# Patient Record
Sex: FEMALE | Race: WHITE | NOT HISPANIC OR LATINO | Employment: OTHER | URBAN - NONMETROPOLITAN AREA
[De-identification: names, ages, dates, MRNs, and addresses within clinical notes are randomized per-mention and may not be internally consistent; named-entity substitution may affect disease eponyms.]

---

## 2017-01-01 ENCOUNTER — OFFICE VISIT (OUTPATIENT)
Dept: CARDIAC SURGERY | Facility: CLINIC | Age: 67
End: 2017-01-01

## 2017-01-01 ENCOUNTER — LAB (OUTPATIENT)
Dept: ONCOLOGY | Facility: HOSPITAL | Age: 67
End: 2017-01-01

## 2017-01-01 ENCOUNTER — HOSPITAL ENCOUNTER (INPATIENT)
Facility: HOSPITAL | Age: 67
LOS: 4 days | Discharge: HOME | End: 2017-11-25
Attending: EMERGENCY MEDICINE | Admitting: INTERNAL MEDICINE

## 2017-01-01 ENCOUNTER — HOSPITAL ENCOUNTER (EMERGENCY)
Facility: HOSPITAL | Age: 67
Discharge: HOME | End: 2017-08-24
Attending: EMERGENCY MEDICINE | Admitting: EMERGENCY MEDICINE

## 2017-01-01 ENCOUNTER — TELEPHONE (OUTPATIENT)
Dept: ONCOLOGY | Facility: CLINIC | Age: 67
End: 2017-01-01

## 2017-01-01 ENCOUNTER — HOSPITAL ENCOUNTER (OUTPATIENT)
Dept: CT IMAGING | Facility: HOSPITAL | Age: 67
Discharge: HOME | End: 2017-09-08
Attending: INTERNAL MEDICINE | Admitting: INTERNAL MEDICINE

## 2017-01-01 ENCOUNTER — APPOINTMENT (OUTPATIENT)
Dept: CARDIOLOGY | Facility: HOSPITAL | Age: 67
End: 2017-01-01

## 2017-01-01 ENCOUNTER — APPOINTMENT (OUTPATIENT)
Dept: GENERAL RADIOLOGY | Facility: HOSPITAL | Age: 67
End: 2017-01-01

## 2017-01-01 ENCOUNTER — APPOINTMENT (OUTPATIENT)
Dept: ONCOLOGY | Facility: CLINIC | Age: 67
End: 2017-01-01

## 2017-01-01 ENCOUNTER — OFFICE VISIT (OUTPATIENT)
Dept: FAMILY MEDICINE CLINIC | Facility: CLINIC | Age: 67
End: 2017-01-01

## 2017-01-01 ENCOUNTER — OFFICE VISIT (OUTPATIENT)
Dept: ONCOLOGY | Facility: CLINIC | Age: 67
End: 2017-01-01

## 2017-01-01 ENCOUNTER — APPOINTMENT (OUTPATIENT)
Dept: CT IMAGING | Facility: HOSPITAL | Age: 67
End: 2017-01-01

## 2017-01-01 ENCOUNTER — APPOINTMENT (OUTPATIENT)
Dept: RADIATION ONCOLOGY | Facility: HOSPITAL | Age: 67
End: 2017-01-01

## 2017-01-01 ENCOUNTER — APPOINTMENT (OUTPATIENT)
Dept: NUCLEAR MEDICINE | Facility: HOSPITAL | Age: 67
End: 2017-01-01

## 2017-01-01 ENCOUNTER — LAB (OUTPATIENT)
Dept: LAB | Facility: OTHER | Age: 67
End: 2017-01-01

## 2017-01-01 ENCOUNTER — APPOINTMENT (OUTPATIENT)
Dept: CT IMAGING | Facility: HOSPITAL | Age: 67
End: 2017-01-01
Attending: INTERNAL MEDICINE

## 2017-01-01 ENCOUNTER — INFUSION (OUTPATIENT)
Dept: ONCOLOGY | Facility: HOSPITAL | Age: 67
End: 2017-01-01

## 2017-01-01 ENCOUNTER — APPOINTMENT (OUTPATIENT)
Dept: ULTRASOUND IMAGING | Facility: HOSPITAL | Age: 67
End: 2017-01-01

## 2017-01-01 ENCOUNTER — TELEPHONE (OUTPATIENT)
Dept: ONCOLOGY | Facility: HOSPITAL | Age: 67
End: 2017-01-01

## 2017-01-01 ENCOUNTER — DOCUMENTATION (OUTPATIENT)
Dept: ONCOLOGY | Facility: HOSPITAL | Age: 67
End: 2017-01-01

## 2017-01-01 ENCOUNTER — APPOINTMENT (OUTPATIENT)
Dept: ONCOLOGY | Facility: HOSPITAL | Age: 67
End: 2017-01-01

## 2017-01-01 ENCOUNTER — HOSPITAL ENCOUNTER (INPATIENT)
Facility: HOSPITAL | Age: 67
LOS: 2 days | Discharge: HOME | End: 2017-08-05
Attending: EMERGENCY MEDICINE | Admitting: FAMILY MEDICINE

## 2017-01-01 VITALS
BODY MASS INDEX: 35.13 KG/M2 | SYSTOLIC BLOOD PRESSURE: 138 MMHG | RESPIRATION RATE: 18 BRPM | RESPIRATION RATE: 18 BRPM | HEIGHT: 62 IN | SYSTOLIC BLOOD PRESSURE: 144 MMHG | TEMPERATURE: 96.9 F | BODY MASS INDEX: 35.12 KG/M2 | DIASTOLIC BLOOD PRESSURE: 75 MMHG | HEART RATE: 65 BPM | WEIGHT: 190.92 LBS | TEMPERATURE: 98.6 F | HEART RATE: 86 BPM | DIASTOLIC BLOOD PRESSURE: 76 MMHG | WEIGHT: 192 LBS

## 2017-01-01 VITALS
WEIGHT: 190.8 LBS | SYSTOLIC BLOOD PRESSURE: 130 MMHG | HEIGHT: 62 IN | DIASTOLIC BLOOD PRESSURE: 74 MMHG | HEART RATE: 64 BPM | BODY MASS INDEX: 35.11 KG/M2 | OXYGEN SATURATION: 96 %

## 2017-01-01 VITALS
DIASTOLIC BLOOD PRESSURE: 74 MMHG | OXYGEN SATURATION: 97 % | WEIGHT: 193.8 LBS | TEMPERATURE: 97.6 F | RESPIRATION RATE: 18 BRPM | SYSTOLIC BLOOD PRESSURE: 155 MMHG | HEART RATE: 68 BPM | BODY MASS INDEX: 35.66 KG/M2 | HEIGHT: 62 IN

## 2017-01-01 VITALS
WEIGHT: 187.13 LBS | OXYGEN SATURATION: 98 % | SYSTOLIC BLOOD PRESSURE: 158 MMHG | DIASTOLIC BLOOD PRESSURE: 82 MMHG | HEIGHT: 62 IN | BODY MASS INDEX: 34.44 KG/M2 | TEMPERATURE: 97.4 F | HEART RATE: 98 BPM

## 2017-01-01 VITALS
DIASTOLIC BLOOD PRESSURE: 73 MMHG | TEMPERATURE: 97.5 F | WEIGHT: 187 LBS | HEIGHT: 62 IN | RESPIRATION RATE: 18 BRPM | BODY MASS INDEX: 34.41 KG/M2 | SYSTOLIC BLOOD PRESSURE: 123 MMHG | HEART RATE: 90 BPM | OXYGEN SATURATION: 90 %

## 2017-01-01 VITALS
HEART RATE: 67 BPM | RESPIRATION RATE: 16 BRPM | BODY MASS INDEX: 34.64 KG/M2 | TEMPERATURE: 97 F | WEIGHT: 189.4 LBS | SYSTOLIC BLOOD PRESSURE: 192 MMHG | DIASTOLIC BLOOD PRESSURE: 79 MMHG

## 2017-01-01 VITALS
HEIGHT: 62 IN | SYSTOLIC BLOOD PRESSURE: 120 MMHG | OXYGEN SATURATION: 99 % | BODY MASS INDEX: 35.15 KG/M2 | DIASTOLIC BLOOD PRESSURE: 76 MMHG | WEIGHT: 191 LBS | TEMPERATURE: 97.2 F | HEART RATE: 66 BPM

## 2017-01-01 VITALS
SYSTOLIC BLOOD PRESSURE: 162 MMHG | BODY MASS INDEX: 35.31 KG/M2 | DIASTOLIC BLOOD PRESSURE: 98 MMHG | WEIGHT: 191.9 LBS | HEIGHT: 62 IN | RESPIRATION RATE: 18 BRPM | HEART RATE: 72 BPM | TEMPERATURE: 98.3 F

## 2017-01-01 VITALS
SYSTOLIC BLOOD PRESSURE: 149 MMHG | TEMPERATURE: 97.7 F | DIASTOLIC BLOOD PRESSURE: 89 MMHG | HEART RATE: 79 BPM | BODY MASS INDEX: 35.11 KG/M2 | HEIGHT: 62 IN | WEIGHT: 190.8 LBS

## 2017-01-01 VITALS
OXYGEN SATURATION: 93 % | SYSTOLIC BLOOD PRESSURE: 146 MMHG | BODY MASS INDEX: 36.44 KG/M2 | TEMPERATURE: 97.6 F | HEIGHT: 62 IN | HEART RATE: 76 BPM | WEIGHT: 198 LBS | DIASTOLIC BLOOD PRESSURE: 72 MMHG | RESPIRATION RATE: 18 BRPM

## 2017-01-01 VITALS
BODY MASS INDEX: 35.16 KG/M2 | RESPIRATION RATE: 18 BRPM | HEART RATE: 70 BPM | DIASTOLIC BLOOD PRESSURE: 90 MMHG | SYSTOLIC BLOOD PRESSURE: 165 MMHG | WEIGHT: 192.24 LBS | TEMPERATURE: 98.9 F

## 2017-01-01 DIAGNOSIS — I10 ESSENTIAL HYPERTENSION: Chronic | ICD-10-CM

## 2017-01-01 DIAGNOSIS — C79.51 METASTASIS TO BONE (HCC): ICD-10-CM

## 2017-01-01 DIAGNOSIS — C50.412 MALIGNANT NEOPLASM OF UPPER-OUTER QUADRANT OF LEFT FEMALE BREAST (HCC): Primary | ICD-10-CM

## 2017-01-01 DIAGNOSIS — T45.1X5A PANCYTOPENIA DUE TO ANTINEOPLASTIC CHEMOTHERAPY (HCC): ICD-10-CM

## 2017-01-01 DIAGNOSIS — D61.810 PANCYTOPENIA DUE TO ANTINEOPLASTIC CHEMOTHERAPY (HCC): ICD-10-CM

## 2017-01-01 DIAGNOSIS — Z17.0 MALIGNANT NEOPLASM OF UPPER-OUTER QUADRANT OF LEFT BREAST IN FEMALE, ESTROGEN RECEPTOR POSITIVE (HCC): ICD-10-CM

## 2017-01-01 DIAGNOSIS — D61.818 PANCYTOPENIA (HCC): ICD-10-CM

## 2017-01-01 DIAGNOSIS — C50.412 MALIGNANT NEOPLASM OF UPPER-OUTER QUADRANT OF LEFT FEMALE BREAST, UNSPECIFIED ESTROGEN RECEPTOR STATUS (HCC): ICD-10-CM

## 2017-01-01 DIAGNOSIS — M79.605 BILATERAL LOWER EXTREMITY PAIN: ICD-10-CM

## 2017-01-01 DIAGNOSIS — C50.412 MALIGNANT NEOPLASM OF UPPER-OUTER QUADRANT OF LEFT FEMALE BREAST (HCC): ICD-10-CM

## 2017-01-01 DIAGNOSIS — A41.9 SEPSIS, DUE TO UNSPECIFIED ORGANISM: Primary | ICD-10-CM

## 2017-01-01 DIAGNOSIS — C50.412 MALIGNANT NEOPLASM OF UPPER-OUTER QUADRANT OF LEFT BREAST IN FEMALE, ESTROGEN RECEPTOR POSITIVE (HCC): ICD-10-CM

## 2017-01-01 DIAGNOSIS — Z17.0 MALIGNANT NEOPLASM OF UPPER-OUTER QUADRANT OF LEFT BREAST IN FEMALE, ESTROGEN RECEPTOR POSITIVE (HCC): Primary | ICD-10-CM

## 2017-01-01 DIAGNOSIS — F41.9 ANXIETY: Chronic | ICD-10-CM

## 2017-01-01 DIAGNOSIS — C79.51 METASTASIS TO BONE (HCC): Primary | ICD-10-CM

## 2017-01-01 DIAGNOSIS — C50.919 MALIGNANT NEOPLASM OF FEMALE BREAST, UNSPECIFIED ESTROGEN RECEPTOR STATUS, UNSPECIFIED LATERALITY, UNSPECIFIED SITE OF BREAST (HCC): ICD-10-CM

## 2017-01-01 DIAGNOSIS — E87.6 HYPOKALEMIA: ICD-10-CM

## 2017-01-01 DIAGNOSIS — I48.0 PAROXYSMAL ATRIAL FIBRILLATION (HCC): ICD-10-CM

## 2017-01-01 DIAGNOSIS — K21.00 REFLUX ESOPHAGITIS: Chronic | ICD-10-CM

## 2017-01-01 DIAGNOSIS — Z85.3 HISTORY OF BREAST CANCER: Chronic | ICD-10-CM

## 2017-01-01 DIAGNOSIS — M79.604 BILATERAL LOWER EXTREMITY PAIN: ICD-10-CM

## 2017-01-01 DIAGNOSIS — A41.9 SEPSIS SECONDARY TO UTI (HCC): ICD-10-CM

## 2017-01-01 DIAGNOSIS — E87.1 HYPONATREMIA: ICD-10-CM

## 2017-01-01 DIAGNOSIS — G89.29 CHRONIC MIDLINE THORACIC BACK PAIN: Primary | ICD-10-CM

## 2017-01-01 DIAGNOSIS — E78.5 HYPERLIPIDEMIA, UNSPECIFIED: Primary | Chronic | ICD-10-CM

## 2017-01-01 DIAGNOSIS — C50.412 MALIGNANT NEOPLASM OF UPPER-OUTER QUADRANT OF LEFT BREAST IN FEMALE, ESTROGEN RECEPTOR POSITIVE (HCC): Primary | ICD-10-CM

## 2017-01-01 DIAGNOSIS — I44.0 AV BLOCK, 1ST DEGREE: ICD-10-CM

## 2017-01-01 DIAGNOSIS — Z79.01 CURRENT USE OF LONG TERM ANTICOAGULATION: Primary | ICD-10-CM

## 2017-01-01 DIAGNOSIS — R11.0 NAUSEA: Primary | ICD-10-CM

## 2017-01-01 DIAGNOSIS — Z74.09 IMPAIRED MOBILITY: ICD-10-CM

## 2017-01-01 DIAGNOSIS — M54.6 CHRONIC MIDLINE THORACIC BACK PAIN: Primary | ICD-10-CM

## 2017-01-01 DIAGNOSIS — C79.51 BONE METASTASIS: ICD-10-CM

## 2017-01-01 DIAGNOSIS — E78.01 FAMILIAL HYPERCHOLESTEROLEMIA: Chronic | ICD-10-CM

## 2017-01-01 DIAGNOSIS — N39.0 SEPSIS SECONDARY TO UTI (HCC): ICD-10-CM

## 2017-01-01 DIAGNOSIS — R06.09 DYSPNEA ON EXERTION: Primary | ICD-10-CM

## 2017-01-01 LAB
ALBUMIN SERPL-MCNC: 2.9 G/DL (ref 3.4–4.8)
ALBUMIN SERPL-MCNC: 3.1 G/DL (ref 3.4–4.8)
ALBUMIN SERPL-MCNC: 3.2 G/DL (ref 3.4–4.8)
ALBUMIN SERPL-MCNC: 3.2 G/DL (ref 3.4–4.8)
ALBUMIN SERPL-MCNC: 3.7 G/DL (ref 3.2–5.5)
ALBUMIN SERPL-MCNC: 3.7 G/DL (ref 3.4–4.8)
ALBUMIN SERPL-MCNC: 3.8 G/DL (ref 3.4–4.8)
ALBUMIN SERPL-MCNC: 3.9 G/DL (ref 3.4–4.8)
ALBUMIN SERPL-MCNC: 4 G/DL (ref 3.4–4.8)
ALBUMIN SERPL-MCNC: 4.1 G/DL (ref 3.4–4.8)
ALBUMIN SERPL-MCNC: 4.3 G/DL (ref 3.4–4.8)
ALBUMIN/GLOB SERPL: 0.9 G/DL (ref 1.1–1.8)
ALBUMIN/GLOB SERPL: 0.9 G/DL (ref 1–3)
ALBUMIN/GLOB SERPL: 1 G/DL (ref 1.1–1.8)
ALBUMIN/GLOB SERPL: 1 G/DL (ref 1.1–1.8)
ALBUMIN/GLOB SERPL: 1.1 G/DL (ref 1.1–1.8)
ALBUMIN/GLOB SERPL: 1.2 G/DL (ref 1.1–1.8)
ALBUMIN/GLOB SERPL: 1.3 G/DL (ref 1.1–1.8)
ALP SERPL-CCNC: 34 U/L (ref 38–126)
ALP SERPL-CCNC: 38 U/L (ref 38–126)
ALP SERPL-CCNC: 39 U/L (ref 38–126)
ALP SERPL-CCNC: 40 U/L (ref 38–126)
ALP SERPL-CCNC: 47 U/L (ref 15–121)
ALP SERPL-CCNC: 47 U/L (ref 38–126)
ALP SERPL-CCNC: 47 U/L (ref 38–126)
ALP SERPL-CCNC: 49 U/L (ref 38–126)
ALP SERPL-CCNC: 50 U/L (ref 38–126)
ALP SERPL-CCNC: 50 U/L (ref 38–126)
ALP SERPL-CCNC: 52 U/L (ref 38–126)
ALP SERPL-CCNC: 57 U/L (ref 38–126)
ALP SERPL-CCNC: 57 U/L (ref 38–126)
ALP SERPL-CCNC: 58 U/L (ref 38–126)
ALP SERPL-CCNC: 63 U/L (ref 38–126)
ALT SERPL W P-5'-P-CCNC: 14 U/L (ref 9–52)
ALT SERPL W P-5'-P-CCNC: 19 U/L (ref 10–60)
ALT SERPL W P-5'-P-CCNC: 21 U/L (ref 9–52)
ALT SERPL W P-5'-P-CCNC: 21 U/L (ref 9–52)
ALT SERPL W P-5'-P-CCNC: 22 U/L (ref 9–52)
ALT SERPL W P-5'-P-CCNC: 22 U/L (ref 9–52)
ALT SERPL W P-5'-P-CCNC: 23 U/L (ref 9–52)
ALT SERPL W P-5'-P-CCNC: 24 U/L (ref 9–52)
ALT SERPL W P-5'-P-CCNC: 25 U/L (ref 9–52)
ALT SERPL W P-5'-P-CCNC: 25 U/L (ref 9–52)
ALT SERPL W P-5'-P-CCNC: 27 U/L (ref 9–52)
ALT SERPL W P-5'-P-CCNC: 28 U/L (ref 9–52)
ALT SERPL W P-5'-P-CCNC: 29 U/L (ref 9–52)
ALT SERPL W P-5'-P-CCNC: 31 U/L (ref 9–52)
ALT SERPL W P-5'-P-CCNC: 36 U/L (ref 9–52)
ANION GAP SERPL CALCULATED.3IONS-SCNC: 10 MMOL/L (ref 5–15)
ANION GAP SERPL CALCULATED.3IONS-SCNC: 10 MMOL/L (ref 5–15)
ANION GAP SERPL CALCULATED.3IONS-SCNC: 11 MMOL/L (ref 5–15)
ANION GAP SERPL CALCULATED.3IONS-SCNC: 11 MMOL/L (ref 5–15)
ANION GAP SERPL CALCULATED.3IONS-SCNC: 12 MMOL/L (ref 5–15)
ANION GAP SERPL CALCULATED.3IONS-SCNC: 13 MMOL/L (ref 5–15)
ANION GAP SERPL CALCULATED.3IONS-SCNC: 13 MMOL/L (ref 5–15)
ANION GAP SERPL CALCULATED.3IONS-SCNC: 14 MMOL/L (ref 5–15)
ANION GAP SERPL CALCULATED.3IONS-SCNC: 5 MMOL/L (ref 5–15)
ANION GAP SERPL CALCULATED.3IONS-SCNC: 6 MMOL/L (ref 5–15)
ANION GAP SERPL CALCULATED.3IONS-SCNC: 7 MMOL/L (ref 5–15)
ANION GAP SERPL CALCULATED.3IONS-SCNC: 7 MMOL/L (ref 5–15)
ANION GAP SERPL CALCULATED.3IONS-SCNC: 8 MMOL/L (ref 5–15)
ANION GAP SERPL CALCULATED.3IONS-SCNC: 8 MMOL/L (ref 5–15)
ANION GAP SERPL CALCULATED.3IONS-SCNC: 9 MMOL/L (ref 5–15)
ANISOCYTOSIS BLD QL: ABNORMAL
ANISOCYTOSIS BLD QL: NORMAL
APTT PPP: 26.5 SECONDS (ref 20–40.3)
APTT PPP: 29.7 SECONDS (ref 20–40.3)
AST SERPL-CCNC: 26 U/L (ref 14–36)
AST SERPL-CCNC: 29 U/L (ref 14–36)
AST SERPL-CCNC: 29 U/L (ref 14–36)
AST SERPL-CCNC: 30 U/L (ref 14–36)
AST SERPL-CCNC: 32 U/L (ref 14–36)
AST SERPL-CCNC: 37 U/L (ref 10–60)
AST SERPL-CCNC: 42 U/L (ref 14–36)
AST SERPL-CCNC: 43 U/L (ref 14–36)
AST SERPL-CCNC: 44 U/L (ref 14–36)
AST SERPL-CCNC: 46 U/L (ref 14–36)
AST SERPL-CCNC: 48 U/L (ref 14–36)
AST SERPL-CCNC: 49 U/L (ref 14–36)
AST SERPL-CCNC: 49 U/L (ref 14–36)
AST SERPL-CCNC: 58 U/L (ref 14–36)
AST SERPL-CCNC: 82 U/L (ref 14–36)
BACTERIA BLD CULT: ABNORMAL
BACTERIA SPEC AEROBE CULT: ABNORMAL
BACTERIA SPEC AEROBE CULT: NORMAL
BACTERIA UR QL AUTO: ABNORMAL /HPF
BASO STIPL COARSE BLD QL SMEAR: ABNORMAL
BASO STIPL COARSE BLD QL SMEAR: NORMAL
BASOPHILS # BLD AUTO: 0.03 10*3/MM3 (ref 0–0.2)
BASOPHILS # BLD AUTO: 0.03 10*3/MM3 (ref 0–0.2)
BASOPHILS # BLD AUTO: 0.05 10*3/MM3 (ref 0–0.2)
BASOPHILS # BLD AUTO: 0.06 10*3/MM3 (ref 0–0.2)
BASOPHILS # BLD AUTO: 0.07 10*3/MM3 (ref 0–0.2)
BASOPHILS # BLD AUTO: 0.07 10*3/MM3 (ref 0–0.2)
BASOPHILS # BLD AUTO: 0.08 10*3/MM3 (ref 0–0.2)
BASOPHILS # BLD AUTO: 0.1 10*3/MM3 (ref 0–0.2)
BASOPHILS # BLD AUTO: 0.12 10*3/MM3 (ref 0–0.2)
BASOPHILS # BLD AUTO: 0.13 10*3/MM3 (ref 0–0.2)
BASOPHILS # BLD AUTO: 0.14 10*3/MM3 (ref 0–0.2)
BASOPHILS # BLD AUTO: 0.14 10*3/MM3 (ref 0–0.2)
BASOPHILS # BLD MANUAL: 0.02 10*3/MM3 (ref 0–0.2)
BASOPHILS # BLD MANUAL: 0.02 10*3/MM3 (ref 0–0.2)
BASOPHILS # BLD MANUAL: 0.03 10*3/MM3 (ref 0–0.2)
BASOPHILS # BLD MANUAL: 0.11 10*3/MM3 (ref 0–0.2)
BASOPHILS NFR BLD AUTO: 1 % (ref 0–2)
BASOPHILS NFR BLD AUTO: 1.6 % (ref 0–2)
BASOPHILS NFR BLD AUTO: 2 % (ref 0–2)
BASOPHILS NFR BLD AUTO: 2.1 % (ref 0–2)
BASOPHILS NFR BLD AUTO: 2.2 % (ref 0–2)
BASOPHILS NFR BLD AUTO: 2.4 % (ref 0–2)
BASOPHILS NFR BLD AUTO: 2.8 % (ref 0–2)
BASOPHILS NFR BLD AUTO: 2.8 % (ref 0–2)
BASOPHILS NFR BLD AUTO: 2.9 % (ref 0–2)
BASOPHILS NFR BLD AUTO: 3.2 % (ref 0–2)
BASOPHILS NFR BLD AUTO: 3.2 % (ref 0–2)
BASOPHILS NFR BLD AUTO: 3.3 % (ref 0–2)
BASOPHILS NFR BLD AUTO: 3.3 % (ref 0–2)
BASOPHILS NFR BLD AUTO: 4 % (ref 0–2)
BH CV ECHO MEAS - ACS: 2.2 CM
BH CV ECHO MEAS - AO MAX PG (FULL): 4.5 MMHG
BH CV ECHO MEAS - AO MAX PG: 8.1 MMHG
BH CV ECHO MEAS - AO MEAN PG (FULL): 2.2 MMHG
BH CV ECHO MEAS - AO MEAN PG: 4.2 MMHG
BH CV ECHO MEAS - AO ROOT AREA: 12.8 CM^2
BH CV ECHO MEAS - AO ROOT DIAM: 4 CM
BH CV ECHO MEAS - AO V2 MAX: 142 CM/SEC
BH CV ECHO MEAS - AO V2 MEAN: 94.2 CM/SEC
BH CV ECHO MEAS - AO V2 VTI: 28.4 CM
BH CV ECHO MEAS - AVA(I,A): 3.9 CM^2
BH CV ECHO MEAS - AVA(I,D): 3.9 CM^2
BH CV ECHO MEAS - AVA(V,A): 3.1 CM^2
BH CV ECHO MEAS - AVA(V,D): 3.1 CM^2
BH CV ECHO MEAS - EDV(CUBED): 137.8 ML
BH CV ECHO MEAS - EDV(TEICH): 127.5 ML
BH CV ECHO MEAS - EF(CUBED): 83 %
BH CV ECHO MEAS - EF(TEICH): 75.6 %
BH CV ECHO MEAS - ESV(CUBED): 23.4 ML
BH CV ECHO MEAS - ESV(TEICH): 31.1 ML
BH CV ECHO MEAS - FS: 44.6 %
BH CV ECHO MEAS - IVS/LVPW: 1.1
BH CV ECHO MEAS - IVSD: 1.3 CM
BH CV ECHO MEAS - LA DIMENSION: 4.2 CM
BH CV ECHO MEAS - LA/AO: 1
BH CV ECHO MEAS - LV MASS(C)D: 267.9 GRAMS
BH CV ECHO MEAS - LV MAX PG: 3.5 MMHG
BH CV ECHO MEAS - LV MEAN PG: 2.1 MMHG
BH CV ECHO MEAS - LV V1 MAX: 94.2 CM/SEC
BH CV ECHO MEAS - LV V1 MEAN: 66.1 CM/SEC
BH CV ECHO MEAS - LV V1 VTI: 23.2 CM
BH CV ECHO MEAS - LVIDD: 5.2 CM
BH CV ECHO MEAS - LVIDS: 2.9 CM
BH CV ECHO MEAS - LVOT AREA: 4.7 CM^2
BH CV ECHO MEAS - LVOT DIAM: 2.5 CM
BH CV ECHO MEAS - LVPWD: 1.2 CM
BH CV ECHO MEAS - MR MAX PG: 109.7 MMHG
BH CV ECHO MEAS - MR MAX VEL: 523.4 CM/SEC
BH CV ECHO MEAS - MV A MAX VEL: 76.2 CM/SEC
BH CV ECHO MEAS - MV E MAX VEL: 63 CM/SEC
BH CV ECHO MEAS - MV E/A: 0.83
BH CV ECHO MEAS - MV MAX PG: 2.7 MMHG
BH CV ECHO MEAS - MV MEAN PG: 1.4 MMHG
BH CV ECHO MEAS - MV P1/2T MAX VEL: 76.4 CM/SEC
BH CV ECHO MEAS - MV V2 MAX: 82.7 CM/SEC
BH CV ECHO MEAS - MV V2 MEAN: 57 CM/SEC
BH CV ECHO MEAS - MV V2 VTI: 28.5 CM
BH CV ECHO MEAS - MVA P1/2T LCG: 2.9 CM^2
BH CV ECHO MEAS - MVA(VTI): 3.9 CM^2
BH CV ECHO MEAS - PA MAX PG: 3.6 MMHG
BH CV ECHO MEAS - PA V2 MAX: 94.3 CM/SEC
BH CV ECHO MEAS - PI END-D VEL: 44.9 CM/SEC
BH CV ECHO MEAS - RAP SYSTOLE: 5 MMHG
BH CV ECHO MEAS - RVSP: 33.2 MMHG
BH CV ECHO MEAS - SV(AO): 364.5 ML
BH CV ECHO MEAS - SV(CUBED): 114.4 ML
BH CV ECHO MEAS - SV(LVOT): 109.8 ML
BH CV ECHO MEAS - SV(TEICH): 96.4 ML
BH CV ECHO MEAS - TR MAX VEL: 265.6 CM/SEC
BH CV STRESS BP STAGE 1: NORMAL
BH CV STRESS COMMENTS STAGE 1: NORMAL
BH CV STRESS DOSE REGADENOSON STAGE 1: 0.4
BH CV STRESS DURATION MIN STAGE 1: 0
BH CV STRESS DURATION SEC STAGE 1: 10
BH CV STRESS HR STAGE 1: 61
BH CV STRESS PROTOCOL 1: NORMAL
BH CV STRESS RECOVERY BP: NORMAL MMHG
BH CV STRESS RECOVERY HR: 86 BPM
BH CV STRESS STAGE 1: 1
BILIRUB SERPL-MCNC: 0.3 MG/DL (ref 0.2–1.3)
BILIRUB SERPL-MCNC: 0.4 MG/DL (ref 0.2–1.3)
BILIRUB SERPL-MCNC: 0.5 MG/DL (ref 0.2–1)
BILIRUB SERPL-MCNC: 0.5 MG/DL (ref 0.2–1.3)
BILIRUB SERPL-MCNC: 0.6 MG/DL (ref 0.2–1.3)
BILIRUB SERPL-MCNC: 0.7 MG/DL (ref 0.2–1.3)
BILIRUB UR QL STRIP: NEGATIVE
BUN BLD-MCNC: 10 MG/DL (ref 7–21)
BUN BLD-MCNC: 10 MG/DL (ref 7–21)
BUN BLD-MCNC: 11 MG/DL (ref 7–21)
BUN BLD-MCNC: 11 MG/DL (ref 7–21)
BUN BLD-MCNC: 12 MG/DL (ref 7–21)
BUN BLD-MCNC: 12 MG/DL (ref 7–21)
BUN BLD-MCNC: 14 MG/DL (ref 7–21)
BUN BLD-MCNC: 15 MG/DL (ref 7–21)
BUN BLD-MCNC: 17 MG/DL (ref 7–21)
BUN BLD-MCNC: 17 MG/DL (ref 7–21)
BUN BLD-MCNC: 18 MG/DL (ref 7–21)
BUN BLD-MCNC: 18 MG/DL (ref 7–21)
BUN BLD-MCNC: 19 MG/DL (ref 8–25)
BUN BLD-MCNC: 23 MG/DL (ref 7–21)
BUN BLD-MCNC: 24 MG/DL (ref 7–21)
BUN BLD-MCNC: 25 MG/DL (ref 7–21)
BUN BLD-MCNC: 25 MG/DL (ref 7–21)
BUN BLD-MCNC: 9 MG/DL (ref 7–21)
BUN/CREAT SERPL: 10 (ref 7–25)
BUN/CREAT SERPL: 10.5 (ref 7–25)
BUN/CREAT SERPL: 11.5 (ref 7–25)
BUN/CREAT SERPL: 11.6 (ref 7–25)
BUN/CREAT SERPL: 12.9 (ref 7–25)
BUN/CREAT SERPL: 12.9 (ref 7–25)
BUN/CREAT SERPL: 13.1 (ref 7–25)
BUN/CREAT SERPL: 14 (ref 7–25)
BUN/CREAT SERPL: 14.9 (ref 7–25)
BUN/CREAT SERPL: 16.1 (ref 7–25)
BUN/CREAT SERPL: 16.5 (ref 7–25)
BUN/CREAT SERPL: 17 (ref 7–25)
BUN/CREAT SERPL: 17.9 (ref 7–25)
BUN/CREAT SERPL: 18.3 (ref 7–25)
BUN/CREAT SERPL: 19 (ref 7–25)
BUN/CREAT SERPL: 20.8 (ref 7–25)
BUN/CREAT SERPL: 21.3 (ref 7–25)
BUN/CREAT SERPL: 21.7 (ref 7–25)
BUN/CREAT SERPL: 22 (ref 7–25)
BUN/CREAT SERPL: 22.4 (ref 7–25)
BUN/CREAT SERPL: 23.4 (ref 7–25)
CALCIUM SPEC-SCNC: 10 MG/DL (ref 8.4–10.8)
CALCIUM SPEC-SCNC: 10.4 MG/DL (ref 8.4–10.2)
CALCIUM SPEC-SCNC: 7.2 MG/DL (ref 8.4–10.2)
CALCIUM SPEC-SCNC: 7.6 MG/DL (ref 8.4–10.2)
CALCIUM SPEC-SCNC: 7.9 MG/DL (ref 8.4–10.2)
CALCIUM SPEC-SCNC: 7.9 MG/DL (ref 8.4–10.2)
CALCIUM SPEC-SCNC: 8.7 MG/DL (ref 8.4–10.2)
CALCIUM SPEC-SCNC: 8.8 MG/DL (ref 8.4–10.2)
CALCIUM SPEC-SCNC: 8.9 MG/DL (ref 8.4–10.2)
CALCIUM SPEC-SCNC: 9 MG/DL (ref 8.4–10.2)
CALCIUM SPEC-SCNC: 9.2 MG/DL (ref 8.4–10.2)
CALCIUM SPEC-SCNC: 9.3 MG/DL (ref 8.4–10.2)
CALCIUM SPEC-SCNC: 9.4 MG/DL (ref 8.4–10.2)
CALCIUM SPEC-SCNC: 9.6 MG/DL (ref 8.4–10.2)
CANCER AG15-3 SERPL-ACNC: 179.4 U/ML (ref 0–25)
CANCER AG15-3 SERPL-ACNC: 187.1 U/ML (ref 0–25)
CANCER AG27-29 SERPL-ACNC: 209.4 U/ML (ref 0–38.6)
CANCER AG27-29 SERPL-ACNC: 224 U/ML (ref 0–38.6)
CANCER AG27-29 SERPL-ACNC: 243.7 U/ML (ref 0–38.6)
CANCER AG27-29 SERPL-ACNC: 277.3 U/ML (ref 0–38.6)
CANCER AG27-29 SERPL-ACNC: 320.6 U/ML (ref 0–38.6)
CANCER AG27-29 SERPL-ACNC: 327.9 U/ML (ref 0–38.6)
CHLORIDE SERPL-SCNC: 100 MMOL/L (ref 95–110)
CHLORIDE SERPL-SCNC: 101 MMOL/L (ref 95–110)
CHLORIDE SERPL-SCNC: 102 MMOL/L (ref 100–112)
CHLORIDE SERPL-SCNC: 102 MMOL/L (ref 95–110)
CHLORIDE SERPL-SCNC: 103 MMOL/L (ref 95–110)
CHLORIDE SERPL-SCNC: 103 MMOL/L (ref 95–110)
CHLORIDE SERPL-SCNC: 104 MMOL/L (ref 95–110)
CHLORIDE SERPL-SCNC: 105 MMOL/L (ref 95–110)
CHLORIDE SERPL-SCNC: 105 MMOL/L (ref 95–110)
CHLORIDE SERPL-SCNC: 106 MMOL/L (ref 95–110)
CHLORIDE SERPL-SCNC: 107 MMOL/L (ref 95–110)
CHLORIDE SERPL-SCNC: 107 MMOL/L (ref 95–110)
CHLORIDE SERPL-SCNC: 108 MMOL/L (ref 95–110)
CHLORIDE SERPL-SCNC: 109 MMOL/L (ref 95–110)
CHLORIDE SERPL-SCNC: 109 MMOL/L (ref 95–110)
CHLORIDE SERPL-SCNC: 94 MMOL/L (ref 95–110)
CHLORIDE SERPL-SCNC: 99 MMOL/L (ref 95–110)
CHOLEST SERPL-MCNC: 124 MG/DL (ref 150–200)
CK MB SERPL-CCNC: 0.76 NG/ML (ref 0–5)
CK SERPL-CCNC: 66 U/L (ref 30–135)
CLARITY UR: ABNORMAL
CLARITY UR: ABNORMAL
CLARITY UR: CLEAR
CO2 SERPL-SCNC: 20 MMOL/L (ref 22–31)
CO2 SERPL-SCNC: 21 MMOL/L (ref 22–31)
CO2 SERPL-SCNC: 22 MMOL/L (ref 22–31)
CO2 SERPL-SCNC: 23 MMOL/L (ref 22–31)
CO2 SERPL-SCNC: 24 MMOL/L (ref 22–31)
CO2 SERPL-SCNC: 25 MMOL/L (ref 22–31)
CO2 SERPL-SCNC: 26 MMOL/L (ref 22–31)
CO2 SERPL-SCNC: 26 MMOL/L (ref 22–31)
CO2 SERPL-SCNC: 27 MMOL/L (ref 20–32)
CO2 SERPL-SCNC: 27 MMOL/L (ref 22–31)
COLOR UR: YELLOW
CREAT BLD-MCNC: 0.77 MG/DL (ref 0.5–1)
CREAT BLD-MCNC: 0.78 MG/DL (ref 0.5–1)
CREAT BLD-MCNC: 0.82 MG/DL (ref 0.5–1)
CREAT BLD-MCNC: 0.84 MG/DL (ref 0.5–1)
CREAT BLD-MCNC: 0.86 MG/DL (ref 0.5–1)
CREAT BLD-MCNC: 0.87 MG/DL (ref 0.5–1)
CREAT BLD-MCNC: 0.88 MG/DL (ref 0.5–1)
CREAT BLD-MCNC: 0.9 MG/DL (ref 0.5–1)
CREAT BLD-MCNC: 0.93 MG/DL (ref 0.5–1)
CREAT BLD-MCNC: 0.94 MG/DL (ref 0.5–1)
CREAT BLD-MCNC: 0.95 MG/DL (ref 0.5–1)
CREAT BLD-MCNC: 0.96 MG/DL (ref 0.5–1)
CREAT BLD-MCNC: 1 MG/DL (ref 0.4–1.3)
CREAT BLD-MCNC: 1 MG/DL (ref 0.5–1)
CREAT BLD-MCNC: 1.03 MG/DL (ref 0.5–1)
CREAT BLD-MCNC: 1.07 MG/DL (ref 0.5–1)
CREAT BLD-MCNC: 1.08 MG/DL (ref 0.5–1)
CREAT BLD-MCNC: 1.15 MG/DL (ref 0.5–1)
CREAT BLD-MCNC: 1.2 MG/DL (ref 0.5–1)
D-DIMER, QUANTITATIVE (MAD,POW, STR): 1002 NG/ML (FEU) (ref 0–470)
D-DIMER, QUANTITATIVE (MAD,POW, STR): 3249 NG/ML (FEU) (ref 0–470)
D-LACTATE SERPL-SCNC: 1.6 MMOL/L (ref 0.5–2)
DACRYOCYTES BLD QL SMEAR: ABNORMAL
DEPRECATED RDW RBC AUTO: 67.7 FL (ref 36.4–46.3)
DEPRECATED RDW RBC AUTO: 68 FL (ref 36.4–46.3)
DEPRECATED RDW RBC AUTO: 68.1 FL (ref 36.4–46.3)
DEPRECATED RDW RBC AUTO: 69.4 FL (ref 36.4–46.3)
DEPRECATED RDW RBC AUTO: 70.1 FL (ref 36.4–46.3)
DEPRECATED RDW RBC AUTO: 70.6 FL (ref 36.4–46.3)
DEPRECATED RDW RBC AUTO: 70.6 FL (ref 36.4–46.3)
DEPRECATED RDW RBC AUTO: 71.6 FL (ref 36.4–46.3)
DEPRECATED RDW RBC AUTO: 72.2 FL (ref 36.4–46.3)
DEPRECATED RDW RBC AUTO: 72.7 FL (ref 36.4–46.3)
DEPRECATED RDW RBC AUTO: 73.3 FL (ref 36.4–46.3)
DEPRECATED RDW RBC AUTO: 73.3 FL (ref 36.4–46.3)
DEPRECATED RDW RBC AUTO: 73.5 FL (ref 36.4–46.3)
DEPRECATED RDW RBC AUTO: 73.7 FL (ref 36.4–46.3)
DEPRECATED RDW RBC AUTO: 74.7 FL (ref 36.4–46.3)
DEPRECATED RDW RBC AUTO: 75.3 FL (ref 36.4–46.3)
DEPRECATED RDW RBC AUTO: 75.8 FL (ref 36.4–46.3)
DEPRECATED RDW RBC AUTO: 77.2 FL (ref 36.4–46.3)
DEPRECATED RDW RBC AUTO: 78.6 FL (ref 36.4–46.3)
DEPRECATED RDW RBC AUTO: 79.5 FL (ref 36.4–46.3)
DEPRECATED RDW RBC AUTO: 82.7 FL (ref 36.4–46.3)
DEPRECATED RDW RBC AUTO: 82.8 FL (ref 36.4–46.3)
DIGOXIN SERPL-MCNC: 0.7 NG/ML (ref 0.8–2)
DIGOXIN SERPL-MCNC: 0.8 NG/ML (ref 0.8–2)
DIGOXIN SERPL-MCNC: 1.2 NG/ML (ref 0.8–2)
EOSINOPHIL # BLD AUTO: 0.01 10*3/MM3 (ref 0–0.7)
EOSINOPHIL # BLD AUTO: 0.01 10*3/MM3 (ref 0–0.7)
EOSINOPHIL # BLD AUTO: 0.03 10*3/MM3 (ref 0–0.7)
EOSINOPHIL # BLD AUTO: 0.04 10*3/MM3 (ref 0–0.7)
EOSINOPHIL # BLD AUTO: 0.05 10*3/MM3 (ref 0–0.7)
EOSINOPHIL # BLD AUTO: 0.05 10*3/MM3 (ref 0–0.7)
EOSINOPHIL # BLD AUTO: 0.06 10*3/MM3 (ref 0–0.7)
EOSINOPHIL # BLD AUTO: 0.07 10*3/MM3 (ref 0–0.7)
EOSINOPHIL # BLD MANUAL: 0.03 10*3/MM3 (ref 0–0.7)
EOSINOPHIL # BLD MANUAL: 0.03 10*3/MM3 (ref 0–0.7)
EOSINOPHIL # BLD MANUAL: 0.05 10*3/MM3 (ref 0–0.7)
EOSINOPHIL # BLD MANUAL: 0.09 10*3/MM3 (ref 0–0.7)
EOSINOPHIL # BLD MANUAL: 0.12 10*3/MM3 (ref 0–0.7)
EOSINOPHIL NFR BLD AUTO: 0.3 % (ref 0–7)
EOSINOPHIL NFR BLD AUTO: 0.3 % (ref 0–7)
EOSINOPHIL NFR BLD AUTO: 1 % (ref 0–7)
EOSINOPHIL NFR BLD AUTO: 1.1 % (ref 0–7)
EOSINOPHIL NFR BLD AUTO: 1.3 % (ref 0–7)
EOSINOPHIL NFR BLD AUTO: 1.4 % (ref 0–7)
EOSINOPHIL NFR BLD AUTO: 1.6 % (ref 0–7)
EOSINOPHIL NFR BLD AUTO: 1.7 % (ref 0–7)
EOSINOPHIL NFR BLD AUTO: 1.7 % (ref 0–7)
EOSINOPHIL NFR BLD AUTO: 2 % (ref 0–7)
EOSINOPHIL NFR BLD AUTO: 2.2 % (ref 0–7)
EOSINOPHIL NFR BLD AUTO: 2.5 % (ref 0–7)
EOSINOPHIL NFR BLD MANUAL: 1 % (ref 0–7)
EOSINOPHIL NFR BLD MANUAL: 1 % (ref 0–7)
EOSINOPHIL NFR BLD MANUAL: 2 % (ref 0–7)
EOSINOPHIL NFR BLD MANUAL: 2 % (ref 0–7)
EOSINOPHIL NFR BLD MANUAL: 4 % (ref 0–7)
ERYTHROCYTE [DISTWIDTH] IN BLOOD BY AUTOMATED COUNT: 18.5 % (ref 11.5–14.5)
ERYTHROCYTE [DISTWIDTH] IN BLOOD BY AUTOMATED COUNT: 18.6 % (ref 11.5–14.5)
ERYTHROCYTE [DISTWIDTH] IN BLOOD BY AUTOMATED COUNT: 18.7 % (ref 11.5–14.5)
ERYTHROCYTE [DISTWIDTH] IN BLOOD BY AUTOMATED COUNT: 18.8 % (ref 11.5–14.5)
ERYTHROCYTE [DISTWIDTH] IN BLOOD BY AUTOMATED COUNT: 18.9 % (ref 11.5–14.5)
ERYTHROCYTE [DISTWIDTH] IN BLOOD BY AUTOMATED COUNT: 19.2 % (ref 11.5–14.5)
ERYTHROCYTE [DISTWIDTH] IN BLOOD BY AUTOMATED COUNT: 19.7 % (ref 11.5–14.5)
ERYTHROCYTE [DISTWIDTH] IN BLOOD BY AUTOMATED COUNT: 19.8 % (ref 11.5–14.5)
ERYTHROCYTE [DISTWIDTH] IN BLOOD BY AUTOMATED COUNT: 19.8 % (ref 11.5–14.5)
ERYTHROCYTE [DISTWIDTH] IN BLOOD BY AUTOMATED COUNT: 20 % (ref 11.5–14.5)
ERYTHROCYTE [DISTWIDTH] IN BLOOD BY AUTOMATED COUNT: 20 % (ref 11.5–14.5)
ERYTHROCYTE [DISTWIDTH] IN BLOOD BY AUTOMATED COUNT: 20.1 % (ref 11.5–14.5)
ERYTHROCYTE [DISTWIDTH] IN BLOOD BY AUTOMATED COUNT: 20.5 % (ref 11.5–14.5)
ERYTHROCYTE [DISTWIDTH] IN BLOOD BY AUTOMATED COUNT: 20.7 % (ref 11.5–14.5)
ERYTHROCYTE [DISTWIDTH] IN BLOOD BY AUTOMATED COUNT: 20.9 % (ref 11.5–14.5)
ERYTHROCYTE [DISTWIDTH] IN BLOOD BY AUTOMATED COUNT: 21 % (ref 11.5–14.5)
ERYTHROCYTE [DISTWIDTH] IN BLOOD BY AUTOMATED COUNT: 21 % (ref 11.5–14.5)
ERYTHROCYTE [DISTWIDTH] IN BLOOD BY AUTOMATED COUNT: 21.4 % (ref 11.5–14.5)
ERYTHROCYTE [DISTWIDTH] IN BLOOD BY AUTOMATED COUNT: 21.9 % (ref 11.5–14.5)
ERYTHROCYTE [DISTWIDTH] IN BLOOD BY AUTOMATED COUNT: 22 % (ref 11.5–14.5)
ERYTHROCYTE [DISTWIDTH] IN BLOOD BY AUTOMATED COUNT: 22.2 % (ref 11.5–14.5)
ERYTHROCYTE [DISTWIDTH] IN BLOOD BY AUTOMATED COUNT: 22.4 % (ref 11.5–14.5)
FLUAV AG NPH QL: NEGATIVE
FLUBV AG NPH QL IA: NEGATIVE
GFR SERPL CREATININE-BSD FRML MDRD: 45 ML/MIN/1.73 (ref 60–104)
GFR SERPL CREATININE-BSD FRML MDRD: 47 ML/MIN/1.73
GFR SERPL CREATININE-BSD FRML MDRD: 50 ML/MIN/1.73 (ref 45–104)
GFR SERPL CREATININE-BSD FRML MDRD: 51 ML/MIN/1.73 (ref 45–104)
GFR SERPL CREATININE-BSD FRML MDRD: 53 ML/MIN/1.73 (ref 60–104)
GFR SERPL CREATININE-BSD FRML MDRD: 55 ML/MIN/1.73 (ref 45–104)
GFR SERPL CREATININE-BSD FRML MDRD: 55 ML/MIN/1.73 (ref 60–104)
GFR SERPL CREATININE-BSD FRML MDRD: 58 ML/MIN/1.73 (ref 45–104)
GFR SERPL CREATININE-BSD FRML MDRD: 58 ML/MIN/1.73 (ref 45–104)
GFR SERPL CREATININE-BSD FRML MDRD: 59 ML/MIN/1.73 (ref 45–104)
GFR SERPL CREATININE-BSD FRML MDRD: 60 ML/MIN/1.73 (ref 45–104)
GFR SERPL CREATININE-BSD FRML MDRD: 60 ML/MIN/1.73 (ref 60–104)
GFR SERPL CREATININE-BSD FRML MDRD: 60 ML/MIN/1.73 (ref 60–104)
GFR SERPL CREATININE-BSD FRML MDRD: 62 ML/MIN/1.73 (ref 45–104)
GFR SERPL CREATININE-BSD FRML MDRD: 64 ML/MIN/1.73 (ref 45–104)
GFR SERPL CREATININE-BSD FRML MDRD: 65 ML/MIN/1.73 (ref 45–104)
GFR SERPL CREATININE-BSD FRML MDRD: 65 ML/MIN/1.73 (ref 45–104)
GFR SERPL CREATININE-BSD FRML MDRD: 67 ML/MIN/1.73 (ref 45–104)
GFR SERPL CREATININE-BSD FRML MDRD: 69 ML/MIN/1.73 (ref 60–104)
GFR SERPL CREATININE-BSD FRML MDRD: 73 ML/MIN/1.73 (ref 60–104)
GFR SERPL CREATININE-BSD FRML MDRD: 75 ML/MIN/1.73 (ref 60–104)
GLOBULIN UR ELPH-MCNC: 2.9 GM/DL (ref 2.3–3.5)
GLOBULIN UR ELPH-MCNC: 3.2 GM/DL (ref 2.3–3.5)
GLOBULIN UR ELPH-MCNC: 3.3 GM/DL (ref 2.3–3.5)
GLOBULIN UR ELPH-MCNC: 3.3 GM/DL (ref 2.3–3.5)
GLOBULIN UR ELPH-MCNC: 3.4 GM/DL (ref 2.3–3.5)
GLOBULIN UR ELPH-MCNC: 3.5 GM/DL (ref 2.3–3.5)
GLOBULIN UR ELPH-MCNC: 3.5 GM/DL (ref 2.3–3.5)
GLOBULIN UR ELPH-MCNC: 3.6 GM/DL (ref 2.3–3.5)
GLOBULIN UR ELPH-MCNC: 3.7 GM/DL (ref 2.3–3.5)
GLOBULIN UR ELPH-MCNC: 4.1 GM/DL (ref 2.5–4.6)
GLUCOSE BLD-MCNC: 100 MG/DL (ref 60–100)
GLUCOSE BLD-MCNC: 101 MG/DL (ref 60–100)
GLUCOSE BLD-MCNC: 102 MG/DL (ref 70–100)
GLUCOSE BLD-MCNC: 103 MG/DL (ref 60–100)
GLUCOSE BLD-MCNC: 105 MG/DL (ref 60–100)
GLUCOSE BLD-MCNC: 105 MG/DL (ref 60–100)
GLUCOSE BLD-MCNC: 107 MG/DL (ref 60–100)
GLUCOSE BLD-MCNC: 107 MG/DL (ref 60–100)
GLUCOSE BLD-MCNC: 110 MG/DL (ref 60–100)
GLUCOSE BLD-MCNC: 111 MG/DL (ref 60–100)
GLUCOSE BLD-MCNC: 120 MG/DL (ref 60–100)
GLUCOSE BLD-MCNC: 83 MG/DL (ref 60–100)
GLUCOSE BLD-MCNC: 89 MG/DL (ref 60–100)
GLUCOSE BLD-MCNC: 90 MG/DL (ref 60–100)
GLUCOSE BLD-MCNC: 90 MG/DL (ref 60–100)
GLUCOSE BLD-MCNC: 94 MG/DL (ref 60–100)
GLUCOSE BLD-MCNC: 95 MG/DL (ref 60–100)
GLUCOSE BLD-MCNC: 95 MG/DL (ref 60–100)
GLUCOSE BLD-MCNC: 96 MG/DL (ref 60–100)
GLUCOSE BLD-MCNC: 98 MG/DL (ref 60–100)
GLUCOSE BLD-MCNC: 99 MG/DL (ref 60–100)
GLUCOSE UR STRIP-MCNC: NEGATIVE MG/DL
GRAM STN SPEC: ABNORMAL
HCT VFR BLD AUTO: 23.8 % (ref 35–45)
HCT VFR BLD AUTO: 24 % (ref 35–45)
HCT VFR BLD AUTO: 25 % (ref 35–45)
HCT VFR BLD AUTO: 25.5 % (ref 35–45)
HCT VFR BLD AUTO: 26.7 % (ref 35–45)
HCT VFR BLD AUTO: 27.1 % (ref 35–45)
HCT VFR BLD AUTO: 28 % (ref 35–45)
HCT VFR BLD AUTO: 28 % (ref 35–45)
HCT VFR BLD AUTO: 28.3 % (ref 35–45)
HCT VFR BLD AUTO: 28.7 % (ref 35–45)
HCT VFR BLD AUTO: 28.8 % (ref 35–45)
HCT VFR BLD AUTO: 29.1 % (ref 35–45)
HCT VFR BLD AUTO: 29.4 % (ref 35–45)
HCT VFR BLD AUTO: 29.6 % (ref 35–45)
HCT VFR BLD AUTO: 29.9 % (ref 35–45)
HCT VFR BLD AUTO: 30 % (ref 35–45)
HCT VFR BLD AUTO: 30.1 % (ref 35–45)
HCT VFR BLD AUTO: 30.8 % (ref 35–45)
HCT VFR BLD AUTO: 31.3 % (ref 35–45)
HCT VFR BLD AUTO: 31.7 % (ref 35–45)
HCT VFR BLD AUTO: 31.7 % (ref 35–45)
HCT VFR BLD AUTO: 32.5 % (ref 35–45)
HCT VFR BLD AUTO: 36.6 % (ref 35–45)
HDLC SERPL-MCNC: 30 MG/DL (ref 35–100)
HGB BLD-MCNC: 10 G/DL (ref 12–15.5)
HGB BLD-MCNC: 10.1 G/DL (ref 12–15.5)
HGB BLD-MCNC: 10.2 G/DL (ref 12–15.5)
HGB BLD-MCNC: 10.3 G/DL (ref 12–15.5)
HGB BLD-MCNC: 10.4 G/DL (ref 12–15.5)
HGB BLD-MCNC: 10.4 G/DL (ref 12–15.5)
HGB BLD-MCNC: 12.3 G/DL (ref 12–15.5)
HGB BLD-MCNC: 7.8 G/DL (ref 12–15.5)
HGB BLD-MCNC: 8 G/DL (ref 12–15.5)
HGB BLD-MCNC: 8 G/DL (ref 12–15.5)
HGB BLD-MCNC: 8.5 G/DL (ref 12–15.5)
HGB BLD-MCNC: 8.6 G/DL (ref 12–15.5)
HGB BLD-MCNC: 8.8 G/DL (ref 12–15.5)
HGB BLD-MCNC: 9.1 G/DL (ref 12–15.5)
HGB BLD-MCNC: 9.2 G/DL (ref 12–15.5)
HGB BLD-MCNC: 9.2 G/DL (ref 12–15.5)
HGB BLD-MCNC: 9.3 G/DL (ref 12–15.5)
HGB BLD-MCNC: 9.3 G/DL (ref 12–15.5)
HGB BLD-MCNC: 9.5 G/DL (ref 12–15.5)
HGB BLD-MCNC: 9.6 G/DL (ref 12–15.5)
HGB BLD-MCNC: 9.7 G/DL (ref 12–15.5)
HGB BLD-MCNC: 9.8 G/DL (ref 12–15.5)
HGB BLD-MCNC: 9.8 G/DL (ref 12–15.5)
HGB UR QL STRIP.AUTO: ABNORMAL
HOLD SPECIMEN: NORMAL
HYALINE CASTS UR QL AUTO: ABNORMAL /LPF
HYPOCHROMIA BLD QL: ABNORMAL
HYPOCHROMIA BLD QL: NORMAL
IMM GRANULOCYTES # BLD: 0 10*3/MM3 (ref 0–0.02)
IMM GRANULOCYTES # BLD: 0.01 10*3/MM3 (ref 0–0.02)
IMM GRANULOCYTES NFR BLD: 0 % (ref 0–0.5)
IMM GRANULOCYTES NFR BLD: 0.2 % (ref 0–0.5)
IMM GRANULOCYTES NFR BLD: 0.3 % (ref 0–0.5)
INR PPP: 1.09 (ref 0.8–1.2)
INR PPP: 1.45 (ref 0.8–1.2)
ISOLATED FROM: ABNORMAL
KETONES UR QL STRIP: NEGATIVE
LDLC SERPL CALC-MCNC: 63 MG/DL
LDLC/HDLC SERPL: 2.09 {RATIO}
LEUKOCYTE ESTERASE UR QL STRIP.AUTO: ABNORMAL
LV EF NUC BP: 68 %
LYMPHOCYTES # BLD AUTO: 0.5 10*3/MM3 (ref 0.6–4.2)
LYMPHOCYTES # BLD AUTO: 0.92 10*3/MM3 (ref 0.6–4.2)
LYMPHOCYTES # BLD AUTO: 0.92 10*3/MM3 (ref 0.6–4.2)
LYMPHOCYTES # BLD AUTO: 0.96 10*3/MM3 (ref 0.6–4.2)
LYMPHOCYTES # BLD AUTO: 1.34 10*3/MM3 (ref 0.6–4.2)
LYMPHOCYTES # BLD AUTO: 1.48 10*3/MM3 (ref 0.6–4.2)
LYMPHOCYTES # BLD AUTO: 1.49 10*3/MM3 (ref 0.6–4.2)
LYMPHOCYTES # BLD AUTO: 1.64 10*3/MM3 (ref 0.6–4.2)
LYMPHOCYTES # BLD AUTO: 1.76 10*3/MM3 (ref 0.6–4.2)
LYMPHOCYTES # BLD AUTO: 1.76 10*3/MM3 (ref 0.6–4.2)
LYMPHOCYTES # BLD AUTO: 1.77 10*3/MM3 (ref 0.6–4.2)
LYMPHOCYTES # BLD AUTO: 1.83 10*3/MM3 (ref 0.6–4.2)
LYMPHOCYTES # BLD AUTO: 1.85 10*3/MM3 (ref 0.6–4.2)
LYMPHOCYTES # BLD AUTO: 1.92 10*3/MM3 (ref 0.6–4.2)
LYMPHOCYTES # BLD AUTO: 2.04 10*3/MM3 (ref 0.6–4.2)
LYMPHOCYTES # BLD AUTO: 2.1 10*3/MM3 (ref 0.6–4.2)
LYMPHOCYTES # BLD MANUAL: 1.28 10*3/MM3 (ref 0.6–4.2)
LYMPHOCYTES # BLD MANUAL: 1.55 10*3/MM3 (ref 0.6–4.2)
LYMPHOCYTES # BLD MANUAL: 1.55 10*3/MM3 (ref 0.6–4.2)
LYMPHOCYTES # BLD MANUAL: 1.73 10*3/MM3 (ref 0.6–4.2)
LYMPHOCYTES # BLD MANUAL: 1.77 10*3/MM3 (ref 0.6–4.2)
LYMPHOCYTES # BLD MANUAL: 1.81 10*3/MM3 (ref 0.6–4.2)
LYMPHOCYTES # BLD MANUAL: 1.87 10*3/MM3 (ref 0.6–4.2)
LYMPHOCYTES # BLD MANUAL: 2.07 10*3/MM3 (ref 0.6–4.2)
LYMPHOCYTES NFR BLD AUTO: 16 % (ref 10–50)
LYMPHOCYTES NFR BLD AUTO: 30.6 % (ref 10–50)
LYMPHOCYTES NFR BLD AUTO: 38 % (ref 10–50)
LYMPHOCYTES NFR BLD AUTO: 38 % (ref 10–50)
LYMPHOCYTES NFR BLD AUTO: 39 % (ref 10–50)
LYMPHOCYTES NFR BLD AUTO: 42.4 % (ref 10–50)
LYMPHOCYTES NFR BLD AUTO: 43.8 % (ref 10–50)
LYMPHOCYTES NFR BLD AUTO: 47 % (ref 10–50)
LYMPHOCYTES NFR BLD AUTO: 47.2 % (ref 10–50)
LYMPHOCYTES NFR BLD AUTO: 49.1 % (ref 10–50)
LYMPHOCYTES NFR BLD AUTO: 49.4 % (ref 10–50)
LYMPHOCYTES NFR BLD AUTO: 51.6 % (ref 10–50)
LYMPHOCYTES NFR BLD AUTO: 60.1 % (ref 10–50)
LYMPHOCYTES NFR BLD AUTO: 62.7 % (ref 10–50)
LYMPHOCYTES NFR BLD AUTO: 63.4 % (ref 10–50)
LYMPHOCYTES NFR BLD AUTO: 64.7 % (ref 10–50)
LYMPHOCYTES NFR BLD MANUAL: 36 % (ref 10–50)
LYMPHOCYTES NFR BLD MANUAL: 57 % (ref 10–50)
LYMPHOCYTES NFR BLD MANUAL: 58 % (ref 10–50)
LYMPHOCYTES NFR BLD MANUAL: 6 % (ref 0–12)
LYMPHOCYTES NFR BLD MANUAL: 6 % (ref 0–12)
LYMPHOCYTES NFR BLD MANUAL: 60 % (ref 10–50)
LYMPHOCYTES NFR BLD MANUAL: 62 % (ref 10–50)
LYMPHOCYTES NFR BLD MANUAL: 64 % (ref 10–50)
LYMPHOCYTES NFR BLD MANUAL: 65 % (ref 10–50)
LYMPHOCYTES NFR BLD MANUAL: 67 % (ref 10–50)
LYMPHOCYTES NFR BLD MANUAL: 7 % (ref 0–12)
LYMPHOCYTES NFR BLD MANUAL: 8 % (ref 0–12)
LYMPHOCYTES NFR BLD MANUAL: 8 % (ref 0–12)
LYMPHOCYTES NFR BLD MANUAL: 9 % (ref 0–12)
MACROCYTES BLD QL SMEAR: ABNORMAL
MACROCYTES BLD QL SMEAR: NORMAL
MACROCYTES BLD QL SMEAR: NORMAL
MAGNESIUM SERPL-MCNC: 1.9 MG/DL (ref 1.6–2.3)
MAGNESIUM SERPL-MCNC: 1.9 MG/DL (ref 1.6–2.3)
MAGNESIUM SERPL-MCNC: 2 MG/DL (ref 1.6–2.3)
MAGNESIUM SERPL-MCNC: 2 MG/DL (ref 1.6–2.3)
MAXIMAL PREDICTED HEART RATE: 152 BPM
MCH RBC QN AUTO: 30.2 PG (ref 26.5–34)
MCH RBC QN AUTO: 31.4 PG (ref 26.5–34)
MCH RBC QN AUTO: 31.8 PG (ref 26.5–34)
MCH RBC QN AUTO: 31.9 PG (ref 26.5–34)
MCH RBC QN AUTO: 32 PG (ref 26.5–34)
MCH RBC QN AUTO: 32.2 PG (ref 26.5–34)
MCH RBC QN AUTO: 32.3 PG (ref 26.5–34)
MCH RBC QN AUTO: 32.4 PG (ref 26.5–34)
MCH RBC QN AUTO: 32.6 PG (ref 26.5–34)
MCH RBC QN AUTO: 32.6 PG (ref 26.5–34)
MCH RBC QN AUTO: 32.7 PG (ref 26.5–34)
MCH RBC QN AUTO: 32.9 PG (ref 26.5–34)
MCH RBC QN AUTO: 33 PG (ref 26.5–34)
MCH RBC QN AUTO: 33.1 PG (ref 26.5–34)
MCH RBC QN AUTO: 33.2 PG (ref 26.5–34)
MCH RBC QN AUTO: 33.3 PG (ref 26.5–34)
MCH RBC QN AUTO: 33.3 PG (ref 26.5–34)
MCH RBC QN AUTO: 33.8 PG (ref 26.5–34)
MCH RBC QN AUTO: 33.9 PG (ref 26.5–34)
MCH RBC QN AUTO: 34.2 PG (ref 26.5–34)
MCHC RBC AUTO-ENTMCNC: 30.7 G/DL (ref 31.4–36)
MCHC RBC AUTO-ENTMCNC: 31.7 G/DL (ref 31.4–36)
MCHC RBC AUTO-ENTMCNC: 31.8 G/DL (ref 31.4–36)
MCHC RBC AUTO-ENTMCNC: 32 G/DL (ref 31.4–36)
MCHC RBC AUTO-ENTMCNC: 32.4 G/DL (ref 31.4–36)
MCHC RBC AUTO-ENTMCNC: 32.5 G/DL (ref 31.4–36)
MCHC RBC AUTO-ENTMCNC: 32.7 G/DL (ref 31.4–36)
MCHC RBC AUTO-ENTMCNC: 32.8 G/DL (ref 31.4–36)
MCHC RBC AUTO-ENTMCNC: 32.9 G/DL (ref 31.4–36)
MCHC RBC AUTO-ENTMCNC: 33 G/DL (ref 31.4–36)
MCHC RBC AUTO-ENTMCNC: 33.2 G/DL (ref 31.4–36)
MCHC RBC AUTO-ENTMCNC: 33.3 G/DL (ref 31.4–36)
MCHC RBC AUTO-ENTMCNC: 33.6 G/DL (ref 31.4–36)
MCHC RBC AUTO-ENTMCNC: 33.9 G/DL (ref 31.4–36)
MCV RBC AUTO: 100.8 FL (ref 80–98)
MCV RBC AUTO: 101 FL (ref 80–98)
MCV RBC AUTO: 101 FL (ref 80–98)
MCV RBC AUTO: 101.4 FL (ref 80–98)
MCV RBC AUTO: 101.7 FL (ref 80–98)
MCV RBC AUTO: 101.8 FL (ref 80–98)
MCV RBC AUTO: 102.9 FL (ref 80–98)
MCV RBC AUTO: 105.3 FL (ref 80–98)
MCV RBC AUTO: 92.2 FL (ref 80–98)
MCV RBC AUTO: 95.8 FL (ref 80–98)
MCV RBC AUTO: 97.2 FL (ref 80–98)
MCV RBC AUTO: 97.7 FL (ref 80–98)
MCV RBC AUTO: 98.3 FL (ref 80–98)
MCV RBC AUTO: 98.4 FL (ref 80–98)
MCV RBC AUTO: 98.9 FL (ref 80–98)
MCV RBC AUTO: 98.9 FL (ref 80–98)
MCV RBC AUTO: 99.2 FL (ref 80–98)
MCV RBC AUTO: 99.6 FL (ref 80–98)
MCV RBC AUTO: 99.6 FL (ref 80–98)
MCV RBC AUTO: 99.7 FL (ref 80–98)
MONOCYTES # BLD AUTO: 0.06 10*3/MM3 (ref 0–0.9)
MONOCYTES # BLD AUTO: 0.12 10*3/MM3 (ref 0–0.9)
MONOCYTES # BLD AUTO: 0.13 10*3/MM3 (ref 0–0.9)
MONOCYTES # BLD AUTO: 0.14 10*3/MM3 (ref 0–0.9)
MONOCYTES # BLD AUTO: 0.14 10*3/MM3 (ref 0–0.9)
MONOCYTES # BLD AUTO: 0.15 10*3/MM3 (ref 0–0.9)
MONOCYTES # BLD AUTO: 0.15 10*3/MM3 (ref 0–0.9)
MONOCYTES # BLD AUTO: 0.16 10*3/MM3 (ref 0–0.9)
MONOCYTES # BLD AUTO: 0.17 10*3/MM3 (ref 0–0.9)
MONOCYTES # BLD AUTO: 0.17 10*3/MM3 (ref 0–0.9)
MONOCYTES # BLD AUTO: 0.18 10*3/MM3 (ref 0–0.9)
MONOCYTES # BLD AUTO: 0.19 10*3/MM3 (ref 0–0.9)
MONOCYTES # BLD AUTO: 0.2 10*3/MM3 (ref 0–0.9)
MONOCYTES # BLD AUTO: 0.2 10*3/MM3 (ref 0–0.9)
MONOCYTES # BLD AUTO: 0.21 10*3/MM3 (ref 0–0.9)
MONOCYTES # BLD AUTO: 0.27 10*3/MM3 (ref 0–0.9)
MONOCYTES # BLD AUTO: 0.27 10*3/MM3 (ref 0–0.9)
MONOCYTES # BLD AUTO: 0.32 10*3/MM3 (ref 0–0.9)
MONOCYTES # BLD AUTO: 0.33 10*3/MM3 (ref 0–0.9)
MONOCYTES # BLD AUTO: 0.4 10*3/MM3 (ref 0–0.9)
MONOCYTES # BLD AUTO: 0.4 10*3/MM3 (ref 0–0.9)
MONOCYTES # BLD AUTO: 0.41 10*3/MM3 (ref 0–0.9)
MONOCYTES # BLD AUTO: 0.41 10*3/MM3 (ref 0–0.9)
MONOCYTES # BLD AUTO: 0.52 10*3/MM3 (ref 0–0.9)
MONOCYTES NFR BLD AUTO: 12.1 % (ref 0–12)
MONOCYTES NFR BLD AUTO: 2.7 % (ref 0–12)
MONOCYTES NFR BLD AUTO: 3.4 % (ref 0–12)
MONOCYTES NFR BLD AUTO: 4.9 % (ref 0–12)
MONOCYTES NFR BLD AUTO: 5 % (ref 0–12)
MONOCYTES NFR BLD AUTO: 5 % (ref 0–12)
MONOCYTES NFR BLD AUTO: 5.1 % (ref 0–12)
MONOCYTES NFR BLD AUTO: 5.9 % (ref 0–12)
MONOCYTES NFR BLD AUTO: 6.4 % (ref 0–12)
MONOCYTES NFR BLD AUTO: 7.4 % (ref 0–12)
MONOCYTES NFR BLD AUTO: 8.2 % (ref 0–12)
MONOCYTES NFR BLD AUTO: 8.8 % (ref 0–12)
MONOCYTES NFR BLD AUTO: 9.3 % (ref 0–12)
MONOCYTES NFR BLD AUTO: 9.4 % (ref 0–12)
MONOCYTES NFR BLD AUTO: 9.4 % (ref 0–12)
MONOCYTES NFR BLD AUTO: 9.8 % (ref 0–12)
NEUTROPHILS # BLD AUTO: 0.6 10*3/MM3 (ref 2–8.6)
NEUTROPHILS # BLD AUTO: 0.62 10*3/MM3 (ref 2–8.6)
NEUTROPHILS # BLD AUTO: 0.67 10*3/MM3 (ref 2–8.6)
NEUTROPHILS # BLD AUTO: 0.72 10*3/MM3 (ref 2–8.6)
NEUTROPHILS # BLD AUTO: 0.73 10*3/MM3 (ref 2–8.6)
NEUTROPHILS # BLD AUTO: 0.79 10*3/MM3 (ref 2–8.6)
NEUTROPHILS # BLD AUTO: 0.83 10*3/MM3 (ref 2–8.6)
NEUTROPHILS # BLD AUTO: 0.87 10*3/MM3 (ref 2–8.6)
NEUTROPHILS # BLD AUTO: 0.97 10*3/MM3 (ref 2–8.6)
NEUTROPHILS # BLD AUTO: 0.99 10*3/MM3 (ref 2–8.6)
NEUTROPHILS # BLD AUTO: 1.03 10*3/MM3 (ref 2–8.6)
NEUTROPHILS # BLD AUTO: 1.23 10*3/MM3 (ref 2–8.6)
NEUTROPHILS # BLD AUTO: 1.44 10*3/MM3 (ref 2–8.6)
NEUTROPHILS # BLD AUTO: 1.5 10*3/MM3 (ref 2–8.6)
NEUTROPHILS # BLD AUTO: 1.56 10*3/MM3 (ref 2–8.6)
NEUTROPHILS # BLD AUTO: 1.69 10*3/MM3 (ref 2–8.6)
NEUTROPHILS # BLD AUTO: 1.9 10*3/MM3 (ref 2–8.6)
NEUTROPHILS # BLD AUTO: 1.9 10*3/MM3 (ref 2–8.6)
NEUTROPHILS # BLD AUTO: 1.93 10*3/MM3 (ref 2–8.6)
NEUTROPHILS # BLD AUTO: 1.97 10*3/MM3 (ref 2–8.6)
NEUTROPHILS # BLD AUTO: 2.42 10*3/MM3 (ref 2–8.6)
NEUTROPHILS # BLD AUTO: 3.16 10*3/MM3 (ref 2–8.6)
NEUTROPHILS NFR BLD AUTO: 25.6 % (ref 37–80)
NEUTROPHILS NFR BLD AUTO: 26.2 % (ref 37–80)
NEUTROPHILS NFR BLD AUTO: 28.5 % (ref 37–80)
NEUTROPHILS NFR BLD AUTO: 32.8 % (ref 37–80)
NEUTROPHILS NFR BLD AUTO: 33.7 % (ref 37–80)
NEUTROPHILS NFR BLD AUTO: 35.9 % (ref 37–80)
NEUTROPHILS NFR BLD AUTO: 39 % (ref 37–80)
NEUTROPHILS NFR BLD AUTO: 40.3 % (ref 37–80)
NEUTROPHILS NFR BLD AUTO: 43.8 % (ref 37–80)
NEUTROPHILS NFR BLD AUTO: 43.9 % (ref 37–80)
NEUTROPHILS NFR BLD AUTO: 45.2 % (ref 37–80)
NEUTROPHILS NFR BLD AUTO: 46.9 % (ref 37–80)
NEUTROPHILS NFR BLD AUTO: 49.2 % (ref 37–80)
NEUTROPHILS NFR BLD AUTO: 50.8 % (ref 37–80)
NEUTROPHILS NFR BLD AUTO: 63.1 % (ref 37–80)
NEUTROPHILS NFR BLD AUTO: 77.3 % (ref 37–80)
NEUTROPHILS NFR BLD MANUAL: 23 % (ref 37–80)
NEUTROPHILS NFR BLD MANUAL: 25 % (ref 37–80)
NEUTROPHILS NFR BLD MANUAL: 25 % (ref 37–80)
NEUTROPHILS NFR BLD MANUAL: 28 % (ref 37–80)
NEUTROPHILS NFR BLD MANUAL: 29 % (ref 37–80)
NEUTROPHILS NFR BLD MANUAL: 29 % (ref 37–80)
NEUTROPHILS NFR BLD MANUAL: 32 % (ref 37–80)
NEUTROPHILS NFR BLD MANUAL: 55 % (ref 37–80)
NEUTS BAND NFR BLD MANUAL: 1 % (ref 0–5)
NEUTS BAND NFR BLD MANUAL: 2 % (ref 0–5)
NITRITE UR QL STRIP: NEGATIVE
NITRITE UR QL STRIP: POSITIVE
NITRITE UR QL STRIP: POSITIVE
NRBC BLD MANUAL-RTO: 0 /100 WBC (ref 0–0)
NT-PROBNP SERPL-MCNC: 477 PG/ML (ref 0–900)
NT-PROBNP SERPL-MCNC: 961 PG/ML (ref 0–900)
PERCENT MAX PREDICTED HR: 57.89 %
PH UR STRIP.AUTO: 6.5 [PH] (ref 5–9)
PH UR STRIP.AUTO: 6.5 [PH] (ref 5–9)
PH UR STRIP.AUTO: 7 [PH] (ref 5–9)
PLAT MORPH BLD: NORMAL
PLAT MORPH BLD: NORMAL
PLATELET # BLD AUTO: 101 10*3/MM3 (ref 150–450)
PLATELET # BLD AUTO: 107 10*3/MM3 (ref 150–450)
PLATELET # BLD AUTO: 112 10*3/MM3 (ref 150–450)
PLATELET # BLD AUTO: 116 10*3/MM3 (ref 150–450)
PLATELET # BLD AUTO: 118 10*3/MM3 (ref 150–450)
PLATELET # BLD AUTO: 122 10*3/MM3 (ref 150–450)
PLATELET # BLD AUTO: 130 10*3/MM3 (ref 150–450)
PLATELET # BLD AUTO: 132 10*3/MM3 (ref 150–450)
PLATELET # BLD AUTO: 150 10*3/MM3 (ref 150–450)
PLATELET # BLD AUTO: 160 10*3/MM3 (ref 150–450)
PLATELET # BLD AUTO: 178 10*3/MM3 (ref 150–450)
PLATELET # BLD AUTO: 199 10*3/MM3 (ref 150–450)
PLATELET # BLD AUTO: 213 10*3/MM3 (ref 150–450)
PLATELET # BLD AUTO: 219 10*3/MM3 (ref 150–450)
PLATELET # BLD AUTO: 235 10*3/MM3 (ref 150–450)
PLATELET # BLD AUTO: 284 10*3/MM3 (ref 150–450)
PLATELET # BLD AUTO: 68 10*3/MM3 (ref 150–450)
PLATELET # BLD AUTO: 81 10*3/MM3 (ref 150–450)
PLATELET # BLD AUTO: 84 10*3/MM3 (ref 150–450)
PLATELET # BLD AUTO: 86 10*3/MM3 (ref 150–450)
PLATELET # BLD AUTO: 86 10*3/MM3 (ref 150–450)
PLATELET # BLD AUTO: 98 10*3/MM3 (ref 150–450)
PMV BLD AUTO: 10 FL (ref 8–12)
PMV BLD AUTO: 10.2 FL (ref 8–12)
PMV BLD AUTO: 10.3 FL (ref 8–12)
PMV BLD AUTO: 10.3 FL (ref 8–12)
PMV BLD AUTO: 10.6 FL (ref 8–12)
PMV BLD AUTO: 9.1 FL (ref 8–12)
PMV BLD AUTO: 9.2 FL (ref 8–12)
PMV BLD AUTO: 9.3 FL (ref 8–12)
PMV BLD AUTO: 9.3 FL (ref 8–12)
PMV BLD AUTO: 9.4 FL (ref 8–12)
PMV BLD AUTO: 9.5 FL (ref 8–12)
PMV BLD AUTO: 9.6 FL (ref 8–12)
PMV BLD AUTO: 9.6 FL (ref 8–12)
PMV BLD AUTO: 9.8 FL (ref 8–12)
PMV BLD AUTO: 9.9 FL (ref 8–12)
PMV BLD AUTO: 9.9 FL (ref 8–12)
POLYCHROMASIA BLD QL SMEAR: ABNORMAL
POLYCHROMASIA BLD QL SMEAR: ABNORMAL
POLYCHROMASIA BLD QL SMEAR: NORMAL
POTASSIUM BLD-SCNC: 2.9 MMOL/L (ref 3.5–5.1)
POTASSIUM BLD-SCNC: 3.2 MMOL/L (ref 3.5–5.1)
POTASSIUM BLD-SCNC: 3.2 MMOL/L (ref 3.5–5.1)
POTASSIUM BLD-SCNC: 3.4 MMOL/L (ref 3.5–5.1)
POTASSIUM BLD-SCNC: 3.5 MMOL/L (ref 3.5–5.1)
POTASSIUM BLD-SCNC: 3.6 MMOL/L (ref 3.5–5.1)
POTASSIUM BLD-SCNC: 3.6 MMOL/L (ref 3.5–5.1)
POTASSIUM BLD-SCNC: 3.7 MMOL/L (ref 3.5–5.1)
POTASSIUM BLD-SCNC: 3.8 MMOL/L (ref 3.5–5.1)
POTASSIUM BLD-SCNC: 3.8 MMOL/L (ref 3.5–5.1)
POTASSIUM BLD-SCNC: 3.9 MMOL/L (ref 3.5–5.1)
POTASSIUM BLD-SCNC: 4 MMOL/L (ref 3.5–5.1)
POTASSIUM BLD-SCNC: 4.1 MMOL/L (ref 3.5–5.1)
POTASSIUM BLD-SCNC: 4.2 MMOL/L (ref 3.5–5.1)
POTASSIUM BLD-SCNC: 4.3 MMOL/L (ref 3.4–5.4)
PROT SERPL-MCNC: 6 G/DL (ref 6.3–8.6)
PROT SERPL-MCNC: 6.1 G/DL (ref 6.3–8.6)
PROT SERPL-MCNC: 6.4 G/DL (ref 6.3–8.6)
PROT SERPL-MCNC: 6.5 G/DL (ref 6.3–8.6)
PROT SERPL-MCNC: 7 G/DL (ref 6.3–8.6)
PROT SERPL-MCNC: 7.1 G/DL (ref 6.3–8.6)
PROT SERPL-MCNC: 7.2 G/DL (ref 6.3–8.6)
PROT SERPL-MCNC: 7.3 G/DL (ref 6.3–8.6)
PROT SERPL-MCNC: 7.5 G/DL (ref 6.3–8.6)
PROT SERPL-MCNC: 7.5 G/DL (ref 6.3–8.6)
PROT SERPL-MCNC: 7.6 G/DL (ref 6.3–8.6)
PROT SERPL-MCNC: 7.6 G/DL (ref 6.3–8.6)
PROT SERPL-MCNC: 7.7 G/DL (ref 6.3–8.6)
PROT SERPL-MCNC: 7.7 G/DL (ref 6.3–8.6)
PROT SERPL-MCNC: 7.8 G/DL (ref 6.7–8.2)
PROT UR QL STRIP: NEGATIVE
PROTHROMBIN TIME: 14 SECONDS (ref 11.1–15.3)
PROTHROMBIN TIME: 17.6 SECONDS (ref 11.1–15.3)
RBC # BLD AUTO: 2.36 10*6/MM3 (ref 3.77–5.16)
RBC # BLD AUTO: 2.41 10*6/MM3 (ref 3.77–5.16)
RBC # BLD AUTO: 2.43 10*6/MM3 (ref 3.77–5.16)
RBC # BLD AUTO: 2.57 10*6/MM3 (ref 3.77–5.16)
RBC # BLD AUTO: 2.7 10*6/MM3 (ref 3.77–5.16)
RBC # BLD AUTO: 2.74 10*6/MM3 (ref 3.77–5.16)
RBC # BLD AUTO: 2.76 10*6/MM3 (ref 3.77–5.16)
RBC # BLD AUTO: 2.79 10*6/MM3 (ref 3.77–5.16)
RBC # BLD AUTO: 2.81 10*6/MM3 (ref 3.77–5.16)
RBC # BLD AUTO: 2.81 10*6/MM3 (ref 3.77–5.16)
RBC # BLD AUTO: 2.83 10*6/MM3 (ref 3.77–5.16)
RBC # BLD AUTO: 2.9 10*6/MM3 (ref 3.77–5.16)
RBC # BLD AUTO: 2.92 10*6/MM3 (ref 3.77–5.16)
RBC # BLD AUTO: 2.94 10*6/MM3 (ref 3.77–5.16)
RBC # BLD AUTO: 2.97 10*6/MM3 (ref 3.77–5.16)
RBC # BLD AUTO: 2.98 10*6/MM3 (ref 3.77–5.16)
RBC # BLD AUTO: 2.98 10*6/MM3 (ref 3.77–5.16)
RBC # BLD AUTO: 3.14 10*6/MM3 (ref 3.77–5.16)
RBC # BLD AUTO: 3.17 10*6/MM3 (ref 3.77–5.16)
RBC # BLD AUTO: 3.22 10*6/MM3 (ref 3.77–5.16)
RBC # BLD AUTO: 3.44 10*6/MM3 (ref 3.77–5.16)
RBC # BLD AUTO: 3.82 10*6/MM3 (ref 3.77–5.16)
RBC # UR: ABNORMAL /HPF
REF LAB TEST METHOD: ABNORMAL
S PNEUM AG SPEC QL LA: NEGATIVE
SCAN SLIDE: NORMAL
SMALL PLATELETS BLD QL SMEAR: ABNORMAL
SMALL PLATELETS BLD QL SMEAR: ADEQUATE
SMALL PLATELETS BLD QL SMEAR: ADEQUATE
SMALL PLATELETS BLD QL SMEAR: NORMAL
SMALL PLATELETS BLD QL SMEAR: NORMAL
SODIUM BLD-SCNC: 133 MMOL/L (ref 137–145)
SODIUM BLD-SCNC: 134 MMOL/L (ref 137–145)
SODIUM BLD-SCNC: 136 MMOL/L (ref 137–145)
SODIUM BLD-SCNC: 137 MMOL/L (ref 137–145)
SODIUM BLD-SCNC: 138 MMOL/L (ref 137–145)
SODIUM BLD-SCNC: 139 MMOL/L (ref 137–145)
SODIUM BLD-SCNC: 140 MMOL/L (ref 137–145)
SODIUM BLD-SCNC: 141 MMOL/L (ref 134–146)
SODIUM BLD-SCNC: 142 MMOL/L (ref 137–145)
SODIUM BLD-SCNC: 142 MMOL/L (ref 137–145)
SP GR UR STRIP: 1.01 (ref 1–1.03)
SP GR UR STRIP: 1.01 (ref 1–1.03)
SP GR UR STRIP: 1.02 (ref 1–1.03)
SQUAMOUS #/AREA URNS HPF: ABNORMAL /HPF
STRESS BASELINE BP: NORMAL MMHG
STRESS BASELINE HR: 54 BPM
STRESS PERCENT HR: 68 %
STRESS POST ESTIMATED WORKLOAD: 1 METS
STRESS POST PEAK BP: NORMAL MMHG
STRESS POST PEAK HR: 88 BPM
STRESS TARGET HR: 129 BPM
T4 SERPL-MCNC: 8.05 MCG/DL (ref 5.5–11)
TRIGL SERPL-MCNC: 157 MG/DL (ref 35–160)
TROPONIN I SERPL-MCNC: 0.03 NG/ML
TROPONIN I SERPL-MCNC: <0.012 NG/ML
TROPONIN I SERPL-MCNC: <0.012 NG/ML
TSH SERPL DL<=0.05 MIU/L-ACNC: 1.76 MIU/ML (ref 0.46–4.68)
TSH SERPL DL<=0.05 MIU/L-ACNC: 2.93 MIU/ML (ref 0.46–4.68)
UROBILINOGEN UR QL STRIP: ABNORMAL
VARIANT LYMPHS NFR BLD MANUAL: 1 % (ref 0–5)
VARIANT LYMPHS NFR BLD MANUAL: 2 % (ref 0–5)
VLDLC SERPL-MCNC: 31.4 MG/DL
WBC MORPH BLD: NORMAL
WBC NRBC COR # BLD: 2.13 10*3/MM3 (ref 3.2–9.8)
WBC NRBC COR # BLD: 2.19 10*3/MM3 (ref 3.2–9.8)
WBC NRBC COR # BLD: 2.23 10*3/MM3 (ref 3.2–9.8)
WBC NRBC COR # BLD: 2.38 10*3/MM3 (ref 3.2–9.8)
WBC NRBC COR # BLD: 2.42 10*3/MM3 (ref 3.2–9.8)
WBC NRBC COR # BLD: 2.67 10*3/MM3 (ref 3.2–9.8)
WBC NRBC COR # BLD: 2.79 10*3/MM3 (ref 3.2–9.8)
WBC NRBC COR # BLD: 2.83 10*3/MM3 (ref 3.2–9.8)
WBC NRBC COR # BLD: 2.86 10*3/MM3 (ref 3.2–9.8)
WBC NRBC COR # BLD: 2.87 10*3/MM3 (ref 3.2–9.8)
WBC NRBC COR # BLD: 3.01 10*3/MM3 (ref 3.2–9.8)
WBC NRBC COR # BLD: 3.03 10*3/MM3 (ref 3.2–9.8)
WBC NRBC COR # BLD: 3.06 10*3/MM3 (ref 3.2–9.8)
WBC NRBC COR # BLD: 3.13 10*3/MM3 (ref 3.2–9.8)
WBC NRBC COR # BLD: 3.56 10*3/MM3 (ref 3.2–9.8)
WBC NRBC COR # BLD: 3.73 10*3/MM3 (ref 3.2–9.8)
WBC NRBC COR # BLD: 3.92 10*3/MM3 (ref 3.2–9.8)
WBC NRBC COR # BLD: 4.2 10*3/MM3 (ref 3.2–9.8)
WBC NRBC COR # BLD: 4.28 10*3/MM3 (ref 3.2–9.8)
WBC NRBC COR # BLD: 4.32 10*3/MM3 (ref 3.2–9.8)
WBC NRBC COR # BLD: 4.34 10*3/MM3 (ref 3.2–9.8)
WBC NRBC COR # BLD: 5.75 10*3/MM3 (ref 3.2–9.8)
WBC UR QL AUTO: ABNORMAL /HPF
WHOLE BLOOD HOLD SPECIMEN: NORMAL

## 2017-01-01 PROCEDURE — 82553 CREATINE MB FRACTION: CPT | Performed by: EMERGENCY MEDICINE

## 2017-01-01 PROCEDURE — 99214 OFFICE O/P EST MOD 30 MIN: CPT | Performed by: INTERNAL MEDICINE

## 2017-01-01 PROCEDURE — 86300 IMMUNOASSAY TUMOR CA 15-3: CPT | Performed by: INTERNAL MEDICINE

## 2017-01-01 PROCEDURE — 85014 HEMATOCRIT: CPT | Performed by: FAMILY MEDICINE

## 2017-01-01 PROCEDURE — 80053 COMPREHEN METABOLIC PANEL: CPT

## 2017-01-01 PROCEDURE — 80053 COMPREHEN METABOLIC PANEL: CPT | Performed by: INTERNAL MEDICINE

## 2017-01-01 PROCEDURE — 85025 COMPLETE CBC W/AUTO DIFF WBC: CPT

## 2017-01-01 PROCEDURE — 25010000002 MORPHINE PER 10 MG: Performed by: EMERGENCY MEDICINE

## 2017-01-01 PROCEDURE — 96372 THER/PROPH/DIAG INJ SC/IM: CPT | Performed by: INTERNAL MEDICINE

## 2017-01-01 PROCEDURE — 86300 IMMUNOASSAY TUMOR CA 15-3: CPT

## 2017-01-01 PROCEDURE — 84484 ASSAY OF TROPONIN QUANT: CPT | Performed by: EMERGENCY MEDICINE

## 2017-01-01 PROCEDURE — 85025 COMPLETE CBC W/AUTO DIFF WBC: CPT | Performed by: INTERNAL MEDICINE

## 2017-01-01 PROCEDURE — G0378 HOSPITAL OBSERVATION PER HR: HCPCS

## 2017-01-01 PROCEDURE — 25010000002 DENOSUMAB 120 MG/1.7ML SOLUTION: Performed by: INTERNAL MEDICINE

## 2017-01-01 PROCEDURE — 87086 URINE CULTURE/COLONY COUNT: CPT | Performed by: INTERNAL MEDICINE

## 2017-01-01 PROCEDURE — 87040 BLOOD CULTURE FOR BACTERIA: CPT | Performed by: EMERGENCY MEDICINE

## 2017-01-01 PROCEDURE — 85730 THROMBOPLASTIN TIME PARTIAL: CPT | Performed by: EMERGENCY MEDICINE

## 2017-01-01 PROCEDURE — 83880 ASSAY OF NATRIURETIC PEPTIDE: CPT | Performed by: PHYSICIAN ASSISTANT

## 2017-01-01 PROCEDURE — 84484 ASSAY OF TROPONIN QUANT: CPT | Performed by: PHYSICIAN ASSISTANT

## 2017-01-01 PROCEDURE — 81001 URINALYSIS AUTO W/SCOPE: CPT | Performed by: EMERGENCY MEDICINE

## 2017-01-01 PROCEDURE — 0 IOPAMIDOL PER 1 ML: Performed by: EMERGENCY MEDICINE

## 2017-01-01 PROCEDURE — 80053 COMPREHEN METABOLIC PANEL: CPT | Performed by: EMERGENCY MEDICINE

## 2017-01-01 PROCEDURE — 83735 ASSAY OF MAGNESIUM: CPT | Performed by: INTERNAL MEDICINE

## 2017-01-01 PROCEDURE — 85025 COMPLETE CBC W/AUTO DIFF WBC: CPT | Performed by: FAMILY MEDICINE

## 2017-01-01 PROCEDURE — 71020 HC CHEST PA AND LATERAL: CPT

## 2017-01-01 PROCEDURE — 87077 CULTURE AEROBIC IDENTIFY: CPT | Performed by: EMERGENCY MEDICINE

## 2017-01-01 PROCEDURE — 85379 FIBRIN DEGRADATION QUANT: CPT | Performed by: EMERGENCY MEDICINE

## 2017-01-01 PROCEDURE — 81001 URINALYSIS AUTO W/SCOPE: CPT | Performed by: INTERNAL MEDICINE

## 2017-01-01 PROCEDURE — 94760 N-INVAS EAR/PLS OXIMETRY 1: CPT

## 2017-01-01 PROCEDURE — 36415 COLL VENOUS BLD VENIPUNCTURE: CPT | Performed by: INTERNAL MEDICINE

## 2017-01-01 PROCEDURE — 99203 OFFICE O/P NEW LOW 30 MIN: CPT | Performed by: NURSE PRACTITIONER

## 2017-01-01 PROCEDURE — 85025 COMPLETE CBC W/AUTO DIFF WBC: CPT | Performed by: PHYSICIAN ASSISTANT

## 2017-01-01 PROCEDURE — 93005 ELECTROCARDIOGRAM TRACING: CPT

## 2017-01-01 PROCEDURE — 25010000002 CEFTRIAXONE PER 250 MG: Performed by: NURSE PRACTITIONER

## 2017-01-01 PROCEDURE — 84443 ASSAY THYROID STIM HORMONE: CPT | Performed by: NURSE PRACTITIONER

## 2017-01-01 PROCEDURE — 85018 HEMOGLOBIN: CPT | Performed by: FAMILY MEDICINE

## 2017-01-01 PROCEDURE — 84436 ASSAY OF TOTAL THYROXINE: CPT | Performed by: INTERNAL MEDICINE

## 2017-01-01 PROCEDURE — 85007 BL SMEAR W/DIFF WBC COUNT: CPT | Performed by: EMERGENCY MEDICINE

## 2017-01-01 PROCEDURE — 83605 ASSAY OF LACTIC ACID: CPT | Performed by: EMERGENCY MEDICINE

## 2017-01-01 PROCEDURE — 85007 BL SMEAR W/DIFF WBC COUNT: CPT

## 2017-01-01 PROCEDURE — 85610 PROTHROMBIN TIME: CPT | Performed by: PHYSICIAN ASSISTANT

## 2017-01-01 PROCEDURE — 80162 ASSAY OF DIGOXIN TOTAL: CPT | Performed by: EMERGENCY MEDICINE

## 2017-01-01 PROCEDURE — G0463 HOSPITAL OUTPT CLINIC VISIT: HCPCS | Performed by: INTERNAL MEDICINE

## 2017-01-01 PROCEDURE — 0 TECHNETIUM SESTAMIBI: Performed by: INTERNAL MEDICINE

## 2017-01-01 PROCEDURE — 80048 BASIC METABOLIC PNL TOTAL CA: CPT | Performed by: NURSE PRACTITIONER

## 2017-01-01 PROCEDURE — 85610 PROTHROMBIN TIME: CPT | Performed by: EMERGENCY MEDICINE

## 2017-01-01 PROCEDURE — 85007 BL SMEAR W/DIFF WBC COUNT: CPT | Performed by: FAMILY MEDICINE

## 2017-01-01 PROCEDURE — 99284 EMERGENCY DEPT VISIT MOD MDM: CPT

## 2017-01-01 PROCEDURE — 71275 CT ANGIOGRAPHY CHEST: CPT

## 2017-01-01 PROCEDURE — 93010 ELECTROCARDIOGRAM REPORT: CPT | Performed by: INTERNAL MEDICINE

## 2017-01-01 PROCEDURE — 96376 TX/PRO/DX INJ SAME DRUG ADON: CPT

## 2017-01-01 PROCEDURE — 25010000002 ONDANSETRON PER 1 MG: Performed by: EMERGENCY MEDICINE

## 2017-01-01 PROCEDURE — 96374 THER/PROPH/DIAG INJ IV PUSH: CPT

## 2017-01-01 PROCEDURE — 87186 SC STD MICRODIL/AGAR DIL: CPT | Performed by: EMERGENCY MEDICINE

## 2017-01-01 PROCEDURE — 93005 ELECTROCARDIOGRAM TRACING: CPT | Performed by: EMERGENCY MEDICINE

## 2017-01-01 PROCEDURE — 85025 COMPLETE CBC W/AUTO DIFF WBC: CPT | Performed by: EMERGENCY MEDICINE

## 2017-01-01 PROCEDURE — 80061 LIPID PANEL: CPT | Performed by: INTERNAL MEDICINE

## 2017-01-01 PROCEDURE — 74177 CT ABD & PELVIS W/CONTRAST: CPT

## 2017-01-01 PROCEDURE — 80053 COMPREHEN METABOLIC PANEL: CPT | Performed by: PHYSICIAN ASSISTANT

## 2017-01-01 PROCEDURE — 78452 HT MUSCLE IMAGE SPECT MULT: CPT

## 2017-01-01 PROCEDURE — 94799 UNLISTED PULMONARY SVC/PX: CPT

## 2017-01-01 PROCEDURE — 87150 DNA/RNA AMPLIFIED PROBE: CPT | Performed by: EMERGENCY MEDICINE

## 2017-01-01 PROCEDURE — 85007 BL SMEAR W/DIFF WBC COUNT: CPT | Performed by: INTERNAL MEDICINE

## 2017-01-01 PROCEDURE — 25010000002 ONDANSETRON PER 1 MG: Performed by: INTERNAL MEDICINE

## 2017-01-01 PROCEDURE — 87040 BLOOD CULTURE FOR BACTERIA: CPT | Performed by: NURSE PRACTITIONER

## 2017-01-01 PROCEDURE — 36415 COLL VENOUS BLD VENIPUNCTURE: CPT

## 2017-01-01 PROCEDURE — 82550 ASSAY OF CK (CPK): CPT | Performed by: EMERGENCY MEDICINE

## 2017-01-01 PROCEDURE — 25010000002 ENOXAPARIN PER 10 MG: Performed by: NURSE PRACTITIONER

## 2017-01-01 PROCEDURE — 83880 ASSAY OF NATRIURETIC PEPTIDE: CPT | Performed by: EMERGENCY MEDICINE

## 2017-01-01 PROCEDURE — 71010 HC CHEST PA OR AP: CPT

## 2017-01-01 PROCEDURE — 25010000002 VANCOMYCIN PER 500 MG: Performed by: EMERGENCY MEDICINE

## 2017-01-01 PROCEDURE — 93970 EXTREMITY STUDY: CPT

## 2017-01-01 PROCEDURE — 87899 AGENT NOS ASSAY W/OPTIC: CPT | Performed by: INTERNAL MEDICINE

## 2017-01-01 PROCEDURE — 93017 CV STRESS TEST TRACING ONLY: CPT

## 2017-01-01 PROCEDURE — 71260 CT THORAX DX C+: CPT

## 2017-01-01 PROCEDURE — 80162 ASSAY OF DIGOXIN TOTAL: CPT

## 2017-01-01 PROCEDURE — 84443 ASSAY THYROID STIM HORMONE: CPT | Performed by: INTERNAL MEDICINE

## 2017-01-01 PROCEDURE — 84132 ASSAY OF SERUM POTASSIUM: CPT | Performed by: INTERNAL MEDICINE

## 2017-01-01 PROCEDURE — 99285 EMERGENCY DEPT VISIT HI MDM: CPT

## 2017-01-01 PROCEDURE — 96372 THER/PROPH/DIAG INJ SC/IM: CPT

## 2017-01-01 PROCEDURE — 25010000002 REGADENOSON 0.4 MG/5ML SOLUTION: Performed by: INTERNAL MEDICINE

## 2017-01-01 PROCEDURE — 0 IOPAMIDOL 61 % SOLUTION: Performed by: INTERNAL MEDICINE

## 2017-01-01 PROCEDURE — 25010000002 ENOXAPARIN PER 10 MG: Performed by: EMERGENCY MEDICINE

## 2017-01-01 PROCEDURE — A9500 TC99M SESTAMIBI: HCPCS | Performed by: INTERNAL MEDICINE

## 2017-01-01 PROCEDURE — 85730 THROMBOPLASTIN TIME PARTIAL: CPT | Performed by: PHYSICIAN ASSISTANT

## 2017-01-01 PROCEDURE — 85379 FIBRIN DEGRADATION QUANT: CPT | Performed by: PHYSICIAN ASSISTANT

## 2017-01-01 PROCEDURE — 93306 TTE W/DOPPLER COMPLETE: CPT

## 2017-01-01 PROCEDURE — 87804 INFLUENZA ASSAY W/OPTIC: CPT | Performed by: EMERGENCY MEDICINE

## 2017-01-01 PROCEDURE — 96375 TX/PRO/DX INJ NEW DRUG ADDON: CPT

## 2017-01-01 PROCEDURE — 87086 URINE CULTURE/COLONY COUNT: CPT | Performed by: EMERGENCY MEDICINE

## 2017-01-01 RX ORDER — SODIUM CHLORIDE 0.9 % (FLUSH) 0.9 %
10 SYRINGE (ML) INJECTION AS NEEDED
Status: DISCONTINUED | OUTPATIENT
Start: 2017-01-01 | End: 2017-01-01 | Stop reason: HOSPADM

## 2017-01-01 RX ORDER — POTASSIUM CHLORIDE 750 MG/1
30 CAPSULE, EXTENDED RELEASE ORAL ONCE
Status: COMPLETED | OUTPATIENT
Start: 2017-01-01 | End: 2017-01-01

## 2017-01-01 RX ORDER — OXYCODONE AND ACETAMINOPHEN 10; 325 MG/1; MG/1
1 TABLET ORAL EVERY 6 HOURS PRN
Status: DISCONTINUED | OUTPATIENT
Start: 2017-01-01 | End: 2017-01-01 | Stop reason: HOSPADM

## 2017-01-01 RX ORDER — LOSARTAN POTASSIUM 100 MG/1
100 TABLET ORAL
Qty: 30 TABLET | Refills: 0 | Status: SHIPPED | OUTPATIENT
Start: 2017-01-01

## 2017-01-01 RX ORDER — ONDANSETRON 4 MG/1
4 TABLET, ORALLY DISINTEGRATING ORAL EVERY 6 HOURS PRN
Status: DISCONTINUED | OUTPATIENT
Start: 2017-01-01 | End: 2017-01-01 | Stop reason: HOSPADM

## 2017-01-01 RX ORDER — LETROZOLE 2.5 MG/1
TABLET, FILM COATED ORAL
Qty: 30 TABLET | Refills: 3 | Status: SHIPPED | OUTPATIENT
Start: 2017-01-01 | End: 2018-01-01

## 2017-01-01 RX ORDER — ONDANSETRON 2 MG/ML
4 INJECTION INTRAMUSCULAR; INTRAVENOUS ONCE
Status: COMPLETED | OUTPATIENT
Start: 2017-01-01 | End: 2017-01-01

## 2017-01-01 RX ORDER — LOSARTAN POTASSIUM 50 MG/1
100 TABLET ORAL
Status: DISCONTINUED | OUTPATIENT
Start: 2017-01-01 | End: 2017-01-01 | Stop reason: HOSPADM

## 2017-01-01 RX ORDER — ISOSORBIDE MONONITRATE 30 MG/1
30 TABLET, EXTENDED RELEASE ORAL
Qty: 30 TABLET | Refills: 0 | Status: SHIPPED | OUTPATIENT
Start: 2017-01-01

## 2017-01-01 RX ORDER — DIGOXIN 125 MCG
125 TABLET ORAL
Status: DISCONTINUED | OUTPATIENT
Start: 2017-01-01 | End: 2017-01-01 | Stop reason: HOSPADM

## 2017-01-01 RX ORDER — DILTIAZEM HYDROCHLORIDE 180 MG/1
180 CAPSULE, EXTENDED RELEASE ORAL DAILY
Qty: 30 CAPSULE | Refills: 3 | Status: SHIPPED | OUTPATIENT
Start: 2017-01-01

## 2017-01-01 RX ORDER — ONDANSETRON 4 MG/1
4 TABLET, FILM COATED ORAL 2 TIMES DAILY PRN
Qty: 40 TABLET | Refills: 5 | Status: SHIPPED | OUTPATIENT
Start: 2017-01-01 | End: 2018-01-01 | Stop reason: SDUPTHER

## 2017-01-01 RX ORDER — LETROZOLE 2.5 MG/1
2.5 TABLET, FILM COATED ORAL DAILY
Status: DISCONTINUED | OUTPATIENT
Start: 2017-01-01 | End: 2017-01-01 | Stop reason: HOSPADM

## 2017-01-01 RX ORDER — DILTIAZEM HYDROCHLORIDE 120 MG/1
CAPSULE, EXTENDED RELEASE ORAL
Refills: 12 | COMMUNITY
Start: 2017-01-01 | End: 2017-01-01 | Stop reason: DRUGHIGH

## 2017-01-01 RX ORDER — OXYCODONE AND ACETAMINOPHEN 10; 325 MG/1; MG/1
1 TABLET ORAL EVERY 6 HOURS PRN
Qty: 16 TABLET | Refills: 0 | Status: SHIPPED | OUTPATIENT
Start: 2017-01-01 | End: 2017-01-01

## 2017-01-01 RX ORDER — BISACODYL 10 MG
10 SUPPOSITORY, RECTAL RECTAL DAILY PRN
Status: DISCONTINUED | OUTPATIENT
Start: 2017-01-01 | End: 2017-01-01 | Stop reason: HOSPADM

## 2017-01-01 RX ORDER — ONDANSETRON 4 MG/1
4 TABLET, FILM COATED ORAL EVERY 6 HOURS PRN
Status: DISCONTINUED | OUTPATIENT
Start: 2017-01-01 | End: 2017-01-01 | Stop reason: SDUPTHER

## 2017-01-01 RX ORDER — CEFUROXIME AXETIL 250 MG/1
250 TABLET ORAL 2 TIMES DAILY
Qty: 24 TABLET | Refills: 0 | Status: SHIPPED | OUTPATIENT
Start: 2017-01-01 | End: 2018-01-01

## 2017-01-01 RX ORDER — OXYCODONE HYDROCHLORIDE AND ACETAMINOPHEN 5; 325 MG/1; MG/1
1 TABLET ORAL EVERY 8 HOURS PRN
Status: DISCONTINUED | OUTPATIENT
Start: 2017-01-01 | End: 2017-01-01 | Stop reason: HOSPADM

## 2017-01-01 RX ORDER — DOCUSATE SODIUM 100 MG/1
200 CAPSULE, LIQUID FILLED ORAL 2 TIMES DAILY
Status: DISCONTINUED | OUTPATIENT
Start: 2017-01-01 | End: 2017-01-01 | Stop reason: HOSPADM

## 2017-01-01 RX ORDER — POLYETHYLENE GLYCOL 3350 17 G/17G
17 POWDER, FOR SOLUTION ORAL DAILY
Status: DISCONTINUED | OUTPATIENT
Start: 2017-01-01 | End: 2017-01-01 | Stop reason: HOSPADM

## 2017-01-01 RX ORDER — VENLAFAXINE 37.5 MG/1
37.5 TABLET ORAL DAILY
Status: DISCONTINUED | OUTPATIENT
Start: 2017-01-01 | End: 2017-01-01 | Stop reason: HOSPADM

## 2017-01-01 RX ORDER — ONDANSETRON 2 MG/ML
4 INJECTION INTRAMUSCULAR; INTRAVENOUS EVERY 6 HOURS PRN
Status: DISCONTINUED | OUTPATIENT
Start: 2017-01-01 | End: 2017-01-01 | Stop reason: HOSPADM

## 2017-01-01 RX ORDER — VENLAFAXINE 37.5 MG/1
37.5 TABLET ORAL DAILY
Qty: 90 TABLET | Refills: 1 | Status: SHIPPED | OUTPATIENT
Start: 2017-01-01 | End: 2017-01-01 | Stop reason: SDUPTHER

## 2017-01-01 RX ORDER — HYDRALAZINE HYDROCHLORIDE 20 MG/ML
10 INJECTION INTRAMUSCULAR; INTRAVENOUS EVERY 6 HOURS PRN
Status: DISCONTINUED | OUTPATIENT
Start: 2017-01-01 | End: 2017-01-01 | Stop reason: HOSPADM

## 2017-01-01 RX ORDER — ACETAMINOPHEN 325 MG/1
650 TABLET ORAL EVERY 4 HOURS PRN
Status: DISCONTINUED | OUTPATIENT
Start: 2017-01-01 | End: 2017-01-01 | Stop reason: HOSPADM

## 2017-01-01 RX ORDER — ATORVASTATIN CALCIUM 10 MG/1
10 TABLET, FILM COATED ORAL NIGHTLY
Status: DISCONTINUED | OUTPATIENT
Start: 2017-01-01 | End: 2017-01-01 | Stop reason: HOSPADM

## 2017-01-01 RX ORDER — PANTOPRAZOLE SODIUM 40 MG/1
40 TABLET, DELAYED RELEASE ORAL DAILY
Qty: 30 TABLET | Refills: 5 | Status: SHIPPED | OUTPATIENT
Start: 2017-01-01

## 2017-01-01 RX ORDER — OMEPRAZOLE 40 MG/1
40 CAPSULE, DELAYED RELEASE ORAL DAILY
Qty: 90 CAPSULE | Refills: 1 | Status: SHIPPED | OUTPATIENT
Start: 2017-01-01 | End: 2017-01-01 | Stop reason: DRUGHIGH

## 2017-01-01 RX ORDER — ACETAMINOPHEN 500 MG
500 TABLET ORAL EVERY 6 HOURS PRN
COMMUNITY

## 2017-01-01 RX ORDER — ASPIRIN 81 MG/1
81 TABLET ORAL DAILY
Qty: 30 TABLET | Refills: 0 | Status: SHIPPED | OUTPATIENT
Start: 2017-01-01

## 2017-01-01 RX ORDER — PANTOPRAZOLE SODIUM 40 MG/1
40 TABLET, DELAYED RELEASE ORAL EVERY MORNING
Status: DISCONTINUED | OUTPATIENT
Start: 2017-01-01 | End: 2017-01-01 | Stop reason: HOSPADM

## 2017-01-01 RX ORDER — DOCOSANOL 100 MG/G
CREAM TOPICAL
Status: DISCONTINUED | OUTPATIENT
Start: 2017-01-01 | End: 2017-01-01 | Stop reason: HOSPADM

## 2017-01-01 RX ORDER — SODIUM CHLORIDE 0.9 % (FLUSH) 0.9 %
1-10 SYRINGE (ML) INJECTION AS NEEDED
Status: DISCONTINUED | OUTPATIENT
Start: 2017-01-01 | End: 2017-01-01 | Stop reason: HOSPADM

## 2017-01-01 RX ORDER — MORPHINE SULFATE 4 MG/ML
4 INJECTION, SOLUTION INTRAMUSCULAR; INTRAVENOUS ONCE
Status: COMPLETED | OUTPATIENT
Start: 2017-01-01 | End: 2017-01-01

## 2017-01-01 RX ORDER — OXYCODONE AND ACETAMINOPHEN 10; 325 MG/1; MG/1
1 TABLET ORAL EVERY 6 HOURS PRN
Qty: 120 TABLET | Refills: 0 | Status: SHIPPED | OUTPATIENT
Start: 2017-01-01 | End: 2018-01-01 | Stop reason: SDUPTHER

## 2017-01-01 RX ORDER — DOCOSANOL 100 MG/G
CREAM TOPICAL
Start: 2017-01-01 | End: 2018-01-01

## 2017-01-01 RX ORDER — ONDANSETRON 4 MG/1
4 TABLET, FILM COATED ORAL 4 TIMES DAILY PRN
Status: DISCONTINUED | OUTPATIENT
Start: 2017-01-01 | End: 2017-01-01 | Stop reason: HOSPADM

## 2017-01-01 RX ORDER — ISOSORBIDE MONONITRATE 30 MG/1
30 TABLET, EXTENDED RELEASE ORAL
Status: DISCONTINUED | OUTPATIENT
Start: 2017-01-01 | End: 2017-01-01 | Stop reason: HOSPADM

## 2017-01-01 RX ORDER — FAMOTIDINE 20 MG/1
20 TABLET, FILM COATED ORAL 2 TIMES DAILY
Status: DISCONTINUED | OUTPATIENT
Start: 2017-01-01 | End: 2017-01-01 | Stop reason: HOSPADM

## 2017-01-01 RX ORDER — SODIUM CHLORIDE 9 MG/ML
125 INJECTION, SOLUTION INTRAVENOUS CONTINUOUS
Status: DISCONTINUED | OUTPATIENT
Start: 2017-01-01 | End: 2017-01-01

## 2017-01-01 RX ORDER — SODIUM CHLORIDE 9 MG/ML
75 INJECTION, SOLUTION INTRAVENOUS CONTINUOUS
Status: DISCONTINUED | OUTPATIENT
Start: 2017-01-01 | End: 2017-01-01 | Stop reason: HOSPADM

## 2017-01-01 RX ORDER — DILTIAZEM HYDROCHLORIDE 120 MG/1
120 CAPSULE, COATED, EXTENDED RELEASE ORAL
Status: DISCONTINUED | OUTPATIENT
Start: 2017-01-01 | End: 2017-01-01 | Stop reason: HOSPADM

## 2017-01-01 RX ORDER — DILTIAZEM HYDROCHLORIDE 180 MG/1
180 CAPSULE, COATED, EXTENDED RELEASE ORAL
Status: DISCONTINUED | OUTPATIENT
Start: 2017-01-01 | End: 2017-01-01 | Stop reason: HOSPADM

## 2017-01-01 RX ORDER — SUCRALFATE 1 G/1
TABLET ORAL
Qty: 90 TABLET | Refills: 5 | Status: SHIPPED | OUTPATIENT
Start: 2017-01-01 | End: 2018-01-01

## 2017-01-01 RX ORDER — VENLAFAXINE 37.5 MG/1
37.5 TABLET ORAL 2 TIMES DAILY WITH MEALS
Status: DISCONTINUED | OUTPATIENT
Start: 2017-01-01 | End: 2017-01-01 | Stop reason: HOSPADM

## 2017-01-01 RX ORDER — ASPIRIN 81 MG/1
81 TABLET ORAL DAILY
Status: DISCONTINUED | OUTPATIENT
Start: 2017-01-01 | End: 2017-01-01 | Stop reason: HOSPADM

## 2017-01-01 RX ORDER — DILTIAZEM HYDROCHLORIDE 180 MG/1
CAPSULE, COATED, EXTENDED RELEASE ORAL
Qty: 30 CAPSULE | Refills: 3 | OUTPATIENT
Start: 2017-01-01

## 2017-01-01 RX ORDER — POTASSIUM CHLORIDE 750 MG/1
40 CAPSULE, EXTENDED RELEASE ORAL AS NEEDED
Status: DISCONTINUED | OUTPATIENT
Start: 2017-01-01 | End: 2017-01-01 | Stop reason: HOSPADM

## 2017-01-01 RX ORDER — ACETAMINOPHEN 500 MG
500 TABLET ORAL EVERY 6 HOURS PRN
Status: DISCONTINUED | OUTPATIENT
Start: 2017-01-01 | End: 2017-01-01 | Stop reason: SDUPTHER

## 2017-01-01 RX ORDER — ATORVASTATIN CALCIUM 10 MG/1
10 TABLET, FILM COATED ORAL DAILY
Qty: 30 TABLET | Refills: 5 | Status: SHIPPED | OUTPATIENT
Start: 2017-01-01 | End: 2018-01-01 | Stop reason: SDUPTHER

## 2017-01-01 RX ORDER — POTASSIUM CHLORIDE 1.5 G/1.77G
40 POWDER, FOR SOLUTION ORAL AS NEEDED
Status: DISCONTINUED | OUTPATIENT
Start: 2017-01-01 | End: 2017-01-01 | Stop reason: HOSPADM

## 2017-01-01 RX ORDER — ASPIRIN 325 MG
325 TABLET, DELAYED RELEASE (ENTERIC COATED) ORAL DAILY
Status: DISCONTINUED | OUTPATIENT
Start: 2017-01-01 | End: 2017-01-01

## 2017-01-01 RX ORDER — POLYETHYLENE GLYCOL 3350 17 G/17G
17 POWDER, FOR SOLUTION ORAL DAILY
Qty: 527 G | Refills: 1 | Status: SHIPPED | OUTPATIENT
Start: 2017-01-01

## 2017-01-01 RX ORDER — SODIUM CHLORIDE 9 MG/ML
100 INJECTION, SOLUTION INTRAVENOUS CONTINUOUS
Status: DISCONTINUED | OUTPATIENT
Start: 2017-01-01 | End: 2017-01-01 | Stop reason: HOSPADM

## 2017-01-01 RX ORDER — SUCRALFATE 1 G/1
1 TABLET ORAL
Status: DISCONTINUED | OUTPATIENT
Start: 2017-01-01 | End: 2017-01-01 | Stop reason: HOSPADM

## 2017-01-01 RX ORDER — PALBOCICLIB 125 MG/1
CAPSULE ORAL
Qty: 21 CAPSULE | Refills: 2 | Status: SHIPPED | OUTPATIENT
Start: 2017-01-01 | End: 2017-01-01 | Stop reason: SDUPTHER

## 2017-01-01 RX ORDER — 0.9 % SODIUM CHLORIDE 0.9 %
10 VIAL (ML) INJECTION AS NEEDED
Status: DISCONTINUED | OUTPATIENT
Start: 2017-01-01 | End: 2017-01-01 | Stop reason: HOSPADM

## 2017-01-01 RX ORDER — ERGOCALCIFEROL 1.25 MG/1
50000 CAPSULE ORAL
Qty: 2 CAPSULE | Refills: 5 | Status: SHIPPED | OUTPATIENT
Start: 2017-01-01 | End: 2017-01-01 | Stop reason: DRUGHIGH

## 2017-01-01 RX ORDER — ACETAMINOPHEN 325 MG/1
650 TABLET ORAL ONCE
Status: COMPLETED | OUTPATIENT
Start: 2017-01-01 | End: 2017-01-01

## 2017-01-01 RX ORDER — LOSARTAN POTASSIUM AND HYDROCHLOROTHIAZIDE 25; 100 MG/1; MG/1
1 TABLET ORAL DAILY
Qty: 30 TABLET | Refills: 5 | Status: SHIPPED | OUTPATIENT
Start: 2017-01-01 | End: 2017-01-01 | Stop reason: HOSPADM

## 2017-01-01 RX ORDER — OXYCODONE HYDROCHLORIDE AND ACETAMINOPHEN 5; 325 MG/1; MG/1
1 TABLET ORAL EVERY 8 HOURS PRN
Qty: 90 TABLET | Refills: 0 | Status: SHIPPED | OUTPATIENT
Start: 2017-01-01 | End: 2018-01-01

## 2017-01-01 RX ORDER — VENLAFAXINE 37.5 MG/1
TABLET ORAL
Qty: 90 TABLET | Refills: 1 | Status: SHIPPED | OUTPATIENT
Start: 2017-01-01 | End: 2018-01-01

## 2017-01-01 RX ADMIN — ASPIRIN 325 MG: 325 TABLET, DELAYED RELEASE ORAL at 08:34

## 2017-01-01 RX ADMIN — VANCOMYCIN HYDROCHLORIDE 1750 MG: 1 INJECTION, POWDER, LYOPHILIZED, FOR SOLUTION INTRAVENOUS at 13:58

## 2017-01-01 RX ADMIN — OXYCODONE HYDROCHLORIDE AND ACETAMINOPHEN 1 TABLET: 5; 325 TABLET ORAL at 12:11

## 2017-01-01 RX ADMIN — RIVAROXABAN 20 MG: 10 TABLET, FILM COATED ORAL at 17:36

## 2017-01-01 RX ADMIN — POTASSIUM CHLORIDE 40 MEQ: 750 CAPSULE, EXTENDED RELEASE ORAL at 00:43

## 2017-01-01 RX ADMIN — PANTOPRAZOLE SODIUM 40 MG: 40 TABLET, DELAYED RELEASE ORAL at 06:26

## 2017-01-01 RX ADMIN — AZTREONAM 2 G: 2 INJECTION, POWDER, LYOPHILIZED, FOR SOLUTION INTRAMUSCULAR; INTRAVENOUS at 22:50

## 2017-01-01 RX ADMIN — LETROZOLE 2.5 MG: 2.5 TABLET, FILM COATED ORAL at 09:27

## 2017-01-01 RX ADMIN — DOCUSATE SODIUM 200 MG: 100 CAPSULE, LIQUID FILLED ORAL at 08:33

## 2017-01-01 RX ADMIN — OXYCODONE HYDROCHLORIDE AND ACETAMINOPHEN 1 TABLET: 5; 325 TABLET ORAL at 21:09

## 2017-01-01 RX ADMIN — DIGOXIN 125 MCG: 125 TABLET ORAL at 10:43

## 2017-01-01 RX ADMIN — SODIUM CHLORIDE 100 ML/HR: 9 INJECTION, SOLUTION INTRAVENOUS at 06:37

## 2017-01-01 RX ADMIN — LETROZOLE 2.5 MG: 2.5 TABLET, FILM COATED ORAL at 09:13

## 2017-01-01 RX ADMIN — LETROZOLE 2.5 MG: 2.5 TABLET, FILM COATED ORAL at 08:34

## 2017-01-01 RX ADMIN — ONDANSETRON 4 MG: 4 TABLET, FILM COATED ORAL at 11:41

## 2017-01-01 RX ADMIN — ASPIRIN 325 MG: 325 TABLET, DELAYED RELEASE ORAL at 12:00

## 2017-01-01 RX ADMIN — SODIUM CHLORIDE 100 ML/HR: 9 INJECTION, SOLUTION INTRAVENOUS at 05:47

## 2017-01-01 RX ADMIN — LOSARTAN POTASSIUM 100 MG: 50 TABLET, FILM COATED ORAL at 08:33

## 2017-01-01 RX ADMIN — ISOSORBIDE MONONITRATE 30 MG: 30 TABLET, EXTENDED RELEASE ORAL at 08:16

## 2017-01-01 RX ADMIN — SUCRALFATE 1 G: 1 TABLET ORAL at 08:34

## 2017-01-01 RX ADMIN — ISOSORBIDE MONONITRATE 30 MG: 30 TABLET, EXTENDED RELEASE ORAL at 09:27

## 2017-01-01 RX ADMIN — FAMOTIDINE 20 MG: 20 TABLET ORAL at 17:32

## 2017-01-01 RX ADMIN — LETROZOLE 2.5 MG: 2.5 TABLET, FILM COATED ORAL at 11:40

## 2017-01-01 RX ADMIN — SUCRALFATE 1 G: 1 TABLET ORAL at 17:32

## 2017-01-01 RX ADMIN — IOPAMIDOL 61 ML: 755 INJECTION, SOLUTION INTRAVENOUS at 19:01

## 2017-01-01 RX ADMIN — LOSARTAN POTASSIUM 100 MG: 50 TABLET, FILM COATED ORAL at 08:16

## 2017-01-01 RX ADMIN — POTASSIUM CHLORIDE 40 MEQ: 750 CAPSULE, EXTENDED RELEASE ORAL at 20:55

## 2017-01-01 RX ADMIN — DOCUSATE SODIUM 200 MG: 100 CAPSULE, LIQUID FILLED ORAL at 09:13

## 2017-01-01 RX ADMIN — DOCUSATE SODIUM 200 MG: 100 CAPSULE, LIQUID FILLED ORAL at 18:11

## 2017-01-01 RX ADMIN — DENOSUMAB 120 MG: 120 INJECTION SUBCUTANEOUS at 14:02

## 2017-01-01 RX ADMIN — OXYCODONE HYDROCHLORIDE AND ACETAMINOPHEN 1 TABLET: 5; 325 TABLET ORAL at 21:54

## 2017-01-01 RX ADMIN — ATORVASTATIN CALCIUM 10 MG: 10 TABLET, FILM COATED ORAL at 21:23

## 2017-01-01 RX ADMIN — ATORVASTATIN CALCIUM 10 MG: 10 TABLET, FILM COATED ORAL at 21:07

## 2017-01-01 RX ADMIN — CEFTRIAXONE SODIUM 1 G: 1 INJECTION, POWDER, FOR SOLUTION INTRAMUSCULAR; INTRAVENOUS at 11:24

## 2017-01-01 RX ADMIN — ATORVASTATIN CALCIUM 10 MG: 10 TABLET, FILM COATED ORAL at 20:06

## 2017-01-01 RX ADMIN — SUCRALFATE 1 G: 1 TABLET ORAL at 09:22

## 2017-01-01 RX ADMIN — RIVAROXABAN 20 MG: 10 TABLET, FILM COATED ORAL at 18:15

## 2017-01-01 RX ADMIN — FAMOTIDINE 20 MG: 20 TABLET ORAL at 08:33

## 2017-01-01 RX ADMIN — SUCRALFATE 1 G: 1 TABLET ORAL at 11:42

## 2017-01-01 RX ADMIN — SUCRALFATE 1 G: 1 TABLET ORAL at 11:43

## 2017-01-01 RX ADMIN — TECHNETIUM TC-99M SESTAMIBI 1 DOSE: 1 INJECTION INTRAVENOUS at 09:13

## 2017-01-01 RX ADMIN — LETROZOLE 2.5 MG: 2.5 TABLET, FILM COATED ORAL at 10:42

## 2017-01-01 RX ADMIN — CEFTRIAXONE SODIUM 1 G: 1 INJECTION, POWDER, FOR SOLUTION INTRAMUSCULAR; INTRAVENOUS at 09:27

## 2017-01-01 RX ADMIN — LOSARTAN POTASSIUM 100 MG: 50 TABLET, FILM COATED ORAL at 08:52

## 2017-01-01 RX ADMIN — LETROZOLE 2.5 MG: 2.5 TABLET, FILM COATED ORAL at 09:21

## 2017-01-01 RX ADMIN — ATORVASTATIN CALCIUM 10 MG: 10 TABLET, FILM COATED ORAL at 21:21

## 2017-01-01 RX ADMIN — RIVAROXABAN 20 MG: 10 TABLET, FILM COATED ORAL at 17:20

## 2017-01-01 RX ADMIN — LOSARTAN POTASSIUM 100 MG: 50 TABLET, FILM COATED ORAL at 18:58

## 2017-01-01 RX ADMIN — ATORVASTATIN CALCIUM 10 MG: 10 TABLET, FILM COATED ORAL at 20:50

## 2017-01-01 RX ADMIN — RIVAROXABAN 20 MG: 10 TABLET, FILM COATED ORAL at 18:58

## 2017-01-01 RX ADMIN — DILTIAZEM HYDROCHLORIDE 120 MG: 120 CAPSULE, COATED, EXTENDED RELEASE ORAL at 08:16

## 2017-01-01 RX ADMIN — LETROZOLE 2.5 MG: 2.5 TABLET, FILM COATED ORAL at 08:49

## 2017-01-01 RX ADMIN — DILTIAZEM HYDROCHLORIDE 180 MG: 180 CAPSULE, COATED, EXTENDED RELEASE ORAL at 09:28

## 2017-01-01 RX ADMIN — PANTOPRAZOLE SODIUM 40 MG: 40 TABLET, DELAYED RELEASE ORAL at 05:39

## 2017-01-01 RX ADMIN — SODIUM CHLORIDE 125 ML/HR: 9 INJECTION, SOLUTION INTRAVENOUS at 12:07

## 2017-01-01 RX ADMIN — VENLAFAXINE HYDROCHLORIDE 37.5 MG: 37.5 TABLET ORAL at 08:16

## 2017-01-01 RX ADMIN — ATORVASTATIN CALCIUM 10 MG: 10 TABLET, FILM COATED ORAL at 20:55

## 2017-01-01 RX ADMIN — ASPIRIN 81 MG: 81 TABLET, COATED ORAL at 09:13

## 2017-01-01 RX ADMIN — PANTOPRAZOLE SODIUM 40 MG: 40 TABLET, DELAYED RELEASE ORAL at 06:13

## 2017-01-01 RX ADMIN — PANTOPRAZOLE SODIUM 40 MG: 40 TABLET, DELAYED RELEASE ORAL at 06:06

## 2017-01-01 RX ADMIN — MORPHINE SULFATE 4 MG: 4 INJECTION, SOLUTION INTRAMUSCULAR; INTRAVENOUS at 13:59

## 2017-01-01 RX ADMIN — REGADENOSON 0.4 MG: 0.08 INJECTION, SOLUTION INTRAVENOUS at 10:46

## 2017-01-01 RX ADMIN — DIGOXIN 125 MCG: 125 TABLET ORAL at 13:26

## 2017-01-01 RX ADMIN — DIGOXIN 125 MCG: 125 TABLET ORAL at 12:00

## 2017-01-01 RX ADMIN — FAMOTIDINE 20 MG: 20 TABLET ORAL at 09:27

## 2017-01-01 RX ADMIN — ASPIRIN 325 MG: 325 TABLET, DELAYED RELEASE ORAL at 08:52

## 2017-01-01 RX ADMIN — SUCRALFATE 1 G: 1 TABLET ORAL at 10:44

## 2017-01-01 RX ADMIN — FAMOTIDINE 20 MG: 20 TABLET ORAL at 18:11

## 2017-01-01 RX ADMIN — ISOSORBIDE MONONITRATE 30 MG: 30 TABLET, EXTENDED RELEASE ORAL at 18:58

## 2017-01-01 RX ADMIN — SODIUM CHLORIDE 100 ML/HR: 9 INJECTION, SOLUTION INTRAVENOUS at 17:48

## 2017-01-01 RX ADMIN — DIGOXIN 125 MCG: 125 TABLET ORAL at 11:24

## 2017-01-01 RX ADMIN — SODIUM CHLORIDE 10 ML: 9 INJECTION, SOLUTION INTRAMUSCULAR; INTRAVENOUS; SUBCUTANEOUS at 10:47

## 2017-01-01 RX ADMIN — TECHNETIUM TC-99M SESTAMIBI 1 DOSE: 1 INJECTION INTRAVENOUS at 10:47

## 2017-01-01 RX ADMIN — LOSARTAN POTASSIUM 100 MG: 50 TABLET, FILM COATED ORAL at 08:01

## 2017-01-01 RX ADMIN — MORPHINE SULFATE 4 MG: 4 INJECTION, SOLUTION INTRAMUSCULAR; INTRAVENOUS at 16:14

## 2017-01-01 RX ADMIN — ISOSORBIDE MONONITRATE 30 MG: 30 TABLET, EXTENDED RELEASE ORAL at 09:22

## 2017-01-01 RX ADMIN — DILTIAZEM HYDROCHLORIDE 180 MG: 180 CAPSULE, COATED, EXTENDED RELEASE ORAL at 18:58

## 2017-01-01 RX ADMIN — CEFTRIAXONE SODIUM 1 G: 1 INJECTION, POWDER, FOR SOLUTION INTRAMUSCULAR; INTRAVENOUS at 09:49

## 2017-01-01 RX ADMIN — DILTIAZEM HYDROCHLORIDE 180 MG: 180 CAPSULE, COATED, EXTENDED RELEASE ORAL at 08:33

## 2017-01-01 RX ADMIN — DIGOXIN 125 MCG: 125 TABLET ORAL at 11:43

## 2017-01-01 RX ADMIN — SODIUM CHLORIDE 100 ML/HR: 9 INJECTION, SOLUTION INTRAVENOUS at 05:13

## 2017-01-01 RX ADMIN — WATER 2 G: 1 INJECTION INTRAMUSCULAR; INTRAVENOUS; SUBCUTANEOUS at 14:27

## 2017-01-01 RX ADMIN — VENLAFAXINE HYDROCHLORIDE 37.5 MG: 37.5 TABLET ORAL at 08:34

## 2017-01-01 RX ADMIN — DILTIAZEM HYDROCHLORIDE 120 MG: 120 CAPSULE, COATED, EXTENDED RELEASE ORAL at 11:59

## 2017-01-01 RX ADMIN — POTASSIUM CHLORIDE 40 MEQ: 1.5 POWDER, FOR SOLUTION ORAL at 17:32

## 2017-01-01 RX ADMIN — DILTIAZEM HYDROCHLORIDE 120 MG: 120 CAPSULE, COATED, EXTENDED RELEASE ORAL at 08:34

## 2017-01-01 RX ADMIN — POTASSIUM CHLORIDE 30 MEQ: 750 CAPSULE, EXTENDED RELEASE ORAL at 17:18

## 2017-01-01 RX ADMIN — LOSARTAN POTASSIUM 100 MG: 50 TABLET, FILM COATED ORAL at 08:34

## 2017-01-01 RX ADMIN — ATORVASTATIN CALCIUM 10 MG: 10 TABLET, FILM COATED ORAL at 21:10

## 2017-01-01 RX ADMIN — PANTOPRAZOLE SODIUM 40 MG: 40 TABLET, DELAYED RELEASE ORAL at 06:52

## 2017-01-01 RX ADMIN — LOSARTAN POTASSIUM 100 MG: 50 TABLET, FILM COATED ORAL at 09:22

## 2017-01-01 RX ADMIN — RIVAROXABAN 20 MG: 10 TABLET, FILM COATED ORAL at 17:39

## 2017-01-01 RX ADMIN — PANTOPRAZOLE SODIUM 40 MG: 40 TABLET, DELAYED RELEASE ORAL at 06:01

## 2017-01-01 RX ADMIN — FAMOTIDINE 20 MG: 20 TABLET ORAL at 17:20

## 2017-01-01 RX ADMIN — ENOXAPARIN SODIUM 90 MG: 100 INJECTION SUBCUTANEOUS at 06:27

## 2017-01-01 RX ADMIN — FAMOTIDINE 20 MG: 20 TABLET ORAL at 09:22

## 2017-01-01 RX ADMIN — SUCRALFATE 1 G: 1 TABLET ORAL at 17:20

## 2017-01-01 RX ADMIN — VENLAFAXINE HYDROCHLORIDE 37.5 MG: 37.5 TABLET ORAL at 12:00

## 2017-01-01 RX ADMIN — ACETAMINOPHEN 650 MG: 325 TABLET ORAL at 11:24

## 2017-01-01 RX ADMIN — PANTOPRAZOLE SODIUM 40 MG: 40 TABLET, DELAYED RELEASE ORAL at 05:13

## 2017-01-01 RX ADMIN — PANTOPRAZOLE SODIUM 40 MG: 40 TABLET, DELAYED RELEASE ORAL at 06:37

## 2017-01-01 RX ADMIN — LOSARTAN POTASSIUM 100 MG: 50 TABLET, FILM COATED ORAL at 09:12

## 2017-01-01 RX ADMIN — ENOXAPARIN SODIUM 90 MG: 100 INJECTION SUBCUTANEOUS at 18:37

## 2017-01-01 RX ADMIN — SUCRALFATE 1 G: 1 TABLET ORAL at 21:07

## 2017-01-01 RX ADMIN — ONDANSETRON 4 MG: 2 INJECTION INTRAMUSCULAR; INTRAVENOUS at 22:29

## 2017-01-01 RX ADMIN — POTASSIUM CHLORIDE 40 MEQ: 750 CAPSULE, EXTENDED RELEASE ORAL at 05:10

## 2017-01-01 RX ADMIN — ATORVASTATIN CALCIUM 10 MG: 10 TABLET, FILM COATED ORAL at 21:51

## 2017-01-01 RX ADMIN — VENLAFAXINE HYDROCHLORIDE 37.5 MG: 37.5 TABLET ORAL at 17:20

## 2017-01-01 RX ADMIN — ISOSORBIDE MONONITRATE 30 MG: 30 TABLET, EXTENDED RELEASE ORAL at 09:12

## 2017-01-01 RX ADMIN — VENLAFAXINE HYDROCHLORIDE 37.5 MG: 37.5 TABLET ORAL at 09:12

## 2017-01-01 RX ADMIN — SUCRALFATE 1 G: 1 TABLET ORAL at 06:37

## 2017-01-01 RX ADMIN — VENLAFAXINE HYDROCHLORIDE 37.5 MG: 37.5 TABLET ORAL at 17:32

## 2017-01-01 RX ADMIN — RIVAROXABAN 20 MG: 10 TABLET, FILM COATED ORAL at 18:11

## 2017-01-01 RX ADMIN — DENOSUMAB 120 MG: 120 INJECTION SUBCUTANEOUS at 11:01

## 2017-01-01 RX ADMIN — SODIUM CHLORIDE 500 ML: 9 INJECTION, SOLUTION INTRAVENOUS at 17:19

## 2017-01-01 RX ADMIN — ASPIRIN 81 MG: 81 TABLET, COATED ORAL at 09:22

## 2017-01-01 RX ADMIN — DIGOXIN 125 MCG: 125 TABLET ORAL at 11:25

## 2017-01-01 RX ADMIN — DILTIAZEM HYDROCHLORIDE 120 MG: 120 CAPSULE, COATED, EXTENDED RELEASE ORAL at 08:52

## 2017-01-01 RX ADMIN — DOCUSATE SODIUM 200 MG: 100 CAPSULE, LIQUID FILLED ORAL at 09:27

## 2017-01-01 RX ADMIN — SODIUM CHLORIDE 100 ML/HR: 9 INJECTION, SOLUTION INTRAVENOUS at 18:48

## 2017-01-01 RX ADMIN — DILTIAZEM HYDROCHLORIDE 180 MG: 180 CAPSULE, COATED, EXTENDED RELEASE ORAL at 09:21

## 2017-01-01 RX ADMIN — DOCUSATE SODIUM 200 MG: 100 CAPSULE, LIQUID FILLED ORAL at 09:21

## 2017-01-01 RX ADMIN — FAMOTIDINE 20 MG: 20 TABLET ORAL at 09:12

## 2017-01-01 RX ADMIN — DOCUSATE SODIUM 200 MG: 100 CAPSULE, LIQUID FILLED ORAL at 17:20

## 2017-01-01 RX ADMIN — DENOSUMAB 120 MG: 120 INJECTION SUBCUTANEOUS at 11:38

## 2017-01-01 RX ADMIN — SODIUM CHLORIDE 100 ML/HR: 9 INJECTION, SOLUTION INTRAVENOUS at 07:22

## 2017-01-01 RX ADMIN — SUCRALFATE 1 G: 1 TABLET ORAL at 21:10

## 2017-01-01 RX ADMIN — RIVAROXABAN 20 MG: 10 TABLET, FILM COATED ORAL at 17:32

## 2017-01-01 RX ADMIN — CEFTRIAXONE SODIUM 1 G: 1 INJECTION, POWDER, FOR SOLUTION INTRAMUSCULAR; INTRAVENOUS at 09:15

## 2017-01-01 RX ADMIN — SUCRALFATE 1 G: 1 TABLET ORAL at 09:27

## 2017-01-01 RX ADMIN — FAMOTIDINE 20 MG: 20 TABLET ORAL at 18:58

## 2017-01-01 RX ADMIN — ACETAMINOPHEN 650 MG: 325 TABLET ORAL at 16:45

## 2017-01-01 RX ADMIN — POTASSIUM CHLORIDE 40 MEQ: 1.5 POWDER, FOR SOLUTION ORAL at 20:49

## 2017-01-01 RX ADMIN — PANTOPRAZOLE SODIUM 40 MG: 40 TABLET, DELAYED RELEASE ORAL at 06:28

## 2017-01-01 RX ADMIN — SODIUM CHLORIDE 100 ML/HR: 9 INJECTION, SOLUTION INTRAVENOUS at 17:13

## 2017-01-01 RX ADMIN — VENLAFAXINE HYDROCHLORIDE 37.5 MG: 37.5 TABLET ORAL at 08:53

## 2017-01-01 RX ADMIN — VENLAFAXINE HYDROCHLORIDE 37.5 MG: 37.5 TABLET ORAL at 09:28

## 2017-01-01 RX ADMIN — ISOSORBIDE MONONITRATE 30 MG: 30 TABLET, EXTENDED RELEASE ORAL at 08:01

## 2017-01-01 RX ADMIN — VENLAFAXINE HYDROCHLORIDE 37.5 MG: 37.5 TABLET ORAL at 18:11

## 2017-01-01 RX ADMIN — SUCRALFATE 1 G: 1 TABLET ORAL at 11:24

## 2017-01-01 RX ADMIN — ISOSORBIDE MONONITRATE 30 MG: 30 TABLET, EXTENDED RELEASE ORAL at 08:52

## 2017-01-01 RX ADMIN — AZTREONAM 2 G: 2 INJECTION, POWDER, LYOPHILIZED, FOR SOLUTION INTRAMUSCULAR; INTRAVENOUS at 05:49

## 2017-01-01 RX ADMIN — SODIUM CHLORIDE 100 ML/HR: 9 INJECTION, SOLUTION INTRAVENOUS at 20:06

## 2017-01-01 RX ADMIN — ASPIRIN 325 MG: 325 TABLET, DELAYED RELEASE ORAL at 20:55

## 2017-01-01 RX ADMIN — VENLAFAXINE HYDROCHLORIDE 37.5 MG: 37.5 TABLET ORAL at 18:58

## 2017-01-01 RX ADMIN — IOPAMIDOL 65 ML: 755 INJECTION, SOLUTION INTRAVENOUS at 14:20

## 2017-01-01 RX ADMIN — SUCRALFATE 1 G: 1 TABLET ORAL at 20:06

## 2017-01-01 RX ADMIN — SUCRALFATE 1 G: 1 TABLET ORAL at 20:50

## 2017-01-01 RX ADMIN — DENOSUMAB 120 MG: 120 INJECTION SUBCUTANEOUS at 14:10

## 2017-01-01 RX ADMIN — DIGOXIN 125 MCG: 125 TABLET ORAL at 11:40

## 2017-01-01 RX ADMIN — DILTIAZEM HYDROCHLORIDE 180 MG: 180 CAPSULE, COATED, EXTENDED RELEASE ORAL at 09:12

## 2017-01-01 RX ADMIN — ASPIRIN 81 MG: 81 TABLET, COATED ORAL at 18:57

## 2017-01-01 RX ADMIN — IOPAMIDOL 90 ML: 612 INJECTION, SOLUTION INTRAVENOUS at 11:15

## 2017-01-01 RX ADMIN — VENLAFAXINE HYDROCHLORIDE 37.5 MG: 37.5 TABLET ORAL at 08:01

## 2017-01-01 RX ADMIN — ATORVASTATIN CALCIUM 10 MG: 10 TABLET, FILM COATED ORAL at 21:09

## 2017-01-01 RX ADMIN — ONDANSETRON 4 MG: 2 INJECTION INTRAMUSCULAR; INTRAVENOUS at 13:59

## 2017-01-01 RX ADMIN — SUCRALFATE 1 G: 1 TABLET ORAL at 18:58

## 2017-01-01 RX ADMIN — DENOSUMAB 120 MG: 120 INJECTION SUBCUTANEOUS at 14:45

## 2017-01-01 RX ADMIN — DENOSUMAB 120 MG: 120 INJECTION SUBCUTANEOUS at 14:12

## 2017-01-01 RX ADMIN — LETROZOLE 2.5 MG: 2.5 TABLET, FILM COATED ORAL at 11:24

## 2017-01-01 RX ADMIN — RIVAROXABAN 20 MG: 10 TABLET, FILM COATED ORAL at 17:51

## 2017-01-01 RX ADMIN — LETROZOLE 2.5 MG: 2.5 TABLET, FILM COATED ORAL at 12:00

## 2017-01-01 RX ADMIN — VENLAFAXINE HYDROCHLORIDE 37.5 MG: 37.5 TABLET ORAL at 09:22

## 2017-01-01 RX ADMIN — DILTIAZEM HYDROCHLORIDE 120 MG: 120 CAPSULE, COATED, EXTENDED RELEASE ORAL at 08:01

## 2017-01-01 RX ADMIN — SUCRALFATE 1 G: 1 TABLET ORAL at 18:11

## 2017-01-01 RX ADMIN — ISOSORBIDE MONONITRATE 30 MG: 30 TABLET, EXTENDED RELEASE ORAL at 08:33

## 2017-01-01 RX ADMIN — LOSARTAN POTASSIUM 100 MG: 50 TABLET, FILM COATED ORAL at 12:00

## 2017-01-01 RX ADMIN — OXYCODONE HYDROCHLORIDE AND ACETAMINOPHEN 1 TABLET: 5; 325 TABLET ORAL at 21:26

## 2017-01-01 RX ADMIN — LOSARTAN POTASSIUM 100 MG: 50 TABLET, FILM COATED ORAL at 09:28

## 2017-01-01 RX ADMIN — LETROZOLE 2.5 MG: 2.5 TABLET, FILM COATED ORAL at 18:58

## 2017-01-01 RX ADMIN — DIGOXIN 125 MCG: 125 TABLET ORAL at 11:41

## 2017-01-01 RX ADMIN — ASPIRIN 81 MG: 81 TABLET, COATED ORAL at 08:01

## 2017-01-05 ENCOUNTER — HOSPITAL ENCOUNTER (OUTPATIENT)
Dept: OTHER | Facility: HOSPITAL | Age: 67
Setting detail: RADIATION/ONCOLOGY SERIES
Discharge: HOME | End: 2017-01-20
Attending: INTERNAL MEDICINE | Admitting: INTERNAL MEDICINE

## 2017-01-05 LAB
ANION GAP SERPL CALCULATED.3IONS-SCNC: 10 MMOL/L (ref 5–15)
BUN SERPL-MCNC: 14 MG/DL (ref 7–21)
CALCIUM SERPL-MCNC: 9.1 MG/DL (ref 8.4–10.2)
CHLORIDE SERPL-SCNC: 97 MMOL/L (ref 95–110)
CO2 SERPL-SCNC: 30 MMOL/L (ref 22–31)
CREAT SERPL-MCNC: 0.8 MG/DL (ref 0.5–1)
GLUCOSE SERPL-MCNC: 89 MG/DL (ref 60–100)
POTASSIUM SERPL-SCNC: 3.3 MMOL/L (ref 3.5–5.1)
SODIUM SERPL-SCNC: 137 MMOL/L (ref 137–145)

## 2017-01-06 LAB — CANCER AG27-29 SERPL-ACNC: 356.3 U/ML (ref 0–38.6)

## 2017-01-12 PROBLEM — C50.912 MALIGNANT NEOPLASM OF LEFT BREAST: Status: ACTIVE | Noted: 2017-01-12

## 2017-01-13 DIAGNOSIS — C50.412 MALIGNANT NEOPLASM OF UPPER-OUTER QUADRANT OF LEFT FEMALE BREAST (HCC): Primary | ICD-10-CM

## 2017-01-19 ENCOUNTER — OFFICE VISIT (OUTPATIENT)
Dept: FAMILY MEDICINE CLINIC | Facility: CLINIC | Age: 67
End: 2017-01-19

## 2017-01-19 VITALS
DIASTOLIC BLOOD PRESSURE: 68 MMHG | WEIGHT: 187.6 LBS | BODY MASS INDEX: 34.52 KG/M2 | SYSTOLIC BLOOD PRESSURE: 148 MMHG | HEART RATE: 88 BPM | HEIGHT: 62 IN | OXYGEN SATURATION: 96 %

## 2017-01-19 DIAGNOSIS — Z00.00 PREVENTATIVE HEALTH CARE: ICD-10-CM

## 2017-01-19 DIAGNOSIS — I48.91 ATRIAL FIBRILLATION, UNSPECIFIED TYPE (HCC): Chronic | ICD-10-CM

## 2017-01-19 DIAGNOSIS — E11.8 TYPE 2 DIABETES MELLITUS WITH COMPLICATION, UNSPECIFIED LONG TERM INSULIN USE STATUS: Chronic | ICD-10-CM

## 2017-01-19 DIAGNOSIS — Z00.00 ROUTINE GENERAL MEDICAL EXAMINATION AT HEALTH CARE FACILITY: ICD-10-CM

## 2017-01-19 DIAGNOSIS — J32.9 SINUSITIS, UNSPECIFIED CHRONICITY, UNSPECIFIED LOCATION: Primary | ICD-10-CM

## 2017-01-19 DIAGNOSIS — Z12.31 VISIT FOR SCREENING MAMMOGRAM: ICD-10-CM

## 2017-01-19 DIAGNOSIS — E78.5 HYPERLIPIDEMIA, UNSPECIFIED: Chronic | ICD-10-CM

## 2017-01-19 DIAGNOSIS — K21.00 REFLUX ESOPHAGITIS: Chronic | ICD-10-CM

## 2017-01-19 DIAGNOSIS — I10 ESSENTIAL HYPERTENSION: Chronic | ICD-10-CM

## 2017-01-19 PROCEDURE — 99214 OFFICE O/P EST MOD 30 MIN: CPT | Performed by: INTERNAL MEDICINE

## 2017-01-19 RX ORDER — POTASSIUM CHLORIDE 20 MEQ/1
20 TABLET, EXTENDED RELEASE ORAL DAILY
Qty: 30 TABLET | Refills: 5 | Status: SHIPPED | OUTPATIENT
Start: 2017-01-19 | End: 2017-01-01 | Stop reason: HOSPADM

## 2017-01-19 RX ORDER — LEVOFLOXACIN 500 MG/1
500 TABLET, FILM COATED ORAL DAILY
Qty: 7 TABLET | Refills: 0 | Status: SHIPPED | OUTPATIENT
Start: 2017-01-19 | End: 2017-01-26

## 2017-01-19 NOTE — PROGRESS NOTES
Subjective   Johanne Mayer is a 66 y.o. female.     Chief Complaint   Patient presents with   • Med Refill     needs K+ filled and c/o acid reflux        History of Present Illness     Pt f/u on DM, hyperlipidemia, HTN, reflux, and Afib.     Pt says she keeps indigestion a lot.  She says she has acid reflux, but this is staying with her.      Pt says she hasn't been able to get her potassium medications refilled.  She says she hasn't been seen in a while and hasn't had blood work in almost 9 months.  She says the pharmacy never got the refill on the potassium and that is why she hasn't had it.  She hasn't had it in over a month.    Pt says she has been SOB, but she has been having pressure in her head and has been coughing a lot.  She thinks she has some congestion.      The following portions of the patient's history were reviewed and updated as appropriate: allergies, current medications, past family history, past medical history, past social history, past surgical history and problem list.    Review of Systems   Constitutional: Negative for activity change, appetite change, fatigue and unexpected weight change.   HENT: Positive for congestion and sinus pressure. Negative for ear pain, facial swelling and hearing loss.    Respiratory: Positive for cough and shortness of breath. Negative for chest tightness and wheezing.    Cardiovascular: Positive for leg swelling. Negative for chest pain and palpitations.   Gastrointestinal: Negative for abdominal pain.   Genitourinary: Negative for difficulty urinating, dysuria, flank pain, frequency and urgency.   Musculoskeletal: Negative for arthralgias and back pain.   Skin: Negative for color change and rash.   Allergic/Immunologic: Negative for environmental allergies and food allergies.   Neurological: Negative for dizziness, syncope, weakness, numbness and headaches.   Hematological: Negative for adenopathy.   Psychiatric/Behavioral: Negative for confusion,  decreased concentration, dysphoric mood, sleep disturbance and suicidal ideas. The patient is not nervous/anxious.    All other systems reviewed and are negative.      Objective   Physical Exam   Constitutional: She is oriented to person, place, and time. Vital signs are normal. She appears well-developed and well-nourished.   HENT:   Head: Normocephalic.   Right Ear: External ear normal.   Left Ear: External ear normal.   Nose: Right sinus exhibits frontal sinus tenderness. Left sinus exhibits frontal sinus tenderness.   Mouth/Throat: Oropharynx is clear and moist.   Eyes: Conjunctivae and EOM are normal. Pupils are equal, round, and reactive to light.   Neck: Normal range of motion. Neck supple. No JVD present. No thyromegaly present.   Cardiovascular: Normal rate, regular rhythm, normal heart sounds and intact distal pulses.    No murmur heard.  Pulmonary/Chest: Effort normal and breath sounds normal. No respiratory distress. She has no wheezes. She has no rales. She exhibits no tenderness.   Abdominal: Soft. Bowel sounds are normal. She exhibits no distension and no mass. There is no tenderness. There is no rebound and no guarding. No hernia.   Musculoskeletal: Normal range of motion. She exhibits edema. She exhibits no tenderness or deformity.   Lymphadenopathy:     She has no cervical adenopathy.   Neurological: She is alert and oriented to person, place, and time. No cranial nerve deficit. Coordination normal.   Skin: Skin is warm and dry. No rash noted. No pallor.   Psychiatric: She has a normal mood and affect. Her behavior is normal. Judgment and thought content normal. Her mood appears not anxious. She does not exhibit a depressed mood. She expresses no homicidal and no suicidal ideation. She expresses no suicidal plans and no homicidal plans.   Nursing note and vitals reviewed.      Assessment/Plan   Johanne was seen today for med refill.    Diagnoses and all orders for this visit:    Sinusitis,  unspecified chronicity, unspecified location  Comments:  new  Orders:  -     CBC & AUTO Differential; Future    Hyperlipidemia, unspecified  -     TSH; Future  -     T4, Free; Future  -     Lipid Panel; Future    Essential hypertension  -     CBC & AUTO Differential; Future  -     Comprehensive Metabolic Panel; Future  -     TSH; Future  -     T4, Free; Future  -     Lipid Panel; Future  -     Protein, Urine, Random; Future  -     Hemoglobin A1c; Future    Reflux esophagitis    Atrial fibrillation, unspecified type    Type 2 diabetes mellitus with complication, unspecified long term insulin use status  -     Comprehensive Metabolic Panel; Future  -     Protein, Urine, Random; Future  -     Hemoglobin A1c; Future    Preventative health care  -     Mammo Screening Bilateral With CAD; Future  -     Ambulatory Referral to General Surgery    Routine general medical examination at health care facility  -     Mammo Screening Bilateral With CAD; Future    Visit for screening mammogram  -     Mammo Screening Bilateral With CAD; Future    Other orders  -     potassium chloride (K-DUR,KLOR-CON) 20 MEQ CR tablet; Take 1 tablet by mouth Daily.  -     levoFLOXacin (LEVAQUIN) 500 MG tablet; Take 1 tablet by mouth Daily for 7 days.        DM labs due: CBC CMP Lipids Thyroids A1C Urine Protein    Refill medications    Recommended the patient to get the pneumonia vaccinations, if they want to receive them today, it will be administered.       Pt needs to schedule a Wellness Exam within the next 6 months.     Schedule Colonoscopy   Schedule Mammo     It is recommended to this patient that they get a Diabetic eye exam and a Diabetic foot exam every year.      Levaquin 500mg a day X7 days for sinusitis.     Schedule Dr. Ware for EDG and colonoscopy.     Scribed for Dr. Wood by Mel[facility] Hernandez [facility] 01/19/17

## 2017-01-19 NOTE — MR AVS SNAPSHOT
Johanne Mayer   1/19/2017 1:15 PM   Office Visit    Dept Phone:  [phone]   Encounter #:  [id number]    Provider:  Jenaro Wood MD   Department:  [facility]                Your Full Care Plan              Today's Medication Changes          These changes are accurate as of: 1/19/17  2:20 PM.  If you have any questions, ask your nurse or doctor.               New Medication(s)Ordered:     levoFLOXacin 500 MG tablet   Commonly known as:  LEVAQUIN   Take 1 tablet by mouth Daily for 7 days.   Started by:  Jenaro Wood MD            Where to Get Your Medications      These medications were sent to [facility] - [place], [place] - [address] [place] [address] [phone] [facility] [phone] [address] [address]     Phone:  [phone]     levoFLOXacin 500 MG tablet    potassium chloride 20 MEQ CR tablet                  Your Updated Medication List          This list is accurate as of: 1/19/17  2:20 PM.  Always use your most recent med list.                atorvastatin 10 MG tablet   Commonly known as:  LIPITOR       CALCIUM 600 + D PO       clopidogrel 75 MG tablet   Commonly known as:  PLAVIX       digoxin 125 MCG tablet   Commonly known as:  LANOXIN       diltiaZEM  MG 24 hr capsule   Commonly known as:  CARDIZEM CD       EXTAVIA SC       fulvestrant 250 MG/5ML chemo syringe   Commonly known as:  FASLODEX       furosemide 40 MG tablet   Commonly known as:  LASIX       levoFLOXacin 500 MG tablet   Commonly known as:  LEVAQUIN   Take 1 tablet by mouth Daily for 7 days.       losartan-hydrochlorothiazide 100-12.5 MG per tablet   Commonly known as:  HYZAAR       Magnesium 250 MG tablet       MULTIVITAMINS PLUS ZINC PO       omeprazole 40 MG capsule   Commonly known as:  priLOSEC   Take 1 capsule by mouth Daily.       potassium chloride 20 MEQ CR tablet   Commonly known as:  K-DUR,KLOR-CON   Take 1 tablet by mouth  Daily.       sucralfate 1 G tablet   Commonly known as:  CARAFATE   Take 1 tablet by mouth 3 (Three) Times a Day.       venlafaxine XR 37.5 MG 24 hr capsule   Commonly known as:  EFFEXOR-XR       Vitamin D-3 1000 UNITS capsule               You Were Diagnosed With        Codes Comments    Sinusitis, unspecified chronicity, unspecified location    -  Primary ICD-10-CM: J32.9  ICD-9-CM: 473.9 new    Hyperlipidemia, unspecified     ICD-10-CM: E78.5  ICD-9-CM: 272.4     Essential hypertension     ICD-10-CM: I10  ICD-9-CM: 401.9     Reflux esophagitis     ICD-10-CM: K21.0  ICD-9-CM: 530.11     Atrial fibrillation, unspecified type     ICD-10-CM: I48.91  ICD-9-CM: 427.31     Type 2 diabetes mellitus with complication, unspecified long term insulin use status     ICD-10-CM: E11.8  ICD-9-CM: 250.90       Instructions     None    Patient Instructions History      Upcoming Appointments     Visit Type Date Time Department    OFFICE VISIT 1/19/2017  1:15 PM [facility]    INJECTION 15 2/6/2017  2:00 PM [facility] OP INFUSION    LAB 2/6/2017  1:15 PM  [facility] INFUSION    FOLLOW UP - ONCOLOGY 4/17/2017  2:00 PM [facility] ONC [facility]      [facility] Signup     Our records indicate that you have declined [facility] signup. If you would like to sign up for [employer] (formerly known as [employer])t, please email [email or web address] [email or web address] or call [phone] to obtain an activation code.             Other Info from Your Visit           Your Appointments     Feb 06, 2017  1:15 PM CST   LAB with NURSE BRITTANY BARRIOS   [facility] OP INFUSION ([address] [address] [phone]            Feb 06, 2017  2:00 PM CST   INJECTION with  [facility] OP INFU PROCEDURE CHAIR   [facility] OP INFUSION ([address] [address]   [phone]            Apr 17, 2017  2:00 PM CDT   FOLLOW UP with Amarjit Stoner MD   [facility] RADIATION ONCOLOGY (--)    [address] [place] [address]  "Dr [place] [address] [phone]              Allergies     Eggs Or Egg-derived Products  Diarrhea, Nausea And Vomiting    Influenza Vaccines      Lortab [Hydrocodone-acetaminophen]  Hallucinations    Penicillins      \"PASS OUT\"      Reason for Visit     Med Refill needs K+ filled and c/o acid reflux      Vital Signs     Blood Pressure Pulse Height Weight Oxygen Saturation Breastfeeding?    148/68 88 62\" (157.5 cm) 187 lb 9.6 oz (85.1 kg) 96% No    Body Mass Index Smoking Status                34.31 kg/m2 Never Smoker          Problems and Diagnoses Noted     Sinus infection    -  Primary    High cholesterol or triglycerides        High blood pressure        Inflammation of the esophagus        Atrial fibrillation (irregular heartbeat)        Type 2 diabetes mellitus with manifestations            "

## 2017-01-25 DIAGNOSIS — Z12.11 SCREENING FOR COLON CANCER: Primary | ICD-10-CM

## 2017-01-25 RX ORDER — SODIUM CHLORIDE 0.9 % (FLUSH) 0.9 %
1-10 SYRINGE (ML) INJECTION AS NEEDED
Status: CANCELLED | OUTPATIENT
Start: 2017-01-25

## 2017-01-25 RX ORDER — SODIUM CHLORIDE 9 MG/ML
30 INJECTION, SOLUTION INTRAVENOUS CONTINUOUS PRN
Status: CANCELLED | OUTPATIENT
Start: 2017-01-25

## 2017-01-25 RX ORDER — LIDOCAINE HYDROCHLORIDE 10 MG/ML
0.1 INJECTION, SOLUTION EPIDURAL; INFILTRATION; INTRACAUDAL; PERINEURAL ONCE AS NEEDED
Status: CANCELLED | OUTPATIENT
Start: 2017-01-25

## 2017-01-26 RX ORDER — ATORVASTATIN CALCIUM 10 MG/1
10 TABLET, FILM COATED ORAL DAILY
Qty: 30 TABLET | Refills: 5 | Status: SHIPPED | OUTPATIENT
Start: 2017-01-26 | End: 2017-01-01 | Stop reason: SDUPTHER

## 2017-01-31 ENCOUNTER — LAB (OUTPATIENT)
Dept: LAB | Facility: OTHER | Age: 67
End: 2017-01-31

## 2017-01-31 DIAGNOSIS — E78.5 HYPERLIPIDEMIA, UNSPECIFIED: Chronic | ICD-10-CM

## 2017-01-31 DIAGNOSIS — E11.8 TYPE 2 DIABETES MELLITUS WITH COMPLICATION, UNSPECIFIED LONG TERM INSULIN USE STATUS: Chronic | ICD-10-CM

## 2017-01-31 DIAGNOSIS — J32.9 SINUSITIS, UNSPECIFIED CHRONICITY, UNSPECIFIED LOCATION: ICD-10-CM

## 2017-01-31 DIAGNOSIS — I10 ESSENTIAL HYPERTENSION: ICD-10-CM

## 2017-01-31 LAB
ALBUMIN SERPL-MCNC: 3.6 G/DL (ref 3.2–5.5)
ALBUMIN/GLOB SERPL: 0.9 G/DL (ref 1–3)
ALP SERPL-CCNC: 45 U/L (ref 15–121)
ALT SERPL W P-5'-P-CCNC: 12 U/L (ref 10–60)
ANION GAP SERPL CALCULATED.3IONS-SCNC: 7 MMOL/L (ref 5–15)
AST SERPL-CCNC: 24 U/L (ref 10–60)
BASOPHILS # BLD AUTO: 0.03 10*3/MM3 (ref 0–0.2)
BASOPHILS NFR BLD AUTO: 0.9 % (ref 0–2)
BILIRUB SERPL-MCNC: 0.5 MG/DL (ref 0.2–1)
BUN BLD-MCNC: 14 MG/DL (ref 8–25)
BUN/CREAT SERPL: 17.5 (ref 7–25)
CALCIUM SPEC-SCNC: 9.2 MG/DL (ref 8.4–10.8)
CHLORIDE SERPL-SCNC: 106 MMOL/L (ref 100–112)
CHOLEST SERPL-MCNC: 139 MG/DL (ref 150–200)
CO2 SERPL-SCNC: 27 MMOL/L (ref 20–32)
CREAT BLD-MCNC: 0.8 MG/DL (ref 0.4–1.3)
DEPRECATED RDW RBC AUTO: 55.9 FL (ref 36.4–46.3)
EOSINOPHIL # BLD AUTO: 0.15 10*3/MM3 (ref 0–0.7)
EOSINOPHIL NFR BLD AUTO: 4.6 % (ref 0–7)
ERYTHROCYTE [DISTWIDTH] IN BLOOD BY AUTOMATED COUNT: 17.6 % (ref 11.5–14.5)
GFR SERPL CREATININE-BSD FRML MDRD: 71 ML/MIN/1.73 (ref 45–104)
GLOBULIN UR ELPH-MCNC: 3.9 GM/DL (ref 2.5–4.6)
GLUCOSE BLD-MCNC: 91 MG/DL (ref 70–100)
HCT VFR BLD AUTO: 36.1 % (ref 35–45)
HDLC SERPL-MCNC: 36 MG/DL (ref 35–100)
HGB BLD-MCNC: 11.1 G/DL (ref 12–15.5)
LDLC SERPL CALC-MCNC: 61 MG/DL
LDLC/HDLC SERPL: 1.71 {RATIO}
LYMPHOCYTES # BLD AUTO: 1.6 10*3/MM3 (ref 0.6–4.2)
LYMPHOCYTES NFR BLD AUTO: 49.5 % (ref 10–50)
MCH RBC QN AUTO: 27.9 PG (ref 26.5–34)
MCHC RBC AUTO-ENTMCNC: 30.7 G/DL (ref 31.4–36)
MCV RBC AUTO: 90.7 FL (ref 80–98)
MONOCYTES # BLD AUTO: 0.24 10*3/MM3 (ref 0–0.9)
MONOCYTES NFR BLD AUTO: 7.4 % (ref 0–12)
NEUTROPHILS # BLD AUTO: 1.21 10*3/MM3 (ref 2–8.6)
NEUTROPHILS NFR BLD AUTO: 37.6 % (ref 37–80)
PLATELET # BLD AUTO: 204 10*3/MM3 (ref 150–450)
PMV BLD AUTO: 9.4 FL (ref 8–12)
POTASSIUM BLD-SCNC: 4.3 MMOL/L (ref 3.4–5.4)
PROT SERPL-MCNC: 7.5 G/DL (ref 6.7–8.2)
RBC # BLD AUTO: 3.98 10*6/MM3 (ref 3.77–5.16)
SODIUM BLD-SCNC: 140 MMOL/L (ref 134–146)
TRIGL SERPL-MCNC: 208 MG/DL (ref 35–160)
VLDLC SERPL-MCNC: 41.6 MG/DL
WBC NRBC COR # BLD: 3.23 10*3/MM3 (ref 3.2–9.8)

## 2017-01-31 PROCEDURE — 84156 ASSAY OF PROTEIN URINE: CPT | Performed by: INTERNAL MEDICINE

## 2017-01-31 PROCEDURE — 80061 LIPID PANEL: CPT | Performed by: INTERNAL MEDICINE

## 2017-01-31 PROCEDURE — 84443 ASSAY THYROID STIM HORMONE: CPT | Performed by: INTERNAL MEDICINE

## 2017-01-31 PROCEDURE — 80053 COMPREHEN METABOLIC PANEL: CPT | Performed by: INTERNAL MEDICINE

## 2017-01-31 PROCEDURE — 84439 ASSAY OF FREE THYROXINE: CPT | Performed by: INTERNAL MEDICINE

## 2017-01-31 PROCEDURE — 85025 COMPLETE CBC W/AUTO DIFF WBC: CPT | Performed by: INTERNAL MEDICINE

## 2017-01-31 PROCEDURE — 83036 HEMOGLOBIN GLYCOSYLATED A1C: CPT | Performed by: INTERNAL MEDICINE

## 2017-01-31 PROCEDURE — 36415 COLL VENOUS BLD VENIPUNCTURE: CPT | Performed by: INTERNAL MEDICINE

## 2017-02-02 LAB
HBA1C MFR BLD: 5.47 % (ref 4–5.6)
PROT UR-MCNC: 23.9 MG/DL
T4 FREE SERPL-MCNC: 0.83 NG/DL (ref 0.78–2.19)
TSH SERPL DL<=0.05 MIU/L-ACNC: 2.72 MIU/ML (ref 0.46–4.68)

## 2017-02-02 RX ORDER — VENLAFAXINE 37.5 MG/1
37.5 TABLET ORAL DAILY
COMMUNITY
End: 2017-01-01 | Stop reason: SDUPTHER

## 2017-02-02 RX ORDER — DILTIAZEM HYDROCHLORIDE 120 MG/1
120 TABLET, FILM COATED ORAL DAILY
COMMUNITY
End: 2017-01-01

## 2017-02-02 RX ORDER — TAMOXIFEN CITRATE 20 MG/1
20 TABLET ORAL DAILY
COMMUNITY
End: 2017-05-10

## 2017-02-02 RX ORDER — FUROSEMIDE 40 MG/1
40 TABLET ORAL 2 TIMES DAILY
COMMUNITY
End: 2017-05-02 | Stop reason: SDUPTHER

## 2017-02-02 RX ORDER — LOSARTAN POTASSIUM AND HYDROCHLOROTHIAZIDE 12.5; 1 MG/1; MG/1
1 TABLET ORAL DAILY
COMMUNITY
End: 2017-01-01

## 2017-02-02 RX ORDER — DIGOXIN 125 MCG
125 TABLET ORAL
COMMUNITY
End: 2018-01-01 | Stop reason: HOSPADM

## 2017-02-03 ENCOUNTER — TELEPHONE (OUTPATIENT)
Dept: FAMILY MEDICINE CLINIC | Facility: CLINIC | Age: 67
End: 2017-02-03

## 2017-02-03 DIAGNOSIS — D64.9 LOW HEMOGLOBIN: Primary | ICD-10-CM

## 2017-02-03 NOTE — TELEPHONE ENCOUNTER
----- Message from Jenaro Wood MD sent at 2/3/2017 11:16 AM CST -----  Anemia vinson. colonscopy if due.

## 2017-02-03 NOTE — TELEPHONE ENCOUNTER
Called patient concerning lab orders and request for her to get colonoscopy.  She is scheduled for colonoscopy on Tuesday Feb. 7th 2017.  Explained new lab orders and she stated she will come in next week and do labs.

## 2017-02-06 ENCOUNTER — OFFICE VISIT (OUTPATIENT)
Dept: ONCOLOGY | Facility: CLINIC | Age: 67
End: 2017-02-06

## 2017-02-06 ENCOUNTER — APPOINTMENT (OUTPATIENT)
Dept: ONCOLOGY | Facility: HOSPITAL | Age: 67
End: 2017-02-06

## 2017-02-06 ENCOUNTER — INFUSION (OUTPATIENT)
Dept: ONCOLOGY | Facility: HOSPITAL | Age: 67
End: 2017-02-06

## 2017-02-06 VITALS
TEMPERATURE: 97.3 F | WEIGHT: 189.7 LBS | HEART RATE: 65 BPM | DIASTOLIC BLOOD PRESSURE: 81 MMHG | HEIGHT: 62 IN | SYSTOLIC BLOOD PRESSURE: 153 MMHG | BODY MASS INDEX: 34.91 KG/M2

## 2017-02-06 DIAGNOSIS — C50.412 MALIGNANT NEOPLASM OF UPPER-OUTER QUADRANT OF LEFT FEMALE BREAST (HCC): Primary | ICD-10-CM

## 2017-02-06 LAB
ALBUMIN SERPL-MCNC: 4 G/DL (ref 3.4–4.8)
ALBUMIN/GLOB SERPL: 1.1 G/DL (ref 1.1–1.8)
ALP SERPL-CCNC: 62 U/L (ref 38–126)
ALT SERPL W P-5'-P-CCNC: 18 U/L (ref 9–52)
ANION GAP SERPL CALCULATED.3IONS-SCNC: 10 MMOL/L (ref 5–15)
ANISOCYTOSIS BLD QL: NORMAL
AST SERPL-CCNC: 34 U/L (ref 14–36)
BASOPHILS # BLD MANUAL: 0.06 10*3/MM3 (ref 0–0.2)
BASOPHILS NFR BLD AUTO: 1 % (ref 0–2)
BILIRUB SERPL-MCNC: 0.5 MG/DL (ref 0.2–1.3)
BUN BLD-MCNC: 19 MG/DL (ref 7–21)
BUN/CREAT SERPL: 22.1 (ref 7–25)
CALCIUM SPEC-SCNC: 8.9 MG/DL (ref 8.4–10.2)
CHLORIDE SERPL-SCNC: 102 MMOL/L (ref 95–110)
CO2 SERPL-SCNC: 28 MMOL/L (ref 22–31)
CREAT BLD-MCNC: 0.86 MG/DL (ref 0.5–1)
DEPRECATED RDW RBC AUTO: 55.8 FL (ref 36.4–46.3)
EOSINOPHIL # BLD MANUAL: 0.11 10*3/MM3 (ref 0–0.7)
EOSINOPHIL NFR BLD MANUAL: 2 % (ref 0–7)
ERYTHROCYTE [DISTWIDTH] IN BLOOD BY AUTOMATED COUNT: 17.2 % (ref 11.5–14.5)
GFR SERPL CREATININE-BSD FRML MDRD: 66 ML/MIN/1.73 (ref 45–104)
GLOBULIN UR ELPH-MCNC: 3.5 GM/DL (ref 2.3–3.5)
GLUCOSE BLD-MCNC: 92 MG/DL (ref 60–100)
HCT VFR BLD AUTO: 36 % (ref 35–45)
HGB BLD-MCNC: 11.8 G/DL (ref 12–15.5)
HYPOCHROMIA BLD QL: NORMAL
LYMPHOCYTES # BLD MANUAL: 2.58 10*3/MM3 (ref 0.6–4.2)
LYMPHOCYTES NFR BLD MANUAL: 46 % (ref 10–50)
LYMPHOCYTES NFR BLD MANUAL: 7 % (ref 0–12)
MCH RBC QN AUTO: 28.9 PG (ref 26.5–34)
MCHC RBC AUTO-ENTMCNC: 32.8 G/DL (ref 31.4–36)
MCV RBC AUTO: 88 FL (ref 80–98)
MONOCYTES # BLD AUTO: 0.39 10*3/MM3 (ref 0–0.9)
NEUTROPHILS # BLD AUTO: 2.46 10*3/MM3 (ref 2–8.6)
NEUTROPHILS NFR BLD MANUAL: 44 % (ref 37–80)
PLATELET # BLD AUTO: 159 10*3/MM3 (ref 150–450)
PMV BLD AUTO: 9.1 FL (ref 8–12)
POTASSIUM BLD-SCNC: 4.3 MMOL/L (ref 3.5–5.1)
PROT SERPL-MCNC: 7.5 G/DL (ref 6.3–8.6)
RBC # BLD AUTO: 4.09 10*6/MM3 (ref 3.77–5.16)
SMALL PLATELETS BLD QL SMEAR: NORMAL
SODIUM BLD-SCNC: 140 MMOL/L (ref 137–145)
WBC MORPH BLD: NORMAL
WBC NRBC COR # BLD: 5.6 10*3/MM3 (ref 3.2–9.8)

## 2017-02-06 PROCEDURE — 96372 THER/PROPH/DIAG INJ SC/IM: CPT | Performed by: INTERNAL MEDICINE

## 2017-02-06 PROCEDURE — 36415 COLL VENOUS BLD VENIPUNCTURE: CPT

## 2017-02-06 PROCEDURE — 36415 COLL VENOUS BLD VENIPUNCTURE: CPT | Performed by: INTERNAL MEDICINE

## 2017-02-06 PROCEDURE — 80053 COMPREHEN METABOLIC PANEL: CPT | Performed by: INTERNAL MEDICINE

## 2017-02-06 PROCEDURE — 85025 COMPLETE CBC W/AUTO DIFF WBC: CPT | Performed by: INTERNAL MEDICINE

## 2017-02-06 PROCEDURE — 85007 BL SMEAR W/DIFF WBC COUNT: CPT | Performed by: INTERNAL MEDICINE

## 2017-02-06 PROCEDURE — 25010000002 DENOSUMAB 120 MG/1.7ML SOLUTION: Performed by: INTERNAL MEDICINE

## 2017-02-06 PROCEDURE — 86300 IMMUNOASSAY TUMOR CA 15-3: CPT | Performed by: INTERNAL MEDICINE

## 2017-02-06 PROCEDURE — 99214 OFFICE O/P EST MOD 30 MIN: CPT | Performed by: INTERNAL MEDICINE

## 2017-02-06 RX ORDER — DEXTROSE AND SODIUM CHLORIDE 5; .45 G/100ML; G/100ML
20 INJECTION, SOLUTION INTRAVENOUS CONTINUOUS
Status: CANCELLED | OUTPATIENT
Start: 2017-02-07

## 2017-02-06 RX ORDER — OXYCODONE HYDROCHLORIDE AND ACETAMINOPHEN 5; 325 MG/1; MG/1
1 TABLET ORAL EVERY 8 HOURS PRN
Qty: 90 TABLET | Refills: 0 | Status: SHIPPED | OUTPATIENT
Start: 2017-02-06 | End: 2017-01-01 | Stop reason: SDUPTHER

## 2017-02-06 RX ADMIN — DENOSUMAB 120 MG: 120 INJECTION SUBCUTANEOUS at 15:14

## 2017-02-07 ENCOUNTER — ANESTHESIA (OUTPATIENT)
Dept: GASTROENTEROLOGY | Facility: HOSPITAL | Age: 67
End: 2017-02-07

## 2017-02-07 ENCOUNTER — HOSPITAL ENCOUNTER (OUTPATIENT)
Facility: HOSPITAL | Age: 67
Setting detail: HOSPITAL OUTPATIENT SURGERY
Discharge: HOME | End: 2017-02-07
Attending: SURGERY | Admitting: SURGERY

## 2017-02-07 ENCOUNTER — ANESTHESIA EVENT (OUTPATIENT)
Dept: GASTROENTEROLOGY | Facility: HOSPITAL | Age: 67
End: 2017-02-07

## 2017-02-07 ENCOUNTER — TELEPHONE (OUTPATIENT)
Dept: FAMILY MEDICINE CLINIC | Facility: CLINIC | Age: 67
End: 2017-02-07

## 2017-02-07 VITALS
HEIGHT: 62 IN | OXYGEN SATURATION: 95 % | SYSTOLIC BLOOD PRESSURE: 118 MMHG | DIASTOLIC BLOOD PRESSURE: 53 MMHG | BODY MASS INDEX: 33.92 KG/M2 | WEIGHT: 184.3 LBS | RESPIRATION RATE: 20 BRPM | HEART RATE: 67 BPM | TEMPERATURE: 97.4 F

## 2017-02-07 DIAGNOSIS — D64.9 ANEMIA, UNSPECIFIED TYPE: Primary | ICD-10-CM

## 2017-02-07 DIAGNOSIS — Z12.11 SCREENING FOR COLON CANCER: ICD-10-CM

## 2017-02-07 LAB — CANCER AG27-29 SERPL-ACNC: 270 U/ML (ref 0–38.6)

## 2017-02-07 PROCEDURE — 25010000002 FENTANYL CITRATE (PF) 100 MCG/2ML SOLUTION: Performed by: NURSE ANESTHETIST, CERTIFIED REGISTERED

## 2017-02-07 PROCEDURE — 25010000002 PROPOFOL 10 MG/ML EMULSION: Performed by: NURSE ANESTHETIST, CERTIFIED REGISTERED

## 2017-02-07 PROCEDURE — 88305 TISSUE EXAM BY PATHOLOGIST: CPT | Performed by: PATHOLOGY

## 2017-02-07 PROCEDURE — 45380 COLONOSCOPY AND BIOPSY: CPT | Performed by: SURGERY

## 2017-02-07 PROCEDURE — 25010000002 MIDAZOLAM PER 1 MG: Performed by: NURSE ANESTHETIST, CERTIFIED REGISTERED

## 2017-02-07 PROCEDURE — 88305 TISSUE EXAM BY PATHOLOGIST: CPT | Performed by: SURGERY

## 2017-02-07 RX ORDER — PROPOFOL 10 MG/ML
VIAL (ML) INTRAVENOUS AS NEEDED
Status: DISCONTINUED | OUTPATIENT
Start: 2017-02-07 | End: 2017-02-07 | Stop reason: SURG

## 2017-02-07 RX ORDER — SODIUM CHLORIDE, SODIUM GLUCONATE, SODIUM ACETATE, POTASSIUM CHLORIDE, AND MAGNESIUM CHLORIDE 526; 502; 368; 37; 30 MG/100ML; MG/100ML; MG/100ML; MG/100ML; MG/100ML
1000 INJECTION, SOLUTION INTRAVENOUS CONTINUOUS PRN
Status: DISCONTINUED | OUTPATIENT
Start: 2017-02-07 | End: 2017-02-07 | Stop reason: HOSPADM

## 2017-02-07 RX ORDER — FENTANYL CITRATE 50 UG/ML
INJECTION, SOLUTION INTRAMUSCULAR; INTRAVENOUS AS NEEDED
Status: DISCONTINUED | OUTPATIENT
Start: 2017-02-07 | End: 2017-02-07 | Stop reason: SURG

## 2017-02-07 RX ORDER — MIDAZOLAM HYDROCHLORIDE 1 MG/ML
INJECTION INTRAMUSCULAR; INTRAVENOUS AS NEEDED
Status: DISCONTINUED | OUTPATIENT
Start: 2017-02-07 | End: 2017-02-07 | Stop reason: SURG

## 2017-02-07 RX ADMIN — PROPOFOL 30 MG: 10 INJECTION, EMULSION INTRAVENOUS at 13:15

## 2017-02-07 RX ADMIN — PROPOFOL 50 MG: 10 INJECTION, EMULSION INTRAVENOUS at 13:10

## 2017-02-07 RX ADMIN — SODIUM CHLORIDE, SODIUM GLUCONATE, SODIUM ACETATE, POTASSIUM CHLORIDE, AND MAGNESIUM CHLORIDE 1000 ML: 526; 502; 368; 37; 30 INJECTION, SOLUTION INTRAVENOUS at 12:45

## 2017-02-07 RX ADMIN — FENTANYL CITRATE 50 MCG: 50 INJECTION, SOLUTION INTRAMUSCULAR; INTRAVENOUS at 13:00

## 2017-02-07 RX ADMIN — MIDAZOLAM 1 MG: 1 INJECTION INTRAMUSCULAR; INTRAVENOUS at 13:15

## 2017-02-07 RX ADMIN — PROPOFOL 70 MG: 10 INJECTION, EMULSION INTRAVENOUS at 13:00

## 2017-02-07 RX ADMIN — FENTANYL CITRATE 50 MCG: 50 INJECTION, SOLUTION INTRAMUSCULAR; INTRAVENOUS at 13:15

## 2017-02-07 RX ADMIN — MIDAZOLAM 1 MG: 1 INJECTION INTRAMUSCULAR; INTRAVENOUS at 13:00

## 2017-02-07 NOTE — PROGRESS NOTES
DATE OF VISIT: 2/6/2017    REASON FOR VISIT:  Metastatic lobular carcinoma of left breast with a bone metastasis.    HISTORY OF PRESENT ILLNESS:    66-year-old female with a past medical history significant for left breast cancer with diffuse skeletal metastasis diagnosed in 2014, currently on tamoxifen with Xgeva is here for follow-up visit today.  Patient complains of discomfort in bilateral hip.  Denies any fever or chills.  Denies any blood in the stool or urine.  Denies any mouth ulceration or jaw pain.    PAST MEDICAL HISTORY:    Past Medical History   Diagnosis Date   • Atrial fibrillation    • Carcinoma, female breast, infiltrating lobular      LEFT side with axillary lymph node metastases      • Essential hypertension    • GERD (gastroesophageal reflux disease)    • Heartburn    • History of echocardiogram 10/13/2011   • Localized enlarged lymph nodes    • Lump of breast, left    • Lymphadenopathy       LEFT axilla-this is confirmed by my personal review of the ultrasound      • Osteoarthritis        SOCIAL HISTORY:    Social History   Substance Use Topics   • Smoking status: Never Smoker   • Smokeless tobacco: None   • Alcohol use No       Surgical History :  Past Surgical History   Procedure Laterality Date   • Appendectomy     • Breast surgery  11/21/2014     Ultrasound directed mammotome biopsy of the left breast with clip placement, lesion at the 5:30 position 5 cm. from the nipple.Left axillary lymph node biopsy, open.   • Colonoscopy  12/12/2011     Mild diverticulosis in sigmoid colon. Stool collected for study. Hemorrhoids found.   • Cholecystectomy     • Upper gastrointestinal endoscopy  06/10/2014     Normal hypopharynx, esophagus, symmetrical & patent pylorus. Hiatus hernia in GE junction. Polyps in antrum & fundus. Multiple biopsies taken. Prominant CBD in medial duodenal wall. The ampulla has a normal appearance.   • Injection of medication  08/12/2013     Kenalog (19)      • Tonsillectomy    "  • Tubal abdominal ligation         ALLERGIES:    Allergies   Allergen Reactions   • Eggs Or Egg-Derived Products Diarrhea and Nausea And Vomiting   • Influenza Vaccines    • Lortab [Hydrocodone-Acetaminophen] Hallucinations   • Penicillins      \"PASS OUT\"       REVIEW OF SYSTEMS:      CONSTITUTIONAL: Positive for fatigue.  Positive for hot flashes.  No fever, chills, or night sweats.     HEENT:  No epistaxis, mouth sores, or difficulty swallowing.    RESPIRATORY:  No new shortness of breath or cough at present.    CARDIOVASCULAR:  No chest pain or palpitations.    GASTROINTESTINAL:  Positive for intermittent constipation.  No abdominal pain, nausea, vomiting, or blood in the stool.    GENITOURINARY:  No dysuria or hematuria.    MUSCULOSKELETAL:  Complains of bilateral hip pain and stiffness.  Complains of bilateral shoulder stiffness.    NEUROLOGICAL:  No tingling or numbness. No new headache or dizziness.     LYMPHATICS:  Denies any abnormal swollen and anywhere in the body.    SKIN:  Denies any new skin rash.    PHYSICAL EXAMINATION:      VITAL SIGNS:    Visit Vitals   • /81   • Pulse 65   • Temp 97.3 °F (36.3 °C)   • Ht 62\" (157.5 cm)   • Wt 189 lb 11.2 oz (86 kg)   • BMI 34.7 kg/m2       GENERAL:  Not in any distress.     EYES:  Mild pallor. No icterus.    NECK:  No adenopathy.    RESPIRATORY:  Fair air entry bilaterally. No rhonchi or wheezing.    CARDIOVASCULAR:  S1, S2. Regular rate and rhythm.  Systolic murmur present.    ABDOMEN:  Soft, nontender. Bowel sounds present.    EXTREMITIES:  No edema.    NEUROLOGICAL:  Alert, awake, oriented x3. No deficits.    DIAGNOSTIC DATA:    GLUCOSE   Date Value Ref Range Status   02/06/2017 92 60 - 100 mg/dL Final   01/05/2017 89 60 - 100 mg/dl Final     SODIUM   Date Value Ref Range Status   02/06/2017 140 137 - 145 mmol/L Final   01/05/2017 137 137 - 145 mmol/L Final     POTASSIUM   Date Value Ref Range Status   02/06/2017 4.3 3.5 - 5.1 mmol/L Final   01/05/2017 " 3.3 (L) 3.5 - 5.1 mmol/L Final     CO2   Date Value Ref Range Status   02/06/2017 28.0 22.0 - 31.0 mmol/L Final   01/05/2017 30 22 - 31 mmol/L Final     CHLORIDE   Date Value Ref Range Status   02/06/2017 102 95 - 110 mmol/L Final   01/05/2017 97 95 - 110 mmol/L Final     ANION GAP   Date Value Ref Range Status   02/06/2017 10.0 5.0 - 15.0 mmol/L Final   01/05/2017 10.0 5.0 - 15.0 mmol/L Final     CREATININE   Date Value Ref Range Status   02/06/2017 0.86 0.50 - 1.00 mg/dL Final   01/05/2017 0.8 0.5 - 1.0 mg/dl Final     BUN   Date Value Ref Range Status   02/06/2017 19 7 - 21 mg/dL Final   01/05/2017 14 7 - 21 mg/dl Final     BUN/CREATININE RATIO   Date Value Ref Range Status   02/06/2017 22.1 7.0 - 25.0 Final     CALCIUM   Date Value Ref Range Status   02/06/2017 8.9 8.4 - 10.2 mg/dL Final   01/05/2017 9.1 8.4 - 10.2 mg/dl Final     EGFR NON  AMER   Date Value Ref Range Status   02/06/2017 66 45 - 104 mL/min/1.73 Final     ALKALINE PHOSPHATASE   Date Value Ref Range Status   02/06/2017 62 38 - 126 U/L Final   10/28/2016 66 38 - 126 U/L Final     TOTAL PROTEIN   Date Value Ref Range Status   02/06/2017 7.5 6.3 - 8.6 g/dL Final   10/28/2016 7.7 6.3 - 8.6 gm/dl Final     ALT (SGPT)   Date Value Ref Range Status   02/06/2017 18 9 - 52 U/L Final   10/28/2016 26 9 - 52 U/L Final     AST (SGOT)   Date Value Ref Range Status   02/06/2017 34 14 - 36 U/L Final   10/28/2016 35 14 - 36 U/L Final     TOTAL BILIRUBIN   Date Value Ref Range Status   02/06/2017 0.5 0.2 - 1.3 mg/dL Final   10/28/2016 0.4 0.2 - 1.3 mg/dl Final     ALBUMIN   Date Value Ref Range Status   02/06/2017 4.00 3.40 - 4.80 g/dL Final   10/28/2016 4.0 3.4 - 4.8 gm/dl Final     GLOBULIN   Date Value Ref Range Status   02/06/2017 3.5 2.3 - 3.5 gm/dL Final     A/G RATIO   Date Value Ref Range Status   02/06/2017 1.1 1.1 - 1.8 g/dL Final     Lab Results   Component Value Date    WBC 5.60 02/06/2017    HGB 11.8 (L) 02/06/2017    HCT 36.0 02/06/2017    MCV  "88.0 02/06/2017     02/06/2017     Lab Results   Component Value Date    NEUTROABS 1.21 (L) 01/31/2017     Lab Results   Component Value Date    LABCA2 356.3 (H) 01/05/2017       RADIOLOGY DATA :  CT chest abdomen and pelvis done on October 28, 2016 showed:  IMPRESSION-   1.Worsening of osteoblastic lesions involving the thoracic and lumbar  spine suspicious for progression of metastatic disease. Stable  osteoblastic metastatic disease involving the clavicle sternum and  pelvic region.  2.Mild centrilobular emphysematous changes.  3.Bilateral kidneys stable small simple renal cortical cyst.  4.Colonic diverticulosis.      ASSESSMENT AND PLAN:      1.  Metastatic lobular carcinoma of left breast with a diffuse blastic lesion involving the spine and pelvis, ER/NM positive, HER-2/shelly negative diagnosed in December 2014.  Initially patient was started on Arimidex with Xgeva in November 2015 patient had a restaging scan done which showed worsening of the metastases and patient was changed to Aromasin along with Xgeva.  In February 2016 in view of worsening metastasis patient was started on Faslodex with Xgeva.  Staging done in October 2016 showed worsening so patient was put on tamoxifen along with Xgeva.  At this point we'll continue with the tamoxifen will follow up CA 27-29 from today.  Shows worsening then patient may need restaging CAT scan and to be seen in one month from now.  If CA 27-29 is stable or better than last month and then will continue with tamoxifen and do the restaging CAT scan in 3 months from now.\"  Recommendations were discussed with the patient and her daughter in the room.    2.  Hot flashes: Patient is enough Effexor with her for now    3.  Bone metastases: Continue with Xgeva for now.  Patient was again explained about possible side effects including jaw pain and osteonecrosis of jaw.    4.  Prescriptions: Patient is requesting a refill on Percocet 5/325 today.  We will give her total 90 " tablets.    5.  Health maintenance: Patient does not smoke.  She is not a candidate for further screening colonoscopy in view of metastatic breast cancer.  She remains full code.       Joey Ramos MD  2/6/2017  7:19 PM

## 2017-02-07 NOTE — ANESTHESIA POSTPROCEDURE EVALUATION
Patient: Johanne Mayer    Procedure Summary     Date Anesthesia Start Anesthesia Stop Room / Location    02/07/17 1258 1332 [facility] ENDOSCOPY 2 / [facility] ENDOSCOPY       Procedure Diagnosis Surgeon Provider    COLONOSCOPY (N/A ) Screening for colon cancer  (Screening for colon cancer [Z12.11]) MD Malika Chan CRNA          Anesthesia Type: MAC  Last vitals  BP (!) 185/105 (02/07/17 1236)    Temp      Pulse 61 (02/07/17 1236)   Resp 18 (02/07/17 1236)    SpO2 96 % (02/07/17 1236)      Post Anesthesia Care and Evaluation    Patient location during evaluation: bedside  Patient participation: complete - patient participated  Level of consciousness: awake and alert  Pain management: adequate  Airway patency: patent  Anesthetic complications: No anesthetic complications    Cardiovascular status: acceptable  Respiratory status: acceptable  Hydration status: acceptable

## 2017-02-07 NOTE — H&P
Chief Complaint: Need for Screening Colonoscopy    Johanne Mayer is a 66 y.o. female referred today for evaluation for colonoscopy.  She notes no change in bowel habits, no blood in the stool.     She does have a history of lobular cancer of the breast for which she is still undergoing therapy.    Prior Colonoscopy:yes  Prior Polyps:yes  Family History of Colon Cancer:no  On anticoagulation/antiplatelets:yes    Past Surgical History   Procedure Laterality Date   • Appendectomy     • Breast surgery  11/21/2014     Ultrasound directed mammotome biopsy of the left breast with clip placement, lesion at the 5:30 position 5 cm. from the nipple.Left axillary lymph node biopsy, open.   • Colonoscopy  12/12/2011     Mild diverticulosis in sigmoid colon. Stool collected for study. Hemorrhoids found.   • Cholecystectomy     • Upper gastrointestinal endoscopy  06/10/2014     Normal hypopharynx, esophagus, symmetrical & patent pylorus. Hiatus hernia in GE junction. Polyps in antrum & fundus. Multiple biopsies taken. Prominant CBD in medial duodenal wall. The ampulla has a normal appearance.   • Injection of medication  08/12/2013     Kenalog (19)      • Tonsillectomy     • Tubal abdominal ligation       Past Medical History   Diagnosis Date   • Atrial fibrillation    • Carcinoma, female breast, infiltrating lobular      LEFT side with axillary lymph node metastases      • Essential hypertension    • GERD (gastroesophageal reflux disease)    • Heartburn    • History of echocardiogram 10/13/2011   • Localized enlarged lymph nodes    • Lump of breast, left    • Lymphadenopathy       LEFT axilla-this is confirmed by my personal review of the ultrasound      • Osteoarthritis      Social History     Social History   • Marital status:      Spouse name: N/A   • Number of children: N/A   • Years of education: N/A     Occupational History   • Not on file.     Social History Main Topics   • Smoking status: Never Smoker   •  Smokeless tobacco: Not on file   • Alcohol use No   • Drug use: No   • Sexual activity: Defer     Other Topics Concern   • Not on file     Social History Narrative     No current facility-administered medications on file prior to encounter.      Current Outpatient Prescriptions on File Prior to Encounter   Medication Sig Dispense Refill   • Calcium Carb-Cholecalciferol (CALCIUM 600 + D PO) Take 1 capsule by mouth 2 (Two) Times a Day.     • Cholecalciferol (VITAMIN D-3) 1000 UNITS capsule Take 1,000 Units by mouth Daily.     • Interferon Beta-1b (EXTAVIA SC) Inject  under the skin Every 30 (Thirty) Days.     • Magnesium 250 MG tablet Take 1 tablet by mouth Daily.     • Multiple Vitamins-Minerals (MULTIVITAMINS PLUS ZINC PO) Take 50 mg by mouth Daily.     • omeprazole (priLOSEC) 40 MG capsule Take 1 capsule by mouth Daily. 30 capsule 5   • potassium chloride (K-DUR,KLOR-CON) 20 MEQ CR tablet Take 1 tablet by mouth Daily. 30 tablet 5   • sucralfate (CARAFATE) 1 G tablet Take 1 tablet by mouth 3 (Three) Times a Day. 90 tablet 5   • atorvastatin (LIPITOR) 10 MG tablet Take 1 tablet by mouth Daily. 30 tablet 5   • clopidogrel (PLAVIX) 75 MG tablet Take 75 mg by mouth Daily.     • [DISCONTINUED] fulvestrant (FASLODEX) 250 MG/5ML chemo syringe Inject 500 mg into the shoulder, thigh, or buttocks Every 14 (Fourteen) Days.           Review of Systems    Vitals:    02/07/17 1236   BP: (!) 185/105   Pulse: 61   Resp: 18   SpO2: 96%       Physical Exam:       General Appearance:    Alert, cooperative, in no acute distress   Head:    Normocephalic, without obvious abnormality, atraumatic   Eyes:            Lids and lashes normal, conjunctivae and sclerae normal, no   icterus, no pallor, corneas clear, PERRLA   Ears:    Ears appear intact with no abnormalities noted   Throat:   No oral lesions, no thrush, oral mucosa moist   Neck:   No adenopathy, supple, trachea midline, no thyromegaly, no   carotid bruit, no JVD   Back:     No  kyphosis present, no scoliosis present, no skin lesions,      erythema or scars, no tenderness to percussion or                   palpation, range of motion normal   Lungs:     Clear to auscultation,respirations regular, even and                  unlabored    Heart:    Regular rhythm and normal rate, normal S1 and S2, no            murmur, no gallop, no rub, no click   Chest Wall:    No abnormalities observed   Abdomen:     Normal bowel sounds, no masses, no organomegaly, soft        non-tender, non-distended, no guarding, no rebound                Tenderness, soft   Extremities:   Moves all extremities well, no edema, no cyanosis, no             redness   Pulses:   Pulses palpable and equal bilaterally   Skin:   No bleeding, bruising or rash   Lymph nodes:   No palpable adenopathy   Neurologic:   Cranial nerves 2 - 12 grossly intact, sensation intact, DTR       present and equal bilaterally       Assessment     In need of screening colonoscopy.    Plan     Risks, benefits, rationale and prep for colonoscopy have been discussed with the patient.  The patient indicates understanding of these issues and agrees with the plan.          This document has been electronically signed by Yevgeniy Ware MD on February 7, 2017 12:55 PM

## 2017-02-07 NOTE — TELEPHONE ENCOUNTER
Spoke with pt daughter because she had just gone in for colonoscopy.  Told daughter of new orders.  She voiced understanding

## 2017-02-07 NOTE — TELEPHONE ENCOUNTER
----- Message from Jenaro Wood MD sent at 2/3/2017  3:26 PM CST -----  Anemia vinson. colonscopy if due.

## 2017-02-08 LAB
LAB AP CASE REPORT: NORMAL
Lab: NORMAL
PATH REPORT.FINAL DX SPEC: NORMAL
PATH REPORT.GROSS SPEC: NORMAL

## 2017-02-09 ENCOUNTER — LAB (OUTPATIENT)
Dept: LAB | Facility: OTHER | Age: 67
End: 2017-02-09

## 2017-02-09 DIAGNOSIS — D64.9 ANEMIA, UNSPECIFIED TYPE: ICD-10-CM

## 2017-02-09 PROCEDURE — 82728 ASSAY OF FERRITIN: CPT | Performed by: INTERNAL MEDICINE

## 2017-02-09 PROCEDURE — 82607 VITAMIN B-12: CPT | Performed by: INTERNAL MEDICINE

## 2017-02-10 LAB
FERRITIN SERPL-MCNC: 226 NG/ML (ref 11.1–264)
VIT B12 BLD-MCNC: 625 PG/ML (ref 239–931)

## 2017-02-23 ENCOUNTER — OFFICE VISIT (OUTPATIENT)
Dept: SURGERY | Facility: CLINIC | Age: 67
End: 2017-02-23

## 2017-02-23 VITALS
SYSTOLIC BLOOD PRESSURE: 140 MMHG | DIASTOLIC BLOOD PRESSURE: 82 MMHG | WEIGHT: 189 LBS | BODY MASS INDEX: 34.78 KG/M2 | HEIGHT: 62 IN

## 2017-02-23 DIAGNOSIS — D12.6 ADENOMATOUS COLON POLYP: Primary | ICD-10-CM

## 2017-02-23 PROCEDURE — 99212 OFFICE O/P EST SF 10 MIN: CPT | Performed by: SURGERY

## 2017-02-23 NOTE — PROGRESS NOTES
CHIEF COMPLAINT:    Chief Complaint   Patient presents with   • Follow-up     Endo Results       HISTORY OF PRESENT ILLNESS:    Johanne Mayer is a 66 y.o. female who underwent colonoscopy.  She had 2 adenomas removed.  She notes no issues at home post procedure.  Pathology was reviewed with her today.    EXAM:  Vitals:    02/23/17 1404   BP: 140/82         Abdomen soft    ASSESSMENT:    S/p polypectomy    PLAN:    She had a few small adenomas.  She will be due for repeat colonoscopy in 5 years.          This document has been electronically signed by Yevgeniy Ware MD on February 23, 2017 2:36 PM

## 2017-03-06 ENCOUNTER — INFUSION (OUTPATIENT)
Dept: ONCOLOGY | Facility: HOSPITAL | Age: 67
End: 2017-03-06

## 2017-03-06 DIAGNOSIS — C50.412 MALIGNANT NEOPLASM OF UPPER-OUTER QUADRANT OF LEFT FEMALE BREAST (HCC): Primary | ICD-10-CM

## 2017-03-06 LAB
ALBUMIN SERPL-MCNC: 4.2 G/DL (ref 3.4–4.8)
ALBUMIN/GLOB SERPL: 1.1 G/DL (ref 1.1–1.8)
ALP SERPL-CCNC: 59 U/L (ref 38–126)
ALT SERPL W P-5'-P-CCNC: 11 U/L (ref 9–52)
ANION GAP SERPL CALCULATED.3IONS-SCNC: 8 MMOL/L (ref 5–15)
AST SERPL-CCNC: 43 U/L (ref 14–36)
BILIRUB SERPL-MCNC: 0.6 MG/DL (ref 0.2–1.3)
BUN BLD-MCNC: 19 MG/DL (ref 7–21)
BUN/CREAT SERPL: 24.4 (ref 7–25)
CALCIUM SPEC-SCNC: 9.5 MG/DL (ref 8.4–10.2)
CHLORIDE SERPL-SCNC: 101 MMOL/L (ref 95–110)
CO2 SERPL-SCNC: 28 MMOL/L (ref 22–31)
CREAT BLD-MCNC: 0.78 MG/DL (ref 0.5–1)
GFR SERPL CREATININE-BSD FRML MDRD: 73 ML/MIN/1.73 (ref 60–104)
GLOBULIN UR ELPH-MCNC: 3.8 GM/DL (ref 2.3–3.5)
GLUCOSE BLD-MCNC: 97 MG/DL (ref 60–100)
POTASSIUM BLD-SCNC: 3.7 MMOL/L (ref 3.5–5.1)
PROT SERPL-MCNC: 8 G/DL (ref 6.3–8.6)
SODIUM BLD-SCNC: 137 MMOL/L (ref 137–145)

## 2017-03-06 PROCEDURE — 96372 THER/PROPH/DIAG INJ SC/IM: CPT | Performed by: INTERNAL MEDICINE

## 2017-03-06 PROCEDURE — 25010000002 DENOSUMAB 120 MG/1.7ML SOLUTION: Performed by: INTERNAL MEDICINE

## 2017-03-06 PROCEDURE — 80053 COMPREHEN METABOLIC PANEL: CPT | Performed by: INTERNAL MEDICINE

## 2017-03-06 RX ADMIN — DENOSUMAB 120 MG: 120 INJECTION SUBCUTANEOUS at 16:31

## 2017-04-03 ENCOUNTER — INFUSION (OUTPATIENT)
Dept: ONCOLOGY | Facility: HOSPITAL | Age: 67
End: 2017-04-03

## 2017-04-03 DIAGNOSIS — C50.412 MALIGNANT NEOPLASM OF UPPER-OUTER QUADRANT OF LEFT FEMALE BREAST (HCC): Primary | ICD-10-CM

## 2017-04-03 LAB
ALBUMIN SERPL-MCNC: 4.2 G/DL (ref 3.4–4.8)
ALBUMIN/GLOB SERPL: 1.1 G/DL (ref 1.1–1.8)
ALP SERPL-CCNC: 63 U/L (ref 38–126)
ALT SERPL W P-5'-P-CCNC: 21 U/L (ref 9–52)
ANION GAP SERPL CALCULATED.3IONS-SCNC: 10 MMOL/L (ref 5–15)
AST SERPL-CCNC: 43 U/L (ref 14–36)
BILIRUB SERPL-MCNC: 0.4 MG/DL (ref 0.2–1.3)
BUN BLD-MCNC: 20 MG/DL (ref 7–21)
BUN/CREAT SERPL: 23.3 (ref 7–25)
CALCIUM SPEC-SCNC: 9.2 MG/DL (ref 8.4–10.2)
CHLORIDE SERPL-SCNC: 103 MMOL/L (ref 95–110)
CO2 SERPL-SCNC: 27 MMOL/L (ref 22–31)
CREAT BLD-MCNC: 0.86 MG/DL (ref 0.5–1)
GFR SERPL CREATININE-BSD FRML MDRD: 66 ML/MIN/1.73 (ref 45–104)
GLOBULIN UR ELPH-MCNC: 3.7 GM/DL (ref 2.3–3.5)
GLUCOSE BLD-MCNC: 116 MG/DL (ref 60–100)
POTASSIUM BLD-SCNC: 3.6 MMOL/L (ref 3.5–5.1)
PROT SERPL-MCNC: 7.9 G/DL (ref 6.3–8.6)
SODIUM BLD-SCNC: 140 MMOL/L (ref 137–145)

## 2017-04-03 PROCEDURE — 25010000002 DENOSUMAB 120 MG/1.7ML SOLUTION: Performed by: INTERNAL MEDICINE

## 2017-04-03 PROCEDURE — 96372 THER/PROPH/DIAG INJ SC/IM: CPT | Performed by: INTERNAL MEDICINE

## 2017-04-03 PROCEDURE — 36415 COLL VENOUS BLD VENIPUNCTURE: CPT | Performed by: INTERNAL MEDICINE

## 2017-04-03 RX ADMIN — DENOSUMAB 120 MG: 120 INJECTION SUBCUTANEOUS at 15:55

## 2017-04-17 ENCOUNTER — HOSPITAL ENCOUNTER (OUTPATIENT)
Dept: RADIATION ONCOLOGY | Facility: HOSPITAL | Age: 67
Setting detail: RADIATION/ONCOLOGY SERIES
End: 2017-04-17

## 2017-04-17 ENCOUNTER — OFFICE VISIT (OUTPATIENT)
Dept: RADIATION ONCOLOGY | Facility: HOSPITAL | Age: 67
End: 2017-04-17

## 2017-04-17 VITALS
DIASTOLIC BLOOD PRESSURE: 86 MMHG | BODY MASS INDEX: 35.67 KG/M2 | HEART RATE: 71 BPM | TEMPERATURE: 97.7 F | WEIGHT: 195 LBS | SYSTOLIC BLOOD PRESSURE: 178 MMHG | RESPIRATION RATE: 20 BRPM

## 2017-04-17 DIAGNOSIS — C50.412 MALIGNANT NEOPLASM OF UPPER-OUTER QUADRANT OF LEFT FEMALE BREAST (HCC): Primary | ICD-10-CM

## 2017-04-17 PROCEDURE — G0463 HOSPITAL OUTPT CLINIC VISIT: HCPCS | Performed by: RADIOLOGY

## 2017-04-17 PROCEDURE — 99214 OFFICE O/P EST MOD 30 MIN: CPT | Performed by: RADIOLOGY

## 2017-04-17 NOTE — PROGRESS NOTES
Patient:  Johanne Mayer  :  1950  Medical Record Number:  [id number]    Encounter Date:  2017      Diagnosis:      ICD-10-CM ICD-9-CM   1. Malignant neoplasm of upper-outer quadrant of left female breast C50.412 174.4    it was lobular carcinoma of the breast with extensive bony metastases stage IV disease    Treatment: Patient was treated with a tamoxifen and xgeva over last 2 year with a good response     Chief Complaint:  Chief Complaint   Patient presents with   • Breast Cancer   • Follow-up        Interval History:  Radiation follow-up and having severe pain in lower back area 9 out of 10 discomfort started 5 days ago.  Its very difficult for patient to move around and she cannot sit in comfortable position.  Pain medicine is not helping and she is looking some more help.  Her next appointment with oncologist is in 2 weeks.  She also notice some 2-3 masses subcutaneous in the left breast.    Medications:  Medication reconciliation for the patient was reviewed and confirmed in the electronic medical record    History of Present Illness : Radiation follow-up     Review of Systems:    Review of Systems   Musculoskeletal: Positive for back pain.        Back pain X last 5 days       CONSTITUTIONAL: No  complaint of fatigue. Denies any fever, chills or weight loss.  But she is in acute distress because of pain.    HEENT:  No epistaxis, mouth sores or difficulty swallowing.    RESPIRATORY:  No complaint of shortness of breath. cough . Denies any hemoptysis.    CARDIOVASCULAR:  No chest pain or palpitations.    GASTROINTESTINAL:  No abdominal pain nausea, vomiting or blood in the stool, diarrhea.    GENITOURINARY:  Denies any hematuria. Dysuria, urgency or nocturia    MUSCULOSKELETAL: No complaint of bony pain,     LYMPHATICS:  Denies any abnormal swollen glands, lumps or bumps anywhere in the body.    Other six system reviewed and are negative.    Physical Exam:     Vitals:    Vitals:    17  1357   BP: 178/86   Pulse: 71   Resp: 20   Temp: 97.7 °F (36.5 °C)     BMI:  Body mass index is 35.67 kg/(m^2).     Patient was counseled on current BMI.  Patient was encouraged for diet, exercise, and healthy lifestyle.  BSA:  Body surface area is 1.89 meters squared.    Performance Status:  (1) Restricted in physically strenuous activity, ambulatory and able to do work of light nature  ACP: Advanced Care Planning was discussed. The patient does not have a living will documented in the medical record and declined to perform one today.  Smoking Cessation: Counseling given: Yes   she never smoked.    Physical Exam  :    Constitutional:  Well-developed, well-nourished white female    in acute distress because of pain. Alert and oriented x 3.     HEENT:  Pupils reactive to light, conjunctivae pink. Oral cavity/oropharyngeal exam normal.    Neck:  Supple, no thyromegaly.    Lymphatics:  There is no lymphadenopathy in head, neck, supraclaivicular, axillary, or inguinal area.    Lungs:  Clear on auscultation,no wheezing, crepitation.  Poor respiratory effort.    Breast: Right breast exam is normal however {exam showed subcutaneous masses of around 8-1.2 cm in size located in different area.  These are new and a popping up in last 4-6 weeks.  No obvious large mass palpable in the left breast.  No discharge from nipple.  This masses are heart informed and suspicious for recurrent cancer.      Heart:  Rate and rhythm regular, no murmur    Abdomen: Soft, without organomegaly, non-tender, non-distended. No other masses palpable in abdomen. Bowel sounds are present.    Extremities:  Without cyanosis or edema, no clubbing.    Spine: Severe tenderness at lower lumbar spine and minimal tenderness on SI joint.  Patient is having no radiating pain going down in the leg or she denies any leg weakness.      Skin:  Warm, dry, normal texture. No sign of jaundice.    Neurologic:  No focal neurological deficits. Physiologically intact.  Normal gait.    Psychologic:  Alert and oriented x 3, good inisight.    Diagnostic Data:    Infusion on 04/03/2017   Component Date Value Ref Range Status   • Glucose 04/03/2017 116* 60 - 100 mg/dL Final   • BUN 04/03/2017 20  7 - 21 mg/dL Final   • Creatinine 04/03/2017 0.86  0.50 - 1.00 mg/dL Final   • Sodium 04/03/2017 140  137 - 145 mmol/L Final   • Potassium 04/03/2017 3.6  3.5 - 5.1 mmol/L Final   • Chloride 04/03/2017 103  95 - 110 mmol/L Final   • CO2 04/03/2017 27.0  22.0 - 31.0 mmol/L Final   • Calcium 04/03/2017 9.2  8.4 - 10.2 mg/dL Final   • Total Protein 04/03/2017 7.9  6.3 - 8.6 g/dL Final   • Albumin 04/03/2017 4.20  3.40 - 4.80 g/dL Final   • ALT (SGPT) 04/03/2017 21  9 - 52 U/L Final   • AST (SGOT) 04/03/2017 43* 14 - 36 U/L Final   • Alkaline Phosphatase 04/03/2017 63  38 - 126 U/L Final   • Total Bilirubin 04/03/2017 0.4  0.2 - 1.3 mg/dL Final   • eGFR Non African Amer 04/03/2017 66  45 - 104 mL/min/1.73 Final   • Globulin 04/03/2017 3.7* 2.3 - 3.5 gm/dL Final   • A/G Ratio 04/03/2017 1.1  1.1 - 1.8 g/dL Final   • BUN/Creatinine Ratio 04/03/2017 23.3  7.0 - 25.0 Final   • Anion Gap 04/03/2017 10.0  5.0 - 15.0 mmol/L Final        CT of the chest abdomen pelvis done in October 2016 is been reviewed in bone scan done in 2015 has been reviewed finding notated.    Quality of Life:  80 - Restricted Physical Activity    Time Spent Face to Face:  80 % of total 20 minute time spent in counseling, coordination of care, overall prognosis, different treatment options, and outcome result.       Impression:  Oncology Impressions: Progression of cancer    Plan:  1.  Clinically it since patient is having progression of her cancer in the breast as well as bony metastases giving her low back pain problem.  2.  I will order bone scan and we'll see patient on next day to give the test result.  Base upon bone scan finding further treatment recommendation to follow.  3.  Patient was advised to see medical oncologist  also for consideration of possible change in hormone treatment and was advised to keep her appointment with him in 2 weeks.    Follow Up:  Return in about 3 days (around 4/20/2017) for Office Visit.    Sincerely,    Electronically signed by:    Amarjit Stoner MD April 17, 2017 2:38 PM     [facility]    CC:  Dr. Richard Irvin

## 2017-04-21 ENCOUNTER — HOSPITAL ENCOUNTER (OUTPATIENT)
Dept: NUCLEAR MEDICINE | Facility: HOSPITAL | Age: 67
Discharge: HOME | End: 2017-04-21

## 2017-04-21 PROCEDURE — 0 TECHNETIUM MEDRONATE KIT: Performed by: RADIOLOGY

## 2017-04-21 PROCEDURE — 78306 BONE IMAGING WHOLE BODY: CPT

## 2017-04-21 PROCEDURE — A9503 TC99M MEDRONATE: HCPCS | Performed by: RADIOLOGY

## 2017-04-21 RX ORDER — TC 99M MEDRONATE 20 MG/10ML
27.2 INJECTION, POWDER, LYOPHILIZED, FOR SOLUTION INTRAVENOUS
Status: COMPLETED | OUTPATIENT
Start: 2017-04-21 | End: 2017-04-21

## 2017-04-21 RX ADMIN — Medication 27.2 MILLICURIE: at 09:18

## 2017-04-24 RX ORDER — VENLAFAXINE HYDROCHLORIDE 37.5 MG/1
CAPSULE, EXTENDED RELEASE ORAL
Qty: 30 CAPSULE | Refills: 2 | Status: SHIPPED | OUTPATIENT
Start: 2017-04-24 | End: 2017-01-01 | Stop reason: HOSPADM

## 2017-04-25 ENCOUNTER — OFFICE VISIT (OUTPATIENT)
Dept: RADIATION ONCOLOGY | Facility: HOSPITAL | Age: 67
End: 2017-04-25

## 2017-04-25 VITALS
TEMPERATURE: 97.8 F | SYSTOLIC BLOOD PRESSURE: 179 MMHG | WEIGHT: 190 LBS | RESPIRATION RATE: 20 BRPM | BODY MASS INDEX: 34.75 KG/M2 | DIASTOLIC BLOOD PRESSURE: 89 MMHG | HEART RATE: 73 BPM

## 2017-04-25 DIAGNOSIS — C50.412 MALIGNANT NEOPLASM OF UPPER-OUTER QUADRANT OF LEFT FEMALE BREAST (HCC): Primary | ICD-10-CM

## 2017-04-25 PROCEDURE — 99213 OFFICE O/P EST LOW 20 MIN: CPT | Performed by: RADIOLOGY

## 2017-04-25 PROCEDURE — G0463 HOSPITAL OUTPT CLINIC VISIT: HCPCS | Performed by: RADIOLOGY

## 2017-04-25 NOTE — PROGRESS NOTES
Patient:  Johanne Mayer  :  1950  Medical Record Number:  [id number]    Encounter Date:  2017      Diagnosis:      ICD-10-CM ICD-9-CM   1. Malignant neoplasm of upper-outer quadrant of left female breast C50.412 174.4    lobular carcinoma of left breast with extensive bony metastases stage IV    Treatment: Tamoxifen and xgeva since last 2 year     Chief Complaint:  Chief Complaint   Patient presents with   • Breast Cancer   • Follow-up        Interval History:  Patient is here for bone scan test result.  Her pain has subsided 2 days after we saw her last time and that today her pain in lower back is 2 out of 10 discomfort and she is not even taking the any pain medicine other than Tylenol.    Medications:  Medication reconciliation for the patient was reviewed and confirmed in the electronic medical record    History of Present Illness : Radiation follow-up     Review of Systems:    Review of Systems   Constitutional: Positive for fatigue.        No energy   Respiratory: Positive for shortness of breath.    Musculoskeletal: Positive for gait problem.       CONSTITUTIONAL: No  complaint of fatigue. Denies any fever, chills or weight loss.  Tiredness is from disease related and shortness of breath is unchanged gait is unchanged also.    Physical Exam:     Vitals:    Vitals:    17 1051   BP: 179/89   Pulse: 73   Resp: 20   Temp: 97.8 °F (36.6 °C)     BMI:  Body mass index is 34.75 kg/(m^2).     Patient was counseled on current BMI.  Patient was encouraged for diet, exercise, and healthy lifestyle.  BSA:  Body surface area is 1.87 meters squared.    Performance Status:  (1) Restricted in physically strenuous activity, ambulatory and able to do work of light nature  ACP: Advanced Care Planning was discussed. The patient does not have a living will documented in the medical record and declined to perform one today.  Smoking Cessation: Counseling given: Yes    Physical Exam  :    Constitutional:   Well-developed, well-nourished white female   not in acute distress. Alert and oriented x 3.  Having difficulty walking.  Since patient pain is under control and no new complaint solely limited physical exam was done.    Lymphatics:  There is no lymphadenopathy in head, neck, supraclaivicular, axillary, or inguinal area.    Lungs:  Clear on auscultation,no wheezing, crepitation.    Breast:  Subcutaneous nodule in the left breast is growing and 2 more new cutaneous no dual notice under the right breast indicating disease is progressing.    Heart:  Rate and rhythm regular, no murmur    Extremities:  Without cyanosis or edema, no clubbing.    Spine:  No tenderness    Diagnostic Data:    Infusion on 04/03/2017   Component Date Value Ref Range Status   • Glucose 04/03/2017 116* 60 - 100 mg/dL Final   • BUN 04/03/2017 20  7 - 21 mg/dL Final   • Creatinine 04/03/2017 0.86  0.50 - 1.00 mg/dL Final   • Sodium 04/03/2017 140  137 - 145 mmol/L Final   • Potassium 04/03/2017 3.6  3.5 - 5.1 mmol/L Final   • Chloride 04/03/2017 103  95 - 110 mmol/L Final   • CO2 04/03/2017 27.0  22.0 - 31.0 mmol/L Final   • Calcium 04/03/2017 9.2  8.4 - 10.2 mg/dL Final   • Total Protein 04/03/2017 7.9  6.3 - 8.6 g/dL Final   • Albumin 04/03/2017 4.20  3.40 - 4.80 g/dL Final   • ALT (SGPT) 04/03/2017 21  9 - 52 U/L Final   • AST (SGOT) 04/03/2017 43* 14 - 36 U/L Final   • Alkaline Phosphatase 04/03/2017 63  38 - 126 U/L Final   • Total Bilirubin 04/03/2017 0.4  0.2 - 1.3 mg/dL Final   • eGFR Non African Amer 04/03/2017 66  45 - 104 mL/min/1.73 Final   • Globulin 04/03/2017 3.7* 2.3 - 3.5 gm/dL Final   • A/G Ratio 04/03/2017 1.1  1.1 - 1.8 g/dL Final   • BUN/Creatinine Ratio 04/03/2017 23.3  7.0 - 25.0 Final   • Anion Gap 04/03/2017 10.0  5.0 - 15.0 mmol/L Final        Bone scan has been reviewed and results were discussed with the family and all of their questions were answered.  Since bone scan showing progression of bony metastases patient name  Good systemic treatment to control her systemic disease.    Quality of Life:  80 - Restricted Physical Activity    Time Spent Face to Face:  90 % of total 15 minute time spent in counseling, coordination of care, overall prognosis, different treatment options, and outcome result.       Impression:  Oncology Impressions: Progression of cancer    Plan:  1.  Patient was advised to keep her appointment with medical oncologist next week and discuss a different hormone/chemotherapy option.  2.  There is no need of radiation at present time.  Role of radiation is mainly for palliation of pain control or bleeding or obstruction related problem.    Follow Up:  Return in about 6 months (around 10/25/2017) for Office Visit.    Sincerely,    Electronically signed by:    Amarjit Stoner MD April 25, 2017 11:23 AM     [facility]    CC:  Dr. Ramos

## 2017-05-01 ENCOUNTER — OFFICE VISIT (OUTPATIENT)
Dept: ONCOLOGY | Facility: CLINIC | Age: 67
End: 2017-05-01

## 2017-05-01 ENCOUNTER — INFUSION (OUTPATIENT)
Dept: ONCOLOGY | Facility: HOSPITAL | Age: 67
End: 2017-05-01

## 2017-05-01 VITALS
TEMPERATURE: 97.8 F | DIASTOLIC BLOOD PRESSURE: 96 MMHG | RESPIRATION RATE: 16 BRPM | BODY MASS INDEX: 34.95 KG/M2 | HEART RATE: 91 BPM | SYSTOLIC BLOOD PRESSURE: 162 MMHG | WEIGHT: 191.1 LBS

## 2017-05-01 DIAGNOSIS — C50.412 MALIGNANT NEOPLASM OF UPPER-OUTER QUADRANT OF LEFT FEMALE BREAST (HCC): Primary | ICD-10-CM

## 2017-05-01 LAB
ALBUMIN SERPL-MCNC: 4.2 G/DL (ref 3.4–4.8)
ALBUMIN/GLOB SERPL: 1.1 G/DL (ref 1.1–1.8)
ALP SERPL-CCNC: 69 U/L (ref 38–126)
ALT SERPL W P-5'-P-CCNC: 22 U/L (ref 9–52)
ANION GAP SERPL CALCULATED.3IONS-SCNC: 14 MMOL/L (ref 5–15)
AST SERPL-CCNC: 33 U/L (ref 14–36)
BASOPHILS # BLD AUTO: 0.03 10*3/MM3 (ref 0–0.2)
BASOPHILS NFR BLD AUTO: 0.5 % (ref 0–2)
BILIRUB SERPL-MCNC: 0.5 MG/DL (ref 0.2–1.3)
BUN BLD-MCNC: 14 MG/DL (ref 7–21)
BUN/CREAT SERPL: 17.5 (ref 7–25)
CALCIUM SPEC-SCNC: 8.9 MG/DL (ref 8.4–10.2)
CHLORIDE SERPL-SCNC: 100 MMOL/L (ref 95–110)
CO2 SERPL-SCNC: 25 MMOL/L (ref 22–31)
CREAT BLD-MCNC: 0.8 MG/DL (ref 0.5–1)
DEPRECATED RDW RBC AUTO: 47.7 FL (ref 36.4–46.3)
EOSINOPHIL # BLD AUTO: 0.2 10*3/MM3 (ref 0–0.7)
EOSINOPHIL NFR BLD AUTO: 3.2 % (ref 0–7)
ERYTHROCYTE [DISTWIDTH] IN BLOOD BY AUTOMATED COUNT: 14.9 % (ref 11.5–14.5)
GFR SERPL CREATININE-BSD FRML MDRD: 71 ML/MIN/1.73 (ref 45–104)
GLOBULIN UR ELPH-MCNC: 3.8 GM/DL (ref 2.3–3.5)
GLUCOSE BLD-MCNC: 96 MG/DL (ref 60–100)
HCT VFR BLD AUTO: 36.2 % (ref 35–45)
HGB BLD-MCNC: 12 G/DL (ref 12–15.5)
IMM GRANULOCYTES # BLD: 0.02 10*3/MM3 (ref 0–0.02)
IMM GRANULOCYTES NFR BLD: 0.3 % (ref 0–0.5)
LYMPHOCYTES # BLD AUTO: 2.46 10*3/MM3 (ref 0.6–4.2)
LYMPHOCYTES NFR BLD AUTO: 39.7 % (ref 10–50)
MAGNESIUM SERPL-MCNC: 2.1 MG/DL (ref 1.6–2.3)
MCH RBC QN AUTO: 29.1 PG (ref 26.5–34)
MCHC RBC AUTO-ENTMCNC: 33.1 G/DL (ref 31.4–36)
MCV RBC AUTO: 87.7 FL (ref 80–98)
MONOCYTES # BLD AUTO: 0.51 10*3/MM3 (ref 0–0.9)
MONOCYTES NFR BLD AUTO: 8.2 % (ref 0–12)
NEUTROPHILS # BLD AUTO: 2.97 10*3/MM3 (ref 2–8.6)
NEUTROPHILS NFR BLD AUTO: 48.1 % (ref 37–80)
PHOSPHATE SERPL-MCNC: 2.9 MG/DL (ref 2.4–4.4)
PLATELET # BLD AUTO: 201 10*3/MM3 (ref 150–450)
PMV BLD AUTO: 8.8 FL (ref 8–12)
POTASSIUM BLD-SCNC: 4 MMOL/L (ref 3.5–5.1)
PROT SERPL-MCNC: 8 G/DL (ref 6.3–8.6)
RBC # BLD AUTO: 4.13 10*6/MM3 (ref 3.77–5.16)
SODIUM BLD-SCNC: 139 MMOL/L (ref 137–145)
WBC NRBC COR # BLD: 6.19 10*3/MM3 (ref 3.2–9.8)

## 2017-05-01 PROCEDURE — 36415 COLL VENOUS BLD VENIPUNCTURE: CPT

## 2017-05-01 PROCEDURE — 36415 COLL VENOUS BLD VENIPUNCTURE: CPT | Performed by: INTERNAL MEDICINE

## 2017-05-01 PROCEDURE — 85025 COMPLETE CBC W/AUTO DIFF WBC: CPT | Performed by: INTERNAL MEDICINE

## 2017-05-01 PROCEDURE — 83735 ASSAY OF MAGNESIUM: CPT | Performed by: INTERNAL MEDICINE

## 2017-05-01 PROCEDURE — 86300 IMMUNOASSAY TUMOR CA 15-3: CPT | Performed by: INTERNAL MEDICINE

## 2017-05-01 PROCEDURE — 99214 OFFICE O/P EST MOD 30 MIN: CPT | Performed by: INTERNAL MEDICINE

## 2017-05-01 PROCEDURE — 84100 ASSAY OF PHOSPHORUS: CPT | Performed by: INTERNAL MEDICINE

## 2017-05-01 PROCEDURE — 96372 THER/PROPH/DIAG INJ SC/IM: CPT | Performed by: INTERNAL MEDICINE

## 2017-05-01 PROCEDURE — 25010000002 DENOSUMAB 120 MG/1.7ML SOLUTION: Performed by: INTERNAL MEDICINE

## 2017-05-01 PROCEDURE — G0463 HOSPITAL OUTPT CLINIC VISIT: HCPCS | Performed by: INTERNAL MEDICINE

## 2017-05-01 PROCEDURE — 80053 COMPREHEN METABOLIC PANEL: CPT | Performed by: INTERNAL MEDICINE

## 2017-05-01 RX ADMIN — DENOSUMAB 120 MG: 120 INJECTION SUBCUTANEOUS at 15:21

## 2017-05-02 LAB — CANCER AG27-29 SERPL-ACNC: 275.9 U/ML (ref 0–38.6)

## 2017-05-03 RX ORDER — FUROSEMIDE 40 MG/1
TABLET ORAL
Qty: 60 TABLET | Refills: 5 | Status: SHIPPED | OUTPATIENT
Start: 2017-05-03 | End: 2017-01-01 | Stop reason: HOSPADM

## 2017-05-03 RX ORDER — OMEPRAZOLE 40 MG/1
CAPSULE, DELAYED RELEASE ORAL
Qty: 30 CAPSULE | Refills: 5 | Status: SHIPPED | OUTPATIENT
Start: 2017-05-03 | End: 2017-01-01 | Stop reason: SDUPTHER

## 2017-05-04 ENCOUNTER — HOSPITAL ENCOUNTER (OUTPATIENT)
Dept: CT IMAGING | Facility: HOSPITAL | Age: 67
Discharge: HOME | End: 2017-05-04
Admitting: INTERNAL MEDICINE

## 2017-05-04 DIAGNOSIS — C50.412 MALIGNANT NEOPLASM OF UPPER-OUTER QUADRANT OF LEFT FEMALE BREAST (HCC): ICD-10-CM

## 2017-05-04 PROCEDURE — 0 IOPAMIDOL 61 % SOLUTION: Performed by: INTERNAL MEDICINE

## 2017-05-04 PROCEDURE — 71260 CT THORAX DX C+: CPT

## 2017-05-04 PROCEDURE — 74177 CT ABD & PELVIS W/CONTRAST: CPT

## 2017-05-04 RX ADMIN — IOPAMIDOL 95 ML: 612 INJECTION, SOLUTION INTRAVENOUS at 17:00

## 2017-05-10 ENCOUNTER — OFFICE VISIT (OUTPATIENT)
Dept: ONCOLOGY | Facility: CLINIC | Age: 67
End: 2017-05-10

## 2017-05-10 VITALS
WEIGHT: 194 LBS | HEART RATE: 72 BPM | DIASTOLIC BLOOD PRESSURE: 86 MMHG | RESPIRATION RATE: 16 BRPM | TEMPERATURE: 98 F | BODY MASS INDEX: 35.48 KG/M2 | SYSTOLIC BLOOD PRESSURE: 165 MMHG

## 2017-05-10 DIAGNOSIS — C50.412 MALIGNANT NEOPLASM OF UPPER-OUTER QUADRANT OF LEFT FEMALE BREAST (HCC): Primary | ICD-10-CM

## 2017-05-10 PROCEDURE — 99214 OFFICE O/P EST MOD 30 MIN: CPT | Performed by: INTERNAL MEDICINE

## 2017-05-10 PROCEDURE — G0463 HOSPITAL OUTPT CLINIC VISIT: HCPCS | Performed by: INTERNAL MEDICINE

## 2017-05-10 RX ORDER — LETROZOLE 2.5 MG/1
2.5 TABLET, FILM COATED ORAL DAILY
Qty: 30 TABLET | Refills: 3 | Status: SHIPPED | OUTPATIENT
Start: 2017-05-10 | End: 2017-01-01 | Stop reason: SDUPTHER

## 2017-05-10 RX ORDER — ONDANSETRON 4 MG/1
4 TABLET, FILM COATED ORAL 4 TIMES DAILY PRN
Qty: 40 TABLET | Refills: 3 | Status: SHIPPED | OUTPATIENT
Start: 2017-05-10 | End: 2017-01-01 | Stop reason: DRUGHIGH

## 2017-06-14 NOTE — PROGRESS NOTES
DATE OF VISIT: 6/14/2017    REASON FOR VISIT:  Metastatic lobular carcinoma of left breast with a bone metastasis.    HISTORY OF PRESENT ILLNESS:    66-year-old female with a past medical history significant for left breast cancer with diffuse skeletal metastasis diagnosed in 2014, currently on tamoxifen with Xgeva is here for follow-up visit today.  Last seen here at clinic on May 1, 2017, on that day she was complaining of worsening back pain as well as new skin nodules on the breast area as well as chest area, which patient underwent restaging CT of chest abdomen and pelvis with contrast earlier this week.  She is here to discuss the result of CT scan and further treatment recommendation.  Still complains of discomfort and pain in the back as well as shoulder region.  Denies any blood in the stool or urine.  Denies any fever or chills. Tolerating the Palbociclib well. Pain better. No other complaints.      PAST MEDICAL HISTORY:    Past Medical History:   Diagnosis Date   • Atrial fibrillation    • Carcinoma, female breast, infiltrating lobular     LEFT side with axillary lymph node metastases      • Essential hypertension    • GERD (gastroesophageal reflux disease)    • Heartburn    • History of echocardiogram 10/13/2011   • Localized enlarged lymph nodes    • Lump of breast, left    • Lymphadenopathy      LEFT axilla-this is confirmed by my personal review of the ultrasound      • Osteoarthritis        SOCIAL HISTORY:    Social History   Substance Use Topics   • Smoking status: Never Smoker   • Smokeless tobacco: Never Used   • Alcohol use No       Surgical History :  Past Surgical History:   Procedure Laterality Date   • APPENDECTOMY     • BREAST SURGERY  11/21/2014    Ultrasound directed mammotome biopsy of the left breast with clip placement, lesion at the 5:30 position 5 cm. from the nipple.Left axillary lymph node biopsy, open.   • CHOLECYSTECTOMY     • COLONOSCOPY  12/12/2011    Mild diverticulosis in  "sigmoid colon. Stool collected for study. Hemorrhoids found.   • COLONOSCOPY N/A 2/7/2017    Procedure: COLONOSCOPY;  Surgeon: Yevgeniy Ware MD;  Location: [facility] ENDOSCOPY;  Service:    • INJECTION OF MEDICATION  08/12/2013    Kenalog (19)      • TONSILLECTOMY     • TUBAL ABDOMINAL LIGATION     • UPPER GASTROINTESTINAL ENDOSCOPY  06/10/2014    Normal hypopharynx, esophagus, symmetrical & patent pylorus. Hiatus hernia in GE junction. Polyps in antrum & fundus. Multiple biopsies taken. Prominant CBD in medial duodenal wall. The ampulla has a normal appearance.       ALLERGIES:    Allergies   Allergen Reactions   • Eggs Or Egg-Derived Products Diarrhea and Nausea And Vomiting   • Influenza Vaccines    • Lortab [Hydrocodone-Acetaminophen] Hallucinations   • Penicillins      \"PASS OUT\"       REVIEW OF SYSTEMS:      CONSTITUTIONAL: Positive for fatigue.  Positive for hot flashes.  No fever, chills, or night sweats.     HEENT:  No epistaxis, mouth sores, or difficulty swallowing.    RESPIRATORY:  No new shortness of breath or cough at present.    CARDIOVASCULAR:  No chest pain or palpitations.    GASTROINTESTINAL:  Positive for intermittent constipation.  No abdominal pain, nausea, vomiting, or blood in the stool.    GENITOURINARY:  No dysuria or hematuria.    MUSCULOSKELETAL:  Complains of bilateral hip pain and stiffness, which is getting worse.  Complains of bilateral shoulder stiffness.    NEUROLOGICAL:  No tingling or numbness. No new headache or dizziness.     LYMPHATICS:  Denies any abnormal swollen and anywhere in the body.    SKIN:  Complains of skin nodule that started to break out about 3 weeks ago initially was on the left breast now with its progressing to the right breast as well.        PHYSICAL EXAMINATION:      VITAL SIGNS:    BP (!) 192/79  Pulse 67  Temp 97 °F (36.1 °C)  Resp 16  Wt 189 lb 6.4 oz (85.9 kg)  BMI 34.64 kg/m2    GENERAL:  Not in any distress.     EYES:  Mild pallor. No " icterus.  Extraocular movement intact.    NECK:  No adenopathy.  No JVD.    RESPIRATORY:  Fair air entry bilaterally. No rhonchi or wheezing.    CARDIOVASCULAR:  S1, S2. Regular rate and rhythm.  Systolic murmur present.    ABDOMEN:  Soft, nontender. Bowel sounds present.    EXTREMITIES:  No edema.  No calf  Tenderness    NEUROLOGICAL:  Alert, awake, oriented x3. No motor or sensory deficits.  Cranial nerves II-12 grossly intact.        DIAGNOSTIC DATA:    Glucose   Date Value Ref Range Status   06/14/2017 100 60 - 100 mg/dL Final     Sodium   Date Value Ref Range Status   06/14/2017 142 137 - 145 mmol/L Final     Potassium   Date Value Ref Range Status   06/14/2017 3.5 3.5 - 5.1 mmol/L Final     CO2   Date Value Ref Range Status   06/14/2017 27.0 22.0 - 31.0 mmol/L Final     Chloride   Date Value Ref Range Status   06/14/2017 102 95 - 110 mmol/L Final     Anion Gap   Date Value Ref Range Status   06/14/2017 13.0 5.0 - 15.0 mmol/L Final     Creatinine   Date Value Ref Range Status   06/14/2017 0.96 0.50 - 1.00 mg/dL Final     BUN   Date Value Ref Range Status   06/14/2017 11 7 - 21 mg/dL Final     BUN/Creatinine Ratio   Date Value Ref Range Status   06/14/2017 11.5 7.0 - 25.0 Final     Calcium   Date Value Ref Range Status   06/14/2017 8.9 8.4 - 10.2 mg/dL Final     eGFR Non  Amer   Date Value Ref Range Status   06/14/2017 58 45 - 104 mL/min/1.73 Final     Alkaline Phosphatase   Date Value Ref Range Status   06/14/2017 57 38 - 126 U/L Final     Total Protein   Date Value Ref Range Status   06/14/2017 7.7 6.3 - 8.6 g/dL Final     ALT (SGPT)   Date Value Ref Range Status   06/14/2017 29 9 - 52 U/L Final     AST (SGOT)   Date Value Ref Range Status   06/14/2017 49 (H) 14 - 36 U/L Final     Total Bilirubin   Date Value Ref Range Status   06/14/2017 0.6 0.2 - 1.3 mg/dL Final     Albumin   Date Value Ref Range Status   06/14/2017 4.10 3.40 - 4.80 g/dL Final     Globulin   Date Value Ref Range Status   06/14/2017  3.6 (H) 2.3 - 3.5 gm/dL Final     A/G Ratio   Date Value Ref Range Status   06/14/2017 1.1 1.1 - 1.8 g/dL Final     Lab Results   Component Value Date    WBC 2.38 (L) 06/14/2017    HGB 10.4 (L) 06/14/2017    HCT 31.7 (L) 06/14/2017    MCV 92.2 06/14/2017     (L) 06/14/2017     Lab Results   Component Value Date    NEUTROABS 2.97 05/01/2017    FERRITIN 226.00 02/09/2017    [id number] 625 02/09/2017     Lab Results   Component Value Date    LABCA2 275.9 (H) 05/01/2017       RADIOLOGY DATA :    Nuclear bone scan done on April 21, 2017 showed:  FINDINGS:      The uptake and distribution of radiotracer activity demonstrates:         Interval progression of disease demonstrating relative intensity  in previously noted sites of abnormal activity, additionally with  new sites in both the axial and appendicular skeleton.      The kidneys are visualized bilaterally.   .   IMPRESSION:  CONCLUSION:      1. Interval progression of disease.        CT scan of chest, abdomen and pelvis with contrast done on May 4, 2017 was reviewed and discussed with patient, it showed:  - - - CT CHEST - - -       PULMONARY PARENCHYMA:   - air spaces: negative  - interstitium: grossly within normal limits for age  - misc.: no pulmonary nodules or mass      MEDIASTINUM / YOVANY:  - heart: normal size , no pericardial fluid  - aorta/great vessels: normal caliber and configuration for  age  - misc.: no mediastinal mass / significant adenopathy     PLEURAL COMPARTMENT:   - misc.: no pleural fluid or mass      MISC:  - inferior neck: negative  - osseous / body wall: Multiple osteolytic/sclerotic  lesions.  - misc:     - - - CT ABDOMEN - - -   ABDOMEN:   - LIVER: normal size / contour, no ductal dilatation , no  focal lesion   - GB: grossly negative   - CBD: grossly negative   - SPLEEN: normal size and contour   - PANCREAS: normal in size, contour, no focal mass   - VISCERA: normal caliber, no wall thickening   - MESENTERY: no mesenteric mass   -  CAVITY: no free abdominal fluid, no free intraperitoneal  air   - BODY WALL: wnl   - OSSEOUS: Multiple osteolytic/sclerotic lesions.      RETROPERITONEUM:   - KIDNEYS: normal size / contour, no collecting system  dilation  no evidence of an enhancing mass   - URETERS: normal course, caliber   - ADRENALS: normal size, contour   - MISC: no sig retroperitoneal adenopathy or mass   - VASCULAR: aorta / iliacs: wnl for age      - - - CT PELVIS - - -   - VISCERA: [normal caliber] normal caliber small/large  bowel, no focal thickening/mass   - MESENTERY: no mass   - VASCULAR: wnl for age  - CAVITY: no free fluid / air  - BLADDER: unremarkable  - OSSEOUS: Multiple osteolytic/sclerotic lesions.  - MISC:     .   IMPRESSION:  CONCLUSION:   1. Interval progression of osseous metastatic disease.  2. Otherwise, stable assessment.              ASSESSMENT AND PLAN:      1.  Metastatic lobular carcinoma of left breast with a diffuse blastic lesion involving the spine and pelvis, ER/RI positive, HER-2/shelly negative diagnosed in December 2014.  Initially patient was started on Arimidex with Xgeva, in November 2015 patient had a restaging scan done which showed worsening of the metastases and patient was changed to Aromasin along with Xgeva.  In February 2016 in view of worsening metastasis patient was started on Faslodex with Xgeva.ReStaging  Work up done in October 2016 showed worsening of metastatic disease, so patient was put on tamoxifen along with Xgeva.Patient has been on tamoxifen since October 2016.  In view of worsening bone metastasis based on the bone scan done in April 2017 as well as restaging of CT of chest abdomen and pelvis done on May 4, 2017.  Result of CT scan were discussed with patient and her daughters.  It was recommended that we change therapy to Palbociclib with letrozole.  Patient was explained about possible side effects of Palbociclib including lowering of blood count, risk of infection, need for blood  transfusion, risk of kidney failure, stomatitis, diarrhea.  We will also provided information to read about both Palbociclib and date result today. On Palbociclib and Letrozole. Patient is doing well. WBC is low but acceptable as we see with this medicine. Explained to the patient about neutropenia and plan to continue 125 mg for C-2 also. She will start from tomorrow.      2.  Hot flashes: Patient is enough Effexor with her for now    3.  Bone metastases: Continue with Xgeva for now.  Patient was again explained about possible side effects including jaw pain and osteonecrosis of jaw. Xgeva today. I did discuss about taking it once in 3 months as data is as good as monthly. Patient to decide about it.    4.  Prescriptions: Patient is enough prescription for Percocet and Effexor.  Prescription for letrozole has been sent to her pharmacy today on May 10, 2017.    5.  Health maintenance: Patient does not smoke.  She is not a candidate for further screening colonoscopy in view of metastatic breast cancer.  She remains full code.    RTC 4 weeks with blood work.       Kaitlynn Steele MD  6/14/2017  10:39 AM

## 2017-07-14 PROBLEM — T45.1X5A PANCYTOPENIA DUE TO ANTINEOPLASTIC CHEMOTHERAPY (HCC): Status: ACTIVE | Noted: 2017-01-01

## 2017-07-14 PROBLEM — C79.51 METASTASIS TO BONE: Status: ACTIVE | Noted: 2017-01-01

## 2017-07-14 PROBLEM — D61.810 PANCYTOPENIA DUE TO ANTINEOPLASTIC CHEMOTHERAPY (HCC): Status: ACTIVE | Noted: 2017-01-01

## 2017-07-14 NOTE — PROGRESS NOTES
DATE OF VISIT: 7/14/2017    REASON FOR VISIT:  Metastatic lobular carcinoma of left breast with a bone metastasis.    HISTORY OF PRESENT ILLNESS:    66-year-old female with a past medical history significant for left breast cancer with diffuse skeletal metastasis diagnosed in 2014, currently on tamoxifen with Xgeva is here for follow-up visit today.  In May of 2017 in view of disease progression on CT scan patient was changed over to Palbociclib with letrozole.  Patient finished her second cycle of Palbociclib on July 5, 2017.  She is here to discuss further treatment recommendation.  Denies any fever or chills.  Denies any recent infections.  Denies any blood in the stool or urine.  Denies any excessive mouth sores or nausea or vomiting.    PAST MEDICAL HISTORY:    Past Medical History:   Diagnosis Date   • Atrial fibrillation    • Carcinoma, female breast, infiltrating lobular     LEFT side with axillary lymph node metastases      • Essential hypertension    • GERD (gastroesophageal reflux disease)    • Heartburn    • History of echocardiogram 10/13/2011   • Localized enlarged lymph nodes    • Lump of breast, left    • Lymphadenopathy      LEFT axilla-this is confirmed by my personal review of the ultrasound      • Osteoarthritis        SOCIAL HISTORY:    Social History   Substance Use Topics   • Smoking status: Never Smoker   • Smokeless tobacco: Never Used   • Alcohol use No       Surgical History :  Past Surgical History:   Procedure Laterality Date   • APPENDECTOMY     • BREAST SURGERY  11/21/2014    Ultrasound directed mammotome biopsy of the left breast with clip placement, lesion at the 5:30 position 5 cm. from the nipple.Left axillary lymph node biopsy, open.   • CHOLECYSTECTOMY     • COLONOSCOPY  12/12/2011    Mild diverticulosis in sigmoid colon. Stool collected for study. Hemorrhoids found.   • COLONOSCOPY N/A 2/7/2017    Procedure: COLONOSCOPY;  Surgeon: Yevgeniy Ware MD;  Location: [facility]  "ENDOSCOPY;  Service:    • INJECTION OF MEDICATION  08/12/2013    Kenalog (19)      • TONSILLECTOMY     • TUBAL ABDOMINAL LIGATION     • UPPER GASTROINTESTINAL ENDOSCOPY  06/10/2014    Normal hypopharynx, esophagus, symmetrical & patent pylorus. Hiatus hernia in GE junction. Polyps in antrum & fundus. Multiple biopsies taken. Prominant CBD in medial duodenal wall. The ampulla has a normal appearance.       ALLERGIES:    Allergies   Allergen Reactions   • Eggs Or Egg-Derived Products Diarrhea and Nausea And Vomiting   • Influenza Vaccines    • Lortab [Hydrocodone-Acetaminophen] Hallucinations   • Penicillins      \"PASS OUT\"       REVIEW OF SYSTEMS:      CONSTITUTIONAL: Positive for fatigue.  Positive for hot flashes.  No fever, chills, or night sweats.     HEENT:  No epistaxis, mouth sores, or difficulty swallowing.    RESPIRATORY:  No new shortness of breath or cough at present.    CARDIOVASCULAR:  No chest pain or palpitations.    GASTROINTESTINAL:  Positive for intermittent constipation.  No abdominal pain, nausea, vomiting, or blood in the stool.    GENITOURINARY:  No dysuria or hematuria.    MUSCULOSKELETAL:  Complains of bilateral hip pain and stiffness, which is getting worse.  Complains of bilateral shoulder stiffness.    NEUROLOGICAL:  No tingling or numbness. No new headache or dizziness.     LYMPHATICS:  Denies any abnormal swollen and anywhere in the body.    SKIN:  Complains of skin nodule that has not progressed since starting chemotherapy.        PHYSICAL EXAMINATION:      VITAL SIGNS:    /89  Pulse 79  Temp 97.7 °F (36.5 °C)  Ht 62\" (157.5 cm)  Wt 190 lb 12.8 oz (86.5 kg)  BMI 34.9 kg/m2    GENERAL:  Not in any distress.     EYES:  Mild pallor. No icterus.  Extraocular movement intact.    NECK:  No adenopathy.  No JVD.    RESPIRATORY:  Fair air entry bilaterally. No rhonchi or wheezing.    CARDIOVASCULAR:  S1, S2. Regular rate and rhythm.  Systolic murmur present.    ABDOMEN:  Soft, " nontender. Bowel sounds present.    EXTREMITIES:  No edema.  No calf  Tenderness    NEUROLOGICAL:  Alert, awake, oriented x3. No motor or sensory deficits.  Cranial nerves II-12 grossly intact.        DIAGNOSTIC DATA:    Glucose   Date Value Ref Range Status   07/14/2017 120 (H) 60 - 100 mg/dL Final     Sodium   Date Value Ref Range Status   07/14/2017 139 137 - 145 mmol/L Final     Potassium   Date Value Ref Range Status   07/14/2017 3.7 3.5 - 5.1 mmol/L Final     CO2   Date Value Ref Range Status   07/14/2017 25.0 22.0 - 31.0 mmol/L Final     Chloride   Date Value Ref Range Status   07/14/2017 102 95 - 110 mmol/L Final     Anion Gap   Date Value Ref Range Status   07/14/2017 12.0 5.0 - 15.0 mmol/L Final     Creatinine   Date Value Ref Range Status   07/14/2017 0.90 0.50 - 1.00 mg/dL Final     BUN   Date Value Ref Range Status   07/14/2017 9 7 - 21 mg/dL Final     BUN/Creatinine Ratio   Date Value Ref Range Status   07/14/2017 10.0 7.0 - 25.0 Final     Calcium   Date Value Ref Range Status   07/14/2017 8.8 8.4 - 10.2 mg/dL Final     eGFR Non  Amer   Date Value Ref Range Status   07/14/2017 62 45 - 104 mL/min/1.73 Final     Alkaline Phosphatase   Date Value Ref Range Status   07/14/2017 49 38 - 126 U/L Final     Total Protein   Date Value Ref Range Status   07/14/2017 7.5 6.3 - 8.6 g/dL Final     ALT (SGPT)   Date Value Ref Range Status   07/14/2017 24 9 - 52 U/L Final     AST (SGOT)   Date Value Ref Range Status   07/14/2017 44 (H) 14 - 36 U/L Final     Total Bilirubin   Date Value Ref Range Status   07/14/2017 0.5 0.2 - 1.3 mg/dL Final     Albumin   Date Value Ref Range Status   07/14/2017 4.10 3.40 - 4.80 g/dL Final     Globulin   Date Value Ref Range Status   07/14/2017 3.4 2.3 - 3.5 gm/dL Final     A/G Ratio   Date Value Ref Range Status   07/14/2017 1.2 1.1 - 1.8 g/dL Final     Lab Results   Component Value Date    WBC 2.67 (L) 07/14/2017    HGB 10.3 (L) 07/14/2017    HCT 31.7 (L) 07/14/2017    MCV 98.4  (H) 07/14/2017     (L) 07/14/2017     Lab Results   Component Value Date    NEUTROABS 0.60 (L) 06/14/2017    FERRITIN 226.00 02/09/2017    [id number] 625 02/09/2017     Lab Results   Component Value Date    LABCA2 243.7 (H) 06/14/2017       RADIOLOGY DATA :    Nuclear bone scan done on April 21, 2017 showed:  FINDINGS:      The uptake and distribution of radiotracer activity demonstrates:         Interval progression of disease demonstrating relative intensity  in previously noted sites of abnormal activity, additionally with  new sites in both the axial and appendicular skeleton.      The kidneys are visualized bilaterally.   .   IMPRESSION:  CONCLUSION:      1. Interval progression of disease.        CT scan of chest, abdomen and pelvis with contrast done on May 4, 2017 was reviewed and discussed with patient, it showed:  - - - CT CHEST - - -       PULMONARY PARENCHYMA:   - air spaces: negative  - interstitium: grossly within normal limits for age  - misc.: no pulmonary nodules or mass      MEDIASTINUM / YOVANY:  - heart: normal size , no pericardial fluid  - aorta/great vessels: normal caliber and configuration for  age  - misc.: no mediastinal mass / significant adenopathy     PLEURAL COMPARTMENT:   - misc.: no pleural fluid or mass      MISC:  - inferior neck: negative  - osseous / body wall: Multiple osteolytic/sclerotic  lesions.  - misc:     - - - CT ABDOMEN - - -   ABDOMEN:   - LIVER: normal size / contour, no ductal dilatation , no  focal lesion   - GB: grossly negative   - CBD: grossly negative   - SPLEEN: normal size and contour   - PANCREAS: normal in size, contour, no focal mass   - VISCERA: normal caliber, no wall thickening   - MESENTERY: no mesenteric mass   - CAVITY: no free abdominal fluid, no free intraperitoneal  air   - BODY WALL: wnl   - OSSEOUS: Multiple osteolytic/sclerotic lesions.      RETROPERITONEUM:   - KIDNEYS: normal size / contour, no collecting system  dilation  no evidence of  an enhancing mass   - URETERS: normal course, caliber   - ADRENALS: normal size, contour   - MISC: no sig retroperitoneal adenopathy or mass   - VASCULAR: aorta / iliacs: wnl for age      - - - CT PELVIS - - -   - VISCERA: [normal caliber] normal caliber small/large  bowel, no focal thickening/mass   - MESENTERY: no mass   - VASCULAR: wnl for age  - CAVITY: no free fluid / air  - BLADDER: unremarkable  - OSSEOUS: Multiple osteolytic/sclerotic lesions.  - MISC:     .   IMPRESSION:  CONCLUSION:   1. Interval progression of osseous metastatic disease.  2. Otherwise, stable assessment.              ASSESSMENT AND PLAN:      1.  Metastatic lobular carcinoma of left breast with a diffuse blastic lesion involving the spine and pelvis, ER/WA positive, HER-2/shelly negative diagnosed in December 2014.  Initially patient was started on Arimidex with Xgeva, in November 2015 patient had a restaging scan done which showed worsening of the metastases and patient was changed to Aromasin along with Xgeva.  In February 2016 in view of worsening metastasis patient was started on Faslodex with Xgeva.ReStaging  Work up done in October 2016 showed worsening of metastatic disease, so patient was put on tamoxifen along with Xgeva.Patient has been on tamoxifen since October 2016.  In view of worsening bone metastasis based on the bone scan done in April 2017 as well as restaging of CT of chest abdomen and pelvis done on May 4, 2017.  Started with Palbociclib and letrozole in May 2017.  Patient finished her second cycle of Palbociclib on July 5, 2017.  Her white blood cell count is still low at 2.6 today.  Last month her white blood cell count was 2.3 and ANC was very low at 600.  At this point it was recommended to continue holding Palbociclib this week as well.  We will recheck her CBC next week and if her white blood cell count is improved by then we will resume third cycle of Palbociclib.  Patient was instructed to continue taking  letrozole every day at this point we will see her back in about 5 weeks with a repeat CBC and CMP on that day.  Plan is to repeat restaging CT scan after 4 cycles of Palbociclib with letrozole.  Her CA 27-29 last month was slightly lower than what it was in month of May.  We will go CA 27-29 today as well.    2.  Pancytopenia: Most likely secondary to Palbociclib.  Patient was instructed to come to the emergency room she starts having any bleeding or any fever.  We will monitored with CBC for now.    3.  Hot flashes: Patient is enough Effexor with her for now    4.  Bone metastases: Continue with Xgeva for now.  Patient was again explained about possible side effects including jaw pain and osteonecrosis of jaw.  She is scheduled to get a dose of Xgeva today on July 14, 2017.    5.  Prescriptions: Patient is resting a prescription of Percocet 5/325 daily.  Prescription with 90 tablet has been given to her today on July 14, 2017    6.  Health maintenance: Patient does not smoke.  She is not a candidate for further screening colonoscopy in view of metastatic breast cancer.  She remains full code.       Joey Ramos MD  7/14/2017  12:18 PM

## 2017-07-18 NOTE — PLAN OF CARE
Problem: Patient Care Overview (Adult)  Goal: Plan of Care Review    02/07/17 1332   Coping/Psychosocial Response Interventions   Plan Of Care Reviewed With patient   Patient Care Overview   Progress no change   Outcome Evaluation   Outcome Summary/Follow up Plan vss         Problem: GI Endoscopy (Adult)  Goal: Signs and Symptoms of Listed Potential Problems Will be Absent or Manageable (GI Endoscopy)  Outcome: Ongoing (interventions implemented as appropriate)    02/07/17 1332   GI Endoscopy   Problems Assessed (GI Endoscopy) all   Problems Present (GI Endoscopy) none           
Problem: Patient Care Overview (Adult)  Goal: Plan of Care Review    02/07/17 1414   Coping/Psychosocial Response Interventions   Plan Of Care Reviewed With patient   Patient Care Overview   Progress no change   Outcome Evaluation   Outcome Summary/Follow up Plan ready for d/c           
no abdominal pain, no bloating, no constipation, no diarrhea, no nausea and no vomiting.

## 2017-07-31 PROBLEM — I48.0 PAF (PAROXYSMAL ATRIAL FIBRILLATION) (HCC): Status: ACTIVE | Noted: 2017-01-01

## 2017-07-31 PROBLEM — R06.09 DYSPNEA ON EXERTION: Status: ACTIVE | Noted: 2017-01-01

## 2017-08-01 NOTE — PLAN OF CARE
Problem: Patient Care Overview (Adult)  Goal: Plan of Care Review  Outcome: Ongoing (interventions implemented as appropriate)    08/01/17 1829   Coping/Psychosocial Response Interventions   Plan Of Care Reviewed With patient;daughter   Patient Care Overview   Progress no change   Outcome Evaluation   Outcome Summary/Follow up Plan pt did not have any c/o chest pain today, will continue to monitor, new meds started       Goal: Adult Individualization and Mutuality  Outcome: Ongoing (interventions implemented as appropriate)  Goal: Discharge Needs Assessment  Outcome: Ongoing (interventions implemented as appropriate)    Problem: Respiratory Insufficiency (Adult)  Goal: Identify Related Risk Factors and Signs and Symptoms  Outcome: Ongoing (interventions implemented as appropriate)  Goal: Acid/Base Balance  Outcome: Ongoing (interventions implemented as appropriate)  Goal: Effective Ventilation  Outcome: Ongoing (interventions implemented as appropriate)

## 2017-08-01 NOTE — CONSULTS
Nutrition Services    Patient Name:  Johanne Mayer  YOB: 1950  MRN: [id number]  Admit Date:  7/31/2017        Verified food allergies with pt--She is allergic to eggs (scrambled eggs, Fried eggs, and boiled eggs.  However, she can have eggs cooked in foods.  Rd will note      Electronically signed by:  Melia Cool RD  08/01/17 9:37 AM

## 2017-08-01 NOTE — PROGRESS NOTES
[facility] Medicine Services  INPATIENT PROGRESS NOTE    Length of Stay: 0  Date of Admission: 7/31/2017  Primary Care Physician: Jenaro Wood MD    Subjective   Chief Complaint: Dyspnea on exertion  HPI:  66 year old female with a significant medical history of Stage IV breast cancer with bone metastases and PAF who presents to the ED with a complaint of dyspnea on exertion.  This has been ongoing for approximately a 10 days.  The patient notes with minimal activity she develops dyspnea, weakness, dizziness, palpitations, and nausea without vomiting.  She was noted to have rhythm change to atrial fibrillation in the ED while ambulating to the bathroom, but returned to NSR at rest.  CTA of the chest was negative for PE but could not exclude right lower lobe segmental emboli.  She was evaluated by cardiology who recommends Echo and Nuclear stress test.  She is also recommended anticoagulation and after long discussion of risks and benefits or coumadin vs NOACs, the patient and family have opted for Xarelto.      Review of Systems   Constitutional: Negative for chills and fever.   Respiratory: Positive for shortness of breath.    Cardiovascular: Negative for chest pain.   Gastrointestinal: Negative for abdominal pain, diarrhea, nausea and vomiting.        All pertinent negatives and positives are as above. All other systems have been reviewed and are negative unless otherwise stated.     Objective    Temp:  [96.1 °F (35.6 °C)-98.4 °F (36.9 °C)] 96.1 °F (35.6 °C)  Heart Rate:  [65-92] 69  Resp:  [20-22] 20  BP: (119-174)/(61-99) 137/64    Physical Exam   Constitutional: She is oriented to person, place, and time. She appears well-developed and well-nourished.   Cardiovascular: Normal rate, regular rhythm, normal heart sounds and intact distal pulses.    No murmur heard.  Pulmonary/Chest: Effort normal and breath sounds normal. No respiratory distress. She has no wheezes.    Abdominal: Soft. Bowel sounds are normal. She exhibits no distension. There is no tenderness.   Musculoskeletal: Normal range of motion. She exhibits no edema.   Neurological: She is alert and oriented to person, place, and time.   Skin: Skin is warm and dry. No erythema.       Results Review:  I have reviewed the labs, radiology results, and diagnostic studies.    Laboratory Data:     Results from last 7 days  Lab Units 08/01/17  0942 08/01/17  0220 07/31/17  1932 07/31/17  1324   SODIUM mmol/L  --  136* 133* 134*   POTASSIUM mmol/L 4.1 3.2* 2.9* 3.2*   CHLORIDE mmol/L  --  100 99 94*   CO2 mmol/L  --  26.0 22.0 26.0   BUN mg/dL  --  23* 25* 25*   CREATININE mg/dL  --  1.08* 1.15* 1.20*   GLUCOSE mg/dL  --  105* 89 101*   CALCIUM mg/dL  --  8.9 9.3 10.4*   BILIRUBIN mg/dL  --   --   --  0.6   ALK PHOS U/L  --   --   --  63   ALT (SGPT) U/L  --   --   --  28   AST (SGOT) U/L  --   --   --  32   ANION GAP mmol/L  --  10.0 12.0 14.0     Estimated Creatinine Clearance: 49.8 mL/min (by C-G formula based on Cr of 1.08).            Results from last 7 days  Lab Units 07/31/17  1324   WBC 10*3/mm3 3.56   HEMOGLOBIN g/dL 12.3   HEMATOCRIT % 36.6   PLATELETS 10*3/mm3 284       Results from last 7 days  Lab Units 07/31/17  1324   INR  1.09       Culture Data:   No results found for: BLOODCX  No results found for: URINECX  No results found for: RESPCX  No results found for: WOUNDCX  No results found for: STOOLCX  No components found for: BODYFLD    Radiology Data:   Imaging Results (last 24 hours)     Procedure Component Value Units Date/Time    XR Chest 1 View [[id number]] Collected:  07/31/17 1430     Updated:  07/31/17 1450    Narrative:         PROCEDURE: Single chest view AP    REASON FOR EXAM:soa    FINDINGS: Cardiac and pulmonary vasculature are normal. Tortuous  thoracic aorta. Lungs are clear. Pleural spaces are normal.  Widening of the right shoulder acromioclavicular joint space as  well as coracoid clavicular  space with elevation of the clavicle.      Impression:       1.  Type III right AC joint separation.  2.  No acute cardiopulmonary abnormality.    Electronically signed by:  Dariusz Austin MD  7/31/2017 2:49 PM CDT  Workstation: [id number]    CT Angiogram Chest With Contrast [[id number]] Collected:  07/31/17 1853     Updated:  07/31/17 1936    Narrative:           DATE OF EXAM: 7/31/2017 6:53 PM CDT    PROCEDURE: CT CHEST ANGIOGRAPHY WITH IV CONTRAST    INDICATION FOR PROCEDURE: 66 years old patient presents for  evaluation of dyspnea.  ICD 10 code:  R06.09.    TECHNIQUE:  Contiguous axial images are obtained of the chest and  upper abdomen after intravenous administration of 61 mm of  Isovue-370.  Multiplanar reformations and computer generated 3-D  multiplanar MIPS are submitted for interpretation.      This exam was performed according to our departmental  dose-optimization program, which includes automated exposure  control, adjustment of the mA and/or kV according to patient size  and/or use of iterative reconstruction technique.    COMPARISON:  CT of the chest dated May 4, 2017.    FINDINGS:  Lungs are expanded. There is heterogeneous groundglass  attenuation both lungs possibly related to mild pulmonary  congestion. Bronchovascular markings are mild prominent..  No  pleural effusions are visualized.    The heart has a normal appearance without evidence for  pericardial effusion.  There are vascular calcifications of the  coronary arteries.    Pulmonary arteries  have a normal enhanced appearance. The  segmental arteries are not well enhanced and small pulmonary  emboli are not excluded..  The thoracic aorta is tortuous with  atherosclerotic calcification. Maximum transverse dimension of  the ascending thoracic aorta measures approximately 4.2 cm..    There is no evidence for mediastinal, hilar or axillary  lymphadenopathy.    Chest wall has a normal appearance. There is extensive abnormal  attenuation within the  imaged thoracic vertebral bodies and ribs  consistent with extensive metastatic disease.    Visualized abdominal structures are within normal limits..      Impression:         1.  No CT evidence for large or central pulmonary emboli.   2.  Segmental emboli are not excluded particularly in the right  lower lobe.  3.  Mild aneurysmal dilatation of the ascending thoracic aorta.   4.  Mild pulmonary congestion.    Electronically signed by:  Meenu De La Garza MD  7/31/2017 7:35 PM CDT  Workstation: [employer] have reviewed the patient current medications.     Assessment/Plan     Hospital Problem List     * (Principal)Dyspnea on exertion    Malignant neoplasm of left breast    Metastasis to bone    PAF (paroxysmal atrial fibrillation)          Plan:   CHADSVASC: 3  HAS-BLED: 2  Echo  Nuclear stress test  Telemetry  Start xarelto, currently in NSR  Replace potassium, continue to hold HCTZ            This document has been electronically signed by FADI Ferguson on August 1, 2017 10:58 AM

## 2017-08-01 NOTE — CONSULTS
Cardiology Consultation Note.        Patient Name: Johanne Mayer  Age/Sex: 66 y.o. female  : 1950  MRN: [id number]    Date of consultation: 2017  Consulting Physician: Julito Graf MD  Primary care Physician: Jenaro Wood MD  Requesting Physician:   FADI Ferguson  Reason for consultation:  Shortness of breath with paroxysmal atrial fibrillation      Subjective:       Chief Complaint: Lightheaded dizziness and near-syncope symptomatic palpitation    History of Present Illness:  Johanne Mayer is a 66 y.o. female     Body mass index is 33.29 kg/(m^2). with a past medical history significant for atherosclerotic CAD with the last coronary angiogram done in 2009, which revealed an ectatic right coronary artery with evidence of good AGUSTIN-3 flow in the 2nd diagonal branch with 20 to 30% stenosis and an ostial circumflex artery with luminal irregularity with an ectatic left anterior descending artery with evidence of good AGUSTIN-3 flow, with history of paroxysmal atrial fibrillation, arterial hypertension, previous history of tobacco abuse, mediastinal adenopathy, GERD, allergic rhinitis, varicose veins and anxiety,Stage IV breast cancer with skeletal metastasis diagnosed in  on Palbociclib with letrozole, obesity paroxysmal atrial fibrillation.     Patient has been evaluated by Dr. Ramos at the [facility] for the chemotherapy for the breast carcinoma.    Patient week ago underwent evaluation by Dr. Wood and was found to have elevated blood pressure.  Patient antihypertensive medication was adjusted.  Patient subsequently was evaluated in the [facility] emergency room with symptoms of palpitation.     Patient over the last one week has been having symptoms of dizziness with palpitations especially when she starts exerting herself.  Patient has associated symptoms of shortness of breath.  Patient on further questioning has had episodes of palpitation.   Patient does have a history of paroxysmal atrial fibrillation and is currently not on anticoagulation.  Patient is on antiplatelet therapy Plavix.  Patient has had persistent symptoms of lightheaded dizziness and symptomatic palpitation.  Due to the patient's persistent symptoms patient presented to the emergency room.    In the emergency room patient telemetry monitor showed evidence of paroxysmal atrial fibrillation with a rapid ventricular response.  Patient had complain of having symptoms of shortness of breath.  Patient underwent a CT angiogram to rule out pulmonary embolism.  Patient did show evidence of dilatation of the ascending aorta.  Patient on specific questioning has had epigastric discomfort but denies any symptoms of severe  chest pain.  Patient denies any fever or chill patient denies any hemoptysis hematuria bright red blood per rectum.  Have discussed with the patient should the patient have recurrence of atrial fibrillation patient may need to stop the Plavix and would consider anticoagulation to prevent thromboembolic event.    Patient on further questioning denies any lower extremity edema.      Patient 10 point review of system except for stated in the history of present illness is negative          Past Medical History:  1. Chest pain epigastric discomfort with symptomatic palpitation with lightheaded dizziness  2. Shortness of breath   3. Atherosclerotic CAD   4. Last coronary angiogram done in 2009 revealed a. Ectatic left anterior descending artery with an ectatic right coronary artery with evidence of luminal irregularity b. Obtuse marginal branch with 20 to 30% stenosis c. Preserved left ventricular systolic function with an ejection fraction of 55%   5. Paroxysmal atrial fibrillation   6. Arterial hypertension   7. Mediastinal adenopathy status post bronchoscopy   8. GERD with gastritis   9. Concentric left ventricular hypertrophy with an ejection fraction of 55%   10. Mild mitral and  mild tricuspid regurgitation   11. COPD   12. Restless legs syndrome   13. Anxiety   14. Stage IV breast cancer with skeletal metastasis diagnosed in 2014             Past Medical History:   Diagnosis Date   • Atrial fibrillation    • Carcinoma, female breast, infiltrating lobular     LEFT side with axillary lymph node metastases      • Essential hypertension    • GERD (gastroesophageal reflux disease)    • Heartburn    • History of echocardiogram 10/13/2011   • Localized enlarged lymph nodes    • Lump of breast, left    • Lymphadenopathy      LEFT axilla-this is confirmed by my personal review of the ultrasound      • Osteoarthritis        Past Surgical History:  1. Mediastinoscopy   2. Cholecystectomy   3. EGD   4. Colonoscopy   5. Appendectomy   6. Tonsillectomy   7. Tubal ligation   8. Bronchoscopy         Past Surgical History:   Procedure Laterality Date   • APPENDECTOMY     • BREAST SURGERY  11/21/2014    Ultrasound directed mammotome biopsy of the left breast with clip placement, lesion at the 5:30 position 5 cm. from the nipple.Left axillary lymph node biopsy, open.   • CHOLECYSTECTOMY     • COLONOSCOPY  12/12/2011    Mild diverticulosis in sigmoid colon. Stool collected for study. Hemorrhoids found.   • COLONOSCOPY N/A 2/7/2017    Procedure: COLONOSCOPY;  Surgeon: Yevgeniy Ware MD;  Location: [facility] ENDOSCOPY;  Service:    • INJECTION OF MEDICATION  08/12/2013    Kenalog (19)      • TONSILLECTOMY     • TUBAL ABDOMINAL LIGATION     • UPPER GASTROINTESTINAL ENDOSCOPY  06/10/2014    Normal hypopharynx, esophagus, symmetrical & patent pylorus. Hiatus hernia in GE junction. Polyps in antrum & fundus. Multiple biopsies taken. Prominant CBD in medial duodenal wall. The ampulla has a normal appearance.       Family History:  Family History   Problem Relation Age of Onset   • Heart failure Mother      congested   • Lung cancer Father    • Heart attack Other    • Lung cancer Other    • Colon cancer Neg Hx   "  • Stomach cancer Neg Hx        Social History:  Social History     Social History   • Marital status:      Spouse name: N/A   • Number of children: N/A   • Years of education: N/A     Occupational History   • Not on file.     Social History Main Topics   • Smoking status: Never Smoker   • Smokeless tobacco: Never Used   • Alcohol use No   • Drug use: No   • Sexual activity: Defer     Other Topics Concern   • Not on file     Social History Narrative        Cardiac Risk factor:   1. Postmenopausal   2. Arterial hypertension   3. Hyperlipidemia   4. Family history of CAD       Allergies:  Allergies   Allergen Reactions   • Eggs Or Egg-Derived Products Diarrhea and Nausea And Vomiting   • Influenza Vaccines    • Lortab [Hydrocodone-Acetaminophen] Hallucinations   • Penicillins      \"PASS OUT\"       Medication::  Prescriptions Prior to Admission   Medication Sig Dispense Refill Last Dose   • atorvastatin (LIPITOR) 10 MG tablet Take 1 tablet by mouth Daily. 30 tablet 5 Taking   • Calcium Carb-Cholecalciferol (CALCIUM 600 + D PO) Take 1 capsule by mouth 2 (Two) Times a Day.   Taking   • clopidogrel (PLAVIX) 75 MG tablet Take 75 mg by mouth Daily.   Taking   • digoxin (LANOXIN) 125 MCG tablet Take 125 mcg by mouth Daily.   Taking   • DILT- MG 24 hr capsule TAKE 1 CAPSULE BY MOUTH EVERY DAY  12 Taking   • furosemide (LASIX) 40 MG tablet 1 TABLET 2 TIMES PER DAY AFTER MEALS ORAL 60 tablet 5 Taking   • Interferon Beta-1b (EXTAVIA SC) Inject  under the skin Every 30 (Thirty) Days.   Taking   • letrozole (FEMARA) 2.5 MG tablet Take 1 tablet by mouth Daily. 30 tablet 3 Taking   • losartan-hydrochlorothiazide (HYZAAR) 100-25 MG per tablet Take 1 tablet by mouth Daily. 30 tablet 5    • Magnesium 250 MG tablet Take 1 tablet by mouth Daily.   Taking   • omeprazole (priLOSEC) 40 MG capsule Take 1 capsule by mouth Daily. 90 capsule 1 Taking   • ondansetron (ZOFRAN) 4 MG tablet Take 1 tablet by mouth 4 (Four) Times a Day " As Needed for Nausea or Vomiting. 40 tablet 3 Taking   • oxyCODONE-acetaminophen (PERCOCET) 5-325 MG per tablet Take 1 tablet by mouth Every 8 (Eight) Hours As Needed for Severe Pain . 90 tablet 0 Taking   • Palbociclib 125 MG capsule capsule Take 1 capsule by mouth Daily. From day 1 to day 21 of 28 day cycle. 21 capsule 2 Taking   • potassium chloride (K-DUR,KLOR-CON) 20 MEQ CR tablet Take 1 tablet by mouth Daily. 30 tablet 5 Taking   • sucralfate (CARAFATE) 1 G tablet Take 1 tablet by mouth 3 (Three) Times a Day. 90 tablet 5 Taking   • venlafaxine XR (EFFEXOR-XR) 37.5 MG 24 hr capsule TAKE 1 CAPSULE BY MOUTH DAILY 30 capsule 2 Taking   • vitamin D (ERGOCALCIFEROL) 89463 UNITS capsule capsule Take 1 capsule by mouth Every 14 (Fourteen) Days. 2 capsule 5    • Cholecalciferol (VITAMIN D-3) 1000 UNITS capsule Take 1,000 Units by mouth Daily.   Taking   • Multiple Vitamins-Minerals (MULTIVITAMINS PLUS ZINC PO) Take 50 mg by mouth Daily.   Taking   • venlafaxine (EFFEXOR) 37.5 MG tablet Take 1 tablet by mouth Daily. 90 tablet 1 Taking           Review of Systems:       Constitutional:  Denies recent weight loss, weight gain, fever or chills, no change in exercise tolerance     HENT:  Denies any hearing loss, epistaxis, hoarseness, or difficulty speaking.     Eyes: Wears eyeglasses or contact lenses     Respiratory:  Denies dyspnea with exertion,no cough, wheezing, or hemoptysis.     Cardiovascular: Positive for palpitations epigastric discomfort chest pain and shortness of breath and presyncopal symptoms.  Negative fororthopnea, PND, peripheral edema, syncope, or claudication.     Gastrointestinal:  Denies change in bowel habits, dyspepsia, ulcer disease, hematochezia, or melena.  No nausea, no vomiting, no hematemesis, no diarrhea or constipation, no melena      Endocrine: Negative for cold intolerance, heat intolerance, polydipsia, polyphagia and polyuria. Denies any history of weight change, heat/cold intolerance,  polydipsia, polyuria     Genitourinary: Negative for hematuria.      Musculoskeletal: Denies any history of arthritic symptoms or back problems .  No joint pain, joint stiffness, joint swelling, muscle pain, muscle weakness and neck pain    Skin:  Denies any change in hair or nails, rashes, or skin lesions.     Allergic/Immunologic: Negative.  Negative for environmental allergies, food allergies and immunocompromised state.     Neurological:  Denies any history of recurrent headaches, strokes, TIA, or seizure disorder.     Hematological: Denies excessive bleeding, easy bruising, fatigue, lymphadenopathy and petechiae or any bleeding disorders, or lymphadenopathy.     Psychiatric/Behavioral: Denies any history of depression, substance abuse, or change in cognitive function. Denies any psychomotor reaction or tangential thought.  No depression, homicidal ideations and suicidal ideations    Endocrine: No frequent urination and nocturia, temperature intolerance, weight gain, unintended and weight loss, unintended            Objective:     Objective:  Vitals:    07/31/17 2232   BP:    Pulse: 85   Resp: 20   Temp:    SpO2: 96%     .    Body mass index is 33.29 kg/(m^2).           Physical Exam:   General Appearance:    Alert, oriented, cooperative, in no acute distress   Head:    Normocephalic, atraumatic, without obvious abnormality   Eyes:           JUVENTINO  Lids and lashes normal, conjunctivae and sclerae normal, no icterus, no pallor   Ears:    Ears appear intact with no abnormalities noted   Throat:   Mucous membranes pink and moist   Neck:   Supple, trachea midline, no carotid bruit, no organomegaly or JVD   Lungs:     Clear to auscultation and percussion, respirations regular, even and Unlabored. No wheezes, rales, rhonchi    Heart:    Regular rhythm and normal rate, normal S1 and S2, no            murmur, no gallop, no rub, no click   Abdomen:     Soft, non-tender, non-distended, no guarding, no rebound tenderness,  Normal bowel sounds in all four quadrant, no masses, liver and spleen nonpalpable,    Genitalia:    Deferred   Extremities:   Moves all extremities well, no edema, no cyanosis, no              Redness, no clubbing   Pulses:   Pulses palpable and equal bilaterally   Skin:   Moist and warm. No bleeding, bruising or rash   Neurologic/Psychiatric:   Alert and oriented to person, place, and time.  Motor, power and tone in upper and lower extremity is grossly intact.  No focal neurological deficits. Normal cognitive function. No psychomotor reaction or tangential thought. No depression, homicidal ideations and suicidal ideations           Lab Review:       Results from last 7 days  Lab Units 07/31/17  1932 07/31/17  1324   SODIUM mmol/L 133* 134*   POTASSIUM mmol/L 2.9* 3.2*   CHLORIDE mmol/L 99 94*   CO2 mmol/L 22.0 26.0   BUN mg/dL 25* 25*   CREATININE mg/dL 1.15* 1.20*   CALCIUM mg/dL 9.3 10.4*   BILIRUBIN mg/dL  --  0.6   ALK PHOS U/L  --  63   ALT (SGPT) U/L  --  28   AST (SGOT) U/L  --  32   GLUCOSE mg/dL 89 101*       Results from last 7 days  Lab Units 07/31/17  1932 07/31/17  1324   CK TOTAL U/L  --  66   TROPONIN I ng/mL 0.030 0.025           Results from last 7 days  Lab Units 07/31/17  1324   WBC 10*3/mm3 3.56   HEMOGLOBIN g/dL 12.3   HEMATOCRIT % 36.6   PLATELETS 10*3/mm3 284       Results from last 7 days  Lab Units 07/31/17  1324   INR  1.09   APTT seconds 26.5               Results from last 7 days  Lab Units 07/31/17  1932   TSH mIU/mL 2.930       EKG:   ECG/EMG Results (last 24 hours)     Procedure Component Value Units Date/Time    SCANNED EKG [[id number]] Resulted:  07/31/17      Updated:  07/31/17 1244    ECG 12 Lead [[id number]] Collected:  07/31/17 1214     Updated:  07/31/17 2047    Narrative:       Test Reason : AFIB  Blood Pressure : **/** mmHG  Vent. Rate : 086 BPM     Atrial Rate : 086 BPM     P-R Int : 218 ms          QRS Dur : 088 ms      QT Int : 396 ms       P-R-T Axes : 082 -22 157 degrees      QTc Int : 473 ms    Sinus rhythm with 1st degree AV block  Left ventricular hypertrophy with repolarization abnormality  Cannot rule out Septal infarct , age undetermined  Abnormal ECG  No previous ECGs available  Confirmed by OG SALMERON MD ([id number]),  ELENI BAEZA ([id number]) on  7/31/2017 8:46:51 PM    Referred By:             Confirmed By:OG SALMERON MD          Imaging:  Imaging Results (last 24 hours)     Procedure Component Value Units Date/Time    XR Chest 1 View [[id number]] Collected:  07/31/17 1430     Updated:  07/31/17 1450    Narrative:         PROCEDURE: Single chest view AP    REASON FOR EXAM:soa    FINDINGS: Cardiac and pulmonary vasculature are normal. Tortuous  thoracic aorta. Lungs are clear. Pleural spaces are normal.  Widening of the right shoulder acromioclavicular joint space as  well as coracoid clavicular space with elevation of the clavicle.      Impression:       1.  Type III right AC joint separation.  2.  No acute cardiopulmonary abnormality.    Electronically signed by:  Dariusz Austin MD  7/31/2017 2:49 PM CDT  Workstation: [id number]    CT Angiogram Chest With Contrast [[id number]] Collected:  07/31/17 1853     Updated:  07/31/17 1936    Narrative:           DATE OF EXAM: 7/31/2017 6:53 PM CDT    PROCEDURE: CT CHEST ANGIOGRAPHY WITH IV CONTRAST    INDICATION FOR PROCEDURE: 66 years old patient presents for  evaluation of dyspnea.  ICD 10 code:  R06.09.    TECHNIQUE:  Contiguous axial images are obtained of the chest and  upper abdomen after intravenous administration of 61 mm of  Isovue-370.  Multiplanar reformations and computer generated 3-D  multiplanar MIPS are submitted for interpretation.      This exam was performed according to our departmental  dose-optimization program, which includes automated exposure  control, adjustment of the mA and/or kV according to patient size  and/or use of iterative reconstruction technique.    COMPARISON:  CT of the chest dated May 4,  2017.    FINDINGS:  Lungs are expanded. There is heterogeneous groundglass  attenuation both lungs possibly related to mild pulmonary  congestion. Bronchovascular markings are mild prominent..  No  pleural effusions are visualized.    The heart has a normal appearance without evidence for  pericardial effusion.  There are vascular calcifications of the  coronary arteries.    Pulmonary arteries  have a normal enhanced appearance. The  segmental arteries are not well enhanced and small pulmonary  emboli are not excluded..  The thoracic aorta is tortuous with  atherosclerotic calcification. Maximum transverse dimension of  the ascending thoracic aorta measures approximately 4.2 cm..    There is no evidence for mediastinal, hilar or axillary  lymphadenopathy.    Chest wall has a normal appearance. There is extensive abnormal  attenuation within the imaged thoracic vertebral bodies and ribs  consistent with extensive metastatic disease.    Visualized abdominal structures are within normal limits..      Impression:         1.  No CT evidence for large or central pulmonary emboli.   2.  Segmental emboli are not excluded particularly in the right  lower lobe.  3.  Mild aneurysmal dilatation of the ascending thoracic aorta.   4.  Mild pulmonary congestion.    Electronically signed by:  Meenu De La Garza MD  7/31/2017 7:35 PM CDT  Workstation: [email or web address]          MARY personally viewed and interpreted the patient's EKG/Telemetry data.    Assessment:   1.  Shortness of breath presyncopal symptoms with lightheaded dizziness.  2.  Paroxysmal atrial fibrillation.  3.  Atherosclerotic coronary artery disease with last coronary angiogram done in 2009.  4.  Hyponatremia with mild renal insufficiency.  5.  History of stage IV breast carcinoma with skeletal metastasis on chemotherapy followed at the [facility].          Plan:   1. Epigastric discomfort with atypical Chest pain with last coronary angiogram done in 2009 that had not revealed  any evidence of any obstructive epicardial coronary artery disease. Patient did have an ectatic left anterior descending artery and right coronary artery with circumflex ostium with luminal irregularity with preserved left ventricular systolic function. In view of the patient's negative cardiac markers with resting EKG that did not show any acute ST-T wave changes, patient at the present time will not be subjected to any invasive evaluation, as the patient is ruling out for myocardial infarction and has not had any further recurrent symptoms of chest discomfort. Patient will be treated medically and will start the patient on Imdur 30 mg once a day.  Patient would be subjected to a Lexiscan Cardiolite stress test to rule out any evidence of any stress-induced ischemia.  2.  Paroxysmal atrial fibrillation.  Patient is currently not on anticoagulation and is on antiplatelet therapy.  Patient is on Cardizem and digoxin for rate control.  Review of the record indicated patient is on Effexor which could be causing symptoms of palpitation and increase in the heart rate.  At the present time patient telemetry monitor would be followed.  Patient would undergo a transthoracic echocardiogram  3.  Arterial hypertension.  Patient blood pressure has been labile.  Patient would be continued on the present dose of the Cardizem and the losartan.  Patient has been counseled to decrease her salt intake.  4.  Hypertensive heart disease with mitral and tricuspid regurgitation.  Clinically patient is not in congestive heart failure.  Patient is to undergo a transthoracic echocardiogram.  5.  Stage IV metastatic breast carcinoma with skeletal metastasis.  Patient has been evaluated by Dr. Blanc and is currently on chemotherapeutic agent.  6.  Hyperlipidemia.  Patient had been counseled on low-fat low-cholesterol diet    Thank you for the consultation.        Time: time spent in face-to-face evaluation of greater than 55  minutes and  interacting and formulating examining and discussing the plan with the patient with 50% of greater time spent in face-to-face interaction.    Julito Graf MD  07/31/17  11:27 PM    Dictated utilizing Dragon dictation.

## 2017-08-01 NOTE — PLAN OF CARE
Problem: Patient Care Overview (Adult)  Goal: Plan of Care Review  Outcome: Ongoing (interventions implemented as appropriate)    08/01/17 0440   Coping/Psychosocial Response Interventions   Plan Of Care Reviewed With patient   Patient Care Overview   Progress progress towards functional goals is fair   Outcome Evaluation   Outcome Summary/Follow up Plan pt. receiving potassium replacement for abnormal lab value and oxygen therapy for SOA w/ exertion        Goal: Adult Individualization and Mutuality  Outcome: Ongoing (interventions implemented as appropriate)    08/01/17 0440   Individualization   Patient Specific Preferences likes have tv on while sleeping   Patient Specific Goals pt. potassium level will normalize   Patient Specific Interventions medication managment with potassium supplementation        Goal: Discharge Needs Assessment  Outcome: Ongoing (interventions implemented as appropriate)    07/31/17 1954 08/01/17 0440   Discharge Needs Assessment   Concerns To Be Addressed --  no discharge needs identified   Current Discharge Risk --  chronically ill   Discharge Disposition --  still a patient   Current Health   Anticipated Changes Related to Illness --  none   Living Environment   Transportation Available --  car   Self-Care   Equipment Currently Used at Home cane, straight;walker, rolling --          Problem: Respiratory Insufficiency (Adult)  Goal: Identify Related Risk Factors and Signs and Symptoms  Outcome: Ongoing (interventions implemented as appropriate)    08/01/17 0440   Respiratory Insufficiency   Related Risk Factors (Respiratory Insufficiency) activity intolerance   Signs and Symptoms (Respiratory Insufficiency) breathing pattern changes;nasal flaring;shortness of breath       Goal: Acid/Base Balance  Outcome: Ongoing (interventions implemented as appropriate)    08/01/17 0440   Respiratory Insufficiency (Adult)   Acid/Base Balance achieves outcome       Goal: Effective  Ventilation  Outcome: Ongoing (interventions implemented as appropriate)    08/01/17 0440   Respiratory Insufficiency (Adult)   Effective Ventilation achieves outcome

## 2017-08-01 NOTE — PROGRESS NOTES
Discharge Planning Assessment  [facility]     Patient Name: Johanne Mayer  MRN: [id number]  Today's Date: 8/1/2017    Admit Date: 7/31/2017          Discharge Needs Assessment       08/01/17 1253    Living Environment    Lives With child(ilya), adult    Living Arrangements apartment    Home Accessibility bed and bath on same level    Stair Railings at Home none    Transportation Available car;family or friend will provide    Living Environment    Provides Primary Care For no one    Quality Of Family Relationships supportive    Discharge Needs Assessment    Readmission Within The Last 30 Days no previous admission in last 30 days    Anticipated Changes Related to Illness none    Equipment Currently Used at Home cane, straight;rollator    Equipment Needed After Discharge none            Discharge Plan       08/01/17 1255    Case Management/Social Work Plan    Plan Plans to return home at discharge.  Refused home health services aqt present time.           Discharge Placement     No information found                Demographic Summary       08/01/17 1252    Referral Information    Admission Type observation    Contact Information    Permission Granted to Share Information With ;family/designee;primary care physician    Primary Care Physician Information    Name Dr. Jenaro Wood            Functional Status       08/01/17 1252    Functional Status Prior    Ambulation 1-->assistive equipment    Transferring 1-->assistive equipment    Toileting 1-->assistive equipment    Bathing 0-->independent    Dressing 0-->independent    Eating 0-->independent    Communication 0-->understands/communicates without difficulty    Swallowing 0-->swallows foods/liquids without difficulty    IADL    Medications independent    Meal Preparation independent    Housekeeping independent    Laundry independent    Shopping independent    Oral Care independent    Cognitive/Perceptual/Developmental    Current Mental  Status/Cognitive Functioning no deficits noted            Psychosocial     None            Abuse/Neglect     None            Legal     None            Substance Abuse     None            Patient Forms     None          Cass Stacy

## 2017-08-02 NOTE — PLAN OF CARE
Problem: Patient Care Overview (Adult)  Goal: Plan of Care Review  Outcome: Ongoing (interventions implemented as appropriate)    08/02/17 1513   Coping/Psychosocial Response Interventions   Plan Of Care Reviewed With patient;daughter   Patient Care Overview   Progress improving         Problem: Respiratory Insufficiency (Adult)  Goal: Identify Related Risk Factors and Signs and Symptoms  Outcome: Ongoing (interventions implemented as appropriate)

## 2017-08-02 NOTE — PROGRESS NOTES
[facility] Medicine Services  INPATIENT PROGRESS NOTE    Length of Stay: 0  Date of Admission: 7/31/2017  Primary Care Physician: Jenaro Wood MD    Subjective   Chief Complaint: Dyspnea on exertion  HPI:  66 year old female with a significant medical history of Stage IV breast cancer with bone metastases and PAF who presents to the ED with a complaint of dyspnea on exertion.  This has been ongoing for approximately a 10 days.  The patient notes with minimal activity she develops dyspnea, weakness, dizziness, palpitations, and nausea without vomiting.  She was noted to have rhythm change to atrial fibrillation in the ED while ambulating to the bathroom, but returned to NSR at rest.  CTA of the chest was negative for PE but could not exclude right lower lobe segmental emboli.  She was evaluated by cardiology who recommends Echo and Nuclear stress test.  She is also recommended anticoagulation and after long discussion of risks and benefits or coumadin vs NOACs, the patient and family have opted for Xarelto.  Echo reveals normal EF.  Nuclear stress was performed today, results are pending.  She states she has not been up today to see if she is still short of air.      Review of Systems   Constitutional: Negative for chills and fever.   Respiratory: Positive for shortness of breath.    Cardiovascular: Negative for chest pain.   Gastrointestinal: Negative for abdominal pain, diarrhea, nausea and vomiting.        All pertinent negatives and positives are as above. All other systems have been reviewed and are negative unless otherwise stated.     Objective    Temp:  [96.5 °F (35.8 °C)-97.2 °F (36.2 °C)] 96.5 °F (35.8 °C)  Heart Rate:  [53-81] 81  Resp:  [18] 18  BP: (111-131)/(58-84) 131/67    Physical Exam   Constitutional: She is oriented to person, place, and time. She appears well-developed and well-nourished.   Cardiovascular: Normal rate, regular rhythm, normal heart  sounds and intact distal pulses.    No murmur heard.  Pulmonary/Chest: Effort normal and breath sounds normal. No respiratory distress. She has no wheezes.   Abdominal: Soft. Bowel sounds are normal. She exhibits no distension. There is no tenderness.   Musculoskeletal: Normal range of motion. She exhibits no edema.   Neurological: She is alert and oriented to person, place, and time.   Skin: Skin is warm and dry. No erythema.       Results Review:  I have reviewed the labs, radiology results, and diagnostic studies.    Laboratory Data:     Results from last 7 days  Lab Units 08/02/17  0840 08/01/17  0942 08/01/17  0220 07/31/17  1932 07/31/17  1324   SODIUM mmol/L 137  --  136* 133* 134*   POTASSIUM mmol/L 3.7 4.1 3.2* 2.9* 3.2*   CHLORIDE mmol/L 103  --  100 99 94*   CO2 mmol/L 27.0  --  26.0 22.0 26.0   BUN mg/dL 15  --  23* 25* 25*   CREATININE mg/dL 0.88  --  1.08* 1.15* 1.20*   GLUCOSE mg/dL 99  --  105* 89 101*   CALCIUM mg/dL 9.0  --  8.9 9.3 10.4*   BILIRUBIN mg/dL  --   --   --   --  0.6   ALK PHOS U/L  --   --   --   --  63   ALT (SGPT) U/L  --   --   --   --  28   AST (SGOT) U/L  --   --   --   --  32   ANION GAP mmol/L 7.0  --  10.0 12.0 14.0     Estimated Creatinine Clearance: 61.1 mL/min (by C-G formula based on Cr of 0.88).            Results from last 7 days  Lab Units 07/31/17  1324   WBC 10*3/mm3 3.56   HEMOGLOBIN g/dL 12.3   HEMATOCRIT % 36.6   PLATELETS 10*3/mm3 284       Results from last 7 days  Lab Units 07/31/17  1324   INR  1.09       Culture Data:   No results found for: BLOODCX  No results found for: URINECX  No results found for: RESPCX  No results found for: WOUNDCX  No results found for: STOOLCX  No components found for: BODYFLD    Radiology Data:   Imaging Results (last 24 hours)     ** No results found for the last 24 hours. **          I have reviewed the patient current medications.     Assessment/Plan     Hospital Problem List     * (Principal)Dyspnea on exertion    Malignant  neoplasm of left breast    Metastasis to bone    PAF (paroxysmal atrial fibrillation)          Plan:   CHADSVASC: 3  HAS-BLED: 2  Nuclear stress test pending  Telemetry  Jazzy, currently in NSR, will need anticoagulation referral  Continue to hold HCTZ            This document has been electronically signed by FADI Ferguson on August 2, 2017 3:00 PM

## 2017-08-02 NOTE — PLAN OF CARE
Problem: Patient Care Overview (Adult)  Goal: Plan of Care Review  Outcome: Ongoing (interventions implemented as appropriate)    08/02/17 0502   Coping/Psychosocial Response Interventions   Plan Of Care Reviewed With patient;daughter   Patient Care Overview   Progress improving   Outcome Evaluation   Outcome Summary/Follow up Plan plan for stress test today         Problem: Respiratory Insufficiency (Adult)  Goal: Identify Related Risk Factors and Signs and Symptoms  Outcome: Ongoing (interventions implemented as appropriate)    08/02/17 0502   Respiratory Insufficiency   Related Risk Factors (Respiratory Insufficiency) activity intolerance   Signs and Symptoms (Respiratory Insufficiency) shortness of breath       Goal: Acid/Base Balance  Outcome: Ongoing (interventions implemented as appropriate)    08/02/17 0502   Respiratory Insufficiency (Adult)   Acid/Base Balance achieves outcome       Goal: Effective Ventilation  Outcome: Ongoing (interventions implemented as appropriate)    08/02/17 0502   Respiratory Insufficiency (Adult)   Effective Ventilation achieves outcome

## 2017-08-03 NOTE — PLAN OF CARE
Problem: Patient Care Overview (Adult)  Goal: Plan of Care Review  Outcome: Ongoing (interventions implemented as appropriate)    08/03/17 0511   Coping/Psychosocial Response Interventions   Plan Of Care Reviewed With patient   Patient Care Overview   Progress improving   Outcome Evaluation   Outcome Summary/Follow up Plan medical management plan possible discharge today

## 2017-08-03 NOTE — PROGRESS NOTES
Cardiology Progress Note:     LOS: 0 days   Patient Care Team:  Jenaro Wood MD as PCP - General  Joey Ramos MD as PCP - Claims Attributed  Joey Ramos MD as Consulting Physician (Hematology and Oncology)      Subjective:    Chart reviewed , patient seen and examined.  Patient had complain of having symptoms of shortness of breath at rest.  Patient has symptoms of chest tightness.  Patient has not had any further recurrence of atrial fibrillation.  Due to the patient symptoms of chest tightness with atrial fibrillation patient has been recommended to undergo a Lexiscan Cardiolite stress test.  Plan was to proceed with a Lexiscan Cardiolite stress test in the morning.              Objective:     Objective:  Vitals:    08/02/17 2117   BP: 128/64   Pulse: 68   Resp: 20   Temp: 96.8 °F (36 °C)   SpO2: 97%     No intake or output data in the 24 hours ending 08/02/17 4214          Physical Exam:   General Appearance:    Alert, oriented, cooperative, in no acute distress   Head:    Normocephalic, atraumatic, without obvious abnormality   Eyes:           JUVENTINO  Lids and lashes normal, conjunctivae and sclerae normal, no icterus, no pallor   Ears:    Ears appear intact with no abnormalities noted   Throat:   Mucous membranes pink and moist   Neck:   Supple, trachea midline, no carotid bruit, no organomegaly or JVD   Lungs:     Clear to auscultation and percussion, respirations regular, even and Unlabored. No wheezes, rales, rhonchi    Heart:    Regular rhythm and normal rate, normal S1 and S2, no            murmur, no gallop, no rub, no click   Abdomen:     Soft, non-tender, non-distended, no guarding, no rebound tenderness, Normal bowel sounds in all four quadrant, no masses, liver and spleen nonpalpable,    Genitalia:    Deferred   Extremities:   Moves all extremities well, no edema, no cyanosis, no              Redness, no clubbing   Pulses:   Pulses palpable and equal bilaterally   Skin:   Moist and warm. No  bleeding, bruising or rash   Neurologic/Psychiatric:   Alert and oriented to person, place, and time.  Motor, power and tone in upper and lower extremity is grossly intact.  No focal neurological deficits. Normal cognitive function. No psychomotor reaction or tangential thought. No depression, homicidal ideations and suicidal ideations            Results Review:    Lab Results (last 24 hours)     Procedure Component Value Units Date/Time    Basic Metabolic Panel [[id number]]  (Normal) Collected:  08/02/17 0840    Specimen:  Blood Updated:  08/02/17 0913     Glucose 99 mg/dL      BUN 15 mg/dL      Creatinine 0.88 mg/dL      Sodium 137 mmol/L      Potassium 3.7 mmol/L      Chloride 103 mmol/L      CO2 27.0 mmol/L      Calcium 9.0 mg/dL      eGFR Non African Amer 64 mL/min/1.73      BUN/Creatinine Ratio 17.0     Anion Gap 7.0 mmol/L     Extra Tubes [[id number]] Collected:  08/02/17 0840    Specimen:  Blood from Blood, Venous Line Updated:  08/02/17 1001    Narrative:       The following orders were created for panel order Extra Tubes.  Procedure                               Abnormality         Status                     ---------                               -----------         ------                     Light Blue Top[[id number]]                                   Final result               Lavender Top[[id number]]                                     Final result                 Please view results for these tests on the individual orders.    Light Blue Top [[id number]] Collected:  08/02/17 0840    Specimen:  Blood Updated:  08/02/17 1001     Extra Tube hold for add-on      Auto resulted       Lavender Top [[id number]] Collected:  08/02/17 0840    Specimen:  Blood Updated:  08/02/17 1001     Extra Tube hold for add-on      Auto resulted              Medication Review:   Current Facility-Administered Medications   Medication Dose Route Frequency Provider Last Rate Last Dose   • aspirin EC tablet 325 mg  325 mg Oral Daily  FADI Ferguson   325 mg at 08/02/17 1200   • atorvastatin (LIPITOR) tablet 10 mg  10 mg Oral Nightly Eliseo Sanchez APRN   10 mg at 08/02/17 2121   • digoxin (LANOXIN) tablet 125 mcg  125 mcg Oral Daily Eliseo Sanchez APRN   125 mcg at 08/02/17 1200   • diltiaZEM CD (CARDIZEM CD) 24 hr capsule 120 mg  120 mg Oral Q24H FADI Ferguson   120 mg at 08/02/17 1159   • letrozole (FEMARA) tablet 2.5 mg  2.5 mg Oral Daily FADI Ferguson   2.5 mg at 08/02/17 1200   • losartan (COZAAR) tablet 100 mg  100 mg Oral Q24H Eliseo Sanchez APRN   100 mg at 08/02/17 1200   • oxyCODONE-acetaminophen (PERCOCET) 5-325 MG per tablet 1 tablet  1 tablet Oral Q8H PRN FADI Ferguson   1 tablet at 08/02/17 1211   • pantoprazole (PROTONIX) EC tablet 40 mg  40 mg Oral QAM FADI Ferguson   40 mg at 08/02/17 0613   • potassium chloride (KLOR-CON) packet 40 mEq  40 mEq Oral PRN FADI Ferguson       • potassium chloride (MICRO-K) CR capsule 40 mEq  40 mEq Oral PRN FADI Ferguson   40 mEq at 08/01/17 0510   • rivaroxaban (XARELTO) tablet 20 mg  20 mg Oral Daily With Dinner Eliseo Sanchez APRN   20 mg at 08/02/17 1739   • sodium chloride 0.9 % flush 1-10 mL  1-10 mL Intravenous PRN FADI Ferguson       • sodium chloride 0.9 % flush 10 mL  10 mL Intravenous PRN Gagan Jhaveri MD       • sodium chloride 0.9 % flush 10 mL  10 mL Intravenous PRN Gagan Jhaveri MD       • sodium chloride 0.9 % infusion  75 mL/hr Intravenous Continuous Gagan Jhaveri MD       • sodium chloride flush 10 mL  10 mL Intravenous PRN Julito Graf MD   10 mL at 08/02/17 1047   • venlafaxine (EFFEXOR) tablet 37.5 mg  37.5 mg Oral Daily FADI Ferguson   37.5 mg at 08/02/17 1200       Assessment and Plan:    Principal Problem:    Dyspnea on exertion  Active Problems:    Malignant neoplasm of left breast     Metastasis to bone    PAF (paroxysmal atrial fibrillation)  1.  Chest pain.  Patient previous coronary angiogram done in 2009 had revealed an ectatic right coronary artery and 20-30% stenosis in the diagonal branch.  Patient now has symptoms of lightheaded dizziness presyncope and had symptoms of chest pain.  Patient does have a history of paroxysmal atrial fibrillation.  Due to the patient's symptom complex patient was recommended to undergo a Lexiscan Cardiolite stress test.  Risk-benefit treatment option for the Lexiscan Cardiolite stress test were discussed with the patient and an informed consent was obtained from the patient for the Lexiscan Cardiolite stress test.  Plan was to proceed with the Lexiscan Cardiolite stress test in the morning.  2.  Paroxysmal atrial fibrillation.  Patient has been maintained in normal sinus rhythm and is currently on digoxin and Cardizem.  Patient is not anticoagulated secondary to history of stage IV breast carcinoma with skeletal metastasis on chemotherapy.  Patient had undergone a CT angiogram to rule out pulmonary embolism.  Patient telemetry monitor would be followed closely.  3.  Arterial hypertension.  Patient is currently on antihypertensive medication patient blood pressure is stable.  Patient has been counseled to decrease her salt intake.  4.  Hypertensive heart disease with mitral and tricuspid regurgitation.  Clinically patient not in congestive heart failure.  5.  Metastatic breast carcinoma with skeletal involvement patient has been followed by oncology.            Julito Graf MD  08/02/17  10:54 PM      Time: Time spent in face-to-face interaction 25 minutes    Dictated utilizing Dragon dictation.

## 2017-08-03 NOTE — PAYOR COMM NOTE
"Johanne Young (66 y.o. Female)     Date of Birth Social Security Number Address Home Phone MRN    1950  [address] [place] [address] [phone] [id number]    [employer] Marital Status          Catholic        Admission Date Admission Type Admitting Provider Attending Provider Department, Room/Bed    7/31/17 Emergency Chris Frey MD Popescu, Tudor, MD [address], [facility]/1    Discharge Date Discharge Disposition Discharge Destination                      Attending Provider: Carlin Perea MD     Allergies:  Tape, Eggs Or Egg-derived Products, Influenza Vaccines, Lortab [Hydrocodone-acetaminophen], Penicillins    Isolation:  None   Infection:  None   Code Status:  FULL    Ht:  62\" (157.5 cm)   Wt:  183 lb (83 kg)    Admission Cmt:  None   Principal Problem:  Dyspnea on exertion [R06.09]                 Active Insurance as of 7/31/2017     Primary Coverage     Payor Plan Insurance Group Employer/Plan Group    MEDICARE MEDICARE A & B      Payor Plan Address Payor Plan Phone Number Effective From Effective To    [address] [phone] 1/1/2006     [place] [address]       Subscriber Name Subscriber Birth Date Member ID       JOHANNE YOUNG 1950 [id number]           Secondary Coverage     Payor Plan Insurance Group Employer/Plan Group    [place] WELL[place]ARE MEDICAID      Payor Plan Address Payor Plan Phone Number Effective From Effective To    [address] [address] [phone] 4/3/2017     [place] [address]       Subscriber Name Subscriber Birth Date Member ID       JOHANNE YOUNG 1950 [id number]                 Emergency Contacts      (Rel.) Home Phone Work Phone Mobile Phone    Palak Bateman (Daughter) [phone] -- [phone]        [facility]  [phone]    Phone  [phone]    Fax  OBV 7/31/17 and IP 8/3/17    History & Physical     No notes of this type " exist for this encounter.           Emergency Department Notes      Kellie Burch RN at 7/31/2017 12:10 PM          Patient sent to Ed by Dr. Graf for increased heart rate and SOA     Electronically signed by Kellie Burch RN at 7/31/2017 12:10 PM      Gagan Jhaveri MD at 7/31/2017  3:28 PM      Procedure Orders:    1. ECG 12 Lead [[id number]] ordered by Gagan Jhaveri MD at 07/31/17 1216                Subjective   HPI Comments: 65yo female pmh significant breast ca/htn/hyperlipidemia/cad/atrial fibrillation presents ED c/o 1-1.5wk hx generalized weakness, intermittent tachycardia/palpitations, ESTEVES.  ROS neg fever/chills/cough/orthopnea/edema/dizziness/chest pain/syncope/abdominal pain/v/d/dysuria.    Patient is a 66 y.o. female presenting with general illness.   Illness   Severity:  Moderate  Onset quality:  Gradual  Duration:  1 week  Timing:  Constant  Chronicity:  Recurrent  Associated symptoms: fatigue, nausea and shortness of breath    Associated symptoms: no chest pain, no cough and no wheezing        Review of Systems   Constitutional: Positive for fatigue.   HENT: Negative.    Eyes: Negative.    Respiratory: Positive for shortness of breath. Negative for cough and wheezing.    Cardiovascular: Positive for palpitations. Negative for chest pain and leg swelling.   Gastrointestinal: Positive for nausea.   Endocrine: Negative.    Genitourinary: Negative.    Musculoskeletal: Negative.    Neurological: Positive for weakness. Negative for syncope.       Past Medical History:   Diagnosis Date   • Atrial fibrillation    • Carcinoma, female breast, infiltrating lobular     LEFT side with axillary lymph node metastases      • Essential hypertension    • GERD (gastroesophageal reflux disease)    • Heartburn    • History of echocardiogram 10/13/2011   • Localized enlarged lymph nodes    • Lump of breast, left    • Lymphadenopathy      LEFT axilla-this is confirmed by my personal review of the ultrasound      •  "Osteoarthritis        Allergies   Allergen Reactions   • Eggs Or Egg-Derived Products Diarrhea and Nausea And Vomiting   • Influenza Vaccines    • Lortab [Hydrocodone-Acetaminophen] Hallucinations   • Penicillins      \"PASS OUT\"       Past Surgical History:   Procedure Laterality Date   • APPENDECTOMY     • BREAST SURGERY  11/21/2014    Ultrasound directed mammotome biopsy of the left breast with clip placement, lesion at the 5:30 position 5 cm. from the nipple.Left axillary lymph node biopsy, open.   • CHOLECYSTECTOMY     • COLONOSCOPY  12/12/2011    Mild diverticulosis in sigmoid colon. Stool collected for study. Hemorrhoids found.   • COLONOSCOPY N/A 2/7/2017    Procedure: COLONOSCOPY;  Surgeon: Yevgeniy Ware MD;  Location: [facility] ENDOSCOPY;  Service:    • INJECTION OF MEDICATION  08/12/2013    Kenalog (19)      • TONSILLECTOMY     • TUBAL ABDOMINAL LIGATION     • UPPER GASTROINTESTINAL ENDOSCOPY  06/10/2014    Normal hypopharynx, esophagus, symmetrical & patent pylorus. Hiatus hernia in GE junction. Polyps in antrum & fundus. Multiple biopsies taken. Prominant CBD in medial duodenal wall. The ampulla has a normal appearance.       Family History   Problem Relation Age of Onset   • Heart failure Mother      congested   • Lung cancer Father    • Heart attack Other    • Lung cancer Other    • Colon cancer Neg Hx    • Stomach cancer Neg Hx        Social History     Social History   • Marital status:      Spouse name: N/A   • Number of children: N/A   • Years of education: N/A     Social History Main Topics   • Smoking status: Never Smoker   • Smokeless tobacco: Never Used   • Alcohol use No   • Drug use: No   • Sexual activity: Defer     Other Topics Concern   • None     Social History Narrative           Objective   Physical Exam   Constitutional: She is oriented to person, place, and time. She appears well-developed and well-nourished.   HENT:   Head: Normocephalic and atraumatic.   Mouth/Throat: " Oropharynx is clear and moist.   Eyes: Pupils are equal, round, and reactive to light.   Neck: Neck supple. No JVD present. No tracheal deviation present.   Cardiovascular: Normal rate, regular rhythm, normal heart sounds and intact distal pulses.  Exam reveals no gallop and no friction rub.    No murmur heard.  Pulmonary/Chest: Effort normal and breath sounds normal. She has no wheezes. She has no rales.   Abdominal: Soft. Bowel sounds are normal. There is no tenderness. There is no rebound and no guarding.   Musculoskeletal: Normal range of motion. She exhibits no edema or tenderness.   Lymphadenopathy:     She has no cervical adenopathy.   Neurological: She is alert and oriented to person, place, and time.   Skin: Skin is warm and dry.   Nursing note and vitals reviewed.      ECG 12 Lead    Date/Time: 7/31/2017 3:31 PM  Performed by: KIARRA MCGILL  Authorized by: KIARRA MCGILL   Interpreted by physician  Rhythm: sinus rhythm  Rate: normal  BPM: 86  QRS axis: left  Conduction: 1st degree  ST Depression: V5 and V6  T depression: I and aVL  Other findings: PRWP  Clinical impression: abnormal ECG              ED Course  ED Course   Comment By Time   D/w Dr. Perea, admitting. Kiarra Mcgill MD 07/31 5581        Labs Reviewed   COMPREHENSIVE METABOLIC PANEL - Abnormal; Notable for the following:        Result Value    Glucose 101 (*)     BUN 25 (*)     Creatinine 1.20 (*)     Sodium 134 (*)     Potassium 3.2 (*)     Chloride 94 (*)     Calcium 10.4 (*)     eGFR Non  Amer 45 (*)     All other components within normal limits   D-DIMER, QUANTITATIVE - Abnormal; Notable for the following:     D-Dimer, Quantitative 1002 (*)     All other components within normal limits    Narrative:     Dimer values <500 ng/ml FEU are FDA approved as aid in diagnosis of deep venous thrombosis and pulmonary embolism.  This test should not be used in an exclusion strategy with pretest probability alone.    A recent guideline  regarding diagnosis for pulmonary thomboembolism recommends an adjusted exclusion criterion of age x 10 ng/ml FEU for patients >50 years of age ([employer]ily Intern Med 2015; 163: 701-711).   CBC WITH AUTO DIFFERENTIAL - Abnormal; Notable for the following:     RDW 20.1 (*)     RDW-SD 70.6 (*)     Neutrophils, Absolute 1.56 (*)     All other components within normal limits   DIGOXIN LEVEL - Normal   PROTIME-INR - Normal    Narrative:     Therapeutic range for most indications is 2.0-3.0 INR,  or 2.5-3.5 for mechanical heart valves.   APTT - Normal    Narrative:     The recommended Heparin therapeutic range is 68-97 seconds.   TROPONIN (IN-HOUSE) - Normal   CK - Normal   CK MB - Normal   BNP (IN-HOUSE) - Normal   RAINBOW DRAW    Narrative:     The following orders were created for panel order [place] Draw.  Procedure                               Abnormality         Status                     ---------                               -----------         ------                     Light Blue Top[[id number]]                                   Final result               Green Top (Gel)[[id number]]                                  Final result               Lavender Top[[id number]]                                     Final result               Gold Top - SST[[id number]]                                   Final result                 Please view results for these tests on the individual orders.   TROPONIN (IN-HOUSE)   LIGHT BLUE TOP   GREEN TOP   LAVENDER TOP   GOLD TOP - SST   CBC AND DIFFERENTIAL    Narrative:     The following orders were created for panel order CBC & Differential.  Procedure                               Abnormality         Status                     ---------                               -----------         ------                     CBC Auto Differential[[id number]]        Abnormal            Final result                 Please view results for these tests on the individual orders.     Xr Chest 1 View    Result  Date: 7/31/2017  Narrative: PROCEDURE: Single chest view AP REASON FOR EXAM:soa FINDINGS: Cardiac and pulmonary vasculature are normal. Tortuous thoracic aorta. Lungs are clear. Pleural spaces are normal. Widening of the right shoulder acromioclavicular joint space as well as coracoid clavicular space with elevation of the clavicle.     Impression: 1.  Type III right AC joint separation. 2.  No acute cardiopulmonary abnormality. Electronically signed by:  Dariusz Austin MD  7/31/2017 2:49 PM CDT Workstation: [id number]            [facility]    Final diagnoses:   Dyspnea on exertion   Hypokalemia   Hyponatremia   AV block, 1st degree            Gagan Jhaveri MD  07/31/17 1712       Electronically signed by Gagan Jhaveri MD at 7/31/2017  5:12 PM      Yi Trivedi at 7/31/2017  5:28 PM          Contacted [facility] in Telemetry to request a telemetry box     Yi Trivedi  07/31/17 1728       Electronically signed by Yi Trivedi at 7/31/2017  5:28 PM

## 2017-08-03 NOTE — PROGRESS NOTES
Cardiology Progress Note:     LOS: 0 days   Patient Care Team:  Jenaro Wood MD as PCP - General  Joey Ramos MD as PCP - Claims Attributed  Joey Ramos MD as Consulting Physician (Hematology and Oncology)      Subjective:     Chart reviewed , patient seen and examined.  Patient underwent a Lexiscan Cardiolite stress test evaluation today which was suggestive of reversible ischemia in the apex with preserved left ventricular systolic function.  Patient is currently in normal sinus rhythm.  Patient has not had any symptoms of substernal chest discomfort.  Patient has been ambulating in the hallway without any symptoms of chest pain or shortness of breath.  Patient has been informed about the stress tests perfusion study finding.  At the present time after prolonged discussion with the patient and the family the plan is to consider medical management for the positive nuclear stress tests rather than a coronary angiogram.  Patient would be started on isosorbide l 30 mg once a day, and patent is to continue on anticoagulation with Xarelto.      Objective:     Objective:  Vitals:    08/02/17 2117   BP: 128/64   Pulse: 68   Resp: 20   Temp: 96.8 °F (36 °C)   SpO2: 97%     No intake or output data in the 24 hours ending 08/02/17 2300          Physical Exam:   General Appearance:    Alert, oriented, cooperative, in no acute distress   Head:    Normocephalic, atraumatic, without obvious abnormality   Eyes:           JUVENTINO  Lids and lashes normal, conjunctivae and sclerae normal, no icterus, no pallor   Ears:    Ears appear intact with no abnormalities noted   Throat:   Mucous membranes pink and moist   Neck:   Supple, trachea midline, no carotid bruit, no organomegaly or JVD   Lungs:     Clear to auscultation and percussion, respirations regular, even and Unlabored. No wheezes, rales, rhonchi    Heart:    Regular rhythm and normal rate, normal S1 and S2, no            murmur, no gallop, no rub, no click   Abdomen:      Soft, non-tender, non-distended, no guarding, no rebound tenderness, Normal bowel sounds in all four quadrant, no masses, liver and spleen nonpalpable,    Genitalia:    Deferred   Extremities:   Moves all extremities well, no edema, no cyanosis, no              Redness, no clubbing   Pulses:   Pulses palpable and equal bilaterally   Skin:   Moist and warm. No bleeding, bruising or rash   Neurologic/Psychiatric:   Alert and oriented to person, place, and time.  Motor, power and tone in upper and lower extremity is grossly intact.  No focal neurological deficits. Normal cognitive function. No psychomotor reaction or tangential thought. No depression, homicidal ideations and suicidal ideations            Results Review:    Lab Results (last 24 hours)     Procedure Component Value Units Date/Time    Basic Metabolic Panel [[id number]]  (Normal) Collected:  08/02/17 0840    Specimen:  Blood Updated:  08/02/17 0913     Glucose 99 mg/dL      BUN 15 mg/dL      Creatinine 0.88 mg/dL      Sodium 137 mmol/L      Potassium 3.7 mmol/L      Chloride 103 mmol/L      CO2 27.0 mmol/L      Calcium 9.0 mg/dL      eGFR Non African Amer 64 mL/min/1.73      BUN/Creatinine Ratio 17.0     Anion Gap 7.0 mmol/L     Extra Tubes [[id number]] Collected:  08/02/17 0840    Specimen:  Blood from Blood, Venous Line Updated:  08/02/17 1001    Narrative:       The following orders were created for panel order Extra Tubes.  Procedure                               Abnormality         Status                     ---------                               -----------         ------                     Light Blue Top[[id number]]                                   Final result               Lavender Top[[id number]]                                     Final result                 Please view results for these tests on the individual orders.    Light Blue Top [[id number]] Collected:  08/02/17 0840    Specimen:  Blood Updated:  08/02/17 1001     Extra Tube hold for  add-on      Auto resulted       Lavdestiney Top [[id number]] Collected:  08/02/17 0840    Specimen:  Blood Updated:  08/02/17 1001     Extra Tube hold for add-on      Auto resulted              Medication Review:   Current Facility-Administered Medications   Medication Dose Route Frequency Provider Last Rate Last Dose   • aspirin EC tablet 325 mg  325 mg Oral Daily FADI Ferguson   325 mg at 08/02/17 1200   • atorvastatin (LIPITOR) tablet 10 mg  10 mg Oral Nightly FADI Ferguson   10 mg at 08/02/17 2121   • digoxin (LANOXIN) tablet 125 mcg  125 mcg Oral Daily FADI Ferguson   125 mcg at 08/02/17 1200   • diltiaZEM CD (CARDIZEM CD) 24 hr capsule 120 mg  120 mg Oral Q24H FADI Ferguson   120 mg at 08/02/17 1159   • letrozole (FEMARA) tablet 2.5 mg  2.5 mg Oral Daily FADI Ferguson   2.5 mg at 08/02/17 1200   • losartan (COZAAR) tablet 100 mg  100 mg Oral Q24H FADI Ferguson   100 mg at 08/02/17 1200   • oxyCODONE-acetaminophen (PERCOCET) 5-325 MG per tablet 1 tablet  1 tablet Oral Q8H PRN FADI Ferguson   1 tablet at 08/02/17 1211   • pantoprazole (PROTONIX) EC tablet 40 mg  40 mg Oral QAM FADI Ferguson   40 mg at 08/02/17 0613   • potassium chloride (KLOR-CON) packet 40 mEq  40 mEq Oral PRN FADI Ferguson       • potassium chloride (MICRO-K) CR capsule 40 mEq  40 mEq Oral PRN FADI Ferguson   40 mEq at 08/01/17 0510   • rivaroxaban (XARELTO) tablet 20 mg  20 mg Oral Daily With Dinner FADI Ferguson   20 mg at 08/02/17 1739   • sodium chloride 0.9 % flush 1-10 mL  1-10 mL Intravenous PRN FADI Ferguson       • sodium chloride 0.9 % flush 10 mL  10 mL Intravenous PRN Gagan Jhaveri MD       • sodium chloride 0.9 % flush 10 mL  10 mL Intravenous PRN Gagan Jhaveri MD       • sodium chloride 0.9 % infusion  75 mL/hr Intravenous Continuous Gagan HAYES  MD Emilio       • sodium chloride flush 10 mL  10 mL Intravenous PRN Julito Graf MD   10 mL at 08/02/17 1047   • venlafaxine (EFFEXOR) tablet 37.5 mg  37.5 mg Oral Daily Eliseo Sanchez FADI   37.5 mg at 08/02/17 1200       Assessment and Plan:    Principal Problem:    Dyspnea on exertion  Active Problems:    Malignant neoplasm of left breast    Metastasis to bone    PAF (paroxysmal atrial fibrillation)  1.  Positive nuclear Cardiolite stress test for reversible ischemia in the apex.  Patient has been recommended medical management for the coronary artery disease positive stress tests.  Patient has currently not having any symptoms of substernal chest pain suggestive of angina.  Patient is on anticoagulation with Xarelto.  Should the patient have symptoms of chest pain and patient would be reevaluated for consideration for a coronary angiogram.  2.  Paroxysmal atrial fibrillation.  Patient is currently in normal sinus rhythm.  Patient is anticoagulated with Xarelto to prevent thromboembolic event.  3.  Arterial hypertension.  Patient blood pressure is currently well-controlled.  Patient would be continued on the present dose of the losartan and the Cardizem.  4.  Stage IV breast carcinoma with skeletal metastasis.  Patient is currently on chemotherapy and has been followed by Dr. Ramos..  5.  Hyperlipidemia.  Patient has been counseled on low-fat low-cholesterol diet and to continue with the present dose of the Lipitor.    Patient would ambulate in the hallway and if the patient did not have any recurrent symptoms of chest pain patient would be stable to be discharged for outpatient follow-up.          Julito Graf MD  08/02/17  11:00 PM      Time: Time spent on face-to-face interaction 25 minutes    EMR Dragon/Transcription disclaimer:   Some of this note may be an electronic transcription/translation of spoken language to printed text. The electronic translation of spoken language may permit  erroneous, or at times, nonsensical words or phrases to be inadvertently transcribed; Although I have reviewed the note for such errors, some may still exist.

## 2017-08-03 NOTE — PLAN OF CARE
Problem: Patient Care Overview (Adult)  Goal: Plan of Care Review  Outcome: Ongoing (interventions implemented as appropriate)    08/03/17 1502   Coping/Psychosocial Response Interventions   Plan Of Care Reviewed With patient   Patient Care Overview   Progress no change         Problem: Respiratory Insufficiency (Adult)  Goal: Identify Related Risk Factors and Signs and Symptoms  Outcome: Ongoing (interventions implemented as appropriate)  Goal: Effective Ventilation  Outcome: Ongoing (interventions implemented as appropriate)

## 2017-08-03 NOTE — PROGRESS NOTES
[facility] Medicine Services  INPATIENT PROGRESS NOTE    Length of Stay: 0  Date of Admission: 7/31/2017  Primary Care Physician: Jenaro Wood MD    Subjective     Chief Complaint   Patient presents with   • Atrial Fibrillation     HPI:  66-year-old female who has significant medical history of stage IV breast cancer with bone metastasis who was presented to the ED with complaint of dyspnea on exertion.  Patient had a CTA which did not show any central or major PE however could not rule out segmental PE.  Therefore patient was started on antepartum relation with Xarelto.  The course of the hospital patient also had cardiology evaluation who recommended patient to go under stress test.  Which was performed by Dr. Graf recommended if patient is ambulatory without any symptom can possibly be discharged home.  Patient is doing very well however she had a major drop in her hemoglobin from 12 to 9.3.  Her repeat hemoglobin however is at 10.  Hemoccult is pending.       Review of Systems   Constitutional: Negative for chills, fatigue and fever.   HENT: Negative for rhinorrhea.    Respiratory: Negative for chest tightness and shortness of breath.    Cardiovascular: Negative for chest pain, palpitations and leg swelling.   Gastrointestinal: Negative for constipation, diarrhea and nausea.   Genitourinary: Negative for dysuria, frequency and urgency.   Musculoskeletal: Negative for back pain.   Neurological: Negative for dizziness and light-headedness.   Psychiatric/Behavioral: Negative for agitation, behavioral problems and confusion.        All pertinent negatives and positives are as above. All other systems have been reviewed and are negative unless otherwise stated.     Objective      Vital Sign Min/Max for last 24 hours  Temp  Min: 96.5 °F (35.8 °C)  Max: 96.9 °F (36.1 °C)   BP  Min: 103/57  Max: 140/64   Pulse  Min: 53  Max: 97   Resp  Min: 18  Max: 20   SpO2  Min: 94 %   Max: 97 %   No Data Recorded   No Data Recorded         Physical Exam   Constitutional: She is oriented to person, place, and time. She appears well-developed.   HENT:   Head: Normocephalic and atraumatic.   Eyes: EOM are normal. Pupils are equal, round, and reactive to light.   Neck: Normal range of motion.   Cardiovascular: Normal rate, regular rhythm and normal heart sounds.    Pulmonary/Chest: Effort normal and breath sounds normal.   Abdominal: Soft. Bowel sounds are normal.   Musculoskeletal: Normal range of motion.   Neurological: She is alert and oriented to person, place, and time.   Skin: Skin is warm and dry.   Psychiatric: She has a normal mood and affect. Her behavior is normal.   Vitals reviewed.      Current Facility-Administered Medications   Medication Dose Route Frequency Provider Last Rate Last Dose   • aspirin EC tablet 325 mg  325 mg Oral Daily FADI Ferguson   325 mg at 08/03/17 0852   • atorvastatin (LIPITOR) tablet 10 mg  10 mg Oral Nightly FADI Ferguson   10 mg at 08/02/17 2121   • digoxin (LANOXIN) tablet 125 mcg  125 mcg Oral Daily Eliseo Sanchez APRN   125 mcg at 08/02/17 1200   • diltiaZEM CD (CARDIZEM CD) 24 hr capsule 120 mg  120 mg Oral Q24H FADI Ferguson   120 mg at 08/03/17 0852   • isosorbide mononitrate (IMDUR) 24 hr tablet 30 mg  30 mg Oral Q24H Julito Graf MD   30 mg at 08/03/17 0852   • letrozole (FEMARA) tablet 2.5 mg  2.5 mg Oral Daily FADI Ferguson   2.5 mg at 08/03/17 0849   • losartan (COZAAR) tablet 100 mg  100 mg Oral Q24H FADI Ferguson   100 mg at 08/03/17 0852   • oxyCODONE-acetaminophen (PERCOCET) 5-325 MG per tablet 1 tablet  1 tablet Oral Q8H PRN FADI Ferguson   1 tablet at 08/02/17 1211   • pantoprazole (PROTONIX) EC tablet 40 mg  40 mg Oral QAM FADI Ferguson   40 mg at 08/03/17 0606   • potassium chloride (KLOR-CON) packet 40 mEq  40 mEq  Oral PRN FADI Ferguson       • potassium chloride (MICRO-K) CR capsule 40 mEq  40 mEq Oral PRN FADI Ferguson   40 mEq at 08/01/17 0510   • rivaroxaban (XARELTO) tablet 20 mg  20 mg Oral Daily With Dinner FADI Ferguson   20 mg at 08/03/17 1751   • sodium chloride 0.9 % flush 1-10 mL  1-10 mL Intravenous PRN FADI Ferguson       • sodium chloride 0.9 % flush 10 mL  10 mL Intravenous PRN Gagan Jhaveri MD       • sodium chloride 0.9 % flush 10 mL  10 mL Intravenous PRN Gagan Jhaveri MD       • sodium chloride 0.9 % infusion  75 mL/hr Intravenous Continuous Gagan Jhaveri MD       • sodium chloride flush 10 mL  10 mL Intravenous PRN Julito Graf MD   10 mL at 08/02/17 1047   • venlafaxine (EFFEXOR) tablet 37.5 mg  37.5 mg Oral Daily FADI Ferguson   37.5 mg at 08/03/17 0853           Results Review:  I have reviewed the labs, radiology results, and diagnostic studies.    Laboratory Data:     Results from last 7 days  Lab Units 08/03/17  0629 08/02/17  0840 08/01/17  0942 08/01/17  0220  07/31/17  1324   SODIUM mmol/L 138 137  --  136*  < > 134*   POTASSIUM mmol/L 4.0 3.7 4.1 3.2*  < > 3.2*   CHLORIDE mmol/L 108 103  --  100  < > 94*   CO2 mmol/L 22.0 27.0  --  26.0  < > 26.0   BUN mg/dL 18 15  --  23*  < > 25*   CREATININE mg/dL 0.77 0.88  --  1.08*  < > 1.20*   GLUCOSE mg/dL 96 99  --  105*  < > 101*   CALCIUM mg/dL 8.8 9.0  --  8.9  < > 10.4*   BILIRUBIN mg/dL  --   --   --   --   --  0.6   ALK PHOS U/L  --   --   --   --   --  63   ALT (SGPT) U/L  --   --   --   --   --  28   AST (SGOT) U/L  --   --   --   --   --  32   ANION GAP mmol/L 8.0 7.0  --  10.0  < > 14.0   < > = values in this interval not displayed.  Estimated Creatinine Clearance: 67.3 mL/min (by C-G formula based on Cr of 0.77).            Results from last 7 days  Lab Units 08/03/17  1419 08/03/17  1000 07/31/17  1324   WBC 10*3/mm3  --  2.23* 3.56   HEMOGLOBIN g/dL 10.0*  9.3* 12.3   HEMATOCRIT % 32.5* 28.7* 36.6   PLATELETS 10*3/mm3  --  160 284       Results from last 7 days  Lab Units 07/31/17  1324   INR  1.09       Culture Data:   No results found for: BLOODCX  No results found for: URINECX  No results found for: RESPCX  No results found for: WOUNDCX  No results found for: STOOLCX  No components found for: BODYFLD      Radiology Data:   Imaging Results (last 24 hours)     ** No results found for the last 24 hours. **          I have reviewed the patient current medications.     Assessment/Plan     Active Hospital Problems (** Indicates Principal Problem)    Diagnosis Date Noted   • **Dyspnea on exertion [R06.09] 07/31/2017   • PAF (paroxysmal atrial fibrillation) [I48.0] 07/31/2017   • Metastasis to bone [C79.51] 07/14/2017   • Malignant neoplasm of left breast [C50.912] 01/12/2017      Resolved Hospital Problems    Diagnosis Date Noted Date Resolved   No resolved problems to display.     Patient currently in normal sinus rhythm and is on Xarelto for her A. fib.  Anemia: GI has been consulted, H&H is stable if continue be stable possibly discharge home tomorrow.  Chest pain: Status post stress test as per cardiology patient if asymptomatic can go back home.  Patient has been asymptomatic today no chest pain or shortness of air.      Discharge Planning: I expect patient to be discharged to home in 1-2 days.      DVT Prophylaxis: xarelto              This document has been electronically signed by Chris Frey MD on August 3, 2017 6:20 PM

## 2017-08-04 NOTE — PROGRESS NOTES
[facility] Medicine Services  INPATIENT PROGRESS NOTE    Length of Stay: 1  Date of Admission: 7/31/2017  Primary Care Physician: Jenaro Wood MD    Subjective     Chief Complaint   Patient presents with   • Atrial Fibrillation     HPI:  66-year-old female who has significant medical history of stage IV breast cancer with bone metastasis who was presented to the ED with complaint of dyspnea on exertion.  Patient had a CTA which did not show any central or major PE however could not rule out segmental PE.  Therefore patient was started on antepartum relation with Xarelto.  The course of the hospital patient also had cardiology evaluation who recommended patient to go under stress test.  Which was performed by Dr. Graf recommended if patient is ambulatory without any symptom can possibly be discharged home.  Patient is doing very well however she had a major drop in her hemoglobin from 12 to 9.3.  Her repeat hemoglobin however is at 10.  Hemoccult is pending.  8/4/2017: Patient did have a hemoglobin drop from 10 to 8.6.  Patient denies any bleeding, dizziness or lightheadedness.       Review of Systems   Constitutional: Negative for chills, fatigue and fever.   HENT: Negative for rhinorrhea.    Respiratory: Negative for chest tightness and shortness of breath.    Cardiovascular: Negative for chest pain, palpitations and leg swelling.   Gastrointestinal: Negative for constipation, diarrhea and nausea.   Genitourinary: Negative for dysuria, frequency and urgency.   Musculoskeletal: Negative for back pain.   Neurological: Negative for dizziness and light-headedness.   Psychiatric/Behavioral: Negative for agitation, behavioral problems and confusion.        All pertinent negatives and positives are as above. All other systems have been reviewed and are negative unless otherwise stated.     Objective      Vital Sign Min/Max for last 24 hours  Temp  Min: 96.5 °F (35.8 °C)   Max: 97.9 °F (36.6 °C)   BP  Min: 105/54  Max: 140/64   Pulse  Min: 45  Max: 76   Resp  Min: 18  Max: 18   SpO2  Min: 93 %  Max: 97 %   No Data Recorded   Weight  Min: 191 lb (86.6 kg)  Max: 191 lb (86.6 kg)         Physical Exam   Constitutional: She is oriented to person, place, and time. She appears well-developed.   HENT:   Head: Normocephalic and atraumatic.   Eyes: EOM are normal. Pupils are equal, round, and reactive to light.   Neck: Normal range of motion.   Cardiovascular: Normal rate, regular rhythm and normal heart sounds.    Pulmonary/Chest: Effort normal and breath sounds normal.   Abdominal: Soft. Bowel sounds are normal.   Musculoskeletal: Normal range of motion.   Neurological: She is alert and oriented to person, place, and time.   Skin: Skin is warm and dry.   Psychiatric: She has a normal mood and affect. Her behavior is normal.   Vitals reviewed.      Current Facility-Administered Medications   Medication Dose Route Frequency Provider Last Rate Last Dose   • [START ON 8/5/2017] aspirin EC tablet 81 mg  81 mg Oral Daily Chris Frey MD       • atorvastatin (LIPITOR) tablet 10 mg  10 mg Oral Nightly FAID Ferguson   10 mg at 08/03/17 2151   • digoxin (LANOXIN) tablet 125 mcg  125 mcg Oral Daily FADI Ferguson   125 mcg at 08/04/17 1140   • diltiaZEM CD (CARDIZEM CD) 24 hr capsule 120 mg  120 mg Oral Q24H FADI Ferguson   120 mg at 08/04/17 0816   • isosorbide mononitrate (IMDUR) 24 hr tablet 30 mg  30 mg Oral Q24H Julito Graf MD   30 mg at 08/04/17 0816   • letrozole (FEMARA) tablet 2.5 mg  2.5 mg Oral Daily FADI Ferguson   2.5 mg at 08/04/17 1140   • losartan (COZAAR) tablet 100 mg  100 mg Oral Q24H FADI Ferguson   100 mg at 08/04/17 0816   • oxyCODONE-acetaminophen (PERCOCET) 5-325 MG per tablet 1 tablet  1 tablet Oral Q8H PRN Eliseo Sanchez, APRN   1 tablet at 08/03/17 2157   • pantoprazole (PROTONIX) EC  tablet 40 mg  40 mg Oral QAM Eliseo DennisonFADI Cortes   40 mg at 08/04/17 0652   • potassium chloride (KLOR-CON) packet 40 mEq  40 mEq Oral PRN FADI Ferguson       • potassium chloride (MICRO-K) CR capsule 40 mEq  40 mEq Oral PRN Eliseo FADI León   40 mEq at 08/01/17 0510   • rivaroxaban (XARELTO) tablet 20 mg  20 mg Oral Daily With Dinner Eliseo FADI León   20 mg at 08/03/17 1751   • sodium chloride 0.9 % flush 1-10 mL  1-10 mL Intravenous PRN FADI Ferguson       • sodium chloride 0.9 % flush 10 mL  10 mL Intravenous PRN Gagan Jhaveri MD       • sodium chloride 0.9 % flush 10 mL  10 mL Intravenous PRN Gagan Jhaveri MD       • sodium chloride 0.9 % infusion  75 mL/hr Intravenous Continuous Gagan Jhaveri MD       • sodium chloride flush 10 mL  10 mL Intravenous PRN Julito Graf MD   10 mL at 08/02/17 1047   • venlafaxine (EFFEXOR) tablet 37.5 mg  37.5 mg Oral Daily Eliseo FADI León   37.5 mg at 08/04/17 0816           Results Review:  I have reviewed the labs, radiology results, and diagnostic studies.    Laboratory Data:     Results from last 7 days  Lab Units 08/04/17  0511 08/03/17  0629 08/02/17  0840  07/31/17  1324   SODIUM mmol/L 139 138 137  < > 134*   POTASSIUM mmol/L 4.1 4.0 3.7  < > 3.2*   CHLORIDE mmol/L 109 108 103  < > 94*   CO2 mmol/L 21.0* 22.0 27.0  < > 26.0   BUN mg/dL 18 18 15  < > 25*   CREATININE mg/dL 0.82 0.77 0.88  < > 1.20*   GLUCOSE mg/dL 90 96 99  < > 101*   CALCIUM mg/dL 8.8 8.8 9.0  < > 10.4*   BILIRUBIN mg/dL  --   --   --   --  0.6   ALK PHOS U/L  --   --   --   --  63   ALT (SGPT) U/L  --   --   --   --  28   AST (SGOT) U/L  --   --   --   --  32   ANION GAP mmol/L 9.0 8.0 7.0  < > 14.0   < > = values in this interval not displayed.  Estimated Creatinine Clearance: 67.1 mL/min (by C-G formula based on Cr of 0.82).            Results from last 7 days  Lab Units 08/04/17  0731 08/03/17  1419 08/03/17  1000  07/31/17  1324   WBC 10*3/mm3 3.06*  --  2.23* 3.56   HEMOGLOBIN g/dL 8.6* 10.0* 9.3* 12.3   HEMATOCRIT % 27.1* 32.5* 28.7* 36.6   PLATELETS 10*3/mm3 132*  --  160 284       Results from last 7 days  Lab Units 07/31/17  1324   INR  1.09       Culture Data:   No results found for: BLOODCX  No results found for: URINECX  No results found for: RESPCX  No results found for: WOUNDCX  No results found for: STOOLCX  No components found for: BODYFLD      Radiology Data:   Imaging Results (last 24 hours)     ** No results found for the last 24 hours. **          I have reviewed the patient current medications.     Assessment/Plan     Active Hospital Problems (** Indicates Principal Problem)    Diagnosis Date Noted   • **Dyspnea on exertion [R06.09] 07/31/2017   • PAF (paroxysmal atrial fibrillation) [I48.0] 07/31/2017   • Metastasis to bone [C79.51] 07/14/2017   • Malignant neoplasm of left breast [C50.912] 01/12/2017      Resolved Hospital Problems    Diagnosis Date Noted Date Resolved   No resolved problems to display.     Patient currently in normal sinus rhythm and is on Xarelto for her A. Fib.  We'll continue to monitor today.  Anemia: Patient had mild decrease in her hemoglobin from 10-8.6.  She is still continued by any signs of bleeding.  We'll obtain a Hemoccult if she is still stable came go home tomorrow.    Chest pain: Status post stress test as per cardiology patient if asymptomatic can go back home.  Patient has been asymptomatic today no chest pain or shortness of air.      Discharge Planning: I expect patient to be discharged to home in 1-2 days.      DVT Prophylaxis: xarelto              This document has been electronically signed by Chris Frey MD on August 4, 2017 12:47 PM

## 2017-08-04 NOTE — PLAN OF CARE
Problem: Patient Care Overview (Adult)  Goal: Adult Individualization and Mutuality  Outcome: Ongoing (interventions implemented as appropriate)    08/01/17 0440 08/04/17 0432   Individualization   Patient Specific Preferences likes have tv on while sleeping --    Patient Specific Goals --  pt. will remain free from SOA    Patient Specific Interventions --  cardiac monitoring and oxygen therapy per protocol        Goal: Discharge Needs Assessment  Outcome: Ongoing (interventions implemented as appropriate)    08/01/17 1253 08/04/17 0432   Discharge Needs Assessment   Concerns To Be Addressed --  no discharge needs identified   Current Discharge Risk --  chronically ill   Discharge Disposition --  still a patient   Current Health   Anticipated Changes Related to Illness --  none   Living Environment   Transportation Available --  car;family or friend will provide   Self-Care   Equipment Currently Used at Home cane, straight;rollator --          Problem: Respiratory Insufficiency (Adult)  Goal: Identify Related Risk Factors and Signs and Symptoms  Outcome: Ongoing (interventions implemented as appropriate)    08/04/17 0432   Respiratory Insufficiency   Related Risk Factors (Respiratory Insufficiency) activity intolerance   Signs and Symptoms (Respiratory Insufficiency) shortness of breath;other (see comments)  (minimal SOB)       Goal: Acid/Base Balance  Outcome: Outcome(s) achieved Date Met:  08/04/17 08/04/17 0432   Respiratory Insufficiency (Adult)   Acid/Base Balance achieves outcome       Goal: Effective Ventilation  Outcome: Ongoing (interventions implemented as appropriate)

## 2017-08-04 NOTE — PROGRESS NOTES
Cardiology Progress Note:     LOS: 0 days   Patient Care Team:  Jenaro Wood MD as PCP - General  Joey Ramos MD as PCP - Claims Attributed  Joey Ramos MD as Consulting Physician (Hematology and Oncology)      Subjective:    Chart reviewed , patient seen and examined. Patient denies any chest pain, shortness of breath palpitation.:  Patient discharged was canceled secondary to the drop in the hemoglobin.  Patient was started on Xarelto.  Patient has not complained of having any bleeding diathesis.               Objective:     Objective:  Vitals:    08/03/17 2018   BP: 107/54   Pulse: 57   Resp: 18   Temp: 97.2 °F (36.2 °C)   SpO2: 93%     No intake or output data in the 24 hours ending 08/03/17 6388          Physical Exam:   General Appearance:    Alert, oriented, cooperative, in no acute distress   Head:    Normocephalic, atraumatic, without obvious abnormality   Eyes:           JUVENTINO  Lids and lashes normal, conjunctivae and sclerae normal, no icterus, no pallor   Ears:    Ears appear intact with no abnormalities noted   Throat:   Mucous membranes pink and moist   Neck:   Supple, trachea midline, no carotid bruit, no organomegaly or JVD   Lungs:     Clear to auscultation and percussion, respirations regular, even and Unlabored. No wheezes, rales, rhonchi    Heart:    Regular rhythm and normal rate, normal S1 and S2, no            murmur, no gallop, no rub, no click   Abdomen:     Soft, non-tender, non-distended, no guarding, no rebound tenderness, Normal bowel sounds in all four quadrant, no masses, liver and spleen nonpalpable,    Genitalia:    Deferred   Extremities:   Moves all extremities well, no edema, no cyanosis, no              Redness, no clubbing   Pulses:   Pulses palpable and equal bilaterally   Skin:   Moist and warm. No bleeding, bruising or rash   Neurologic/Psychiatric:   Alert and oriented to person, place, and time.  Motor, power and tone in upper and lower extremity is grossly intact.   No focal neurological deficits. Normal cognitive function. No psychomotor reaction or tangential thought. No depression, homicidal ideations and suicidal ideations            Results Review:    Lab Results (last 24 hours)     Procedure Component Value Units Date/Time    Basic Metabolic Panel [[id number]]  (Normal) Collected:  08/03/17 0629    Specimen:  Blood Updated:  08/03/17 0653     Glucose 96 mg/dL      BUN 18 mg/dL      Creatinine 0.77 mg/dL      Sodium 138 mmol/L      Potassium 4.0 mmol/L      Chloride 108 mmol/L      CO2 22.0 mmol/L      Calcium 8.8 mg/dL      eGFR Non African Amer 75 mL/min/1.73      BUN/Creatinine Ratio 23.4     Anion Gap 8.0 mmol/L     Extra Tubes [[id number]] Collected:  08/03/17 0629    Specimen:  Blood from Blood, Venous Line Updated:  08/03/17 0801    Narrative:       The following orders were created for panel order Extra Tubes.  Procedure                               Abnormality         Status                     ---------                               -----------         ------                     Lavender Top[[id number]]                                     Final result                 Please view results for these tests on the individual orders.    Lavender Top [[id number]] Collected:  08/03/17 0629    Specimen:  Blood Updated:  08/03/17 0801     Extra Tube hold for add-on      Auto resulted       CBC & Differential [[id number]] Collected:  08/03/17 0629    Specimen:  Blood Updated:  08/03/17 1011    Narrative:       The following orders were created for panel order CBC & Differential.  Procedure                               Abnormality         Status                     ---------                               -----------         ------                     Scan Slide[[id number]]                                                                  CBC Auto Differential[[id number]]        Abnormal            Final result                 Please view results for these tests on the individual  orders.    CBC Auto Differential [[id number]]  (Abnormal) Collected:  08/03/17 1000    Specimen:  Blood Updated:  08/03/17 1011     WBC 2.23 (L) 10*3/mm3      RBC 2.92 (L) 10*6/mm3      Hemoglobin 9.3 (L) g/dL       Results confirmed by analysis on recollected specimen        Hematocrit 28.7 (L) %      MCV 98.3 (H) fL      MCH 31.8 pg      MCHC 32.4 g/dL      RDW 20.5 (H) %      RDW-SD 73.3 (H) fl      MPV 9.4 fL      Platelets 160 10*3/mm3      Neutrophil % 32.8 (L) %      Lymphocyte % 60.1 (H) %      Monocyte % 2.7 %      Eosinophil % 2.2 %      Basophil % 2.2 (H) %      Immature Grans % 0.0 %      Neutrophils, Absolute 0.73 (L) 10*3/mm3      Lymphocytes, Absolute 1.34 10*3/mm3      Monocytes, Absolute 0.06 10*3/mm3      Eosinophils, Absolute 0.05 10*3/mm3      Basophils, Absolute 0.05 10*3/mm3      Immature Grans, Absolute 0.00 10*3/mm3     Scan Slide [[id number]] Collected:  08/03/17 1000    Specimen:  Blood Updated:  08/03/17 1030     Anisocytosis Slight/1+      Few macrocytes and hypochromic cells observed        WBC Morphology Normal     Platelet Morphology Normal    Extra Tubes [[id number]] Collected:  08/03/17 1000    Specimen:  Blood from Blood, Venous Line Updated:  08/03/17 1101    Narrative:       The following orders were created for panel order Extra Tubes.  Procedure                               Abnormality         Status                     ---------                               -----------         ------                     Lavender Top[[id number]]                                     Final result                 Please view results for these tests on the individual orders.    Lavender Top [[id number]] Collected:  08/03/17 1000    Specimen:  Blood Updated:  08/03/17 1101     Extra Tube hold for add-on      Auto resulted       Hemoglobin & Hematocrit, Blood [[id number]]  (Abnormal) Collected:  08/03/17 1419    Specimen:  Blood Updated:  08/03/17 1445     Hemoglobin 10.0 (L) g/dL      Hematocrit 32.5 (L)  %            Medication Review:   Current Facility-Administered Medications   Medication Dose Route Frequency Provider Last Rate Last Dose   • aspirin EC tablet 325 mg  325 mg Oral Daily FADI Ferguson   325 mg at 08/03/17 0852   • atorvastatin (LIPITOR) tablet 10 mg  10 mg Oral Nightly FADI Ferguson   10 mg at 08/03/17 2151   • digoxin (LANOXIN) tablet 125 mcg  125 mcg Oral Daily FADI Ferguson   125 mcg at 08/02/17 1200   • diltiaZEM CD (CARDIZEM CD) 24 hr capsule 120 mg  120 mg Oral Q24H FADI Ferguson   120 mg at 08/03/17 0852   • isosorbide mononitrate (IMDUR) 24 hr tablet 30 mg  30 mg Oral Q24H Julito Graf MD   30 mg at 08/03/17 0852   • letrozole (FEMARA) tablet 2.5 mg  2.5 mg Oral Daily FADI Ferguson   2.5 mg at 08/03/17 0849   • losartan (COZAAR) tablet 100 mg  100 mg Oral Q24H FADI Ferguson   100 mg at 08/03/17 0852   • oxyCODONE-acetaminophen (PERCOCET) 5-325 MG per tablet 1 tablet  1 tablet Oral Q8H PRN FADI Ferguson   1 tablet at 08/03/17 2154   • pantoprazole (PROTONIX) EC tablet 40 mg  40 mg Oral QAM FADI Ferguson   40 mg at 08/03/17 0606   • potassium chloride (KLOR-CON) packet 40 mEq  40 mEq Oral PRN FADI Ferguson       • potassium chloride (MICRO-K) CR capsule 40 mEq  40 mEq Oral PRN FADI Ferguson   40 mEq at 08/01/17 0510   • rivaroxaban (XARELTO) tablet 20 mg  20 mg Oral Daily With Dinner FADI Ferguson   20 mg at 08/03/17 1751   • sodium chloride 0.9 % flush 1-10 mL  1-10 mL Intravenous PRN FADI Ferguson       • sodium chloride 0.9 % flush 10 mL  10 mL Intravenous PRN Gagan Jhaveri MD       • sodium chloride 0.9 % flush 10 mL  10 mL Intravenous PRN Gagan Jhaveri MD       • sodium chloride 0.9 % infusion  75 mL/hr Intravenous Continuous Gagan Jhaveri MD       • sodium chloride flush 10 mL  10 mL  Intravenous PRN Julito Graf MD   10 mL at 08/02/17 1047   • venlafaxine (EFFEXOR) tablet 37.5 mg  37.5 mg Oral Daily Eliseo FADI León   37.5 mg at 08/03/17 0853       Assessment and Plan:    Principal Problem:    Dyspnea on exertion  Active Problems:    Malignant neoplasm of left breast    Metastasis to bone    PAF (paroxysmal atrial fibrillation)  1.  Paroxysmal atrial fibrillation.  Patient is currently in normal sinus rhythm and has been on Xarelto to prevent thromboembolic event.  She'll is to continue with the present dose of the digoxin and Cardizem.  2.  Chronic anticoagulation with Xarelto.  Patient is anemic secondary to the breast carcinoma.  Patient H&H has remained stable.  Patient has been informed should the patient have episodes of any bleeding diastasis patient would be reevaluated for continuation of anticoagulation with Xarelto.  3.  Positive nuclear Cardiolite stress test for reversible ischemia in the apex.  Patient is currently not having any symptoms of chest pain and patient would be treated medically.  4.  Anemia.  Patient hemoglobin has remained stable.            Julito Graf MD  08/03/17  11:28 PM      Time:

## 2017-08-04 NOTE — PLAN OF CARE
Problem: Patient Care Overview (Adult)  Goal: Plan of Care Review    08/04/17 0436   Coping/Psychosocial Response Interventions   Plan Of Care Reviewed With patient   Patient Care Overview   Progress improving   Outcome Evaluation   Outcome Summary/Follow up Plan possible discharge tomorrow [place] [facility] [place]

## 2017-08-05 NOTE — DISCHARGE SUMMARY
[address] [address]  T - [phone]     DISCHARGE SUMMARY         PATIENT  DEMOGRAPHICS   PATIENT NAME: Johanne Mayer                      PHYSICIAN: Chris Frey MD  : 1950  MRN: [id number]    ADMISSION/DISCHARGE INFO   ADMISSION DATE: 2017   DISCHARGE DATE: 2017    ADMISSION DIAGNOSES: Hypokalemia [E87.6]  Hyponatremia [E87.1]  AV block, 1st degree [I44.0]  Dyspnea on exertion [R06.09]  Dyspnea on exertion [R06.09]  DISCHARGE DIAGNOSES:     Principal Problem:    Dyspnea on exertion  Active Problems:    Malignant neoplasm of left breast    Metastasis to bone    PAF (paroxysmal atrial fibrillation)      SERVICE:  Medicine       CONSULTS   Consult Orders (all)     Start     Ordered    17  Inpatient Consult to Cardiology  Once     Specialty:  Cardiology  Provider:  Julito Graf MD    17 1710  Hospitalist (on-call MD unless specified)  Once     Specialty:  Hospitalist  Provider:  (Not yet assigned)    17 1709          PROCEDURES     Imaging Results (last 24 hours)     ** No results found for the last 24 hours. **          Xr Chest 1 View    Result Date: 2017  Narrative: PROCEDURE: Single chest view AP REASON FOR EXAM:soa FINDINGS: Cardiac and pulmonary vasculature are normal. Tortuous thoracic aorta. Lungs are clear. Pleural spaces are normal. Widening of the right shoulder acromioclavicular joint space as well as coracoid clavicular space with elevation of the clavicle.     Impression: 1.  Type III right AC joint separation. 2.  No acute cardiopulmonary abnormality. Electronically signed by:  Dariusz Austin MD  2017 2:49 PM CDT Workstation: [id number]    Ct Angiogram Chest With Contrast    Result Date: 2017  Narrative: DATE OF EXAM: 2017 6:53 PM CDT PROCEDURE: CT CHEST ANGIOGRAPHY WITH IV CONTRAST INDICATION FOR PROCEDURE: 66 years old patient presents for evaluation of  dyspnea.  ICD 10 code:  R06.09. TECHNIQUE:  Contiguous axial images are obtained of the chest and upper abdomen after intravenous administration of 61 mm of Isovue-370.  Multiplanar reformations and computer generated 3-D multiplanar MIPS are submitted for interpretation.  This exam was performed according to our departmental dose-optimization program, which includes automated exposure control, adjustment of the mA and/or kV according to patient size and/or use of iterative reconstruction technique. COMPARISON:  CT of the chest dated May 4, 2017. FINDINGS:  Lungs are expanded. There is heterogeneous groundglass attenuation both lungs possibly related to mild pulmonary congestion. Bronchovascular markings are mild prominent..  No pleural effusions are visualized. The heart has a normal appearance without evidence for pericardial effusion.  There are vascular calcifications of the coronary arteries. Pulmonary arteries  have a normal enhanced appearance. The segmental arteries are not well enhanced and small pulmonary emboli are not excluded..  The thoracic aorta is tortuous with atherosclerotic calcification. Maximum transverse dimension of the ascending thoracic aorta measures approximately 4.2 cm.. There is no evidence for mediastinal, hilar or axillary lymphadenopathy. Chest wall has a normal appearance. There is extensive abnormal attenuation within the imaged thoracic vertebral bodies and ribs consistent with extensive metastatic disease. Visualized abdominal structures are within normal limits..     Impression: 1.  No CT evidence for large or central pulmonary emboli. 2.  Segmental emboli are not excluded particularly in the right lower lobe. 3.  Mild aneurysmal dilatation of the ascending thoracic aorta. 4.  Mild pulmonary congestion. Electronically signed by:  Meenu De La Garza MD  7/31/2017 7:35 PM CDT Workstation: [facility]      HISTORY OF PRESENT ILLNESS   Johanne Mayer is a 66 y.o. female with  significant medical history of stage IV breast cancer with bone metastases in PAF who presented to ED with complaint of dyspnea on exertion.  This has been ongoing for about 10 days.  Patient notes with minimal activity she develops dyspnea, weakness, dizziness, palpitation, and nausea.  Patient was noted to have rhythm changed to atrial fibrillation in ED while ambulating to bathroom.  But she returned to normal sinus rhythm.    DIAGNOSTIC DATA   CTA: Which showed no large or central pulmonary emboli.  However unable to rule out segmental PE.  Chest x-ray: Shows type II right before meals joint separation this is a chronic finding.  Stress test: Left ventricular ejection fraction is normal at 68%.  Small to medium size moderately severe area of the ischemia located in the apex.  Impression consistent with high risk study.    HOSPITAL COURSE   Patient was admitted to the medical floor.  CTA was not able to rule out segmental PE.  Given the high risk with history of malignancy patient was started on Xarelto her H&H was monitored.  Patient did have an episode where her H&H had dropped but and continued to maintain afterwards.  Patient was also evaluated for it paroxysmal atrial fibrillation.  She will remain in normal sinus rhythm.  Dr. Graf ordered stress test and recommended continue with medical therapy.  Patient tolerated the new medication) well her hemoglobin is stable at 9.2 today.  Patient will be going home on Xarelto and will have a follow-up with Dr. Ramos and her PCP for hemoglobin check.  Patient and family understands and agree with the plan.  Physical Exam   Constitutional: She is oriented to person, place, and time. She appears well-developed and well-nourished.   HENT:   Head: Normocephalic and atraumatic.   Eyes: EOM are normal. Pupils are equal, round, and reactive to light.   Neck: Normal range of motion.   Cardiovascular: Normal rate, regular rhythm and normal heart sounds.    Pulmonary/Chest:  Effort normal and breath sounds normal.   Abdominal: Soft. Bowel sounds are normal.   Musculoskeletal: Normal range of motion.   Neurological: She is alert and oriented to person, place, and time. No cranial nerve deficit. Coordination normal.   Skin: Skin is warm and dry.   Psychiatric: She has a normal mood and affect. Her behavior is normal.   Vitals reviewed.         DISCHARGE CONDITION   Stable    DISPOSITION   To home with home health      DISCHARGE MEDICATIONS      Johanne Mayer   Home Medication Instructions ARIEL:[id number]    Printed on:08/05/17 8755   Medication Information                      aspirin 81 MG EC tablet  Take 1 tablet by mouth Daily.             atorvastatin (LIPITOR) 10 MG tablet  Take 1 tablet by mouth Daily.             Calcium Carb-Cholecalciferol (CALCIUM 600 + D PO)  Take 1 capsule by mouth 2 (Two) Times a Day.             Cholecalciferol (VITAMIN D-3) 1000 UNITS capsule  Take 1,000 Units by mouth Daily.             digoxin (LANOXIN) 125 MCG tablet  Take 125 mcg by mouth Daily.             DILT- MG 24 hr capsule  TAKE 1 CAPSULE BY MOUTH EVERY DAY             IBRANCE 125 MG capsule capsule  TAKE ONE CAPSULE BY MOUTH ONE TIME DAILY FOR 21 DAYS ON FOLLOWED BY 7 DAYS OFF.             Interferon Beta-1b (EXTAVIA SC)  Inject  under the skin Every 30 (Thirty) Days.             isosorbide mononitrate (IMDUR) 30 MG 24 hr tablet  Take 1 tablet by mouth Daily.             letrozole (FEMARA) 2.5 MG tablet  Take 1 tablet by mouth Daily.             losartan (COZAAR) 100 MG tablet  Take 1 tablet by mouth Daily.             Magnesium 250 MG tablet  Take 1 tablet by mouth Daily.             Multiple Vitamins-Minerals (MULTIVITAMINS PLUS ZINC PO)  Take 50 mg by mouth Daily.             omeprazole (priLOSEC) 40 MG capsule  Take 1 capsule by mouth Daily.             ondansetron (ZOFRAN) 4 MG tablet  Take 1 tablet by mouth 4 (Four) Times a Day As Needed for Nausea or Vomiting.              oxyCODONE-acetaminophen (PERCOCET) 5-325 MG per tablet  Take 1 tablet by mouth Every 8 (Eight) Hours As Needed for Severe Pain .             rivaroxaban (XARELTO) 20 MG tablet  Take 1 tablet by mouth Daily.             sucralfate (CARAFATE) 1 G tablet  Take 1 tablet by mouth 3 (Three) Times a Day.             venlafaxine (EFFEXOR) 37.5 MG tablet  Take 1 tablet by mouth Daily.             vitamin D (ERGOCALCIFEROL) 87622 UNITS capsule capsule  Take 1 capsule by mouth Every 14 (Fourteen) Days.                   INSTRUCTIONS   Activity: as tolerated     Diet: Cardiac    Special Instructions: Patient instructed to call M.D. or return to ED with worsening shortness of breath, chest pain, fever greater than 100.4°F or any other medical concerns.    FOLLOW UP   Additional Instructions for the Follow-ups that You Need to Schedule     Ambulatory Referral to Home Health    As directed    Face to Face Visit Date:  8/5/2017   Follow-up Provider for Plan of Care?:  I treated the patient in an acute care facility and will not continue treatment after discharge.   Follow-up Provider:  JENARO WOOD   Reason/Clinical Findings:  ESTEFANÍA Schneider PE   Describe mobility limitations that make leaving home difficult:  impaired mobility, transitional visit.   Nursing/Therapeutic Services Requested:   Skilled Nursing  Physical Therapy  Occupational Therapy      Skilled nursing orders:  Medication education   PT orders:  Strengthening   Occupational orders:  Strengthening   Frequency:  1 Week 1             Follow-up Information     Follow up with Jenaro Wood MD Follow up in 3 day(s).    Specialty:  Internal Medicine    Why:  office will call you with appointment date and time    Contact information:    [address] [place] [address] [address] [place] [address] [phone]          Follow up with Joey Ramos MD Follow up in 2 day(s).    Specialty:  Hematology and Oncology    Why:  office will call you with appointment date and time    Contact  information:    [address] [place] [address] [address] [place] [id number] [phone]          Follow up with Julito Graf MD Follow up in 4 week(s).    Specialty:  Cardiology    Why:  office will call you with appointment date and time    Contact information:    [address] [place] [address] [address] [place] [id number] [phone]           PENDING TEST RESULTS AT DISCHARGE   Need to follow up with PCP and Dr. Ramos with labs. Patient and family is aware.     TIME   Time: 30 minutes were spent planning this discharge.                      This document has been electronically signed by Chris Frey MD on August 5, 2017 1:58 PM

## 2017-08-05 NOTE — PROGRESS NOTES
Cardiology Progress Note:     LOS: 1 day   Patient Care Team:  Jenaro Wood MD as PCP - General  Joey Ramos MD as PCP - Claims Attributed  Joey Ramos MD as Consulting Physician (Hematology and Oncology)      Subjective:    Chart reviewed , patient seen and examined. Patient denies any chest pain, shortness of breath palpitation.:   Patient Denies:  Any hemoptysis hematuria bright red blood per rectum.  Patient had a drop in the hemoglobin to 8.6 after being started on Xarelto for paroxysmal atrial fibrillation.  Patient had a borderline positive nuclear Cardiolite stress test which was being treated medically.  After prolonged discussion with the patient the plan was not to consider anticoagulation with Xarelto and would evaluate the patient if the patient does have recurrence of atrial fibrillation then will consider anticoagulation to prevent thromboembolic event.  At the present time would stop the Xarelto with the drop in the hemoglobin to 8.6 and would recheck the H&H in the morning.  Patient is undergoing heme testing of the stool.  Patient has not had any symptoms of chest pain shortness of breath or palpitation.  Patient telemetry monitor did not show off any evidence of any arrhythmia.        Objective:     Objective:  Vitals:    08/04/17 2043   BP: 132/66   Pulse: 69   Resp: 18   Temp: 97 °F (36.1 °C)   SpO2: 95%       Intake/Output Summary (Last 24 hours) at 08/04/17 2100  Last data filed at 08/04/17 1800   Gross per 24 hour   Intake             1440 ml   Output                0 ml   Net             1440 ml             Physical Exam:   General Appearance:    Alert, oriented, cooperative, in no acute distress   Head:    Normocephalic, atraumatic, without obvious abnormality   Eyes:           JUVENTINO  Lids and lashes normal, conjunctivae and sclerae normal, no icterus, no pallor   Ears:    Ears appear intact with no abnormalities noted   Throat:   Mucous membranes pink and moist   Neck:   Supple,  trachea midline, no carotid bruit, no organomegaly or JVD   Lungs:     Clear to auscultation and percussion, respirations regular, even and Unlabored. No wheezes, rales, rhonchi    Heart:    Regular rhythm and normal rate, normal S1 and S2, no            murmur, no gallop, no rub, no click   Abdomen:     Soft, non-tender, non-distended, no guarding, no rebound tenderness, Normal bowel sounds in all four quadrant, no masses, liver and spleen nonpalpable,    Genitalia:    Deferred   Extremities:   Moves all extremities well, no edema, no cyanosis, no              Redness, no clubbing   Pulses:   Pulses palpable and equal bilaterally   Skin:   Moist and warm. No bleeding, bruising or rash   Neurologic/Psychiatric:   Alert and oriented to person, place, and time.  Motor, power and tone in upper and lower extremity is grossly intact.  No focal neurological deficits. Normal cognitive function. No psychomotor reaction or tangential thought. No depression, homicidal ideations and suicidal ideations            Results Review:    Lab Results (last 24 hours)     Procedure Component Value Units Date/Time    Basic Metabolic Panel [[id number]]  (Abnormal) Collected:  08/04/17 0511    Specimen:  Blood Updated:  08/04/17 0617     Glucose 90 mg/dL      BUN 18 mg/dL      Creatinine 0.82 mg/dL      Sodium 139 mmol/L      Potassium 4.1 mmol/L      Chloride 109 mmol/L      CO2 21.0 (L) mmol/L      Calcium 8.8 mg/dL      eGFR Non African Amer 69 mL/min/1.73      BUN/Creatinine Ratio 22.0     Anion Gap 9.0 mmol/L     CBC & Differential [[id number]] Collected:  08/04/17 0511    Specimen:  Blood Updated:  08/04/17 0856    Narrative:       The following orders were created for panel order CBC & Differential.  Procedure                               Abnormality         Status                     ---------                               -----------         ------                     Scan Slide[[id number]]                                                                   CBC Auto Differential[[id number]]        Abnormal            Final result                 Please view results for these tests on the individual orders.    CBC Auto Differential [[id number]]  (Abnormal) Collected:  08/04/17 0731    Specimen:  Blood Updated:  08/04/17 0856     WBC 3.06 (L) 10*3/mm3      RBC 2.74 (L) 10*6/mm3      Hemoglobin 8.6 (L) g/dL       Results confirmed by recollection        Hematocrit 27.1 (L) %      MCV 98.9 (H) fL      MCH 31.4 pg      MCHC 31.7 g/dL      RDW 21.0 (H) %      RDW-SD 75.3 (H) fl      MPV 10.3 fL      Platelets 132 (L) 10*3/mm3      Neutrophil % 28.5 (L) %      Lymphocyte % 62.7 (H) %      Monocyte % 4.9 %      Eosinophil % 2.0 %      Basophil % 1.6 %      Immature Grans % 0.3 %      Neutrophils, Absolute 0.87 (L) 10*3/mm3      Lymphocytes, Absolute 1.92 10*3/mm3      Monocytes, Absolute 0.15 10*3/mm3      Eosinophils, Absolute 0.06 10*3/mm3      Basophils, Absolute 0.05 10*3/mm3      Immature Grans, Absolute 0.01 10*3/mm3      nRBC 0.0 /100 WBC     Extra Tubes [[id number]] Collected:  08/04/17 0732    Specimen:  Blood from Blood, Venous Line Updated:  08/04/17 0901    Narrative:       The following orders were created for panel order Extra Tubes.  Procedure                               Abnormality         Status                     ---------                               -----------         ------                     Lavender Top[[id number]]                                     Final result               Gold Top - SST[[id number]]                                   Final result                 Please view results for these tests on the individual orders.    Lavender Top [[id number]] Collected:  08/04/17 0732    Specimen:  Blood Updated:  08/04/17 0901     Extra Tube hold for add-on      Auto resulted       Gold Top - SST [[id number]] Collected:  08/04/17 0732    Specimen:  Blood Updated:  08/04/17 0901     Extra Tube Hold for add-ons.      Auto resulted.               Medication Review:   Current Facility-Administered Medications   Medication Dose Route Frequency Provider Last Rate Last Dose   • [START ON 8/5/2017] aspirin EC tablet 81 mg  81 mg Oral Daily Chris Frey MD       • atorvastatin (LIPITOR) tablet 10 mg  10 mg Oral Nightly FADI Ferguson   10 mg at 08/03/17 2151   • digoxin (LANOXIN) tablet 125 mcg  125 mcg Oral Daily FADI Ferguson   125 mcg at 08/04/17 1140   • diltiaZEM CD (CARDIZEM CD) 24 hr capsule 120 mg  120 mg Oral Q24H FADI Ferguson   120 mg at 08/04/17 0816   • isosorbide mononitrate (IMDUR) 24 hr tablet 30 mg  30 mg Oral Q24H Julito Graf MD   30 mg at 08/04/17 0816   • letrozole (FEMARA) tablet 2.5 mg  2.5 mg Oral Daily FADI Ferguson   2.5 mg at 08/04/17 1140   • losartan (COZAAR) tablet 100 mg  100 mg Oral Q24H FADI Ferguson   100 mg at 08/04/17 0816   • oxyCODONE-acetaminophen (PERCOCET) 5-325 MG per tablet 1 tablet  1 tablet Oral Q8H PRN FADI Ferguson   1 tablet at 08/03/17 2154   • pantoprazole (PROTONIX) EC tablet 40 mg  40 mg Oral QAM FADI Ferguson   40 mg at 08/04/17 0652   • potassium chloride (KLOR-CON) packet 40 mEq  40 mEq Oral PRN FADI Ferguson       • potassium chloride (MICRO-K) CR capsule 40 mEq  40 mEq Oral PRN FADI Ferguson   40 mEq at 08/01/17 0510   • rivaroxaban (XARELTO) tablet 20 mg  20 mg Oral Daily With Dinner FADI Ferguson   20 mg at 08/04/17 1815   • sodium chloride 0.9 % flush 1-10 mL  1-10 mL Intravenous PRN FADI Ferguson       • sodium chloride 0.9 % flush 10 mL  10 mL Intravenous PRN Gagan Jhaveri MD       • sodium chloride 0.9 % flush 10 mL  10 mL Intravenous PRN Gagan Jhaveri MD       • sodium chloride 0.9 % infusion  75 mL/hr Intravenous Continuous Gagan Jhaveri MD       • sodium chloride flush 10 mL  10 mL Intravenous PRN Julito Graf,  MD   10 mL at 08/02/17 1047   • venlafaxine (EFFEXOR) tablet 37.5 mg  37.5 mg Oral Daily Eliseo Fayeluis alberto, FADI   37.5 mg at 08/04/17 0816       Assessment and Plan:    Principal Problem:    Dyspnea on exertion  Active Problems:    Malignant neoplasm of left breast    Metastasis to bone    PAF (paroxysmal atrial fibrillation)  1.  Paroxysmal atrial fibrillation.  Patient is currently in normal sinus rhythm.  Patient is on digoxin and Cardizem.  Patient was started on Xarelto.  Patient had a drop in the hemoglobin to 8.6.  At the present time after prolonged discussion with the patient and the family the plan is to stop the Xarelto.  Should the patient have recurrence of atrial fibrillation and then patient would be reevaluated for restarting anticoagulation probably Coumadin.  At the present time due to the patient drop in the hemoglobin would stop the Xarelto.  2.  Atypical symptoms of chest pain with positive nuclear stress tests.  Patient has not had any recurrent symptoms of chest discomfort and patient would be treated medically with isosorbide Imdur 30 mg once a day.  3.  Arterial hypertension.  Patient blood pressure is currently well-controlled.  4.  Hyperlipidemia.  Patient is currently on Lipitor.  5.  Breast carcinoma status post skeletal metastasis is currently on chemotherapy.  Patient has been followed by Dr. Ramos.  At the present time patient H&H would be followed at the [facility] on the next evaluation with Dr. Ramos.            Julito Graf MD  08/04/17  9:00 PM      Time: Time spent on face-to-face interaction 25 minutes    EMR Dragon/Transcription disclaimer:   Some of this note may be an electronic transcription/translation of spoken language to printed text. The electronic translation of spoken language may permit erroneous, or at times, nonsensical words or phrases to be inadvertently transcribed; Although I have reviewed the note for such errors, some may still exist.

## 2017-08-05 NOTE — PLAN OF CARE
Problem: Patient Care Overview (Adult)  Goal: Plan of Care Review  Outcome: Ongoing (interventions implemented as appropriate)  Pt rested well.    08/05/17 0456   Coping/Psychosocial Response Interventions   Plan Of Care Reviewed With patient   Patient Care Overview   Progress improving         Problem: Respiratory Insufficiency (Adult)  Goal: Identify Related Risk Factors and Signs and Symptoms  Outcome: Ongoing (interventions implemented as appropriate)

## 2017-08-17 NOTE — TELEPHONE ENCOUNTER
Called patient to inform that neutrophil count still low. Dr. Ramos wants patient to come back next week and have CBC drawn and for patient to hold chemo pill until after blood count next week. Patient informed and states understanding.

## 2017-08-17 NOTE — PROGRESS NOTES
DATE OF VISIT: 8/17/2017    REASON FOR VISIT:  Metastatic lobular carcinoma of left breast with a bone metastasis.    HISTORY OF PRESENT ILLNESS:    66-year-old female with a past medical history significant for left breast cancer with diffuse skeletal metastasis diagnosed in 2014, currently on letrozole and Palbociclib with Xgeva is here for follow-up visit today.  In May of 2017 in view of disease progression on CT scan patient was changed over to Palbociclib with letrozole.  Patient is due to start cycle 4 of Palbociclib with letrozole on August 19, 2017.  She was recently admitted to the hospital on July 31, 2017 with atrial fibrillation.  Denies any fever or chills.  Denies any recent infections.  Denies any blood in the stool or urine.  Denies any excessive mouth sores or nausea or vomiting.    PAST MEDICAL HISTORY:    Past Medical History:   Diagnosis Date   • Atrial fibrillation    • Carcinoma, female breast, infiltrating lobular     LEFT side with axillary lymph node metastases      • Essential hypertension    • GERD (gastroesophageal reflux disease)    • Heartburn    • History of echocardiogram 10/13/2011   • Localized enlarged lymph nodes    • Lump of breast, left    • Lymphadenopathy      LEFT axilla-this is confirmed by my personal review of the ultrasound      • Osteoarthritis        SOCIAL HISTORY:    Social History   Substance Use Topics   • Smoking status: Never Smoker   • Smokeless tobacco: Never Used   • Alcohol use No       Surgical History :  Past Surgical History:   Procedure Laterality Date   • APPENDECTOMY     • BREAST SURGERY  11/21/2014    Ultrasound directed mammotome biopsy of the left breast with clip placement, lesion at the 5:30 position 5 cm. from the nipple.Left axillary lymph node biopsy, open.   • CHOLECYSTECTOMY     • COLONOSCOPY  12/12/2011    Mild diverticulosis in sigmoid colon. Stool collected for study. Hemorrhoids found.   • COLONOSCOPY N/A 2/7/2017    Procedure:  "COLONOSCOPY;  Surgeon: Yevgeniy Ware MD;  Location: [facility] ENDOSCOPY;  Service:    • INJECTION OF MEDICATION  08/12/2013    Kenalog (19)      • TONSILLECTOMY     • TUBAL ABDOMINAL LIGATION     • UPPER GASTROINTESTINAL ENDOSCOPY  06/10/2014    Normal hypopharynx, esophagus, symmetrical & patent pylorus. Hiatus hernia in GE junction. Polyps in antrum & fundus. Multiple biopsies taken. Prominant CBD in medial duodenal wall. The ampulla has a normal appearance.       ALLERGIES:    Allergies   Allergen Reactions   • Tape Itching   • Eggs Or Egg-Derived Products Diarrhea and Nausea And Vomiting   • Influenza Vaccines    • Lortab [Hydrocodone-Acetaminophen] Hallucinations   • Penicillins      \"PASS OUT\"       REVIEW OF SYSTEMS:      CONSTITUTIONAL: Positive for fatigue.  Positive for hot flashes.  No fever, chills, or night sweats.     HEENT:  No epistaxis, mouth sores, or difficulty swallowing.    RESPIRATORY:  No new shortness of breath or cough at present.    CARDIOVASCULAR:  No chest pain or palpitations.    GASTROINTESTINAL:  Positive for intermittent constipation.  No abdominal pain, nausea, vomiting, or blood in the stool.    GENITOURINARY:  No dysuria or hematuria.    MUSCULOSKELETAL:  Complains of bilateral hip pain and stiffness, which is getting worse.  Complains of bilateral shoulder stiffness.    NEUROLOGICAL:  No tingling or numbness. No new headache or dizziness.     LYMPHATICS:  Denies any abnormal swollen and anywhere in the body.    SKIN:  Complains of skin nodule that has not progressed since starting chemotherapy.        PHYSICAL EXAMINATION:      VITAL SIGNS:    /98  Pulse 72  Temp 98.3 °F (36.8 °C)  Resp 18  Ht 62\" (157.5 cm)  Wt 191 lb 14.4 oz (87 kg)  BMI 35.1 kg/m2    GENERAL:  Not in any distress.     EYES:  Mild pallor. No icterus.  Extraocular movement intact.    NECK:  No adenopathy.  No JVD.    RESPIRATORY:  Fair air entry bilaterally. No rhonchi or " wheezing.    CARDIOVASCULAR:  S1, S2. Regular rate and rhythm.  Systolic murmur present.    ABDOMEN:  Soft, nontender. Bowel sounds present.    EXTREMITIES:  No edema.  No calf  Tenderness.  Bruising present in bilateral upper extremity.    NEUROLOGICAL:  Alert, awake, oriented x3. No motor or sensory deficits.  Cranial nerves II-12 grossly intact.        DIAGNOSTIC DATA:    Glucose   Date Value Ref Range Status   08/17/2017 94 60 - 100 mg/dL Final     Sodium   Date Value Ref Range Status   08/17/2017 138 137 - 145 mmol/L Final     Potassium   Date Value Ref Range Status   08/17/2017 4.1 3.5 - 5.1 mmol/L Final     CO2   Date Value Ref Range Status   08/17/2017 25.0 22.0 - 31.0 mmol/L Final     Chloride   Date Value Ref Range Status   08/17/2017 106 95 - 110 mmol/L Final     Anion Gap   Date Value Ref Range Status   08/17/2017 7.0 5.0 - 15.0 mmol/L Final     Creatinine   Date Value Ref Range Status   08/17/2017 0.93 0.50 - 1.00 mg/dL Final     BUN   Date Value Ref Range Status   08/17/2017 12 7 - 21 mg/dL Final     BUN/Creatinine Ratio   Date Value Ref Range Status   08/17/2017 12.9 7.0 - 25.0 Final     Calcium   Date Value Ref Range Status   08/17/2017 8.7 8.4 - 10.2 mg/dL Final     eGFR Non  Amer   Date Value Ref Range Status   08/17/2017 60 (L) >60 mL/min/1.73 Final     Alkaline Phosphatase   Date Value Ref Range Status   08/17/2017 52 38 - 126 U/L Final     Total Protein   Date Value Ref Range Status   08/17/2017 7.2 6.3 - 8.6 g/dL Final     ALT (SGPT)   Date Value Ref Range Status   08/17/2017 27 9 - 52 U/L Final     AST (SGOT)   Date Value Ref Range Status   08/17/2017 43 (H) 14 - 36 U/L Final     Total Bilirubin   Date Value Ref Range Status   08/17/2017 0.4 0.2 - 1.3 mg/dL Final     Albumin   Date Value Ref Range Status   08/17/2017 3.90 3.40 - 4.80 g/dL Final     Globulin   Date Value Ref Range Status   08/17/2017 3.3 2.3 - 3.5 gm/dL Final     A/G Ratio   Date Value Ref Range Status   08/17/2017 1.2  1.1 - 1.8 g/dL Final     Lab Results   Component Value Date    WBC 2.83 (L) 08/17/2017    HGB 9.8 (L) 08/17/2017    HCT 29.4 (L) 08/17/2017    .4 (H) 08/17/2017     (L) 08/17/2017     Lab Results   Component Value Date    NEUTROABS 0.79 (L) 08/17/2017    FERRITIN 226.00 02/09/2017    [id number] 625 02/09/2017     Lab Results   Component Value Date    LABCA2 209.4 (H) 07/14/2017       RADIOLOGY DATA :    Nuclear bone scan done on April 21, 2017 showed:  FINDINGS:      The uptake and distribution of radiotracer activity demonstrates:         Interval progression of disease demonstrating relative intensity  in previously noted sites of abnormal activity, additionally with  new sites in both the axial and appendicular skeleton.      The kidneys are visualized bilaterally.   .   IMPRESSION:  CONCLUSION:      1. Interval progression of disease.        CT scan of chest, abdomen and pelvis with contrast done on May 4, 2017 was reviewed and discussed with patient, it showed:  - - - CT CHEST - - -       PULMONARY PARENCHYMA:   - air spaces: negative  - interstitium: grossly within normal limits for age  - misc.: no pulmonary nodules or mass      MEDIASTINUM / YOVANY:  - heart: normal size , no pericardial fluid  - aorta/great vessels: normal caliber and configuration for  age  - misc.: no mediastinal mass / significant adenopathy     PLEURAL COMPARTMENT:   - misc.: no pleural fluid or mass      MISC:  - inferior neck: negative  - osseous / body wall: Multiple osteolytic/sclerotic  lesions.  - misc:     - - - CT ABDOMEN - - -   ABDOMEN:   - LIVER: normal size / contour, no ductal dilatation , no  focal lesion   - GB: grossly negative   - CBD: grossly negative   - SPLEEN: normal size and contour   - PANCREAS: normal in size, contour, no focal mass   - VISCERA: normal caliber, no wall thickening   - MESENTERY: no mesenteric mass   - CAVITY: no free abdominal fluid, no free intraperitoneal  air   - BODY WALL: wnl   -  OSSEOUS: Multiple osteolytic/sclerotic lesions.      RETROPERITONEUM:   - KIDNEYS: normal size / contour, no collecting system  dilation  no evidence of an enhancing mass   - URETERS: normal course, caliber   - ADRENALS: normal size, contour   - MISC: no sig retroperitoneal adenopathy or mass   - VASCULAR: aorta / iliacs: wnl for age      - - - CT PELVIS - - -   - VISCERA: [normal caliber] normal caliber small/large  bowel, no focal thickening/mass   - MESENTERY: no mass   - VASCULAR: wnl for age  - CAVITY: no free fluid / air  - BLADDER: unremarkable  - OSSEOUS: Multiple osteolytic/sclerotic lesions.  - MISC:     .   IMPRESSION:  CONCLUSION:   1. Interval progression of osseous metastatic disease.  2. Otherwise, stable assessment.              ASSESSMENT AND PLAN:      1.  Metastatic lobular carcinoma of left breast with a diffuse blastic lesion involving the spine and pelvis, ER/AL positive, HER-2/shelly negative diagnosed in December 2014.  Initially patient was started on Arimidex with Xgeva, in November 2015 patient had a restaging scan done which showed worsening of the metastases and patient was changed to Aromasin along with Xgeva.  In February 2016 in view of worsening metastasis patient was started on Faslodex with Xgeva.ReStaging  Work up done in October 2016 showed worsening of metastatic disease, so patient was put on tamoxifen along with Xgeva.Patient has been on tamoxifen since October 2016.  In view of worsening bone metastasis based on the bone scan done in April 2017 as well as restaging of CT of chest abdomen and pelvis done on May 4, 2017.  Started with Palbociclib and letrozole in May 2017.  Patient finished third cycle of Palbociclib on August 11, 2017 with letrozole.  Bloodwork done earlier today shows her ANC is low at 700.  We will ask her to hold Palbociclib this week.  We'll recheck her CBC next Friday on August 25, 2017.  If her neutrophil recovered by that time we will start cycle 4 of  Palbociclib.  Plan is to repeat CT scan of chest, abdomen and pelvis with contrast prior to next clinic visit in 5 weeks.  CA 27-29 has been drawn today.  In the recent past.  27-29 is improving with each cycle.  All this recommendation were discussed with patient.     2.  Pancytopenia: Most likely secondary to Palbociclib.  Patient was instructed to come to the emergency room she starts having any bleeding or any fever.  We will monitored with CBC for now.    3.  Hot flashes: Patient is enough Effexor with her for now    4.  Bone metastases: Continue with Xgeva for now.  Patient was again explained about possible side effects including jaw pain and osteonecrosis of jaw.  She is scheduled to get a dose of Xgeva today on 17 2017.    5.  Prescriptions: Patient has enough prescription of Percocet 5/325 daily.  Prescription with 90 tablet has been given to her on July 14, 2017    6.  Health maintenance: Patient does not smoke.  She is not a candidate for further screening colonoscopy in view of metastatic breast cancer.  She remains full code.       Joey Ramos MD  8/17/2017  4:29 PM

## 2017-08-17 NOTE — PATIENT INSTRUCTIONS
Patient Instructions for CT Scan    · Your CT scan is being done without any oral contrast.  Your CT scan may be done with IV contrast, if you have an allergy to iodine--please tell your nurse.    · Do not eat or drink 4 hours prior to scan.     · You may take your medications with sips of water, except DO NOT take your diabetic pill the morning of the test.    Arrive at the Outpatient Surgery entrance at [facility] 20 minutes prior to appointment time.    You will receive a phone call with an appointment for your CT scan.  Please call our office, if someone does not contact you with 3 days.    Belem Wong RN  August 17, 2017  1:59 PM

## 2017-08-17 NOTE — PATIENT INSTRUCTIONS
Patient Instructions for CT Scan    · Your CT scan is being done without any oral contrast.  Your CT scan may be done with IV contrast, if you have an allergy to iodine--please tell your nurse.    · Do not eat or drink 4 hours prior to scan.     · You may take your medications with sips of water, except DO NOT take your diabetic pill the morning of the test.    Arrive at the Outpatient Surgery entrance at [facility] 20 minutes prior to appointment time.    You will receive a phone call with an appointment for your CT scan.  Please call our office, if someone does not contact you with 3 days.    Belem Wong RN  August 17, 2017  1:59 PM

## 2017-08-23 PROBLEM — Z79.01 CURRENT USE OF LONG TERM ANTICOAGULATION: Status: ACTIVE | Noted: 2017-01-01

## 2017-08-23 NOTE — PROGRESS NOTES
Subjective   Patient ID: Johanne Mayer is a 66 y.o. female is being seen for consultation today at the request of Dr. Frey for anticoagulation management, teaching, dosing.     History of Present Illness  The following portions of the patient's history were reviewed and updated as appropriate: allergies, current medications, past family history, past medical history, past social history, past surgical history and problem list.  PCP: [place]  Card: [facility]    Recent admission for paroxysmal atrial fibrillation with rapid rate. Reports rate control with occasional breakthrough palpitations and feeling weak. PMH includes metastatic breast cancer. She was starting on anticoagulation with Xarelto. Presents for evaluation, dosing,teaching. Reports no side effects, excess bruising, or bleeding.     Past Medical History:   Diagnosis Date   • Atrial fibrillation    • Carcinoma, female breast, infiltrating lobular     LEFT side with axillary lymph node metastases      • Essential hypertension    • GERD (gastroesophageal reflux disease)    • Heartburn    • History of echocardiogram 10/13/2011   • Localized enlarged lymph nodes    • Lump of breast, left    • Lymphadenopathy      LEFT axilla-this is confirmed by my personal review of the ultrasound      • Osteoarthritis      Past Surgical History:   Procedure Laterality Date   • APPENDECTOMY     • BREAST SURGERY  11/21/2014    Ultrasound directed mammotome biopsy of the left breast with clip placement, lesion at the 5:30 position 5 cm. from the nipple.Left axillary lymph node biopsy, open.   • CHOLECYSTECTOMY     • COLONOSCOPY  12/12/2011    Mild diverticulosis in sigmoid colon. Stool collected for study. Hemorrhoids found.   • COLONOSCOPY N/A 2/7/2017    Procedure: COLONOSCOPY;  Surgeon: Yevgeniy Ware MD;  Location: [facility] ENDOSCOPY;  Service:    • INJECTION OF MEDICATION  08/12/2013    Kenalog (19)      • TONSILLECTOMY     • TUBAL ABDOMINAL LIGATION     •  UPPER GASTROINTESTINAL ENDOSCOPY  06/10/2014    Normal hypopharynx, esophagus, symmetrical & patent pylorus. Hiatus hernia in GE junction. Polyps in antrum & fundus. Multiple biopsies taken. Prominant CBD in medial duodenal wall. The ampulla has a normal appearance.         Current Outpatient Prescriptions:   •  aspirin 81 MG EC tablet, Take 1 tablet by mouth Daily., Disp: 30 tablet, Rfl: 0  •  atorvastatin (LIPITOR) 10 MG tablet, Take 1 tablet by mouth Daily., Disp: 30 tablet, Rfl: 5  •  Calcium Carb-Cholecalciferol (CALCIUM 600 + D PO), Take 1 capsule by mouth 2 (Two) Times a Day., Disp: , Rfl:   •  Cholecalciferol (VITAMIN D-3) 1000 UNITS capsule, Take 1,000 Units by mouth Daily., Disp: , Rfl:   •  digoxin (LANOXIN) 125 MCG tablet, Take 125 mcg by mouth Daily., Disp: , Rfl:   •  IBRANCE 125 MG capsule capsule, TAKE ONE CAPSULE BY MOUTH ONE TIME DAILY FOR 21 DAYS ON FOLLOWED BY 7 DAYS OFF., Disp: 21 capsule, Rfl: 2  •  Interferon Beta-1b (EXTAVIA SC), Inject  under the skin Every 30 (Thirty) Days., Disp: , Rfl:   •  isosorbide mononitrate (IMDUR) 30 MG 24 hr tablet, Take 1 tablet by mouth Daily., Disp: 30 tablet, Rfl: 0  •  letrozole (FEMARA) 2.5 MG tablet, Take 1 tablet by mouth Daily., Disp: 30 tablet, Rfl: 3  •  losartan (COZAAR) 100 MG tablet, Take 1 tablet by mouth Daily., Disp: 30 tablet, Rfl: 0  •  Magnesium 250 MG tablet, Take 1 tablet by mouth Daily., Disp: , Rfl:   •  Multiple Vitamins-Minerals (MULTIVITAMINS PLUS ZINC PO), Take 50 mg by mouth Daily., Disp: , Rfl:   •  omeprazole (priLOSEC) 40 MG capsule, Take 1 capsule by mouth Daily., Disp: 90 capsule, Rfl: 1  •  ondansetron (ZOFRAN) 4 MG tablet, Take 1 tablet by mouth 4 (Four) Times a Day As Needed for Nausea or Vomiting., Disp: 40 tablet, Rfl: 3  •  oxyCODONE-acetaminophen (PERCOCET) 5-325 MG per tablet, Take 1 tablet by mouth Every 8 (Eight) Hours As Needed for Severe Pain ., Disp: 90 tablet, Rfl: 0  •  rivaroxaban (XARELTO) 20 MG tablet, Take 1  tablet by mouth Daily., Disp: 30 tablet, Rfl: 11  •  sucralfate (CARAFATE) 1 G tablet, Take 1 tablet by mouth 3 (Three) Times a Day., Disp: 90 tablet, Rfl: 5  •  venlafaxine (EFFEXOR) 37.5 MG tablet, Take 1 tablet by mouth Daily., Disp: 90 tablet, Rfl: 1  •  diltiazem XR (DILACOR XR) 180 MG 24 hr capsule, Take 1 capsule by mouth Daily., Disp: 30 capsule, Rfl: 3    Review of Systems   Constitution: Positive for weakness and malaise/fatigue.   HENT: Negative for nosebleeds.    Eyes: Negative for visual disturbance.   Cardiovascular: Positive for irregular heartbeat, near-syncope and palpitations. Negative for leg swelling.   Respiratory: Positive for shortness of breath. Negative for hemoptysis.    Hematologic/Lymphatic: Negative for bleeding problem. Does not bruise/bleed easily.   Gastrointestinal: Negative for hematemesis and melena.   Genitourinary: Negative for hematuria.   Neurological: Negative for dizziness and light-headedness.   All other systems reviewed and are negative.       Objective   Temp:  [97.4 °F (36.3 °C)] 97.4 °F (36.3 °C)  Heart Rate:  [98] 98  BP: (158)/(82) 158/82  Physical Exam   Constitutional: She is oriented to person, place, and time. She appears well-developed.   HENT:   Head: Normocephalic.   Eyes: Pupils are equal, round, and reactive to light.   Neck: Neck supple. Carotid bruit is not present.   Cardiovascular: Normal rate and intact distal pulses.  An irregular rhythm present.   Pulmonary/Chest: Effort normal and breath sounds normal. No respiratory distress.   Abdominal: Soft. Bowel sounds are normal. She exhibits no distension.   Musculoskeletal: Normal range of motion. She exhibits no edema.   Neurological: She is alert and oriented to person, place, and time. No cranial nerve deficit.   Skin: Skin is warm and dry.   Psychiatric: She has a normal mood and affect. Thought content normal.   Vitals reviewed.    Lab Results   Component Value Date    WBC 4.28 08/23/2017    HGB 10.4 (L)  08/23/2017    HCT 31.3 (L) 08/23/2017    MCV 99.7 (H) 08/23/2017     08/23/2017     Lab Results   Component Value Date    GLUCOSE 94 08/17/2017    BUN 12 08/17/2017    CREATININE 0.93 08/17/2017    EGFRIFNONA 60 (L) 08/17/2017    BCR 12.9 08/17/2017    K 4.1 08/17/2017    CO2 25.0 08/17/2017    CALCIUM 8.7 08/17/2017    ALBUMIN 3.90 08/17/2017    LABIL2 1.2 08/17/2017    AST 43 (H) 08/17/2017    ALT 27 08/17/2017     Interpretation Summary 8/1/17 ECHO  · Left atrial cavity size is borderline dilated.  · Left ventricular wall thickness is consistent with mild-to-moderate hypertrophy. Sigmoid-shaped ventricular septum is present.  · Mild mitral valve regurgitation is present  · Mild tricuspid valve regurgitation is present.           Assessment/Plan   Independent Review of Radiographic Studies:    Detailed discussion regarding risks, benefits, and treatment plan.  Patient understands, agrees, and wishes to proceed with plan.     1. Current use of long term anticoagulation  Lifelong anticoagulation - Xarelto  Copay $0  LIS information given.  Anticoagulation teaching/discussion for 30 minutes of direct face-face interaction with the patient during this encounter and over half of that time was spent on counseling and coordination of care.  We discussed in depth the importance of medication adherence, signs/symptoms of bleeding, and management of anticoagulation for procedures. I also educated the patient about assistance programs available for cost reduction of the medication prescribed.      2. Paroxysmal atrial fibrillation  Rate control. Increase Cardizem with reported symptoms and borderline tachycardia.   Keep scheduled follow up with Dr. Graf.            This document has been electronically signed by FADI Childs on August 23, 2017 3:09 PM

## 2017-08-23 NOTE — PATIENT INSTRUCTIONS
Increase Cardizem 180mg daily (Rx sent)    Lifelong Xarelto (Rx sent)    Notify provider if you experience excessive bleeding from the nose, cuts, gums, rectum, urinary tract, or vagina. Reddish or brown urine or stool. Vomiting of blood or hemorrhoidal bleeding. If major injury occurs present to the Emergency Department.     Return for any difficulty regarding financial coverage, bleeding, side effects.

## 2017-08-24 NOTE — ED PROVIDER NOTES
Subjective   HPI Comments: Patient has stage 4 breast cancer with mets to her bones. States that her pain has increased in her back the past couple of days especially when she breathes deep. Says that it comes around her sides and ribs cage. She is currently on percocet 5mg for the past 2.5 years and it is not helping anymore. Her shortness of breath is due to the pain it causes her when she breathes so she just tries not to breath as much. Is currently on chemotherapy pills and her next bone scan is set for September.     Patient is a 66 y.o. female presenting with back pain.   History provided by:  Patient   used: No    Back Pain   Location:  Thoracic spine  Quality:  Aching, stabbing and shooting  Pain severity:  Moderate  Pain is:  Unable to specify  Onset quality:  Gradual  Duration:  3 days  Timing:  Constant  Progression:  Worsening  Chronicity:  Recurrent  Context: not emotional stress, not falling, not jumping from heights, not lifting heavy objects, not MCA, not MVA, not occupational injury, not pedestrian accident, not physical stress, not recent illness, not recent injury and not twisting    Relieved by:  Nothing  Worsened by:  Nothing  Ineffective treatments:  Narcotics  Associated symptoms: no abdominal pain, no abdominal swelling, no bladder incontinence, no bowel incontinence, no chest pain, no dysuria, no fever, no headaches, no leg pain, no numbness, no paresthesias, no pelvic pain, no perianal numbness, no tingling, no weakness and no weight loss    Risk factors: hx of cancer    Risk factors: no hx of osteoporosis, no lack of exercise, no menopause, not obese, not pregnant, no recent surgery, no steroid use and no vascular disease        Review of Systems   Constitutional: Negative.  Negative for fever and weight loss.   HENT: Negative.    Eyes: Negative.    Respiratory: Negative.    Cardiovascular: Negative.  Negative for chest pain.   Gastrointestinal: Negative.  Negative for  "abdominal pain and bowel incontinence.   Endocrine: Negative.    Genitourinary: Negative.  Negative for bladder incontinence, dysuria and pelvic pain.   Musculoskeletal: Positive for arthralgias, back pain and myalgias. Negative for gait problem, joint swelling, neck pain and neck stiffness.   Skin: Negative.    Allergic/Immunologic: Negative.    Neurological: Negative.  Negative for tingling, weakness, numbness, headaches and paresthesias.   Hematological: Negative.    Psychiatric/Behavioral: Negative.        Past Medical History:   Diagnosis Date   • Atrial fibrillation    • Carcinoma, female breast, infiltrating lobular     LEFT side with axillary lymph node metastases      • Essential hypertension    • GERD (gastroesophageal reflux disease)    • Heartburn    • History of echocardiogram 10/13/2011   • Localized enlarged lymph nodes    • Lump of breast, left    • Lymphadenopathy      LEFT axilla-this is confirmed by my personal review of the ultrasound      • Osteoarthritis        Allergies   Allergen Reactions   • Tape Itching   • Eggs Or Egg-Derived Products Diarrhea and Nausea And Vomiting   • Influenza Vaccines    • Lortab [Hydrocodone-Acetaminophen] Hallucinations   • Penicillins      \"PASS OUT\"       Past Surgical History:   Procedure Laterality Date   • APPENDECTOMY     • BREAST SURGERY  11/21/2014    Ultrasound directed mammotome biopsy of the left breast with clip placement, lesion at the 5:30 position 5 cm. from the nipple.Left axillary lymph node biopsy, open.   • CHOLECYSTECTOMY     • COLONOSCOPY  12/12/2011    Mild diverticulosis in sigmoid colon. Stool collected for study. Hemorrhoids found.   • COLONOSCOPY N/A 2/7/2017    Procedure: COLONOSCOPY;  Surgeon: Yevgeniy Ware MD;  Location: [facility] ENDOSCOPY;  Service:    • INJECTION OF MEDICATION  08/12/2013    Kenalog (19)      • TONSILLECTOMY     • TUBAL ABDOMINAL LIGATION     • UPPER GASTROINTESTINAL ENDOSCOPY  06/10/2014    Normal " hypopharynx, esophagus, symmetrical & patent pylorus. Hiatus hernia in GE junction. Polyps in antrum & fundus. Multiple biopsies taken. Prominant CBD in medial duodenal wall. The ampulla has a normal appearance.       Family History   Problem Relation Age of Onset   • Heart failure Mother      congested   • Lung cancer Father    • Heart attack Other    • Lung cancer Other    • Colon cancer Neg Hx    • Stomach cancer Neg Hx        Social History     Social History   • Marital status:      Spouse name: N/A   • Number of children: N/A   • Years of education: N/A     Social History Main Topics   • Smoking status: Never Smoker   • Smokeless tobacco: Never Used   • Alcohol use No   • Drug use: No   • Sexual activity: Defer     Other Topics Concern   • None     Social History Narrative           Objective   Physical Exam   Constitutional: She is oriented to person, place, and time. She appears well-developed and well-nourished. No distress.   HENT:   Head: Normocephalic and atraumatic.   Eyes: Conjunctivae and EOM are normal. Pupils are equal, round, and reactive to light. Right eye exhibits no discharge. Left eye exhibits no discharge. No scleral icterus.   Neck: Normal range of motion. Neck supple. No JVD present. No tracheal deviation present. No thyromegaly present.   Cardiovascular: Normal rate, regular rhythm, normal heart sounds and intact distal pulses.  Exam reveals no gallop and no friction rub.    No murmur heard.  Pulmonary/Chest: Effort normal and breath sounds normal. No stridor. No respiratory distress. She has no wheezes. She has no rales. She exhibits no tenderness.   Abdominal: Soft. Bowel sounds are normal. She exhibits no distension and no mass. There is no tenderness. There is no rebound and no guarding. No hernia.   Musculoskeletal: Normal range of motion. She exhibits tenderness. She exhibits no edema or deformity.   Lymphadenopathy:     She has no cervical adenopathy.   Neurological: She is  alert and oriented to person, place, and time. She has normal reflexes.   Skin: Skin is warm and dry. No rash noted. She is not diaphoretic. No erythema. No pallor.   Superficial varicose veins noted to bilateral lower extremities. Patient has been on Xarelto for 3 weeks and is doing well with it.    Psychiatric: She has a normal mood and affect. Her behavior is normal. Judgment and thought content normal.   Nursing note and vitals reviewed.      ECG 12 Lead    Date/Time: 8/24/2017 3:46 PM  Performed by: SHAYY JOHNSON  Authorized by: SURINDER CURRAN   Interpreted by physician  Comparison: compared with previous ECG   Rhythm: sinus rhythm  Rate: normal  Clinical impression: normal ECG               ED Course  ED Course        Labs Reviewed   COMPREHENSIVE METABOLIC PANEL - Abnormal; Notable for the following:        Result Value    AST (SGOT) 58 (*)     All other components within normal limits   BNP (IN-HOUSE) - Abnormal; Notable for the following:     proBNP 961.0 (*)     All other components within normal limits   CBC WITH AUTO DIFFERENTIAL - Abnormal; Notable for the following:     RBC 2.70 (*)     Hemoglobin 8.8 (*)     Hematocrit 26.7 (*)     MCV 98.9 (*)     RDW 20.0 (*)     RDW-SD 72.2 (*)     Basophil % 2.4 (*)     Neutrophils, Absolute 1.97 (*)     All other components within normal limits   PROTIME-INR - Abnormal; Notable for the following:     Protime 17.6 (*)     INR 1.45 (*)     All other components within normal limits    Narrative:     Therapeutic range for most indications is 2.0-3.0 INR,  or 2.5-3.5 for mechanical heart valves.   D-DIMER, QUANTITATIVE - Abnormal; Notable for the following:     D-Dimer, Quantitative 3249 (*)     All other components within normal limits    Narrative:     Dimer values <500 ng/ml FEU are FDA approved as aid in diagnosis of deep venous thrombosis and pulmonary embolism.  This test should not be used in an exclusion strategy with pretest probability alone.    A  recent guideline regarding diagnosis for pulmonary thomboembolism recommends an adjusted exclusion criterion of age x 10 ng/ml FEU for patients >50 years of age ([employer] Intern Med 2015; 163: 701-711).   DIGOXIN LEVEL - Normal   TROPONIN (IN-HOUSE) - Normal   TROPONIN (IN-HOUSE) - Normal   APTT - Normal    Narrative:     The recommended Heparin therapeutic range is 68-97 seconds.   RAINBOW DRAW    Narrative:     The following orders were created for panel order [facility] Draw.  Procedure                               Abnormality         Status                     ---------                               -----------         ------                     Light Blue Top[[id number]]                                   Final result               Green Top (Gel)[[id number]]                                  Final result               Lavender Top[[id number]]                                     Final result               Gold Top - SST[[id number]]                                   Final result                 Please view results for these tests on the individual orders.   TROPONIN (IN-HOUSE)   URINALYSIS W/ CULTURE IF INDICATED   CBC AND DIFFERENTIAL    Narrative:     The following orders were created for panel order CBC & Differential.  Procedure                               Abnormality         Status                     ---------                               -----------         ------                     CBC Auto Differential[[id number]]        Abnormal            Final result                 Please view results for these tests on the individual orders.   LIGHT BLUE TOP   GREEN TOP   LAVENDER TOP   GOLD TOP - SST   EXTRA TUBES    Narrative:     The following orders were created for panel order Extra Tubes.  Procedure                               Abnormality         Status                     ---------                               -----------         ------                     Light Blue Top[[id number]]                                    Final result               Lavender Top[[id number]]                                     Final result               Gold Top - SST[[id number]]                                   Final result                 Please view results for these tests on the individual orders.   LIGHT BLUE TOP   LAVENDER TOP   GOLD TOP - SST     Xr Chest 2 View    Result Date: 8/24/2017  Narrative: Chest PA and lateral CLINICAL INDICATION: Chest pain. History of breast carcinoma. COMPARISON: July 31, 2017. CT chest July 31, 2017. FINDINGS: Cardiac silhouette is normal in size. Pulmonary vascularity is unremarkable. No focal infiltrate or consolidation.  No pleural effusion.  No pneumothorax. Subtle fibrotic changes at both lung bases. Sclerotic changes thoracic spine ribs, left humerus compatible with osteoblastic metastases. No change since prior exam.     Impression: CONCLUSION: Sclerotic changes thoracic spine, ribs and left humerus compatible with osteoblastic metastases unchanged since prior examination. Chest x-rays examination is otherwise unremarkable. No evidence of active disease. Electronically signed by:  Dariusz Lacy MD  8/24/2017 1:08 PM CDT Workstation: [id number]    Xr Chest 1 View    Result Date: 7/31/2017  Narrative: PROCEDURE: Single chest view AP REASON FOR EXAM:soa FINDINGS: Cardiac and pulmonary vasculature are normal. Tortuous thoracic aorta. Lungs are clear. Pleural spaces are normal. Widening of the right shoulder acromioclavicular joint space as well as coracoid clavicular space with elevation of the clavicle.     Impression: 1.  Type III right AC joint separation. 2.  No acute cardiopulmonary abnormality. Electronically signed by:  Dariusz Austin MD  7/31/2017 2:49 PM CDT Workstation: [id number]    Us Venous Doppler Lower Extremity Bilateral (duplex)    Result Date: 8/24/2017  Narrative: . EXAMINATION:  Ultrasound, venous, lower extremity CLINICAL INDICATION / HISTORY:  Bilateral lower extremity swelling and dyspnea  COMPARISON:  none TECHNIQUE:  Bilateral lower extremities                         serial axial graded compression technique                         grayscale, color flow, spectral and Doppler FINDINGS: Imaged vessels:   - common femoral vein (CFV)   - deep femoral vein (FV) (formally called superficial femoral vein (SFV))   - profunda femoral vein (PFV) (cephalad portion)   - popliteal vein (PV)   - posterior tibial vein (PTV)   - greater saphenous vein (GSV) (non-contiguous segments) Unless otherwise indicated, all of the above described vessels were freely compressible and demonstrated spontaneous and phasic flow as well as appropriate flow with augmentation. .    Impression: CONCLUSION: 1. Negative examination - no evidence of deep venous thrombosis within the femoral-popliteal system of the bilateral lower extremities. Electronically signed by:  DRAIO Richard MD  8/24/2017 1:30 PM CDT Workstation: [id number]    Ct Angiogram Chest With Contrast    Result Date: 8/24/2017  Narrative: EXAM:  Computed Tomography with CTA       REGION:  Chest     INDICATION:    dyspnea   - rule out pulmonary embolism     CLINICAL HISTORY:   Breast carcinoma   CORRELATIVE IMAGING:  CT chest 5/4/17                       TECHNIQUE:    - PE / vascular protocol    - reconstructions:  axial, coronal, sagittal, obliques    - computer-generated 3D reconstructions (MIPS) were performed.    - contrast:  intravenous Isovue 370, 65 mL                             This exam was performed according to the departmental dose-optimization program which includes automated exposure control, adjustment of the mA and/or kV according to patient size and/or use of iterative reconstruction technique.     COMMENTS: - Pulmonary arterial system:     - Main pulmonary artery trunk:  negative     - Left, right main pulmonary arteries: negative     - Lobar arteries: negative     - Segmental arteries: negative  - Systemic vascularity (as visualized):       -  Aorta:  grossly negative / normal caliber / no dissection       - roots of great vessels:  grossly negative / normal caliber       - SVC / IVC:  grossly negative / normal caliber  - Misc (limited visualization):     - pulmonary parenchyma:  negative     - pleura:  negative     - mediastinal / wanda:  negative     - neck, inferior:  grossly wnl     - subdiaphragmatic structures:  grossly negative (limited evaluation)     - osseous:  Multiple sclerotic bone lesions presumably metastatic     - misc:    .        Impression: CONCLUSION:      1.  No evidence of pulmonary embolism.          2.  No evidence of pathology associated with the visualized aorta.                                      Electronically signed by:  DARIO Richard MD  8/24/2017 2:53 PM CDT Workstation: [id number]    Ct Angiogram Chest With Contrast    Result Date: 7/31/2017  Narrative: DATE OF EXAM: 7/31/2017 6:53 PM CDT PROCEDURE: CT CHEST ANGIOGRAPHY WITH IV CONTRAST INDICATION FOR PROCEDURE: 66 years old patient presents for evaluation of dyspnea.  ICD 10 code:  R06.09. TECHNIQUE:  Contiguous axial images are obtained of the chest and upper abdomen after intravenous administration of 61 mm of Isovue-370.  Multiplanar reformations and computer generated 3-D multiplanar MIPS are submitted for interpretation.  This exam was performed according to our departmental dose-optimization program, which includes automated exposure control, adjustment of the mA and/or kV according to patient size and/or use of iterative reconstruction technique. COMPARISON:  CT of the chest dated May 4, 2017. FINDINGS:  Lungs are expanded. There is heterogeneous groundglass attenuation both lungs possibly related to mild pulmonary congestion. Bronchovascular markings are mild prominent..  No pleural effusions are visualized. The heart has a normal appearance without evidence for pericardial effusion.  There are vascular calcifications of the coronary arteries. Pulmonary  arteries  have a normal enhanced appearance. The segmental arteries are not well enhanced and small pulmonary emboli are not excluded..  The thoracic aorta is tortuous with atherosclerotic calcification. Maximum transverse dimension of the ascending thoracic aorta measures approximately 4.2 cm.. There is no evidence for mediastinal, hilar or axillary lymphadenopathy. Chest wall has a normal appearance. There is extensive abnormal attenuation within the imaged thoracic vertebral bodies and ribs consistent with extensive metastatic disease. Visualized abdominal structures are within normal limits..     Impression: 1.  No CT evidence for large or central pulmonary emboli. 2.  Segmental emboli are not excluded particularly in the right lower lobe. 3.  Mild aneurysmal dilatation of the ascending thoracic aorta. 4.  Mild pulmonary congestion. Electronically signed by:  Meenu De La Garza MD  7/31/2017 7:35 PM CDT Workstation: [facility]    Patient feels better after pain medications. Spoke with Dr. Edward about the patient and he reassures that she is stable and able to go home without over night observation. CT scan of chest was negative.      Patient and family are refusing overnight observation.     MDM  Number of Diagnoses or Management Options  Bilateral lower extremity pain:   Bone metastasis:   Chronic midline thoracic back pain:       Final diagnoses:   Chronic midline thoracic back pain   Bone metastasis   Bilateral lower extremity pain            LOTTIE Argueta  08/24/17 [id number]

## 2017-08-24 NOTE — ED NOTES
Pt presents to ED with c/o back and chest pain, along with right leg pain. Pt states the chest/back pain started around 0400 this am. Pt states the chest pain is constant, but the back pain is only on deep breathing. Pt states her right leg is painful from the ankle to mid thigh.     Yevgeniy Wood RN  08/24/17 [id number]

## 2017-08-24 NOTE — ED NOTES
Discussed follow-up care/recommendations. Gave medications for Percocet 10. States that she is feeling better and ready to go home     Edna Thurman RN  08/24/17 [id number]

## 2017-08-24 NOTE — TELEPHONE ENCOUNTER
----- Message from Joey Rmaos MD sent at 8/23/2017  7:18 PM CDT -----  Regarding: RE: review lab  Ok to restart chemo pill. Thanks  ----- Message -----     From: Chantell Francisco RN     Sent: 8/23/2017   3:10 PM       To: Joey Ramos MD  Subject: review lab                                       Pt was here today to recheck CBC for chemo pills. Please review. thanks

## 2017-09-21 NOTE — PROGRESS NOTES
DATE OF VISIT: 9/21/2017    REASON FOR VISIT:  Metastatic lobular carcinoma of left breast with a bone metastasis.    HISTORY OF PRESENT ILLNESS:    66-year-old female with a past medical history significant for left breast cancer with diffuse skeletal metastasis diagnosed in 2014, currently on letrozole and Palbociclib with Xgeva is here for follow-up visit today.  In May of 2017 in view of disease progression on CT scan patient was changed over to Palbociclib with letrozole.  Patient is due to start cycle 5 of Palbociclib with letrozole today on September 21,2017.  Restaging CT scan done of chest, abdomen and pelvis last week.  She is here to discuss result of CT scan and further treatment recommendation.  Still complains of back pain.  Denies any fever or chills.  Denies any recent infections.  Denies any blood in the stool or urine.  Denies any excessive mouth sores or nausea or vomiting.    PAST MEDICAL HISTORY:    Past Medical History:   Diagnosis Date   • Atrial fibrillation    • Carcinoma, female breast, infiltrating lobular     LEFT side with axillary lymph node metastases      • Essential hypertension    • GERD (gastroesophageal reflux disease)    • Heartburn    • History of echocardiogram 10/13/2011   • Localized enlarged lymph nodes    • Lump of breast, left    • Lymphadenopathy      LEFT axilla-this is confirmed by my personal review of the ultrasound      • Osteoarthritis        SOCIAL HISTORY:    Social History   Substance Use Topics   • Smoking status: Never Smoker   • Smokeless tobacco: Never Used   • Alcohol use No       Surgical History :  Past Surgical History:   Procedure Laterality Date   • APPENDECTOMY     • BREAST SURGERY  11/21/2014    Ultrasound directed mammotome biopsy of the left breast with clip placement, lesion at the 5:30 position 5 cm. from the nipple.Left axillary lymph node biopsy, open.   • CHOLECYSTECTOMY     • COLONOSCOPY  12/12/2011    Mild diverticulosis in sigmoid colon.  "Stool collected for study. Hemorrhoids found.   • COLONOSCOPY N/A 2/7/2017    Procedure: COLONOSCOPY;  Surgeon: Yevgeniy Ware MD;  Location: [facility] ENDOSCOPY;  Service:    • INJECTION OF MEDICATION  08/12/2013    Kenalog (19)      • TONSILLECTOMY     • TUBAL ABDOMINAL LIGATION     • UPPER GASTROINTESTINAL ENDOSCOPY  06/10/2014    Normal hypopharynx, esophagus, symmetrical & patent pylorus. Hiatus hernia in GE junction. Polyps in antrum & fundus. Multiple biopsies taken. Prominant CBD in medial duodenal wall. The ampulla has a normal appearance.       ALLERGIES:    Allergies   Allergen Reactions   • Tape Itching   • Eggs Or Egg-Derived Products Diarrhea and Nausea And Vomiting   • Influenza Vaccines    • Lortab [Hydrocodone-Acetaminophen] Hallucinations   • Penicillins      \"PASS OUT\"       REVIEW OF SYSTEMS:      CONSTITUTIONAL: Positive for fatigue.  Positive for hot flashes.  No fever, chills, or night sweats.     HEENT:  No epistaxis, mouth sores, or difficulty swallowing.    RESPIRATORY:  No new shortness of breath or cough at present.    CARDIOVASCULAR:  No chest pain or palpitations.    GASTROINTESTINAL:  Positive for intermittent constipation.  No abdominal pain, nausea, vomiting, or blood in the stool.    GENITOURINARY:  No dysuria or hematuria.    MUSCULOSKELETAL:  Complains of bilateral hip pain and stiffness, which is getting worse.  Complains of bilateral shoulder stiffness.    NEUROLOGICAL:  No tingling or numbness. No new headache or dizziness.     LYMPHATICS:  Denies any abnormal swollen and anywhere in the body.    SKIN:  Complains of skin nodule that has not progressed since starting chemotherapy.        PHYSICAL EXAMINATION:      VITAL SIGNS:    /76  Pulse 86  Temp 96.9 °F (36.1 °C)  Resp 18  Wt 192 lb (87.1 kg)  LMP  (LMP Unknown)  BMI 35.12 kg/m2    GENERAL:  Not in any distress.     EYES:  Mild pallor. No icterus.  Extraocular movement intact.    NECK:  No adenopathy.  No " JVD.    RESPIRATORY:  Fair air entry bilaterally. No rhonchi or wheezing.    CARDIOVASCULAR:  S1, S2. Regular rate and rhythm.  Systolic murmur present.    ABDOMEN:  Soft, nontender. Bowel sounds present.    EXTREMITIES:  No edema.  No calf  Tenderness.  Bruising present in bilateral upper extremity.    NEUROLOGICAL:  Alert, awake, oriented x3. No motor or sensory deficits.  Cranial nerves II-12 grossly intact.        DIAGNOSTIC DATA:    Glucose   Date Value Ref Range Status   08/24/2017 95 60 - 100 mg/dL Final     Sodium   Date Value Ref Range Status   08/24/2017 138 137 - 145 mmol/L Final     Potassium   Date Value Ref Range Status   08/24/2017 3.9 3.5 - 5.1 mmol/L Final     CO2   Date Value Ref Range Status   08/24/2017 22.0 22.0 - 31.0 mmol/L Final     Chloride   Date Value Ref Range Status   08/24/2017 105 95 - 110 mmol/L Final     Anion Gap   Date Value Ref Range Status   08/24/2017 11.0 5.0 - 15.0 mmol/L Final     Creatinine   Date Value Ref Range Status   08/24/2017 0.78 0.50 - 1.00 mg/dL Final     BUN   Date Value Ref Range Status   08/24/2017 14 7 - 21 mg/dL Final     BUN/Creatinine Ratio   Date Value Ref Range Status   08/24/2017 17.9 7.0 - 25.0 Final     Calcium   Date Value Ref Range Status   08/24/2017 9.6 8.4 - 10.2 mg/dL Final     eGFR Non  Amer   Date Value Ref Range Status   08/24/2017 73 >60 mL/min/1.73 Final     Alkaline Phosphatase   Date Value Ref Range Status   08/24/2017 50 38 - 126 U/L Final     Total Protein   Date Value Ref Range Status   08/24/2017 7.1 6.3 - 8.6 g/dL Final     ALT (SGPT)   Date Value Ref Range Status   08/24/2017 22 9 - 52 U/L Final     AST (SGOT)   Date Value Ref Range Status   08/24/2017 58 (H) 14 - 36 U/L Final     Total Bilirubin   Date Value Ref Range Status   08/24/2017 0.6 0.2 - 1.3 mg/dL Final     Albumin   Date Value Ref Range Status   08/24/2017 3.70 3.40 - 4.80 g/dL Final     Globulin   Date Value Ref Range Status   08/24/2017 3.4 2.3 - 3.5 gm/dL Final      A/G Ratio   Date Value Ref Range Status   08/24/2017 1.1 1.1 - 1.8 g/dL Final     Lab Results   Component Value Date    WBC 4.20 08/24/2017    HGB 8.8 (L) 08/24/2017    HCT 26.7 (L) 08/24/2017    MCV 98.9 (H) 08/24/2017     08/24/2017     Lab Results   Component Value Date    NEUTROABS 1.97 (L) 08/24/2017    FERRITIN 226.00 02/09/2017    [id number] 625 02/09/2017     Lab Results   Component Value Date    LABCA2 224.0 (H) 08/17/2017     Component      Latest Ref Rng & Units 5/1/2017 6/14/2017 7/14/2017 8/17/2017   CA 27.29      0.0 - 38.6 U/mL 275.9 (H) 243.7 (H) 209.4 (H) 224.0 (H)         RADIOLOGY DATA :    CT of chest, abdomen and pelvis with contrast done on September 8, 2017 showed:  IMPRESSION:  CONCLUSION:    1. Widespread osteosclerotic metastatic disease.  2. No changes visualized from the prior study.         Nuclear bone scan done on April 21, 2017 showed:  FINDINGS:      The uptake and distribution of radiotracer activity demonstrates:         Interval progression of disease demonstrating relative intensity  in previously noted sites of abnormal activity, additionally with  new sites in both the axial and appendicular skeleton.      The kidneys are visualized bilaterally.   .   IMPRESSION:  CONCLUSION:      1. Interval progression of disease.              ASSESSMENT AND PLAN:      1.  Metastatic lobular carcinoma of left breast with a diffuse blastic lesion involving the spine and pelvis, ER/CA positive, HER-2/shelly negative diagnosed in December 2014.  Initially patient was started on Arimidex with Xgeva, in November 2015 patient had a restaging scan done which showed worsening of the metastases and patient was changed to Aromasin along with Xgeva.  In February 2016 in view of worsening metastasis patient was started on Faslodex with Xgeva.ReStaging  Work up done in October 2016 showed worsening of metastatic disease, so patient was put on tamoxifen along with Xgeva In view of worsening bone  metastasis based on the bone scan done in April 2017 as well as restaging of CT of chest abdomen and pelvis done on May 4, 2017.  Started with Palbociclib and letrozole in May 2017.  Patient is due to start cycle 5 of Palbociclib with letrozole today on September 21, 2017.  CT scan of chest, abdomen and pelvis with contrast done on September 8, 2017 shows stable disease without any progression of metastatic disease.  In view of her pancytopenia will hold Palbociclib today we will recheck her CBC next week and if her counts have recovered we will start Palbociclib next week.  At this point we'll continue with letrozole and Palbociclib.  We'll see her back in about 5 weeks with a repeat CBC and CMP on that day.    2.  Pancytopenia: Most likely secondary to Palbociclib.  Patient was instructed to come to the emergency room she starts having any bleeding or any fever.  We will monitored with CBC for now.    3.  Hot flashes: Patient is enough Effexor with her for now    4.  Bone metastases: Continue with Xgeva for now.  Patient was again explained about possible side effects including jaw pain and osteonecrosis of jaw.  She is scheduled to get a dose of Xgeva today on 17 2017.    5.  Prescriptions: Patient has enough prescription of Percocet 10/325 daily.      6.  Health maintenance: Patient does not smoke.  She is not a candidate for further screening colonoscopy in view of metastatic breast cancer.  She remains full code.       Joey Ramos MD  9/21/2017  1:21 PM

## 2017-09-29 NOTE — TELEPHONE ENCOUNTER
----- Message from Joey Ramos MD sent at 9/28/2017  5:19 PM CDT -----  Yes. She is ok to restart. Thanks  ----- Message -----     From: Pauline Cueva RN     Sent: 9/28/2017   3:31 PM       To: Joey Ramos MD    See labs done today-Ok to restart oral chemo?

## 2017-09-29 NOTE — TELEPHONE ENCOUNTER
Attempted to call patient to have her restart chemo pills. Patient's phone we have on chart is not accepting incoming calls. Called patient's emergency contact, daughter Palak, and gave her instructions that patient could restart chemo. She stated she would get the message to her mother.

## 2017-10-17 NOTE — TELEPHONE ENCOUNTER
Patient's daughter called back. Informed her that I spoke with pharmacist yesterday and they have refills available for  on [employer]. She states understanding.

## 2017-10-26 NOTE — PROGRESS NOTES
DATE OF VISIT: 10/26/2017    REASON FOR VISIT:  Metastatic lobular carcinoma of left breast with a bone metastasis.    HISTORY OF PRESENT ILLNESS:    66-year-old female with a past medical history significant for left breast cancer with diffuse skeletal metastasis diagnosed in 2014, currently on letrozole and Palbociclib with Xgeva is here for follow-up visit today.  In May of 2017 in view of disease progression on CT scan patient was changed over to Palbociclib with letrozole.  Patient is due to start cycle 6 of Palbociclib with letrozole today on October 26,2017. Still complains of back pain. Complains of constipation for which she is not taking pain medication as prescribed.  Complains of hemorrhoidal bleed secondary to constipation.  Denies any fever or chills.  Denies any recent infections.   Denies any excessive mouth sores or nausea or vomiting.        PAST MEDICAL HISTORY:    Past Medical History:   Diagnosis Date   • Atrial fibrillation    • Carcinoma, female breast, infiltrating lobular     LEFT side with axillary lymph node metastases      • Essential hypertension    • GERD (gastroesophageal reflux disease)    • Heartburn    • History of echocardiogram 10/13/2011   • Localized enlarged lymph nodes    • Lump of breast, left    • Lymphadenopathy      LEFT axilla-this is confirmed by my personal review of the ultrasound      • Osteoarthritis        SOCIAL HISTORY:    Social History   Substance Use Topics   • Smoking status: Never Smoker   • Smokeless tobacco: Never Used   • Alcohol use No       Surgical History :  Past Surgical History:   Procedure Laterality Date   • APPENDECTOMY     • BREAST SURGERY  11/21/2014    Ultrasound directed mammotome biopsy of the left breast with clip placement, lesion at the 5:30 position 5 cm. from the nipple.Left axillary lymph node biopsy, open.   • CHOLECYSTECTOMY     • COLONOSCOPY  12/12/2011    Mild diverticulosis in sigmoid colon. Stool collected for study. Hemorrhoids  "found.   • COLONOSCOPY N/A 2/7/2017    Procedure: COLONOSCOPY;  Surgeon: Yevgeniy Ware MD;  Location: [facility] ENDOSCOPY;  Service:    • INJECTION OF MEDICATION  08/12/2013    Kenalog (19)      • TONSILLECTOMY     • TUBAL ABDOMINAL LIGATION     • UPPER GASTROINTESTINAL ENDOSCOPY  06/10/2014    Normal hypopharynx, esophagus, symmetrical & patent pylorus. Hiatus hernia in GE junction. Polyps in antrum & fundus. Multiple biopsies taken. Prominant CBD in medial duodenal wall. The ampulla has a normal appearance.       ALLERGIES:    Allergies   Allergen Reactions   • Tape Itching   • Eggs Or Egg-Derived Products Diarrhea and Nausea And Vomiting   • Influenza Vaccines    • Lortab [Hydrocodone-Acetaminophen] Hallucinations   • Penicillins      \"PASS OUT\"       REVIEW OF SYSTEMS:      CONSTITUTIONAL: Positive for fatigue.  Positive for hot flashes.  No fever, chills, or night sweats.     HEENT:  No epistaxis, mouth sores, or difficulty swallowing.    RESPIRATORY:  No new shortness of breath or cough at present.    CARDIOVASCULAR:  No chest pain or palpitations.    GASTROINTESTINAL: Complains of constipation.  Complains of hemorrhoidal bleed secondary to constipation.  No abdominal pain, nausea, vomiting, .    GENITOURINARY:  No dysuria or hematuria.    MUSCULOSKELETAL:  Complains of bilateral hip pain and stiffness, which is getting worse.  Complains of bilateral shoulder stiffness.    NEUROLOGICAL:  No tingling or numbness. No new headache or dizziness.     LYMPHATICS:  Denies any abnormal swollen and anywhere in the body.    SKIN:  Complains of skin nodule that has not progressed since starting chemotherapy.  Complains of easy bruising on bilateral upper extremity.        PHYSICAL EXAMINATION:      VITAL SIGNS:    /90  Pulse 70  Temp 98.9 °F (37.2 °C)  Resp 18  Wt 192 lb 3.9 oz (87.2 kg)  LMP  (LMP Unknown)  BMI 35.16 kg/m2    GENERAL:  Not in any distress.     EYES:  Mild pallor. No icterus.  " Extraocular movement intact.    NECK:  No adenopathy.  No JVD.    RESPIRATORY:  Fair air entry bilaterally. No rhonchi or wheezing.    CARDIOVASCULAR:  S1, S2. Regular rate and rhythm.  Systolic murmur present.    ABDOMEN:  Soft, nontender. Bowel sounds present.    EXTREMITIES:  No edema.  No calf  Tenderness.  Bruising present in bilateral upper extremity.    NEUROLOGICAL:  Alert, awake, oriented x3. No motor or sensory deficits.  Cranial nerves II-12 grossly intact.        DIAGNOSTIC DATA:    Glucose   Date Value Ref Range Status   10/26/2017 107 (H) 60 - 100 mg/dL Final     Sodium   Date Value Ref Range Status   10/26/2017 142 137 - 145 mmol/L Final     Potassium   Date Value Ref Range Status   10/26/2017 4.1 3.5 - 5.1 mmol/L Final     CO2   Date Value Ref Range Status   10/26/2017 24.0 22.0 - 31.0 mmol/L Final     Chloride   Date Value Ref Range Status   10/26/2017 105 95 - 110 mmol/L Final     Anion Gap   Date Value Ref Range Status   10/26/2017 13.0 5.0 - 15.0 mmol/L Final     Creatinine   Date Value Ref Range Status   10/26/2017 0.93 0.50 - 1.00 mg/dL Final     BUN   Date Value Ref Range Status   10/26/2017 17 7 - 21 mg/dL Final     BUN/Creatinine Ratio   Date Value Ref Range Status   10/26/2017 18.3 7.0 - 25.0 Final     Calcium   Date Value Ref Range Status   10/26/2017 8.9 8.4 - 10.2 mg/dL Final     eGFR Non  Amer   Date Value Ref Range Status   10/26/2017 60 45 - 104 mL/min/1.73 Final     Alkaline Phosphatase   Date Value Ref Range Status   10/26/2017 47 38 - 126 U/L Final     Total Protein   Date Value Ref Range Status   10/26/2017 7.6 6.3 - 8.6 g/dL Final     ALT (SGPT)   Date Value Ref Range Status   10/26/2017 23 9 - 52 U/L Final     AST (SGOT)   Date Value Ref Range Status   10/26/2017 42 (H) 14 - 36 U/L Final     Total Bilirubin   Date Value Ref Range Status   10/26/2017 0.4 0.2 - 1.3 mg/dL Final     Albumin   Date Value Ref Range Status   10/26/2017 4.10 3.40 - 4.80 g/dL Final     Globulin    Date Value Ref Range Status   10/26/2017 3.5 2.3 - 3.5 gm/dL Final     A/G Ratio   Date Value Ref Range Status   10/26/2017 1.2 1.1 - 1.8 g/dL Final     Lab Results   Component Value Date    WBC 2.86 (L) 10/26/2017    HGB 10.2 (L) 10/26/2017    HCT 30.1 (L) 10/26/2017    .0 (H) 10/26/2017     (L) 10/26/2017     Lab Results   Component Value Date    NEUTROABS 0.73 (L) 10/26/2017    NEUTROABS 0.83 (L) 10/26/2017    FERRITIN 226.00 02/09/2017    [id number] 625 02/09/2017     Lab Results   Component Value Date    LABCA2 277.3 (H) 09/21/2017     Component      Latest Ref Rng & Units 5/1/2017 6/14/2017 7/14/2017 8/17/2017   CA 27.29      0.0 - 38.6 U/mL 275.9 (H) 243.7 (H) 209.4 (H) 224.0 (H)         RADIOLOGY DATA :    CT of chest, abdomen and pelvis with contrast done on September 8, 2017 showed:  IMPRESSION:  CONCLUSION:    1. Widespread osteosclerotic metastatic disease.  2. No changes visualized from the prior study.         Nuclear bone scan done on April 21, 2017 showed:  FINDINGS:      The uptake and distribution of radiotracer activity demonstrates:         Interval progression of disease demonstrating relative intensity  in previously noted sites of abnormal activity, additionally with  new sites in both the axial and appendicular skeleton.      The kidneys are visualized bilaterally.   .   IMPRESSION:  CONCLUSION:      1. Interval progression of disease.              ASSESSMENT AND PLAN:      1.  Metastatic lobular carcinoma of left breast with a diffuse blastic lesion involving the spine and pelvis, ER/AZ positive, HER-2/shelly negative diagnosed in December 2014.  Initially patient was started on Arimidex with Xgeva, in November 2015 patient had a restaging scan done which showed worsening of the metastases and patient was changed to Aromasin along with Xgeva.  In February 2016 in view of worsening metastasis patient was started on Faslodex with Xgeva.ReStaging  Work up done in October 2016  showed worsening of metastatic disease, so patient was put on tamoxifen along with Xgeva In view of worsening bone metastasis based on the bone scan done in April 2017 as well as restaging of CT of chest abdomen and pelvis done on May 4, 2017.  Started with Palbociclib and letrozole in May 2017.  Patient is due to start cycle 6 of Palbociclib with letrozole today on October 26, 2017.  CT scan of chest, abdomen and pelvis with contrast done on September 8, 2017 shows stable disease without any progression of metastatic disease.  In view of her pancytopenia will hold Palbociclib today we will recheck her CBC next week and if her counts have recovered we will start Palbociclib next week.  Recurrent pancytopenia requiring dose interruption.  We'll decrease the dose of Palbociclib to 100 mg by mouth daily day 1-21 of 28 day cycle.  This recommendation were discussed with patient.  At this point we'll continue with letrozole and Palbociclib.  We'll see her back in about 5 weeks with a repeat CBC and CMP on that day.    2.  Pancytopenia: Most likely secondary to Palbociclib.  Patient was instructed to come to the emergency room she starts having any bleeding or any fever.  We will monitored with CBC for now.    3.  Hot flashes: Patient is enough Effexor with her for now    4.  Bone metastases: Continue with Xgeva for now.  Patient was again explained about possible side effects including jaw pain and osteonecrosis of jaw.  She is scheduled to get a dose of Xgeva today .    5.  Constipation: Patient complains of severe constipation and because of that.  She has intermittent hemorrhoidal bleed.  Recommend using stool softeners like Dulcolax every day along with MiraLAX.  If those does not help we can send a prescription for lactulose.    6.  Prescriptions: Patient has enough prescription of Percocet 10/325 daily.  Prescription for  MiraLAX was sent to her pharmacy today on October 26, 2017.    7.  Health maintenance: Patient  does not smoke.  She is not a candidate for further screening colonoscopy in view of metastatic breast cancer.  She remains full code.       Joey Ramos MD  10/26/2017  2:02 PM

## 2017-10-26 NOTE — PROGRESS NOTES
Phoned [facility] to call in the dose change of the Ibrance. They have the information that is needed.

## 2017-11-21 PROBLEM — A41.9 SEPSIS: Status: ACTIVE | Noted: 2017-01-01

## 2017-11-21 NOTE — PLAN OF CARE
Problem: Patient Care Overview (Adult)  Goal: Plan of Care Review  Outcome: Ongoing (interventions implemented as appropriate)    11/21/17 1268   Coping/Psychosocial Response Interventions   Plan Of Care Reviewed With patient   Patient Care Overview   Progress no change   Outcome Evaluation   Outcome Summary/Follow up Plan Patient just arrived to unit from ER. Will continue to monitor.         Problem: Infection, Risk/Actual (Adult)  Goal: Identify Related Risk Factors and Signs and Symptoms  Outcome: Ongoing (interventions implemented as appropriate)  Goal: Infection Prevention/Resolution  Outcome: Ongoing (interventions implemented as appropriate)    Problem: Sepsis (Adult)  Goal: Signs and Symptoms of Listed Potential Problems Will be Absent or Manageable (Sepsis)  Outcome: Ongoing (interventions implemented as appropriate)

## 2017-11-21 NOTE — ED NOTES
"Pt is presented to Ed with c/o \"shaking all over\" back of elbow and BLE pain.  Pt states the shaking started approximately 0730 this morning.  Pt states the back of legs cramping has been present for approximately one week.     Shantell Teague RN  11/21/17 1039    "

## 2017-11-21 NOTE — ED PROVIDER NOTES
"Subjective   HPI Comments: 68yo female pmh significant metastatic lobular breast carcinoma/atrial fibrillation/htn/hyperlipidemia/ gerd, presents ED c/o 1d hx fever/rigors/left elbow arthralgia.  ROS (+) n/v x1.  Denies odynophagia/otalgia/chest pain/soa/abd pain/dysuria/diarrhea.    Patient is a 67 y.o. female presenting with fever.   Fever   Temp source:  Rectal  Onset quality:  Sudden  Duration:  1 day  Timing:  Constant  Chronicity:  New  Associated symptoms: chills        Review of Systems   Constitutional: Positive for chills, fatigue and fever.   HENT: Negative.    Eyes: Negative.    Respiratory: Negative.    Cardiovascular: Negative.    Gastrointestinal: Negative.    Endocrine: Negative.    Genitourinary: Negative.    Musculoskeletal: Positive for arthralgias.   Skin: Negative.    Allergic/Immunologic: Positive for immunocompromised state.       Past Medical History:   Diagnosis Date   • Atrial fibrillation    • Carcinoma, female breast, infiltrating lobular     LEFT side with axillary lymph node metastases      • Essential hypertension    • GERD (gastroesophageal reflux disease)    • Heartburn    • History of echocardiogram 10/13/2011   • Localized enlarged lymph nodes    • Lump of breast, left    • Lymphadenopathy      LEFT axilla-this is confirmed by my personal review of the ultrasound      • Osteoarthritis        Allergies   Allergen Reactions   • Tape Itching   • Eggs Or Egg-Derived Products Diarrhea and Nausea And Vomiting   • Influenza Vaccines    • Lortab [Hydrocodone-Acetaminophen] Hallucinations   • Penicillins      \"PASS OUT\"       Past Surgical History:   Procedure Laterality Date   • APPENDECTOMY     • BREAST SURGERY  11/21/2014    Ultrasound directed mammotome biopsy of the left breast with clip placement, lesion at the 5:30 position 5 cm. from the nipple.Left axillary lymph node biopsy, open.   • CHOLECYSTECTOMY     • COLONOSCOPY  12/12/2011    Mild diverticulosis in sigmoid colon. Stool " collected for study. Hemorrhoids found.   • COLONOSCOPY N/A 2/7/2017    Procedure: COLONOSCOPY;  Surgeon: Yevgeniy Ware MD;  Location: [facility] ENDOSCOPY;  Service:    • INJECTION OF MEDICATION  08/12/2013    Kenalog (19)      • TONSILLECTOMY     • TUBAL ABDOMINAL LIGATION     • UPPER GASTROINTESTINAL ENDOSCOPY  06/10/2014    Normal hypopharynx, esophagus, symmetrical & patent pylorus. Hiatus hernia in GE junction. Polyps in antrum & fundus. Multiple biopsies taken. Prominant CBD in medial duodenal wall. The ampulla has a normal appearance.       Family History   Problem Relation Age of Onset   • Heart failure Mother      congested   • Lung cancer Father    • Heart attack Other    • Lung cancer Other    • Colon cancer Neg Hx    • Stomach cancer Neg Hx        Social History     Social History   • Marital status:      Spouse name: N/A   • Number of children: N/A   • Years of education: N/A     Social History Main Topics   • Smoking status: Never Smoker   • Smokeless tobacco: Never Used   • Alcohol use No   • Drug use: No   • Sexual activity: Defer     Other Topics Concern   • None     Social History Narrative           Objective   Physical Exam   Constitutional: She is oriented to person, place, and time. She appears well-developed and well-nourished.   HENT:   Head: Normocephalic and atraumatic.   Mouth/Throat: Oropharynx is clear and moist.   Eyes: Pupils are equal, round, and reactive to light.   Neck: Normal range of motion. Neck supple. No JVD present. No tracheal deviation present.   Neg meningismus   Cardiovascular: Normal rate, regular rhythm, normal heart sounds and intact distal pulses.  Exam reveals no gallop and no friction rub.    No murmur heard.  Pulmonary/Chest: Effort normal and breath sounds normal. She has no wheezes. She has no rales.   Abdominal: Soft. Bowel sounds are normal. There is no tenderness. There is no rebound and no guarding.   Musculoskeletal: Normal range of motion. She  exhibits no edema or tenderness.   Lymphadenopathy:     She has no cervical adenopathy.   Neurological: She is alert and oriented to person, place, and time. GCS eye subscore is 4. GCS verbal subscore is 5. GCS motor subscore is 6.   Skin: Skin is warm and dry.   Nursing note and vitals reviewed.      Procedures         ED Course  ED Course      Labs Reviewed   DIGOXIN LEVEL - Abnormal; Notable for the following:        Result Value    Digoxin 0.70 (*)     All other components within normal limits   COMPREHENSIVE METABOLIC PANEL - Abnormal; Notable for the following:     Glucose 110 (*)     BUN 24 (*)     Creatinine 1.07 (*)     AST (SGOT) 46 (*)     All other components within normal limits   CBC WITH AUTO DIFFERENTIAL - Abnormal; Notable for the following:     WBC 3.13 (*)     RBC 2.79 (*)     Hemoglobin 9.3 (*)     Hematocrit 28.3 (*)     .4 (*)     RDW 19.8 (*)     RDW-SD 73.5 (*)     Platelets 101 (*)     Lymphocytes, Absolute 0.50 (*)     All other components within normal limits   LACTIC ACID, PLASMA - Normal   BLOOD CULTURE   BLOOD CULTURE   URINE CULTURE   INFLUENZA ANTIGEN, RAPID   SCAN SLIDE   URINALYSIS W/ CULTURE IF INDICATED   URINALYSIS, MICROSCOPIC ONLY   CBC AND DIFFERENTIAL    Narrative:     The following orders were created for panel order CBC & Differential.  Procedure                               Abnormality         Status                     ---------                               -----------         ------                     Scan Slide[[id number]]                                       Final result               CBC Auto Differential[[id number]]        Abnormal            Final result                 Please view results for these tests on the individual orders.     Xr Chest 2 View    Result Date: 11/21/2017  Narrative: Chest PA and lateral CLINICAL INDICATION: Shortness of breath. Fever. History of breast cancer COMPARISON: CT chest September 8, 2017. Chest x-ray August 24, 2017  FINDINGS: Cardiac silhouette is normal in size. Pulmonary vascularity is unremarkable. No focal infiltrate or consolidation.  No pleural effusion.  No pneumothorax. Sclerotic foci in the left humerus, thoracic spine, and ribs compatible with osteoblastic metastases. No change since prior exam.     Impression: CONCLUSION: Numerous sclerotic foci, osteoblastic metastases. No change since prior exam. Lung fields clear. No evidence of active disease. Electronically signed by:  Dariusz Lacy MD  11/21/2017 11:58 AM CST Workstation: [id number]                [facility]    Final diagnoses:   Sepsis, due to unspecified organism   Pancytopenia   Malignant neoplasm of female breast, unspecified estrogen receptor status, unspecified laterality, unspecified site of breast            Gagan Jhaveri MD  11/21/17 2074

## 2017-11-21 NOTE — H&P
[facility] Medicine Services  INPATIENT HISTORY AND PHYSICAL       Patient Care Team:  Jenaro Wood MD as PCP - General  Joey Ramos MD as PCP - Claims Attributed  Joey Ramos MD as Consulting Physician (Hematology and Oncology)    Chief complaint   Chief Complaint   Patient presents with   • Leg Pain     leg cramps   • Fever   • Other     shaking all over   • Vomiting       Subjective   Patient is a 67 y.o. female presents with One of them fever and chills which started this morning.  Patient reported to have temperature of as high as 103°F.  Patient was also reported to have chills.  Patient had episode of nausea with episode of vomiting.  No shortness of breath or coughing spells have been reported.  Patient does complain of having suprapubic pain.  No weight loss we can have been reported.  No change in bowels appetite have been reported.  Patient states she has been feeling well prior to his events.    Review of Systems   Review of Systems   Constitutional: Positive for activity change, appetite change, chills and fever. Negative for diaphoresis.   HENT: Negative for congestion, rhinorrhea, sore throat and trouble swallowing.    Eyes: Negative for visual disturbance.   Respiratory: Negative for cough, chest tightness, shortness of breath and wheezing.    Cardiovascular: Negative for chest pain, palpitations and leg swelling.   Gastrointestinal: Positive for vomiting. Negative for abdominal pain, blood in stool, diarrhea and nausea.   Endocrine: Negative for cold intolerance and heat intolerance.   Genitourinary: Negative for decreased urine volume and difficulty urinating.   Musculoskeletal: Negative for back pain, gait problem and neck pain.   Skin: Negative for rash.   Neurological: Positive for weakness. Negative for dizziness, syncope, light-headedness, numbness and headaches.   Psychiatric/Behavioral: The patient is not nervous/anxious.         History  Past Medical History:   Diagnosis Date   • Atrial fibrillation    • Carcinoma, female breast, infiltrating lobular     LEFT side with axillary lymph node metastases      • Essential hypertension    • GERD (gastroesophageal reflux disease)    • Heartburn    • History of echocardiogram 10/13/2011   • Localized enlarged lymph nodes    • Lump of breast, left    • Lymphadenopathy      LEFT axilla-this is confirmed by my personal review of the ultrasound      • Osteoarthritis      Past Surgical History:   Procedure Laterality Date   • APPENDECTOMY     • BREAST SURGERY  11/21/2014    Ultrasound directed mammotome biopsy of the left breast with clip placement, lesion at the 5:30 position 5 cm. from the nipple.Left axillary lymph node biopsy, open.   • CHOLECYSTECTOMY     • COLONOSCOPY  12/12/2011    Mild diverticulosis in sigmoid colon. Stool collected for study. Hemorrhoids found.   • COLONOSCOPY N/A 2/7/2017    Procedure: COLONOSCOPY;  Surgeon: Yevgeniy Ware MD;  Location: [facility] ENDOSCOPY;  Service:    • INJECTION OF MEDICATION  08/12/2013    Kenalog (19)      • TONSILLECTOMY     • TUBAL ABDOMINAL LIGATION     • UPPER GASTROINTESTINAL ENDOSCOPY  06/10/2014    Normal hypopharynx, esophagus, symmetrical & patent pylorus. Hiatus hernia in GE junction. Polyps in antrum & fundus. Multiple biopsies taken. Prominant CBD in medial duodenal wall. The ampulla has a normal appearance.     Family History   Problem Relation Age of Onset   • Heart failure Mother      congested   • Lung cancer Father    • Heart attack Other    • Lung cancer Other    • Colon cancer Neg Hx    • Stomach cancer Neg Hx      Social History   Substance Use Topics   • Smoking status: Never Smoker   • Smokeless tobacco: Never Used   • Alcohol use No     Prescriptions Prior to Admission   Medication Sig Dispense Refill Last Dose   • acetaminophen (TYLENOL) 500 MG tablet Take 500 mg by mouth Every 6 (Six) Hours As Needed for Mild Pain .    Taking   • aspirin 81 MG EC tablet Take 1 tablet by mouth Daily. 30 tablet 0 Taking   • atorvastatin (LIPITOR) 10 MG tablet Take 1 tablet by mouth Daily. 30 tablet 5 Taking   • Calcium Carb-Cholecalciferol (CALCIUM 600 + D PO) Take 1 capsule by mouth 2 (Two) Times a Day.   Taking   • Cholecalciferol (VITAMIN D-3) 1000 UNITS capsule Take 1,000 Units by mouth Daily.   Taking   • digoxin (LANOXIN) 125 MCG tablet Take 125 mcg by mouth Daily.   Taking   • diltiazem XR (DILACOR XR) 180 MG 24 hr capsule Take 1 capsule by mouth Daily. (Patient taking differently: Take 120 mg by mouth Daily.) 30 capsule 3 Taking   • Interferon Beta-1b (EXTAVIA SC) Inject  under the skin Every 30 (Thirty) Days.   Taking   • isosorbide mononitrate (IMDUR) 30 MG 24 hr tablet Take 1 tablet by mouth Daily. 30 tablet 0 Taking   • letrozole (FEMARA) 2.5 MG tablet TAKE 1 TABLET BY MOUTH DAILY. 30 tablet 3 Taking   • losartan (COZAAR) 100 MG tablet Take 1 tablet by mouth Daily. 30 tablet 0 Taking   • Magnesium 250 MG tablet Take 1 tablet by mouth Daily.   Taking   • Multiple Vitamins-Minerals (MULTIVITAMINS PLUS ZINC PO) Take 50 mg by mouth Daily.   Taking   • omeprazole (priLOSEC) 40 MG capsule Take 1 capsule by mouth Daily. 90 capsule 1 Taking   • ondansetron (ZOFRAN) 4 MG tablet Take 1 tablet by mouth 4 (Four) Times a Day As Needed for Nausea or Vomiting. 40 tablet 3 Taking   • oxyCODONE-acetaminophen (PERCOCET)  MG per tablet Take 1 tablet by mouth Every 6 (Six) Hours As Needed for Moderate Pain  or Severe Pain . 120 tablet 0 Taking   • oxyCODONE-acetaminophen (PERCOCET) 5-325 MG per tablet Take 1 tablet by mouth Every 8 (Eight) Hours As Needed for Severe Pain . 90 tablet 0 Taking   • Palbociclib (IBRANCE) 100 MG capsule capsule Take 1 capsule by mouth Daily for 21 days. 21 capsule 2    • polyethylene glycol (MIRALAX) powder Take 17 g by mouth Daily. 527 g 1    • rivaroxaban (XARELTO) 20 MG tablet Take 1 tablet by mouth Daily. 30 tablet 11  Taking   • sucralfate (CARAFATE) 1 g tablet TAKE 1 TABLET BY MOUTH 3 (THREE) TIMES A DAY. 90 tablet 5 Taking   • venlafaxine (EFFEXOR) 37.5 MG tablet TAKE 1 TABLET BY MOUTH DAILY. 90 tablet 1 Taking   • Zinc 50 MG capsule Take  by mouth.   Taking     Allergies:  Tape; Eggs or egg-derived products; Influenza vaccines; Lortab [hydrocodone-acetaminophen]; and Penicillins  Prior to Admission medications    Medication Sig Start Date End Date Taking? Authorizing Provider   acetaminophen (TYLENOL) 500 MG tablet Take 500 mg by mouth Every 6 (Six) Hours As Needed for Mild Pain .   Yes Historical Provider, MD   aspirin 81 MG EC tablet Take 1 tablet by mouth Daily. 8/5/17  Yes Chris Frey MD   atorvastatin (LIPITOR) 10 MG tablet Take 1 tablet by mouth Daily. 8/9/17  Yes Jenaro Wood MD   Calcium Carb-Cholecalciferol (CALCIUM 600 + D PO) Take 1 capsule by mouth 2 (Two) Times a Day.   Yes Historical Provider, MD   Cholecalciferol (VITAMIN D-3) 1000 UNITS capsule Take 1,000 Units by mouth Daily.   Yes Historical Provider, MD   digoxin (LANOXIN) 125 MCG tablet Take 125 mcg by mouth Daily.   Yes Historical Provider, MD   diltiazem XR (DILACOR XR) 180 MG 24 hr capsule Take 1 capsule by mouth Daily.  Patient taking differently: Take 120 mg by mouth Daily. 8/23/17  Yes FADI Childs   Interferon Beta-1b (EXTAVIA SC) Inject  under the skin Every 30 (Thirty) Days.   Yes Historical Provider, MD   isosorbide mononitrate (IMDUR) 30 MG 24 hr tablet Take 1 tablet by mouth Daily. 8/5/17  Yes Chris Frey MD   letrozole (FEMARA) 2.5 MG tablet TAKE 1 TABLET BY MOUTH DAILY. 9/14/17  Yes Joey Ramos MD   losartan (COZAAR) 100 MG tablet Take 1 tablet by mouth Daily. 8/5/17  Yes Chris Frey MD   Magnesium 250 MG tablet Take 1 tablet by mouth Daily.   Yes Historical Provider, MD   Multiple Vitamins-Minerals (MULTIVITAMINS PLUS ZINC PO) Take 50 mg by mouth Daily.   Yes Historical Provider, MD   omeprazole (priLOSEC) 40 MG  capsule Take 1 capsule by mouth Daily. 6/28/17  Yes Jenaro Wood MD   ondansetron (ZOFRAN) 4 MG tablet Take 1 tablet by mouth 4 (Four) Times a Day As Needed for Nausea or Vomiting. 5/10/17  Yes Joey Ramos MD   oxyCODONE-acetaminophen (PERCOCET)  MG per tablet Take 1 tablet by mouth Every 6 (Six) Hours As Needed for Moderate Pain  or Severe Pain . 8/25/17  Yes Joey Ramos MD   oxyCODONE-acetaminophen (PERCOCET) 5-325 MG per tablet Take 1 tablet by mouth Every 8 (Eight) Hours As Needed for Severe Pain . 7/14/17  Yes Joey Ramos MD   Palbociclib (IBRANCE) 100 MG capsule capsule Take 1 capsule by mouth Daily for 21 days. 11/2/17 11/23/17 Yes Joey Ramos MD   polyethylene glycol (MIRALAX) powder Take 17 g by mouth Daily. 10/26/17  Yes Joey Ramos MD   rivaroxaban (XARELTO) 20 MG tablet Take 1 tablet by mouth Daily. 8/23/17  Yes FADI Childs   sucralfate (CARAFATE) 1 g tablet TAKE 1 TABLET BY MOUTH 3 (THREE) TIMES A DAY. 9/8/17  Yes Jenaro Wood MD   venlafaxine (EFFEXOR) 37.5 MG tablet TAKE 1 TABLET BY MOUTH DAILY. 9/1/17  Yes Kaitlynn Steele MD   Zinc 50 MG capsule Take  by mouth.   Yes Historical Provider, MD       Objective     Vital Signs    Temp:  [98.7 °F (37.1 °C)-103.2 °F (39.6 °C)] 98.7 °F (37.1 °C)  Heart Rate:  [] 72  Resp:  [18-20] 20  BP: (125-147)/(58-81) 147/65    Physical Exam:      Physical Exam   Constitutional: She is oriented to person, place, and time. She appears well-developed and well-nourished.   HENT:   Head: Normocephalic and atraumatic.   Nose: Nose normal.   Eyes: Conjunctivae and EOM are normal. Pupils are equal, round, and reactive to light.   Neck: Normal range of motion. Neck supple. No JVD present. No tracheal deviation present. No thyromegaly present.   Cardiovascular: Normal rate, regular rhythm, normal heart sounds and intact distal pulses.    Pulmonary/Chest: Effort normal and breath sounds normal. No respiratory distress. She has no  wheezes. She has no rales. She exhibits no tenderness.   Abdominal: Soft. Bowel sounds are normal. She exhibits no distension. There is no tenderness. There is no rebound and no guarding.   Musculoskeletal: Normal range of motion. She exhibits no edema.   Lymphadenopathy:     She has no cervical adenopathy.   Neurological: She is alert and oriented to person, place, and time. She has normal reflexes. No cranial nerve deficit.   Skin: Skin is warm and dry.   Intact   Psychiatric: She has a normal mood and affect.   Nursing note and vitals reviewed.      Results Review:       Results from last 7 days  Lab Units 11/21/17  1134   SODIUM mmol/L 138   POTASSIUM mmol/L 3.6   CHLORIDE mmol/L 102   CO2 mmol/L 24.0   BUN mg/dL 24*   CREATININE mg/dL 1.07*   GLUCOSE mg/dL 110*   CALCIUM mg/dL 9.4   BILIRUBIN mg/dL 0.5   ALK PHOS U/L 47   ALT (SGPT) U/L 21   AST (SGOT) U/L 46*               Results from last 7 days  Lab Units 11/21/17  1134   WBC 10*3/mm3 3.13*   HEMOGLOBIN g/dL 9.3*   HEMATOCRIT % 28.3*   PLATELETS 10*3/mm3 101*           Imaging Results (last 7 days)     Procedure Component Value Units Date/Time    XR Chest 2 View [[id number]] Collected:  11/21/17 1136     Updated:  11/21/17 1159    Narrative:       Chest PA and lateral     CLINICAL INDICATION: Shortness of breath. Fever. History of  breast cancer    COMPARISON: CT chest September 8, 2017. Chest x-ray August 24, 2017    FINDINGS: Cardiac silhouette is normal in size. Pulmonary  vascularity is unremarkable.     No focal infiltrate or consolidation.  No pleural effusion.  No  pneumothorax.  Sclerotic foci in the left humerus, thoracic spine, and ribs  compatible with osteoblastic metastases.  No change since prior exam.      Impression:       CONCLUSION: Numerous sclerotic foci, osteoblastic metastases. No  change since prior exam. Lung fields clear. No evidence of active  disease.   Electronically signed by:  Dariusz Lacy MD  11/21/2017 11:58 AM  CST  Workstation: [id number]          Assessment/Plan     Principal Problem:    Sepsis  Active Problems:    Malignant neoplasm of left breast    Pancytopenia due to antineoplastic chemotherapy    Metastasis to bone    PAF (paroxysmal atrial fibrillation)    Osteoarthritis    GERD (gastroesophageal reflux disease)    Essential hypertension    Atrial fibrillation      -We'll continue with IV antibiotics.  -We'll continue with IV hydration.  -We'll resume patient's home medication as prior to coming hospital.  -We'll follow up with urine cultures closely.  -DVT and GI prophylaxis in place.  -We'll continue monitoring patient hospital setting and treat patient as course dictates.    I discussed the patients findings and my recommendations with patient, family and nursing staff.         This document has been electronically signed by Leonardo Fuentes MD on November 21, 2017 4:00 PM        Total Time Spent: 45 mins    EMR Dragon/Transcription disclaimer:   Dictated utilizing Dragon dictation.

## 2017-11-21 NOTE — ED NOTES
Attempted to call report to [facility].  No one available at this time to take report. Shandra ROWELL to call back as soon as possible     Roselyn Brady RN  11/21/17 0099       Roselyn Brady RN  11/21/17 [id number]

## 2017-11-22 PROBLEM — B96.20 E COLI BACTEREMIA: Status: ACTIVE | Noted: 2017-01-01

## 2017-11-22 PROBLEM — R78.81 E COLI BACTEREMIA: Status: ACTIVE | Noted: 2017-01-01

## 2017-11-22 PROBLEM — N30.00 ACUTE CYSTITIS: Status: ACTIVE | Noted: 2017-01-01

## 2017-11-22 NOTE — PROGRESS NOTES
[facility] Medicine Services  INPATIENT PROGRESS NOTE    Length of Stay: 1  Date of Admission: 11/21/2017  Primary Care Physician: Jenaro Wood MD    Subjective   Chief Complaint: Fever, chills, rigors  HPI:  67 year old female with a significant medical history of breast cancer with diffuse skeletal metastasis on chemotherapy with palbociclib and letrozole. She presents with the complaint of fever with Tmax of 103 with associated chills, rigors, nausea, and vomiting.   She was admitted and started on broad spectrum antibiotics.  Urine and blood cultures are positive.  Blood is positive for E. Coli and urine is growing GNR, which I suspect is also E. Coli.  Dr. Ramos is aware of the patient's admission and recommends holding the palbociclib at this time.  She is currently feeling better and is afebrile.     Review of Systems   Constitutional: Negative for chills and fever.   Respiratory: Negative for shortness of breath.    Cardiovascular: Negative for chest pain and palpitations.   Gastrointestinal: Negative for abdominal pain, diarrhea, nausea and vomiting.   All other systems reviewed and are negative.     All pertinent negatives and positives are as above. All other systems have been reviewed and are negative unless otherwise stated.     Objective    Temp:  [96.8 °F (36 °C)-98.7 °F (37.1 °C)] 97 °F (36.1 °C)  Heart Rate:  [] 88  Resp:  [18-20] 18  BP: (125-147)/(58-76) 144/75    Physical Exam   Constitutional: She is oriented to person, place, and time. She appears well-developed and well-nourished.   HENT:   Head: Normocephalic.   Cardiovascular: Normal rate, regular rhythm, normal heart sounds and intact distal pulses.    Pulmonary/Chest: Effort normal and breath sounds normal. No respiratory distress.   Abdominal: Soft. Bowel sounds are normal. She exhibits no distension. There is no tenderness.   Musculoskeletal: Normal range of motion. She exhibits no  edema.   Neurological: She is alert and oriented to person, place, and time.   Skin: Skin is warm and dry. No erythema.   Vitals reviewed.      Results Review:  I have reviewed the labs, radiology results, and diagnostic studies.    Laboratory Data:     Results from last 7 days  Lab Units 11/22/17  0538 11/21/17  1134   SODIUM mmol/L 138 138   POTASSIUM mmol/L 3.4* 3.6   CHLORIDE mmol/L 104 102   CO2 mmol/L 24.0 24.0   BUN mg/dL 17 24*   CREATININE mg/dL 1.03* 1.07*   GLUCOSE mg/dL 111* 110*   CALCIUM mg/dL 7.9* 9.4   BILIRUBIN mg/dL 0.5 0.5   ALK PHOS U/L 39 47   ALT (SGPT) U/L 14 21   AST (SGOT) U/L 30 46*   ANION GAP mmol/L 10.0 12.0     Estimated Creatinine Clearance: 53.5 mL/min (by C-G formula based on Cr of 1.03).    Results from last 7 days  Lab Units 11/22/17  0538   MAGNESIUM mg/dL 1.9           Results from last 7 days  Lab Units 11/22/17  0538 11/21/17  1134   WBC 10*3/mm3 3.01* 3.13*   HEMOGLOBIN g/dL 8.0* 9.3*   HEMATOCRIT % 25.0* 28.3*   PLATELETS 10*3/mm3 86* 101*           Culture Data:   Blood Culture   Date Value Ref Range Status   11/21/2017 Abnormal Stain (A)  Preliminary   11/21/2017 Abnormal Stain (A)  Preliminary     Urine Culture   Date Value Ref Range Status   11/21/2017 Culture in progress  Preliminary   11/21/2017 >100,000 CFU/mL Gram Negative Bacilli (A)  Preliminary     No results found for: RESPCX  No results found for: WOUNDCX  No results found for: STOOLCX  No components found for: BODYFLD    Radiology Data:   Imaging Results (last 24 hours)     Procedure Component Value Units Date/Time    XR Chest 2 View [[id number]] Collected:  11/21/17 1136     Updated:  11/21/17 1159    Narrative:       Chest PA and lateral     CLINICAL INDICATION: Shortness of breath. Fever. History of  breast cancer    COMPARISON: CT chest September 8, 2017. Chest x-ray August 24, 2017    FINDINGS: Cardiac silhouette is normal in size. Pulmonary  vascularity is unremarkable.     No focal infiltrate or  consolidation.  No pleural effusion.  No  pneumothorax.  Sclerotic foci in the left humerus, thoracic spine, and ribs  compatible with osteoblastic metastases.  No change since prior exam.      Impression:       CONCLUSION: Numerous sclerotic foci, osteoblastic metastases. No  change since prior exam. Lung fields clear. No evidence of active  disease.   Electronically signed by:  Dariusz Lacy MD  11/21/2017 11:58 AM  CST Workstation: [id number]          I have reviewed the patient current medications.     Assessment/Plan     Hospital Problem List     * (Principal)Sepsis    Malignant neoplasm of left breast    Pancytopenia due to antineoplastic chemotherapy    Metastasis to bone    PAF (paroxysmal atrial fibrillation)    Osteoarthritis    GERD (gastroesophageal reflux disease)    Essential hypertension    Atrial fibrillation    E coli bacteremia    Acute cystitis          Plan:   D/C aztreonam and vancomycin  Start Rocephin  Await sensitivites  Hold Ibrance  Follow CBC, transfuse if Hgb <7 or platelets <50  If afebrile for 48 hours and tolerating PO, may transition to PO antibiotics based on sensitivities and treat for a 10 day course          This document has been electronically signed by FADI Ferguson on November 22, 2017 12:00 PM

## 2017-11-22 NOTE — PLAN OF CARE
Problem: Patient Care Overview (Adult)  Goal: Plan of Care Review  Outcome: Ongoing (interventions implemented as appropriate)    11/22/17 1221   Coping/Psychosocial Response Interventions   Plan Of Care Reviewed With patient   Patient Care Overview   Progress improving   Outcome Evaluation   Outcome Summary/Follow up Plan Pt resting well, denies any pain and has been afibrile today.

## 2017-11-22 NOTE — PLAN OF CARE
Problem: Patient Care Overview (Adult)  Goal: Plan of Care Review  Outcome: Ongoing (interventions implemented as appropriate)    11/22/17 0348   Coping/Psychosocial Response Interventions   Plan Of Care Reviewed With patient   Patient Care Overview   Progress no change   Outcome Evaluation   Outcome Summary/Follow up Plan pt has been resting well. No complaints of pain. She did vomit once which she says happens when she takes her meds without eating. after zofran she has been feeling much better. Vitals stable. Will continue to monitor.        Goal: Adult Individualization and Mutuality  Outcome: Ongoing (interventions implemented as appropriate)  Goal: Discharge Needs Assessment  Outcome: Ongoing (interventions implemented as appropriate)    Problem: Infection, Risk/Actual (Adult)  Goal: Infection Prevention/Resolution  Outcome: Ongoing (interventions implemented as appropriate)    Problem: Sepsis (Adult)  Goal: Signs and Symptoms of Listed Potential Problems Will be Absent or Manageable (Sepsis)  Outcome: Ongoing (interventions implemented as appropriate)

## 2017-11-23 NOTE — PROGRESS NOTES
[facility] Medicine Services  INPATIENT PROGRESS NOTE    Length of Stay: 2  Date of Admission: 11/21/2017  Primary Care Physician: Jenaro Wood MD    Subjective   Chief Complaint: chills  HPI:  67 year old  female with past medical history of metastatic breast cancer, atrial fibrillation, HTN, who presented on 11/21/17 with chills and shaking.  She is currently under outpatient chemotherapy for metastatic breast cancer.  She has been found to have E coli cystitis and bacteremia and is being treated with Rocephin.  She complains of weakness and chills overnight but has been afebrile.  Repeat blood cultures still show growth of gram negative bacilli at this time.     Review of Systems   Constitutional: Positive for chills and fatigue. Negative for fever.   Respiratory: Negative for shortness of breath and wheezing.    Cardiovascular: Negative for chest pain, palpitations and leg swelling.   Gastrointestinal: Negative for constipation, diarrhea, nausea and vomiting.   Genitourinary: Negative for difficulty urinating, dysuria and flank pain.   Musculoskeletal: Negative for back pain.   Skin: Positive for pallor.   Neurological: Negative for dizziness, weakness and light-headedness.   Psychiatric/Behavioral: Negative for confusion.        All pertinent negatives and positives are as above. All other systems have been reviewed and are negative unless otherwise stated.     Objective    Temp:  [97.1 °F (36.2 °C)-97.4 °F (36.3 °C)] 97.2 °F (36.2 °C)  Heart Rate:  [62-88] 75  Resp:  [18] 18  BP: (119-164)/(61-74) 138/68    Physical Exam   Constitutional: She is oriented to person, place, and time. She appears well-developed and well-nourished.   HENT:   Head: Normocephalic and atraumatic.   Eyes: Conjunctivae are normal.   Neck: Neck supple.   Cardiovascular: Normal rate, normal heart sounds and intact distal pulses.    Pulmonary/Chest: Effort normal and breath sounds  normal.   Abdominal: Soft. Bowel sounds are normal.   Musculoskeletal: Normal range of motion.   Neurological: She is alert and oriented to person, place, and time.   Skin: Skin is warm and dry. There is pallor.   Psychiatric: She has a normal mood and affect. Her behavior is normal.   Vitals reviewed.          Results Review:  I have reviewed the labs, radiology results, and diagnostic studies.    Laboratory Data:     Results from last 7 days  Lab Units 11/23/17  0538 11/23/17  0029 11/22/17  0538 11/21/17  1134   SODIUM mmol/L 139  --  138 138   POTASSIUM mmol/L 4.0 4.0 3.4* 3.6   CHLORIDE mmol/L 108  --  104 102   CO2 mmol/L 22.0  --  24.0 24.0   BUN mg/dL 12  --  17 24*   CREATININE mg/dL 0.93  --  1.03* 1.07*   GLUCOSE mg/dL 103*  --  111* 110*   CALCIUM mg/dL 7.2*  --  7.9* 9.4   BILIRUBIN mg/dL 0.3  --  0.5 0.5   ALK PHOS U/L 34*  --  39 47   ALT (SGPT) U/L 21  --  14 21   AST (SGOT) U/L 29  --  30 46*   ANION GAP mmol/L 9.0  --  10.0 12.0     Estimated Creatinine Clearance: 59.1 mL/min (by C-G formula based on Cr of 0.93).    Results from last 7 days  Lab Units 11/23/17  0538 11/22/17  0538   MAGNESIUM mg/dL 2.0 1.9           Results from last 7 days  Lab Units 11/23/17  0539 11/22/17  0538 11/21/17  1134   WBC 10*3/mm3 2.42* 3.01* 3.13*   HEMOGLOBIN g/dL 7.8* 8.0* 9.3*   HEMATOCRIT % 23.8* 25.0* 28.3*   PLATELETS 10*3/mm3 68* 86* 101*           Culture Data:   Blood Culture   Date Value Ref Range Status   11/21/2017 Abnormal Stain (A)  Preliminary   11/21/2017 Gram Negative Bacilli (A)  Preliminary     Urine Culture   Date Value Ref Range Status   11/21/2017 No growth at 24 hours  Final   11/21/2017 >100,000 CFU/mL Escherichia coli (A)  Final     No results found for: RESPCX  No results found for: WOUNDCX  No results found for: STOOLCX  No components found for: BODYFLD    Radiology Data:   Imaging Results (last 24 hours)     ** No results found for the last 24 hours. **          I have reviewed the patient  current medications.     Assessment/Plan     1. Sepsis r/t E coli cystitis and bacteremia: resolved.  Afebrile, Vitals stable.    2. E coli bacteremia: Surveillance cultures drawn yesterday reveal gram negative bacilli at 24 hours.  Continue IV abx for now and monitor cultures.   3. E coli cystitis: currently on Rocephin.    4. Breast cancer with bone mets: Followed by Dr. Ramos for outpatient treatment.  Continue Femara.   5. Paroxysmal atrial fibrillation: Digoxin, Cardizem for rate control. Anticoagulated with Xarelto.   6. HTN: BP stable. Continue Cozaar, and PRN dosing of Hydralazine.   7. Chronic constipation: Miralax daily, Colace BID, PRN Dulcolax and Milk of Magnesia.   8. Pancytopenia r/t chemotherapy.  Continue to monitor CBC.  Transfuse PRBCs if Hgb <7 or platelets <50.        This document has been electronically signed by FADI Mijares on November 23, 2017 9:33 AM

## 2017-11-23 NOTE — PLAN OF CARE
Problem: Patient Care Overview (Adult)  Goal: Plan of Care Review  Outcome: Ongoing (interventions implemented as appropriate)    11/23/17 0942   Coping/Psychosocial Response Interventions   Plan Of Care Reviewed With patient   Patient Care Overview   Progress improving   Outcome Evaluation   Outcome Summary/Follow up Plan Patient has been afibrile, pt denies any pain

## 2017-11-23 NOTE — PLAN OF CARE
Problem: Patient Care Overview (Adult)  Goal: Plan of Care Review  Outcome: Ongoing (interventions implemented as appropriate)    11/23/17 0005   Coping/Psychosocial Response Interventions   Plan Of Care Reviewed With patient   Patient Care Overview   Progress improving       Goal: Adult Individualization and Mutuality  Outcome: Ongoing (interventions implemented as appropriate)  Goal: Discharge Needs Assessment  Outcome: Ongoing (interventions implemented as appropriate)    Problem: Infection, Risk/Actual (Adult)  Goal: Identify Related Risk Factors and Signs and Symptoms  Outcome: Ongoing (interventions implemented as appropriate)  Goal: Infection Prevention/Resolution  Outcome: Ongoing (interventions implemented as appropriate)    Problem: Sepsis (Adult)  Goal: Signs and Symptoms of Listed Potential Problems Will be Absent or Manageable (Sepsis)  Outcome: Ongoing (interventions implemented as appropriate)

## 2017-11-24 NOTE — PLAN OF CARE
Problem: Patient Care Overview (Adult)  Goal: Plan of Care Review  Outcome: Ongoing (interventions implemented as appropriate)    11/24/17 0220   Coping/Psychosocial Response Interventions   Plan Of Care Reviewed With patient   Patient Care Overview   Progress no change         Problem: Infection, Risk/Actual (Adult)  Goal: Infection Prevention/Resolution  Outcome: Ongoing (interventions implemented as appropriate)    11/24/17 0220   Infection, Risk/Actual (Adult)   Infection Prevention/Resolution making progress toward outcome         Problem: Sepsis (Adult)  Goal: Signs and Symptoms of Listed Potential Problems Will be Absent or Manageable (Sepsis)  Outcome: Ongoing (interventions implemented as appropriate)    11/24/17 0220   Sepsis   Problems Assessed (Sepsis) all   Problems Present (Sepsis) none

## 2017-11-24 NOTE — PAYOR COMM NOTE
"Johanne Young (67 y.o. Female)     Date of Birth Social Security Number Address Home Phone MRN    [id number]  [facility] [address] [place] [address] [phone] [id number]    Christianity Marital Status          [employer]        Admission Date Admission Type Admitting Provider Attending Provider Department, Room/Bed    11/21/17 Emergency Leonardo Fuentes MD Patel, Harshul Amrutlal, MD [address], [address]/1    Discharge Date Discharge Disposition Discharge Destination                      Attending Provider: Leonardo Fuentes MD     Allergies:  Tape, Eggs Or Egg-derived Products, Influenza Vaccines, Lortab [Hydrocodone-acetaminophen], Penicillins    Isolation:  None   Infection:  None   Code Status:  FULL    Ht:  62\" (157.5 cm)   Wt:  199 lb 4.8 oz (90.4 kg)    Admission Cmt:  None   Principal Problem:  Sepsis [A41.9]                 Active Insurance as of 11/21/2017     Primary Coverage     Payor Plan Insurance Group Employer/Plan Group    MEDICARE MEDICARE A & B      Payor Plan Address Payor Plan Phone Number Effective From Effective To    [address] [phone] 1/1/2006     [place] [address]       Subscriber Name Subscriber Birth Date Member ID       JOHANNE YOUNG 1950 [id number]           Secondary Coverage     Payor Plan Insurance Group Employer/Plan Group    [employer]LL[place] MEDICAID      Payor Plan Address Payor Plan Phone Number Effective From Effective To    [address] [address] [phone] 4/3/2017     [place] [address]       Subscriber Name Subscriber Birth Date Member ID       JOHANNE YOUNG 1950 [id number]                 Emergency Contacts      (Rel.) Home Phone Work Phone Mobile Phone    Palak Bateman (Daughter) [phone] -- [phone]               History & Physical      Leonardo Fuentes MD at 11/21/2017  4:00 PM                [facility] " Medicine Services  INPATIENT HISTORY AND PHYSICAL       Patient Care Team:  Jenaro Wood MD as PCP - General  Joey Ramos MD as PCP - Claims Attributed  Joey Ramos MD as Consulting Physician (Hematology and Oncology)    Chief complaint   Chief Complaint   Patient presents with   • Leg Pain     leg cramps   • Fever   • Other     shaking all over   • Vomiting       Subjective   Patient is a 67 y.o. female presents with One of them fever and chills which started this morning.  Patient reported to have temperature of as high as 103°F.  Patient was also reported to have chills.  Patient had episode of nausea with episode of vomiting.  No shortness of breath or coughing spells have been reported.  Patient does complain of having suprapubic pain.  No weight loss we can have been reported.  No change in bowels appetite have been reported.  Patient states she has been feeling well prior to his events.    Review of Systems   Review of Systems   Constitutional: Positive for activity change, appetite change, chills and fever. Negative for diaphoresis.   HENT: Negative for congestion, rhinorrhea, sore throat and trouble swallowing.    Eyes: Negative for visual disturbance.   Respiratory: Negative for cough, chest tightness, shortness of breath and wheezing.    Cardiovascular: Negative for chest pain, palpitations and leg swelling.   Gastrointestinal: Positive for vomiting. Negative for abdominal pain, blood in stool, diarrhea and nausea.   Endocrine: Negative for cold intolerance and heat intolerance.   Genitourinary: Negative for decreased urine volume and difficulty urinating.   Musculoskeletal: Negative for back pain, gait problem and neck pain.   Skin: Negative for rash.   Neurological: Positive for weakness. Negative for dizziness, syncope, light-headedness, numbness and headaches.   Psychiatric/Behavioral: The patient is not nervous/anxious.        History  Past Medical History:   Diagnosis Date   • Atrial  fibrillation    • Carcinoma, female breast, infiltrating lobular     LEFT side with axillary lymph node metastases      • Essential hypertension    • GERD (gastroesophageal reflux disease)    • Heartburn    • History of echocardiogram 10/13/2011   • Localized enlarged lymph nodes    • Lump of breast, left    • Lymphadenopathy      LEFT axilla-this is confirmed by my personal review of the ultrasound      • Osteoarthritis      Past Surgical History:   Procedure Laterality Date   • APPENDECTOMY     • BREAST SURGERY  11/21/2014    Ultrasound directed mammotome biopsy of the left breast with clip placement, lesion at the 5:30 position 5 cm. from the nipple.Left axillary lymph node biopsy, open.   • CHOLECYSTECTOMY     • COLONOSCOPY  12/12/2011    Mild diverticulosis in sigmoid colon. Stool collected for study. Hemorrhoids found.   • COLONOSCOPY N/A 2/7/2017    Procedure: COLONOSCOPY;  Surgeon: Yevgeniy Ware MD;  Location: [facility] ENDOSCOPY;  Service:    • INJECTION OF MEDICATION  08/12/2013    Kenalog (19)      • TONSILLECTOMY     • TUBAL ABDOMINAL LIGATION     • UPPER GASTROINTESTINAL ENDOSCOPY  06/10/2014    Normal hypopharynx, esophagus, symmetrical & patent pylorus. Hiatus hernia in GE junction. Polyps in antrum & fundus. Multiple biopsies taken. Prominant CBD in medial duodenal wall. The ampulla has a normal appearance.     Family History   Problem Relation Age of Onset   • Heart failure Mother      congested   • Lung cancer Father    • Heart attack Other    • Lung cancer Other    • Colon cancer Neg Hx    • Stomach cancer Neg Hx      Social History   Substance Use Topics   • Smoking status: Never Smoker   • Smokeless tobacco: Never Used   • Alcohol use No     Prescriptions Prior to Admission   Medication Sig Dispense Refill Last Dose   • acetaminophen (TYLENOL) 500 MG tablet Take 500 mg by mouth Every 6 (Six) Hours As Needed for Mild Pain .   Taking   • aspirin 81 MG EC tablet Take 1 tablet by mouth Daily.  30 tablet 0 Taking   • atorvastatin (LIPITOR) 10 MG tablet Take 1 tablet by mouth Daily. 30 tablet 5 Taking   • Calcium Carb-Cholecalciferol (CALCIUM 600 + D PO) Take 1 capsule by mouth 2 (Two) Times a Day.   Taking   • Cholecalciferol (VITAMIN D-3) 1000 UNITS capsule Take 1,000 Units by mouth Daily.   Taking   • digoxin (LANOXIN) 125 MCG tablet Take 125 mcg by mouth Daily.   Taking   • diltiazem XR (DILACOR XR) 180 MG 24 hr capsule Take 1 capsule by mouth Daily. (Patient taking differently: Take 120 mg by mouth Daily.) 30 capsule 3 Taking   • Interferon Beta-1b (EXTAVIA SC) Inject  under the skin Every 30 (Thirty) Days.   Taking   • isosorbide mononitrate (IMDUR) 30 MG 24 hr tablet Take 1 tablet by mouth Daily. 30 tablet 0 Taking   • letrozole (FEMARA) 2.5 MG tablet TAKE 1 TABLET BY MOUTH DAILY. 30 tablet 3 Taking   • losartan (COZAAR) 100 MG tablet Take 1 tablet by mouth Daily. 30 tablet 0 Taking   • Magnesium 250 MG tablet Take 1 tablet by mouth Daily.   Taking   • Multiple Vitamins-Minerals (MULTIVITAMINS PLUS ZINC PO) Take 50 mg by mouth Daily.   Taking   • omeprazole (priLOSEC) 40 MG capsule Take 1 capsule by mouth Daily. 90 capsule 1 Taking   • ondansetron (ZOFRAN) 4 MG tablet Take 1 tablet by mouth 4 (Four) Times a Day As Needed for Nausea or Vomiting. 40 tablet 3 Taking   • oxyCODONE-acetaminophen (PERCOCET)  MG per tablet Take 1 tablet by mouth Every 6 (Six) Hours As Needed for Moderate Pain  or Severe Pain . 120 tablet 0 Taking   • oxyCODONE-acetaminophen (PERCOCET) 5-325 MG per tablet Take 1 tablet by mouth Every 8 (Eight) Hours As Needed for Severe Pain . 90 tablet 0 Taking   • Palbociclib (IBRANCE) 100 MG capsule capsule Take 1 capsule by mouth Daily for 21 days. 21 capsule 2    • polyethylene glycol (MIRALAX) powder Take 17 g by mouth Daily. 527 g 1    • rivaroxaban (XARELTO) 20 MG tablet Take 1 tablet by mouth Daily. 30 tablet 11 Taking   • sucralfate (CARAFATE) 1 g tablet TAKE 1 TABLET BY  MOUTH 3 (THREE) TIMES A DAY. 90 tablet 5 Taking   • venlafaxine (EFFEXOR) 37.5 MG tablet TAKE 1 TABLET BY MOUTH DAILY. 90 tablet 1 Taking   • Zinc 50 MG capsule Take  by mouth.   Taking     Allergies:  Tape; Eggs or egg-derived products; Influenza vaccines; Lortab [hydrocodone-acetaminophen]; and Penicillins  Prior to Admission medications    Medication Sig Start Date End Date Taking? Authorizing Provider   acetaminophen (TYLENOL) 500 MG tablet Take 500 mg by mouth Every 6 (Six) Hours As Needed for Mild Pain .   Yes Historical Provider, MD   aspirin 81 MG EC tablet Take 1 tablet by mouth Daily. 8/5/17  Yes Chris Frey MD   atorvastatin (LIPITOR) 10 MG tablet Take 1 tablet by mouth Daily. 8/9/17  Yes Jenaro Wood MD   Calcium Carb-Cholecalciferol (CALCIUM 600 + D PO) Take 1 capsule by mouth 2 (Two) Times a Day.   Yes Historical Provider, MD   Cholecalciferol (VITAMIN D-3) 1000 UNITS capsule Take 1,000 Units by mouth Daily.   Yes Historical Provider, MD   digoxin (LANOXIN) 125 MCG tablet Take 125 mcg by mouth Daily.   Yes Historical Provider, MD   diltiazem XR (DILACOR XR) 180 MG 24 hr capsule Take 1 capsule by mouth Daily.  Patient taking differently: Take 120 mg by mouth Daily. 8/23/17  Yes FADI Childs   Interferon Beta-1b (EXTAVIA SC) Inject  under the skin Every 30 (Thirty) Days.   Yes Historical Provider, MD   isosorbide mononitrate (IMDUR) 30 MG 24 hr tablet Take 1 tablet by mouth Daily. 8/5/17  Yes Chris Frey MD   letrozole (FEMARA) 2.5 MG tablet TAKE 1 TABLET BY MOUTH DAILY. 9/14/17  Yes Joey Ramos MD   losartan (COZAAR) 100 MG tablet Take 1 tablet by mouth Daily. 8/5/17  Yes Chris Frey MD   Magnesium 250 MG tablet Take 1 tablet by mouth Daily.   Yes Historical Provider, MD   Multiple Vitamins-Minerals (MULTIVITAMINS PLUS ZINC PO) Take 50 mg by mouth Daily.   Yes Historical Provider, MD   omeprazole (priLOSEC) 40 MG capsule Take 1 capsule by mouth Daily. 6/28/17  Yes Jenaro GARCIA  MD [place]   ondansetron (ZOFRAN) 4 MG tablet Take 1 tablet by mouth 4 (Four) Times a Day As Needed for Nausea or Vomiting. 5/10/17  Yes Joey Ramos MD   oxyCODONE-acetaminophen (PERCOCET)  MG per tablet Take 1 tablet by mouth Every 6 (Six) Hours As Needed for Moderate Pain  or Severe Pain . 8/25/17  Yes Joey Ramos MD   oxyCODONE-acetaminophen (PERCOCET) 5-325 MG per tablet Take 1 tablet by mouth Every 8 (Eight) Hours As Needed for Severe Pain . 7/14/17  Yes Joey Ramos MD   Palbociclib (IBRANCE) 100 MG capsule capsule Take 1 capsule by mouth Daily for 21 days. 11/2/17 11/23/17 Yes Joey Ramos MD   polyethylene glycol (MIRALAX) powder Take 17 g by mouth Daily. 10/26/17  Yes Joey Ramos MD   rivaroxaban (XARELTO) 20 MG tablet Take 1 tablet by mouth Daily. 8/23/17  Yes FADI Childs   sucralfate (CARAFATE) 1 g tablet TAKE 1 TABLET BY MOUTH 3 (THREE) TIMES A DAY. 9/8/17  Yes Jenaro Wood MD   venlafaxine (EFFEXOR) 37.5 MG tablet TAKE 1 TABLET BY MOUTH DAILY. 9/1/17  Yes Kaitlynn Steele MD   Zinc 50 MG capsule Take  by mouth.   Yes Historical Provider, MD       Objective     Vital Signs    Temp:  [98.7 °F (37.1 °C)-103.2 °F (39.6 °C)] 98.7 °F (37.1 °C)  Heart Rate:  [] 72  Resp:  [18-20] 20  BP: (125-147)/(58-81) 147/65    Physical Exam:      Physical Exam   Constitutional: She is oriented to person, place, and time. She appears well-developed and well-nourished.   HENT:   Head: Normocephalic and atraumatic.   Nose: Nose normal.   Eyes: Conjunctivae and EOM are normal. Pupils are equal, round, and reactive to light.   Neck: Normal range of motion. Neck supple. No JVD present. No tracheal deviation present. No thyromegaly present.   Cardiovascular: Normal rate, regular rhythm, normal heart sounds and intact distal pulses.    Pulmonary/Chest: Effort normal and breath sounds normal. No respiratory distress. She has no wheezes. She has no rales. She exhibits no tenderness.    Abdominal: Soft. Bowel sounds are normal. She exhibits no distension. There is no tenderness. There is no rebound and no guarding.   Musculoskeletal: Normal range of motion. She exhibits no edema.   Lymphadenopathy:     She has no cervical adenopathy.   Neurological: She is alert and oriented to person, place, and time. She has normal reflexes. No cranial nerve deficit.   Skin: Skin is warm and dry.   Intact   Psychiatric: She has a normal mood and affect.   Nursing note and vitals reviewed.      Results Review:       Results from last 7 days  Lab Units 11/21/17  1134   SODIUM mmol/L 138   POTASSIUM mmol/L 3.6   CHLORIDE mmol/L 102   CO2 mmol/L 24.0   BUN mg/dL 24*   CREATININE mg/dL 1.07*   GLUCOSE mg/dL 110*   CALCIUM mg/dL 9.4   BILIRUBIN mg/dL 0.5   ALK PHOS U/L 47   ALT (SGPT) U/L 21   AST (SGOT) U/L 46*               Results from last 7 days  Lab Units 11/21/17  1134   WBC 10*3/mm3 3.13*   HEMOGLOBIN g/dL 9.3*   HEMATOCRIT % 28.3*   PLATELETS 10*3/mm3 101*           Imaging Results (last 7 days)     Procedure Component Value Units Date/Time    XR Chest 2 View [[id number]] Collected:  11/21/17 1136     Updated:  11/21/17 1159    Narrative:       Chest PA and lateral     CLINICAL INDICATION: Shortness of breath. Fever. History of  breast cancer    COMPARISON: CT chest September 8, 2017. Chest x-ray August 24, 2017    FINDINGS: Cardiac silhouette is normal in size. Pulmonary  vascularity is unremarkable.     No focal infiltrate or consolidation.  No pleural effusion.  No  pneumothorax.  Sclerotic foci in the left humerus, thoracic spine, and ribs  compatible with osteoblastic metastases.  No change since prior exam.      Impression:       CONCLUSION: Numerous sclerotic foci, osteoblastic metastases. No  change since prior exam. Lung fields clear. No evidence of active  disease.   Electronically signed by:  Dariusz Lacy MD  11/21/2017 11:58 AM  CST Workstation: [id number]          Assessment/Plan     Principal  "Problem:    Sepsis  Active Problems:    Malignant neoplasm of left breast    Pancytopenia due to antineoplastic chemotherapy    Metastasis to bone    PAF (paroxysmal atrial fibrillation)    Osteoarthritis    GERD (gastroesophageal reflux disease)    Essential hypertension    Atrial fibrillation      -We'll continue with IV antibiotics.  -We'll continue with IV hydration.  -We'll resume patient's home medication as prior to coming hospital.  -We'll follow up with urine cultures closely.  -DVT and GI prophylaxis in place.  -We'll continue monitoring patient hospital setting and treat patient as course dictates.    I discussed the patients findings and my recommendations with patient, family and nursing staff.         This document has been electronically signed by Leonardo Fuentes MD on November 21, 2017 4:00 PM        Total Time Spent: 45 mins    EMR Dragon/Transcription disclaimer:   Dictated utilizing Dragon dictation.            Electronically signed by Leonardo Fuentes MD at 11/21/2017  6:02 PM           Emergency Department Notes      Shantell Teague RN at 11/21/2017 10:38 AM          Pt is presented to Ed with c/o \"shaking all over\" back of elbow and BLE pain.  Pt states the shaking started approximately 0730 this morning.  Pt states the back of legs cramping has been present for approximately one week.     Shantell Teague RN  11/21/17 1039       Electronically signed by Shantell Teague RN at 11/21/2017 10:39 AM      Jaleesa Alejandro RN at 11/21/2017 10:56 AM          Dr. Jhaveri at bedside.      Jaleesa Alejandro RN  11/21/17 1056       Electronically signed by Jaleesa Alejandro RN at 11/21/2017 10:56 AM      Gagan Jhaveri MD at 11/21/2017 11:38 AM          Subjective   HPI Comments: 66yo female pmh significant metastatic lobular breast carcinoma/atrial fibrillation/htn/hyperlipidemia/ gerd, presents ED c/o 1d hx fever/rigors/left elbow arthralgia.  ROS (+) n/v x1.  Denies odynophagia/otalgia/chest " "pain/soa/abd pain/dysuria/diarrhea.    Patient is a 67 y.o. female presenting with fever.   Fever   Temp source:  Rectal  Onset quality:  Sudden  Duration:  1 day  Timing:  Constant  Chronicity:  New  Associated symptoms: chills        Review of Systems   Constitutional: Positive for chills, fatigue and fever.   HENT: Negative.    Eyes: Negative.    Respiratory: Negative.    Cardiovascular: Negative.    Gastrointestinal: Negative.    Endocrine: Negative.    Genitourinary: Negative.    Musculoskeletal: Positive for arthralgias.   Skin: Negative.    Allergic/Immunologic: Positive for immunocompromised state.       Past Medical History:   Diagnosis Date   • Atrial fibrillation    • Carcinoma, female breast, infiltrating lobular     LEFT side with axillary lymph node metastases      • Essential hypertension    • GERD (gastroesophageal reflux disease)    • Heartburn    • History of echocardiogram 10/13/2011   • Localized enlarged lymph nodes    • Lump of breast, left    • Lymphadenopathy      LEFT axilla-this is confirmed by my personal review of the ultrasound      • Osteoarthritis        Allergies   Allergen Reactions   • Tape Itching   • Eggs Or Egg-Derived Products Diarrhea and Nausea And Vomiting   • Influenza Vaccines    • Lortab [Hydrocodone-Acetaminophen] Hallucinations   • Penicillins      \"PASS OUT\"       Past Surgical History:   Procedure Laterality Date   • APPENDECTOMY     • BREAST SURGERY  11/21/2014    Ultrasound directed mammotome biopsy of the left breast with clip placement, lesion at the 5:30 position 5 cm. from the nipple.Left axillary lymph node biopsy, open.   • CHOLECYSTECTOMY     • COLONOSCOPY  12/12/2011    Mild diverticulosis in sigmoid colon. Stool collected for study. Hemorrhoids found.   • COLONOSCOPY N/A 2/7/2017    Procedure: COLONOSCOPY;  Surgeon: Yevgeniy Ware MD;  Location: [facility] ENDOSCOPY;  Service:    • INJECTION OF MEDICATION  08/12/2013    Kenalog (19)      • TONSILLECTOMY "     • TUBAL ABDOMINAL LIGATION     • UPPER GASTROINTESTINAL ENDOSCOPY  06/10/2014    Normal hypopharynx, esophagus, symmetrical & patent pylorus. Hiatus hernia in GE junction. Polyps in antrum & fundus. Multiple biopsies taken. Prominant CBD in medial duodenal wall. The ampulla has a normal appearance.       Family History   Problem Relation Age of Onset   • Heart failure Mother      congested   • Lung cancer Father    • Heart attack Other    • Lung cancer Other    • Colon cancer Neg Hx    • Stomach cancer Neg Hx        Social History     Social History   • Marital status:      Spouse name: N/A   • Number of children: N/A   • Years of education: N/A     Social History Main Topics   • Smoking status: Never Smoker   • Smokeless tobacco: Never Used   • Alcohol use No   • Drug use: No   • Sexual activity: Defer     Other Topics Concern   • None     Social History Narrative           Objective   Physical Exam   Constitutional: She is oriented to person, place, and time. She appears well-developed and well-nourished.   HENT:   Head: Normocephalic and atraumatic.   Mouth/Throat: Oropharynx is clear and moist.   Eyes: Pupils are equal, round, and reactive to light.   Neck: Normal range of motion. Neck supple. No JVD present. No tracheal deviation present.   Neg meningismus   Cardiovascular: Normal rate, regular rhythm, normal heart sounds and intact distal pulses.  Exam reveals no gallop and no friction rub.    No murmur heard.  Pulmonary/Chest: Effort normal and breath sounds normal. She has no wheezes. She has no rales.   Abdominal: Soft. Bowel sounds are normal. There is no tenderness. There is no rebound and no guarding.   Musculoskeletal: Normal range of motion. She exhibits no edema or tenderness.   Lymphadenopathy:     She has no cervical adenopathy.   Neurological: She is alert and oriented to person, place, and time. GCS eye subscore is 4. GCS verbal subscore is 5. GCS motor subscore is 6.   Skin: Skin is  warm and dry.   Nursing note and vitals reviewed.      Procedures        ED Course  ED Course      Labs Reviewed   DIGOXIN LEVEL - Abnormal; Notable for the following:        Result Value    Digoxin 0.70 (*)     All other components within normal limits   COMPREHENSIVE METABOLIC PANEL - Abnormal; Notable for the following:     Glucose 110 (*)     BUN 24 (*)     Creatinine 1.07 (*)     AST (SGOT) 46 (*)     All other components within normal limits   CBC WITH AUTO DIFFERENTIAL - Abnormal; Notable for the following:     WBC 3.13 (*)     RBC 2.79 (*)     Hemoglobin 9.3 (*)     Hematocrit 28.3 (*)     .4 (*)     RDW 19.8 (*)     RDW-SD 73.5 (*)     Platelets 101 (*)     Lymphocytes, Absolute 0.50 (*)     All other components within normal limits   LACTIC ACID, PLASMA - Normal   BLOOD CULTURE   BLOOD CULTURE   URINE CULTURE   INFLUENZA ANTIGEN, RAPID   SCAN SLIDE   URINALYSIS W/ CULTURE IF INDICATED   URINALYSIS, MICROSCOPIC ONLY   CBC AND DIFFERENTIAL    Narrative:     The following orders were created for panel order CBC & Differential.  Procedure                               Abnormality         Status                     ---------                               -----------         ------                     Scan Slide[[id number]]                                       Final result               CBC Auto Differential[[id number]]        Abnormal            Final result                 Please view results for these tests on the individual orders.     Xr Chest 2 View    Result Date: 11/21/2017  Narrative: Chest PA and lateral CLINICAL INDICATION: Shortness of breath. Fever. History of breast cancer COMPARISON: CT chest September 8, 2017. Chest x-ray August 24, 2017 FINDINGS: Cardiac silhouette is normal in size. Pulmonary vascularity is unremarkable. No focal infiltrate or consolidation.  No pleural effusion.  No pneumothorax. Sclerotic foci in the left humerus, thoracic spine, and ribs compatible with osteoblastic  metastases. No change since prior exam.     Impression: CONCLUSION: Numerous sclerotic foci, osteoblastic metastases. No change since prior exam. Lung fields clear. No evidence of active disease. Electronically signed by:  Dariusz Lacy MD  11/21/2017 11:58 AM CST Workstation: [id number]                MDM    Final diagnoses:   Sepsis, due to unspecified organism   Pancytopenia   Malignant neoplasm of female breast, unspecified estrogen receptor status, unspecified laterality, unspecified site of breast            Gagan Jhaveri MD  11/21/17 1355       Electronically signed by Gagan Jhaveri MD at 11/21/2017  1:55 PM      Jaleesa Alejandro RN at 11/21/2017 11:43 AM          Lab notified to collect 2nd set of cultures.      Jaleesa Alejandro RN  11/21/17 1144       Electronically signed by Jaleesa Alejandro RN at 11/21/2017 11:44 AM      Roselyn Brady RN at 11/21/2017  3:19 PM          Attempted to call report to [facility].  No one available at this time to take report. Shandra ROWELL to call back as soon as possible     Roselyn Brady RN  11/21/17 1519       Roselyn Brady RN  11/21/17 1520       Electronically signed by Roselyn Brady RN at 11/21/2017  3:20 PM      Puja Cueva at 11/21/2017  3:25 PM           assigned at 1523     Puja Cueva  11/21/17 1525       Electronically signed by Puja Cueva at 11/21/2017  3:25 PM        Vital Signs (last 72 hrs)       11/21 0700  -  11/22 0659 11/22 0700  -  11/23 0659 11/23 0700  -  11/24 0659 11/24 0700  -  11/24 1009   Most Recent    Temp (°F) 96.8 -  (!)103.2    97 -  97.4    97.2 -  98      96.8     96.8 (36)    Heart Rate 72 -  110    62 -  88    60 -  88    73 -  75     73    Resp 18 -  20      18      18      16     16    /58 -  147/65    119/61 -  164/74    122/60 -  160/75      146/69     146/69    SpO2 (%) 91 -  99    91 -  97    94 -  97      93     93          Hospital Medications (active)       Dose Frequency Start End     acetaminophen (TYLENOL) tablet 650 mg 650 mg Every 4 Hours PRN 11/21/2017     Sig - Route: Take 2 tablets by mouth Every 4 (Four) Hours As Needed for Mild Pain . - Oral    aspirin EC tablet 81 mg 81 mg Daily 11/21/2017     Sig - Route: Take 1 tablet by mouth Daily. - Oral    atorvastatin (LIPITOR) tablet 10 mg 10 mg Nightly 11/21/2017     Sig - Route: Take 1 tablet by mouth Every Night. - Oral    bisacodyl (DULCOLAX) suppository 10 mg 10 mg Daily PRN 11/21/2017     Sig - Route: Insert 1 suppository into the rectum Daily As Needed for Constipation. - Rectal    cefTRIAXone (ROCEPHIN) in SWFI 1 gram/10ml IV PUSH syringe 1 g Every 24 Hours 11/22/2017 12/2/2017    Sig - Route: Infuse 10 mL into a venous catheter Daily. - Intravenous    digoxin (LANOXIN) tablet 125 mcg 125 mcg Daily Digoxin 11/22/2017     Sig - Route: Take 1 tablet by mouth Daily. - Oral    diltiaZEM CD (CARDIZEM CD) 24 hr capsule 180 mg 180 mg Every 24 Hours Scheduled 11/21/2017     Sig - Route: Take 1 capsule by mouth Daily. - Oral    docusate sodium (COLACE) capsule 200 mg 200 mg 2 Times Daily 11/21/2017     Sig - Route: Take 2 capsules by mouth 2 (Two) Times a Day. - Oral    famotidine (PEPCID) tablet 20 mg 20 mg 2 Times Daily 11/21/2017     Sig - Route: Take 1 tablet by mouth 2 (Two) Times a Day. - Oral    hydrALAZINE (APRESOLINE) injection 10 mg 10 mg Every 6 Hours PRN 11/21/2017     Sig - Route: Infuse 0.5 mL into a venous catheter Every 6 (Six) Hours As Needed for High Blood Pressure (SBP > 160). - Intravenous    isosorbide mononitrate (IMDUR) 24 hr tablet 30 mg 30 mg Every 24 Hours Scheduled 11/21/2017     Sig - Route: Take 1 tablet by mouth Daily. - Oral    letrozole (FEMARA) tablet 2.5 mg 2.5 mg Daily 11/21/2017     Sig - Route: Take 1 tablet by mouth Daily. - Oral    losartan (COZAAR) tablet 100 mg 100 mg Every 24 Hours Scheduled 11/21/2017     Sig - Route: Take 2 tablets by mouth Daily. - Oral    magnesium hydroxide (MILK OF MAGNESIA)  "suspension 2400 mg/10mL 10 mL 10 mL Daily PRN 11/21/2017     Sig - Route: Take 10 mL by mouth Daily As Needed for Constipation. - Oral    ondansetron (ZOFRAN) injection 4 mg 4 mg Every 6 Hours PRN 11/21/2017     Sig - Route: Infuse 2 mL into a venous catheter Every 6 (Six) Hours As Needed for Nausea or Vomiting. - Intravenous    Linked Group 1:  \"Or\" Linked Group Details        ondansetron (ZOFRAN) tablet 4 mg 4 mg 4 Times Daily PRN 11/21/2017     Sig - Route: Take 1 tablet by mouth 4 (Four) Times a Day As Needed for Nausea or Vomiting. - Oral    ondansetron ODT (ZOFRAN-ODT) disintegrating tablet 4 mg 4 mg Every 6 Hours PRN 11/21/2017     Sig - Route: Take 1 tablet by mouth Every 6 (Six) Hours As Needed for Nausea or Vomiting. - Oral    Linked Group 1:  \"Or\" Linked Group Details        oxyCODONE-acetaminophen (PERCOCET)  MG per tablet 1 tablet 1 tablet Every 6 Hours PRN 11/21/2017     Sig - Route: Take 1 tablet by mouth Every 6 (Six) Hours As Needed for Moderate Pain  or Severe Pain . - Oral    oxyCODONE-acetaminophen (PERCOCET) 5-325 MG per tablet 1 tablet 1 tablet Every 8 Hours PRN 11/21/2017     Sig - Route: Take 1 tablet by mouth Every 8 (Eight) Hours As Needed for Severe Pain . - Oral    pantoprazole (PROTONIX) EC tablet 40 mg 40 mg Every Morning 11/22/2017     Sig - Route: Take 1 tablet by mouth Every Morning. - Oral    polyethylene glycol (MIRALAX) powder 17 g 17 g Daily 11/21/2017     Sig - Route: Take 17 g by mouth Daily. - Oral    potassium chloride (KLOR-CON) packet 40 mEq 40 mEq As Needed 11/22/2017     Sig - Route: Take 40 mEq by mouth As Needed (potassium replacement, see admin instructions). - Oral    potassium chloride (MICRO-K) CR capsule 40 mEq 40 mEq As Needed 11/22/2017     Sig - Route: Take 4 capsules by mouth As Needed (potassium replacement.  see admin instructions). - Oral    rivaroxaban (XARELTO) tablet 20 mg 20 mg Daily With Dinner 11/21/2017     Sig - Route: Take 2 tablets by mouth " "Daily With Dinner. - Oral    sodium chloride 0.9 % flush 1-10 mL 1-10 mL As Needed 11/21/2017     Sig - Route: Infuse 1-10 mL into a venous catheter As Needed for Line Care. - Intravenous    sodium chloride 0.9 % flush 10 mL 10 mL As Needed 11/21/2017     Sig - Route: Infuse 10 mL into a venous catheter As Needed for Line Care. - Intravenous    Linked Group 2:  \"And\" Linked Group Details        sodium chloride 0.9 % infusion 100 mL/hr Continuous 11/21/2017     Sig - Route: Infuse 100 mL/hr into a venous catheter Continuous. - Intravenous    sucralfate (CARAFATE) tablet 1 g 1 g 4 Times Daily Before Meals & Nightly 11/21/2017     Sig - Route: Take 1 tablet by mouth 4 (Four) Times a Day Before Meals & at Bedtime. - Oral    venlafaxine (EFFEXOR) tablet 37.5 mg 37.5 mg 2 Times Daily With Meals 11/21/2017     Sig - Route: Take 1 tablet by mouth 2 (Two) Times a Day With Meals. - Oral          Lab Results (all)     Procedure Component Value Units Date/Time    Comprehensive Metabolic Panel [[id number]]  (Abnormal) Collected:  11/21/17 1134    Specimen:  Blood Updated:  11/21/17 1158     Glucose 110 (H) mg/dL      BUN 24 (H) mg/dL      Creatinine 1.07 (H) mg/dL      Sodium 138 mmol/L      Potassium 3.6 mmol/L      Chloride 102 mmol/L      CO2 24.0 mmol/L      Calcium 9.4 mg/dL      Total Protein 7.3 g/dL      Albumin 3.90 g/dL      ALT (SGPT) 21 U/L      AST (SGOT) 46 (H) U/L      Alkaline Phosphatase 47 U/L      Total Bilirubin 0.5 mg/dL      eGFR Non African Amer 51 mL/min/1.73      Globulin 3.4 gm/dL      A/G Ratio 1.1 g/dL      BUN/Creatinine Ratio 22.4     Anion Gap 12.0 mmol/L     Lactic Acid, Plasma [[id number]]  (Normal) Collected:  11/21/17 1134    Specimen:  Blood Updated:  11/21/17 1158     Lactate 1.6 mmol/L     Digoxin Level [[id number]]  (Abnormal) Collected:  11/21/17 1134    Specimen:  Blood Updated:  11/21/17 1211     Digoxin 0.70 (L) ng/mL     CBC Auto Differential [[id number]]  (Abnormal) Collected:  11/21/17 " 1134    Specimen:  Blood Updated:  11/21/17 1229     WBC 3.13 (L) 10*3/mm3      RBC 2.79 (L) 10*6/mm3      Hemoglobin 9.3 (L) g/dL      Hematocrit 28.3 (L) %      .4 (H) fL      MCH 33.3 pg      MCHC 32.9 g/dL      RDW 19.8 (H) %      RDW-SD 73.5 (H) fl      MPV 9.2 fL      Platelets 101 (L) 10*3/mm3       Verified results repeat analysis        Neutrophil % 77.3 %      Lymphocyte % 16.0 %      Monocyte % 5.1 %      Eosinophil % 0.3 %      Basophil % 1.0 %      Immature Grans % 0.3 %      Neutrophils, Absolute 2.42 10*3/mm3      Lymphocytes, Absolute 0.50 (L) 10*3/mm3      Monocytes, Absolute 0.16 10*3/mm3      Eosinophils, Absolute 0.01 10*3/mm3      Basophils, Absolute 0.03 10*3/mm3      Immature Grans, Absolute 0.01 10*3/mm3      nRBC 0.0 /100 WBC     CBC & Differential [[id number]] Collected:  11/21/17 1134    Specimen:  Blood Updated:  11/21/17 1249    Narrative:       The following orders were created for panel order CBC & Differential.  Procedure                               Abnormality         Status                     ---------                               -----------         ------                     Scan Slide[[id number]]                                       Final result               CBC Auto Differential[[id number]]        Abnormal            Final result                 Please view results for these tests on the individual orders.    Scan Slide [[id number]] Collected:  11/21/17 1134    Specimen:  Blood Updated:  11/21/17 1249     Anisocytosis Large/3+     Hypochromia --      Few hypochromic cells.         Polychromasia Slight/1+     WBC Morphology Normal     Platelet Estimate Decreased    Urinalysis With / Culture If Indicated - Urine, Clean Catch [[id number]]  (Abnormal) Collected:  11/21/17 1204    Specimen:  Urine from Urine, Clean Catch Updated:  11/21/17 1357     Color, UA Yellow     Appearance, UA Cloudy (A)     pH, UA 6.5     Specific Gravity, UA 1.018     Glucose, UA Negative      Ketones, UA Negative     Bilirubin, UA Negative     Blood, UA Moderate (2+) (A)     Protein, UA Negative     Leuk Esterase, UA Large (3+) (A)     Nitrite, UA Positive (A)     Urobilinogen, UA 0.2 E.U./dL    Urinalysis, Microscopic Only - Urine, Clean Catch [[id number]]  (Abnormal) Collected:  11/21/17 1204    Specimen:  Urine from Urine, Clean Catch Updated:  11/21/17 1400     RBC, UA 6-12 (A) /HPF      WBC, UA Too Numerous to Count (A) /HPF      Bacteria, UA 4+ (A) /HPF      Squamous Epithelial Cells, UA None Seen /HPF      Hyaline Casts, UA 0-2 /LPF      Methodology Automated Microscopy    Influenza Antigen, Rapid - Swab, Nasopharynx [[id number]]  (Normal) Collected:  11/21/17 1439    Specimen:  Swab from Nasopharynx Updated:  11/21/17 1514     Influenza A Ag, EIA Negative     Influenza B Ag, EIA Negative    Urinalysis With / Culture If Indicated - [[id number]]  (Abnormal) Collected:  11/21/17 1649    Specimen:  Urine Updated:  11/21/17 1704     Color, UA Yellow     Appearance, UA Cloudy (A)     pH, UA 7.0     Specific Gravity, UA 1.012     Glucose, UA Negative     Ketones, UA Negative     Bilirubin, UA Negative     Blood, UA Small (1+) (A)     Protein, UA Negative     Leuk Esterase, UA Large (3+) (A)     Nitrite, UA Positive (A)     Urobilinogen, UA 0.2 E.U./dL    Urinalysis, Microscopic Only - Urine, Clean Catch [[id number]]  (Abnormal) Collected:  11/21/17 1649    Specimen:  Urine from Urine, Clean Catch Updated:  11/21/17 1705     RBC, UA 3-5 (A) /HPF      WBC, UA Too Numerous to Count (A) /HPF      Bacteria, UA None Seen /HPF      Squamous Epithelial Cells, UA None Seen /HPF      Hyaline Casts, UA 0-2 /LPF      Methodology Automated Microscopy    S. Pneumo Ag Urine or CSF - Urine, Urine, Clean Catch [[id number]]  (Normal) Collected:  11/21/17 1649    Specimen:  Urine from Urine, Clean Catch Updated:  11/21/17 1816     Strep Pneumo Ag Negative    Blood Culture ID, PCR - Blood, [[id number]]  (Abnormal) Collected:   11/21/17 1134    Specimen:  Blood from Arm, Right Updated:  11/22/17 0328     BCID, PCR Escherichia coli. Identification by BCID PCR. (C)    Narrative:         Sensitivity to Follow    CBC Auto Differential [[id number]]  (Abnormal) Collected:  11/22/17 0538    Specimen:  Blood Updated:  11/22/17 0609     WBC 3.01 (L) 10*3/mm3      RBC 2.43 (L) 10*6/mm3      Hemoglobin 8.0 (L) g/dL      Hematocrit 25.0 (L) %      .9 (H) fL      MCH 32.9 pg      MCHC 32.0 g/dL      RDW 19.7 (H) %      RDW-SD 74.7 (H) fl      MPV 9.9 fL      Platelets 86 (L) 10*3/mm3       SPECIMEN REANALYZED TO CONFIRM  PLATELET COUNT        Neutrophil % 63.1 %      Lymphocyte % 30.6 %      Monocyte % 5.0 %      Eosinophil % 0.3 %      Basophil % 1.0 %      Immature Grans % 0.0 %      Neutrophils, Absolute 1.90 (L) 10*3/mm3      Lymphocytes, Absolute 0.92 10*3/mm3      Monocytes, Absolute 0.15 10*3/mm3      Eosinophils, Absolute 0.01 10*3/mm3      Basophils, Absolute 0.03 10*3/mm3      Immature Grans, Absolute 0.00 10*3/mm3      nRBC 0.0 /100 WBC     Magnesium [[id number]]  (Normal) Collected:  11/22/17 0538    Specimen:  Blood Updated:  11/22/17 0631     Magnesium 1.9 mg/dL     Comprehensive Metabolic Panel [[id number]]  (Abnormal) Collected:  11/22/17 0538    Specimen:  Blood Updated:  11/22/17 0631     Glucose 111 (H) mg/dL      BUN 17 mg/dL      Creatinine 1.03 (H) mg/dL      Sodium 138 mmol/L      Potassium 3.4 (L) mmol/L      Chloride 104 mmol/L      CO2 24.0 mmol/L      Calcium 7.9 (L) mg/dL      Total Protein 6.4 g/dL      Albumin 3.20 (L) g/dL      ALT (SGPT) 14 U/L      AST (SGOT) 30 U/L      Alkaline Phosphatase 39 U/L      Total Bilirubin 0.5 mg/dL      eGFR Non African Amer 53 (L) mL/min/1.73      Globulin 3.2 gm/dL      A/G Ratio 1.0 (L) g/dL      BUN/Creatinine Ratio 16.5     Anion Gap 10.0 mmol/L     Urinalysis With / Microscopic If Indicated - Urine, Clean Catch [[id number]]  (Abnormal) Collected:  11/22/17 7520    Specimen:  Urine  from Urine, Clean Catch Updated:  11/22/17 2147     Color, UA Yellow     Appearance, UA Clear     pH, UA 6.5     Specific Gravity, UA 1.010     Glucose, UA Negative     Ketones, UA Negative     Bilirubin, UA Negative     Blood, UA Trace (A)     Protein, UA Negative     Leuk Esterase, UA Moderate (2+) (A)     Nitrite, UA Negative     Urobilinogen, UA 1.0 E.U./dL    Urinalysis, Microscopic Only - Urine, Clean Catch [[id number]]  (Abnormal) Collected:  11/22/17 2138    Specimen:  Urine from Urine, Clean Catch Updated:  11/22/17 2148     RBC, UA 0-2 (A) /HPF      WBC, UA 6-12 (A) /HPF      Bacteria, UA None Seen /HPF      Squamous Epithelial Cells, UA 3-5 (A) /HPF      Hyaline Casts, UA 0-2 /LPF      Methodology Automated Microscopy    Potassium [[id number]]  (Normal) Collected:  11/23/17 0029    Specimen:  Blood Updated:  11/23/17 0046     Potassium 4.0 mmol/L     Urine Culture - Urine, Urine, Clean Catch [[id number]]  (Normal) Collected:  11/21/17 1649    Specimen:  Urine from Urine, Clean Catch Updated:  11/23/17 0527     Urine Culture No growth at 24 hours    CBC Auto Differential [[id number]]  (Abnormal) Collected:  11/23/17 0539    Specimen:  Blood Updated:  11/23/17 0626     WBC 2.42 (L) 10*3/mm3      RBC 2.36 (L) 10*6/mm3      Hemoglobin 7.8 (L) g/dL      Hematocrit 23.8 (L) %      .8 (H) fL      MCH 33.1 pg      MCHC 32.8 g/dL      RDW 18.8 (H) %      RDW-SD 70.1 (H) fl      MPV 10.6 fL      Platelets 68 (L) 10*3/mm3      Neutrophil % 50.8 %      Lymphocyte % 38.0 %      Monocyte % 7.4 %      Eosinophil % 1.7 %      Basophil % 2.1 (H) %      Immature Grans % 0.0 %      Neutrophils, Absolute 1.23 (L) 10*3/mm3      Lymphocytes, Absolute 0.92 10*3/mm3      Monocytes, Absolute 0.18 10*3/mm3      Eosinophils, Absolute 0.04 10*3/mm3      Basophils, Absolute 0.05 10*3/mm3      Immature Grans, Absolute 0.00 10*3/mm3     Magnesium [[id number]]  (Normal) Collected:  11/23/17 0538    Specimen:  Blood Updated:  11/23/17  0628     Magnesium 2.0 mg/dL     Comprehensive Metabolic Panel [[id number]]  (Abnormal) Collected:  11/23/17 0538    Specimen:  Blood Updated:  11/23/17 0647     Glucose 103 (H) mg/dL      BUN 12 mg/dL      Creatinine 0.93 mg/dL      Sodium 139 mmol/L      Potassium 4.0 mmol/L      Chloride 108 mmol/L      CO2 22.0 mmol/L      Calcium 7.2 (L) mg/dL      Total Protein 6.1 (L) g/dL      Albumin 2.90 (L) g/dL      ALT (SGPT) 21 U/L      AST (SGOT) 29 U/L      Alkaline Phosphatase 34 (L) U/L      Total Bilirubin 0.3 mg/dL      eGFR Non African Amer 60 (L) mL/min/1.73      Globulin 3.2 gm/dL      A/G Ratio 0.9 (L) g/dL      BUN/Creatinine Ratio 12.9     Anion Gap 9.0 mmol/L     Urine Culture - Urine, Urine, Clean Catch [[id number]]  (Abnormal)  (Susceptibility) Collected:  11/21/17 1204    Specimen:  Urine from Urine, Clean Catch Updated:  11/23/17 0716     Urine Culture --      >100,000 CFU/mL Escherichia coli (A)    Susceptibility      Escherichia coli     TRENT     Amikacin <=16 ug/ml Susceptible     Amoxicillin + Clavulanate <=8/4 ug/ml Susceptible     Ampicillin <=8 ug/ml Susceptible     Ampicillin + Sulbactam <=8/4 ug/ml Susceptible     Cefazolin <=8 ug/ml Susceptible     Cefotaxime <=2 ug/ml Susceptible     Cefoxitin <=8 ug/ml Susceptible     Ceftazidime <=1 ug/ml Susceptible     Ceftriaxone <=8 ug/ml Susceptible     Cefuroxime sodium <=4 ug/ml Susceptible     Ciprofloxacin <=1 ug/ml Susceptible     Gentamicin <=4 ug/ml Susceptible     Imipenem <=4 ug/ml Susceptible     Levofloxacin <=2 ug/ml Susceptible     Meropenem <=4 ug/ml Susceptible     Nitrofurantoin <=32 ug/ml Susceptible     Piperacillin + Tazobactam <=16 ug/ml Susceptible     Tetracycline <=4 ug/ml Susceptible     Tobramycin <=4 ug/ml Susceptible     Trimethoprim + Sulfamethoxazole <=2/38 ug/ml Susceptible                    Blood Culture - Blood, [[id number]]  (Normal) Collected:  11/23/17 1035    Specimen:  Blood from Arm, Right Updated:  11/23/17 4030      Blood Culture No growth at less than 24 hours    Blood Culture - Blood, [[id number]]  (Normal) Collected:  11/23/17 1035    Specimen:  Blood from Wrist, Right Updated:  11/23/17 2246     Blood Culture No growth at less than 24 hours    CBC Auto Differential [[id number]]  (Abnormal) Collected:  11/24/17 0607    Specimen:  Blood Updated:  11/24/17 0652     WBC 2.19 (L) 10*3/mm3      RBC 2.57 (L) 10*6/mm3      Hemoglobin 8.5 (L) g/dL      Hematocrit 25.5 (L) %      MCV 99.2 (H) fL      MCH 33.1 pg      MCHC 33.3 g/dL      RDW 18.7 (H) %      RDW-SD 68.1 (H) fl      MPV 10.3 fL      Platelets 84 (L) 10*3/mm3      Neutrophil % 45.2 %      Lymphocyte % 43.8 %      Monocyte % 6.4 %      Eosinophil % 1.4 %      Basophil % 3.2 (H) %      Immature Grans % 0.0 %      Neutrophils, Absolute 0.99 (L) 10*3/mm3      Lymphocytes, Absolute 0.96 10*3/mm3      Monocytes, Absolute 0.14 10*3/mm3      Eosinophils, Absolute 0.03 10*3/mm3      Basophils, Absolute 0.07 10*3/mm3      Immature Grans, Absolute 0.00 10*3/mm3     Magnesium [[id number]]  (Normal) Collected:  11/24/17 0607    Specimen:  Blood Updated:  11/24/17 0657     Magnesium 2.0 mg/dL     Comprehensive Metabolic Panel [[id number]]  (Abnormal) Collected:  11/24/17 0607    Specimen:  Blood Updated:  11/24/17 0659     Glucose 105 (H) mg/dL      BUN 10 mg/dL      Creatinine 0.95 mg/dL      Sodium 137 mmol/L      Potassium 3.6 mmol/L      Chloride 108 mmol/L      CO2 24.0 mmol/L      Calcium 7.6 (L) mg/dL      Total Protein 6.5 g/dL      Albumin 3.20 (L) g/dL      ALT (SGPT) 25 U/L      AST (SGOT) 49 (H) U/L      Alkaline Phosphatase 38 U/L      Total Bilirubin 0.3 mg/dL      eGFR Non African Amer 58 mL/min/1.73      Globulin 3.3 gm/dL      A/G Ratio 1.0 (L) g/dL      BUN/Creatinine Ratio 10.5     Anion Gap 5.0 mmol/L     Blood Culture - Blood, [[id number]]  (Abnormal)  (Susceptibility) Collected:  11/21/17 1134    Specimen:  Blood from Arm, Right Updated:  11/24/17 0700     Blood  Culture --      Escherichia coli (A)     Isolated from Anaerobic Bottle     Gram Stain Result Anaerobic Bottle Gram negative bacilli      1 of 4 Positive         Aerobic Bottle Gram negative bacilli      4 of 4 positive        Susceptibility      Escherichia coli     TRENT     Amikacin <=16 ug/ml Susceptible     Amoxicillin + Clavulanate <=8/4 ug/ml Susceptible     Ampicillin <=8 ug/ml Susceptible     Ampicillin + Sulbactam <=8/4 ug/ml Susceptible     Cefazolin <=8 ug/ml Susceptible     Cefotaxime <=2 ug/ml Susceptible     Cefoxitin <=8 ug/ml Susceptible     Ceftazidime <=1 ug/ml Susceptible     Ceftriaxone <=8 ug/ml Susceptible     Cefuroxime sodium <=4 ug/ml Susceptible     Ciprofloxacin <=1 ug/ml Susceptible     Gentamicin <=4 ug/ml Susceptible     Imipenem <=4 ug/ml Susceptible     Levofloxacin <=2 ug/ml Susceptible     Meropenem <=4 ug/ml Susceptible     Piperacillin + Tazobactam <=16 ug/ml Susceptible     Tetracycline <=4 ug/ml Susceptible     Tobramycin <=4 ug/ml Susceptible     Trimethoprim + Sulfamethoxazole <=2/38 ug/ml Susceptible                    Blood Culture - Blood, [[id number]]  (Abnormal) Collected:  11/21/17 1230    Specimen:  Blood from Arm, Right Updated:  11/24/17 5483     Blood Culture --      Escherichia coli (A)      for sensitivity see previous report          Gram Stain Result Anaerobic Bottle Gram negative bacilli      2 of 4 Positive         Aerobic Bottle Gram negative bacilli      3 POSITIVE OF 4 DRAWN             Imaging Results (all)     Procedure Component Value Units Date/Time    XR Chest 2 View [[id number]] Collected:  11/21/17 1136     Updated:  11/21/17 1159    Narrative:       Chest PA and lateral     CLINICAL INDICATION: Shortness of breath. Fever. History of  breast cancer    COMPARISON: CT chest September 8, 2017. Chest x-ray August 24, 2017    FINDINGS: Cardiac silhouette is normal in size. Pulmonary  vascularity is unremarkable.     No focal infiltrate or consolidation.  No  pleural effusion.  No  pneumothorax.  Sclerotic foci in the left humerus, thoracic spine, and ribs  compatible with osteoblastic metastases.  No change since prior exam.      Impression:       CONCLUSION: Numerous sclerotic foci, osteoblastic metastases. No  change since prior exam. Lung fields clear. No evidence of active  disease.   Electronically signed by:  Dariusz Lacy MD  11/21/2017 11:58 AM  [facility] Workstation: [email or web address]             Physician Progress Notes (most recent note)      FADI Mijares at 11/23/2017  9:33 AM  Version 1 of 1    Attestation signed by Leonardo Fuentes MD at 11/23/2017  7:07 PM        Temp:  [97.1 °F (36.2 °C)-97.4 °F (36.3 °C)] 97.2 °F (36.2 °C)  Heart Rate:  [62-88] 75  Resp:  [18] 18  BP: (119-164)/(61-74) 138/68     Physical Exam   Constitutional: She is oriented to person, place, and time. She appears well-developed and well-nourished.   HENT:   Head: Normocephalic and atraumatic.   Eyes: Conjunctivae are normal.   Neck: Neck supple.   Cardiovascular: Normal rate, normal heart sounds and intact distal pulses.    Pulmonary/Chest: Effort normal and breath sounds normal.   Abdominal: Soft. Bowel sounds are normal.   Musculoskeletal: Normal range of motion.   Neurological: She is alert and oriented to person, place, and time.   Skin: Skin is warm and dry. There is pallor.   Psychiatric: She has a normal mood and affect. Her behavior is normal.   Vitals reviewed.    I have reviewed the documentation above and agree.            I personally evaluated and examined the patient in conjunction with FADI Mijares and agree with the assessment, treatment plan, and disposition of the patient as recorded.        This document has been electronically signed by Leonardo Fuentes MD on November 23, 2017 7:07 PM                                      [facility] Medicine Services  INPATIENT PROGRESS NOTE    Length of Stay: 2  Date of Admission:  11/21/2017  Primary Care Physician: Jenaro Wood MD    Subjective   Chief Complaint: chills  HPI:  67 year old  female with past medical history of metastatic breast cancer, atrial fibrillation, HTN, who presented on 11/21/17 with chills and shaking.  She is currently under outpatient chemotherapy for metastatic breast cancer.  She has been found to have E coli cystitis and bacteremia and is being treated with Rocephin.  She complains of weakness and chills overnight but has been afebrile.  Repeat blood cultures still show growth of gram negative bacilli at this time.     Review of Systems   Constitutional: Positive for chills and fatigue. Negative for fever.   Respiratory: Negative for shortness of breath and wheezing.    Cardiovascular: Negative for chest pain, palpitations and leg swelling.   Gastrointestinal: Negative for constipation, diarrhea, nausea and vomiting.   Genitourinary: Negative for difficulty urinating, dysuria and flank pain.   Musculoskeletal: Negative for back pain.   Skin: Positive for pallor.   Neurological: Negative for dizziness, weakness and light-headedness.   Psychiatric/Behavioral: Negative for confusion.        All pertinent negatives and positives are as above. All other systems have been reviewed and are negative unless otherwise stated.     Objective    Temp:  [97.1 °F (36.2 °C)-97.4 °F (36.3 °C)] 97.2 °F (36.2 °C)  Heart Rate:  [62-88] 75  Resp:  [18] 18  BP: (119-164)/(61-74) 138/68    Physical Exam   Constitutional: She is oriented to person, place, and time. She appears well-developed and well-nourished.   HENT:   Head: Normocephalic and atraumatic.   Eyes: Conjunctivae are normal.   Neck: Neck supple.   Cardiovascular: Normal rate, normal heart sounds and intact distal pulses.    Pulmonary/Chest: Effort normal and breath sounds normal.   Abdominal: Soft. Bowel sounds are normal.   Musculoskeletal: Normal range of motion.   Neurological: She is alert and oriented  to person, place, and time.   Skin: Skin is warm and dry. There is pallor.   Psychiatric: She has a normal mood and affect. Her behavior is normal.   Vitals reviewed.          Results Review:  I have reviewed the labs, radiology results, and diagnostic studies.    Laboratory Data:     Results from last 7 days  Lab Units 11/23/17  0538 11/23/17  0029 11/22/17  0538 11/21/17  1134   SODIUM mmol/L 139  --  138 138   POTASSIUM mmol/L 4.0 4.0 3.4* 3.6   CHLORIDE mmol/L 108  --  104 102   CO2 mmol/L 22.0  --  24.0 24.0   BUN mg/dL 12  --  17 24*   CREATININE mg/dL 0.93  --  1.03* 1.07*   GLUCOSE mg/dL 103*  --  111* 110*   CALCIUM mg/dL 7.2*  --  7.9* 9.4   BILIRUBIN mg/dL 0.3  --  0.5 0.5   ALK PHOS U/L 34*  --  39 47   ALT (SGPT) U/L 21  --  14 21   AST (SGOT) U/L 29  --  30 46*   ANION GAP mmol/L 9.0  --  10.0 12.0     Estimated Creatinine Clearance: 59.1 mL/min (by C-G formula based on Cr of 0.93).    Results from last 7 days  Lab Units 11/23/17  0538 11/22/17  0538   MAGNESIUM mg/dL 2.0 1.9           Results from last 7 days  Lab Units 11/23/17  0539 11/22/17  0538 11/21/17  1134   WBC 10*3/mm3 2.42* 3.01* 3.13*   HEMOGLOBIN g/dL 7.8* 8.0* 9.3*   HEMATOCRIT % 23.8* 25.0* 28.3*   PLATELETS 10*3/mm3 68* 86* 101*           Culture Data:   Blood Culture   Date Value Ref Range Status   11/21/2017 Abnormal Stain (A)  Preliminary   11/21/2017 Gram Negative Bacilli (A)  Preliminary     Urine Culture   Date Value Ref Range Status   11/21/2017 No growth at 24 hours  Final   11/21/2017 >100,000 CFU/mL Escherichia coli (A)  Final     No results found for: RESPCX  No results found for: WOUNDCX  No results found for: STOOLCX  No components found for: BODYFLD    Radiology Data:   Imaging Results (last 24 hours)     ** No results found for the last 24 hours. **          I have reviewed the patient current medications.     Assessment/Plan     1. Sepsis r/t E coli cystitis and bacteremia: resolved.  Afebrile, Vitals stable.    2. E  coli bacteremia: Surveillance cultures drawn yesterday reveal gram negative bacilli at 24 hours.  Continue IV abx for now and monitor cultures.   3. E coli cystitis: currently on Rocephin.    4. Breast cancer with bone mets: Followed by Dr. Ramos for outpatient treatment.  Continue Femara.   5. Paroxysmal atrial fibrillation: Digoxin, Cardizem for rate control. Anticoagulated with Xarelto.   6. HTN: BP stable. Continue Cozaar, and PRN dosing of Hydralazine.   7. Chronic constipation: Miralax daily, Colace BID, PRN Dulcolax and Milk of Magnesia.   8. Pancytopenia r/t chemotherapy.  Continue to monitor CBC.  Transfuse PRBCs if Hgb <7 or platelets <50.        This document has been electronically signed by FADI Mijares on November 23, 2017 9:33 AM             Electronically signed by Leonardo Fuentes MD at 11/23/2017  7:07 PM        Consult Notes (most recent note)     No notes of this type exist for this encounter.          Initial Request for inpatient admission  [phone] phone  [phone] fax  Callie Daniels RN, CM

## 2017-11-24 NOTE — PROGRESS NOTES
[facility] Medicine Services  INPATIENT PROGRESS NOTE    Length of Stay: 3  Date of Admission: 11/21/2017  Primary Care Physician: Jenaro Wood MD    Subjective   Chief Complaint: chills  HPI:  67 year old  female with past medical history of metastatic breast cancer, atrial fibrillation, HTN, who presented on 11/21/17 with chills and shaking.  She is currently under outpatient chemotherapy for metastatic breast cancer.  She has been found to have E coli cystitis and bacteremia and is being treated with Rocephin.  She complains of weakness and chills overnight but has been afebrile.  Repeat blood cultures show no growth at 24 hours, but previous cultures did not show growth until 48-72 hours.     Review of Systems   Constitutional: Positive for fatigue. Negative for chills and fever.   Respiratory: Negative for shortness of breath and wheezing.    Cardiovascular: Negative for chest pain, palpitations and leg swelling.   Gastrointestinal: Negative for constipation, diarrhea, nausea and vomiting.   Genitourinary: Negative for difficulty urinating, dysuria and flank pain.   Musculoskeletal: Negative for back pain.   Skin: Positive for pallor.   Neurological: Negative for dizziness, weakness and light-headedness.   Psychiatric/Behavioral: Negative for confusion.        All pertinent negatives and positives are as above. All other systems have been reviewed and are negative unless otherwise stated.     Objective    Temp:  [96.8 °F (36 °C)-98 °F (36.7 °C)] 96.8 °F (36 °C)  Heart Rate:  [60-88] 73  Resp:  [16-18] 16  BP: (122-160)/(60-75) 146/69    Physical Exam   Constitutional: She is oriented to person, place, and time. She appears well-developed and well-nourished.   HENT:   Head: Normocephalic and atraumatic.   Eyes: Conjunctivae are normal.   Neck: Neck supple.   Cardiovascular: Normal rate, normal heart sounds and intact distal pulses.    Pulmonary/Chest:  Effort normal and breath sounds normal.   Abdominal: Soft. Bowel sounds are normal.   Musculoskeletal: Normal range of motion.   Neurological: She is alert and oriented to person, place, and time.   Skin: Skin is warm and dry. There is pallor.   Psychiatric: She has a normal mood and affect. Her behavior is normal.   Vitals reviewed.          Results Review:  I have reviewed the labs, radiology results, and diagnostic studies.    Laboratory Data:     Results from last 7 days  Lab Units 11/24/17  0607 11/23/17  0538 11/23/17  0029 11/22/17  0538   SODIUM mmol/L 137 139  --  138   POTASSIUM mmol/L 3.6 4.0 4.0 3.4*   CHLORIDE mmol/L 108 108  --  104   CO2 mmol/L 24.0 22.0  --  24.0   BUN mg/dL 10 12  --  17   CREATININE mg/dL 0.95 0.93  --  1.03*   GLUCOSE mg/dL 105* 103*  --  111*   CALCIUM mg/dL 7.6* 7.2*  --  7.9*   BILIRUBIN mg/dL 0.3 0.3  --  0.5   ALK PHOS U/L 38 34*  --  39   ALT (SGPT) U/L 25 21  --  14   AST (SGOT) U/L 49* 29  --  30   ANION GAP mmol/L 5.0 9.0  --  10.0     Estimated Creatinine Clearance: 58.4 mL/min (by C-G formula based on Cr of 0.95).    Results from last 7 days  Lab Units 11/24/17  0607 11/23/17  0538 11/22/17  0538   MAGNESIUM mg/dL 2.0 2.0 1.9           Results from last 7 days  Lab Units 11/24/17  0607 11/23/17  0539 11/22/17  0538 11/21/17  1134   WBC 10*3/mm3 2.19* 2.42* 3.01* 3.13*   HEMOGLOBIN g/dL 8.5* 7.8* 8.0* 9.3*   HEMATOCRIT % 25.5* 23.8* 25.0* 28.3*   PLATELETS 10*3/mm3 84* 68* 86* 101*           Culture Data:   Blood Culture   Date Value Ref Range Status   11/23/2017 No growth at 24 hours  Preliminary   11/23/2017 No growth at 24 hours  Preliminary   11/21/2017 Escherichia coli (A)  Final     Comment:     for sensitivity see previous report       Urine Culture   Date Value Ref Range Status   11/21/2017 No growth at 24 hours  Final     No results found for: RESPCX  No results found for: WOUNDCX  No results found for: STOOLCX  No components found for: BODYFLD    Radiology  Data:   Imaging Results (last 24 hours)     ** No results found for the last 24 hours. **          I have reviewed the patient current medications.     Assessment/Plan     1. Sepsis r/t E coli cystitis and bacteremia: resolved.  Afebrile, Vitals stable.    2. E coli bacteremia: Surveillance cultures drawn yesterday reveal no growth at 24 hours.  Will continue to monitor as previous cultures did not show growth until 48-72 hours.  Continue IV abx for now and monitor cultures.   3. E coli cystitis: currently on Rocephin.    4. Breast cancer with bone mets: Followed by Dr. Ramos for outpatient treatment.  Continue Femara.   5. Paroxysmal atrial fibrillation: Digoxin, Cardizem for rate control. Anticoagulated with Xarelto.   6. HTN: BP stable. Continue Cozaar, and PRN dosing of Hydralazine.   7. Chronic constipation: Miralax daily, Colace BID, PRN Dulcolax and Milk of Magnesia.   8. Pancytopenia r/t chemotherapy.  Continue to monitor CBC.  Transfuse PRBCs if Hgb <7 or platelets <50.        This document has been electronically signed by FADI Mijares on November 24, 2017 12:23 PM

## 2017-11-25 NOTE — PLAN OF CARE
Problem: Patient Care Overview (Adult)  Goal: Plan of Care Review  Outcome: Ongoing (interventions implemented as appropriate)    11/25/17 0249   Coping/Psychosocial Response Interventions   Plan Of Care Reviewed With patient   Patient Care Overview   Progress no change   Outcome Evaluation   Outcome Summary/Follow up Plan Pt has rested well overnight, vs stable no new events, will continue to monitor.       Goal: Adult Individualization and Mutuality  Outcome: Ongoing (interventions implemented as appropriate)  Goal: Discharge Needs Assessment  Outcome: Ongoing (interventions implemented as appropriate)    Problem: Infection, Risk/Actual (Adult)  Goal: Identify Related Risk Factors and Signs and Symptoms  Outcome: Ongoing (interventions implemented as appropriate)    Problem: Sepsis (Adult)  Goal: Signs and Symptoms of Listed Potential Problems Will be Absent or Manageable (Sepsis)  Outcome: Ongoing (interventions implemented as appropriate)

## 2017-11-25 NOTE — DISCHARGE SUMMARY
[facility] Medicine Services  DISCHARGE SUMMARY       Date of Admission: 11/21/2017  Date of Discharge:  11/25/2017  Primary Care Physician: Jenaro Wood MD    Presenting Problem/History of Present Illness:  Pancytopenia [D61.818]  Sepsis, due to unspecified organism [A41.9]  Malignant neoplasm of female breast, unspecified estrogen receptor status, unspecified laterality, unspecified site of breast [C50.919]       Final Discharge Diagnoses:  Sepsis r/t E coli cystitis and bacteremia  Pancytopenia r/t chemotherapy     Consults:   Consults     Date and Time Order Name Status Description    11/21/2017 1355 Hospitalist (on-call MD unless specified)            Procedures Performed:                 Pertinent Test Results:   Lab Results (last 24 hours)     Procedure Component Value Units Date/Time    CBC Auto Differential [[id number]]  (Abnormal) Collected:  11/25/17 0554    Specimen:  Blood Updated:  11/25/17 0614     WBC 2.13 (L) 10*3/mm3      RBC 2.41 (L) 10*6/mm3      Hemoglobin 8.0 (L) g/dL      Hematocrit 24.0 (L) %      MCV 99.6 (H) fL      MCH 33.2 pg      MCHC 33.3 g/dL      RDW 18.6 (H) %      RDW-SD 67.7 (H) fl      MPV 10.0 fL      Platelets 81 (L) 10*3/mm3     Magnesium [[id number]]  (Normal) Collected:  11/25/17 0554    Specimen:  Blood Updated:  11/25/17 0633     Magnesium 1.9 mg/dL     Comprehensive Metabolic Panel [[id number]]  (Abnormal) Collected:  11/25/17 0554    Specimen:  Blood Updated:  11/25/17 0633     Glucose 95 mg/dL      BUN 10 mg/dL      Creatinine 0.86 mg/dL      Sodium 138 mmol/L      Potassium 3.8 mmol/L      Chloride 107 mmol/L      CO2 23.0 mmol/L      Calcium 7.9 (L) mg/dL      Total Protein 6.0 (L) g/dL      Albumin 3.10 (L) g/dL      ALT (SGPT) 25 U/L      AST (SGOT) 26 U/L      Alkaline Phosphatase 40 U/L      Total Bilirubin 0.3 mg/dL      eGFR Non African Amer 65 mL/min/1.73      Globulin 2.9 gm/dL      A/G Ratio 1.1 g/dL       BUN/Creatinine Ratio 11.6     Anion Gap 8.0 mmol/L     Manual Differential [[id number]]  (Abnormal) Collected:  11/25/17 0554    Specimen:  Blood Updated:  11/25/17 0721     Neutrophil % 29.0 (L) %      Lymphocyte % 60.0 (H) %      Monocyte % 6.0 %      Eosinophil % 4.0 %      Basophil % 1.0 %      Neutrophils Absolute 0.62 (L) 10*3/mm3      Lymphocytes Absolute 1.28 10*3/mm3      Monocytes Absolute 0.13 10*3/mm3      Eosinophils Absolute 0.09 10*3/mm3      Basophils Absolute 0.02 10*3/mm3      Anisocytosis Slight/1+      FEW POLYCHROMATIC CELLS        WBC Morphology Normal     Platelet Estimate Decreased    Blood Culture - Blood, [[id number]]  (Normal) Collected:  11/23/17 1035    Specimen:  Blood from Arm, Right Updated:  11/25/17 1046     Blood Culture No growth at 2 days    Blood Culture - Blood, [[id number]]  (Normal) Collected:  11/23/17 1035    Specimen:  Blood from Wrist, Right Updated:  11/25/17 1046     Blood Culture No growth at 2 days        Imaging Results (all)     Procedure Component Value Units Date/Time    XR Chest 2 View [[id number]] Collected:  11/21/17 1136     Updated:  11/21/17 1159    Narrative:       Chest PA and lateral     CLINICAL INDICATION: Shortness of breath. Fever. History of  breast cancer    COMPARISON: CT chest September 8, 2017. Chest x-ray August 24, 2017    FINDINGS: Cardiac silhouette is normal in size. Pulmonary  vascularity is unremarkable.     No focal infiltrate or consolidation.  No pleural effusion.  No  pneumothorax.  Sclerotic foci in the left humerus, thoracic spine, and ribs  compatible with osteoblastic metastases.  No change since prior exam.      Impression:       CONCLUSION: Numerous sclerotic foci, osteoblastic metastases. No  change since prior exam. Lung fields clear. No evidence of active  disease.   Electronically signed by:  Dariusz Lacy MD  11/21/2017 11:58 AM  [facility] Workstation: [id number]            Chief Complaint on Day of Discharge: none    Hospital  "Course:  67-year-old  female with past medical history of metastatic left breast carcinoma, hypertension, gastroesophageal reflux disease, osteoarthritis, atrial fibrillation who presented on 11/21/2017 with complaints of fever and chills.  The patient was admitted to the hospital and found to have Escherichia coli cystitis as well as an Escherichia coli bacteremia that was treated with IV Rocephin during her hospital stay.  Clinically, the patient improved quickly with IV Rocephin therapy and she had no further fevers after day of admission.  Surveillance cultures that were repeated on 11/23/2017 showed no growth on blood cultures at 48 hours.  Repeat urine cultures also showed no growth as well.  The patient has been afebrile with no complaints of chills, dysuria, or pain.  The patient has been switched to oral cefuroxime for completion of her antibiotic course.  She will be sent home with 12 day course of oral antibiotics to ensure full 2 week antibiotic course after the first negative culture.  The patient has been instructed to follow-up with her primary care provider within one week of discharge and to keep her scheduled appointment on 11/30/2017 with Dr. Ramos for further monitoring of her cancer treatment.  She's been instructed to monitor her temperature at home and report any fevers of greater than 100.3 to her primary care provider or oncologist.    Condition on Discharge:  stable    Physical Exam on Discharge:  /72 (BP Location: Right arm, Patient Position: Sitting)  Pulse 76  Temp 97.6 °F (36.4 °C) (Temporal Artery )   Resp 18  Ht 62\" (157.5 cm)  Wt 198 lb (89.8 kg)  LMP  (LMP Unknown)  SpO2 93%  BMI 36.21 kg/m2  Physical Exam   Constitutional: She is oriented to person, place, and time. She appears well-developed and well-nourished.   HENT:   Head: Normocephalic.   Eyes: Conjunctivae are normal.   Neck: Neck supple.   Cardiovascular: Normal rate, normal heart sounds and intact " distal pulses.    Pulmonary/Chest: Effort normal and breath sounds normal. No respiratory distress. She has no wheezes.   Abdominal: Soft. Bowel sounds are normal.   Musculoskeletal: Normal range of motion. She exhibits no edema.   Neurological: She is alert and oriented to person, place, and time.   Skin: Skin is warm and dry.   Fever blister noted to left upper lip   Psychiatric: She has a normal mood and affect. Her behavior is normal.   Vitals reviewed.        Discharge Disposition:  Home or Self Care    Discharge Medications:   Johanne Mayer   Home Medication Instructions ARIEL:[id number]    Printed on:11/25/17 0524   Medication Information                      acetaminophen (TYLENOL) 500 MG tablet  Take 500 mg by mouth Every 6 (Six) Hours As Needed for Mild Pain .             aspirin 81 MG EC tablet  Take 1 tablet by mouth Daily.             atorvastatin (LIPITOR) 10 MG tablet  Take 1 tablet by mouth Daily.             Calcium Carb-Cholecalciferol (CALCIUM 600 + D PO)  Take 1 capsule by mouth 2 (Two) Times a Day.             cefuroxime (CEFTIN) 250 MG tablet  Take 1 tablet by mouth 2 (Two) Times a Day.             Cholecalciferol (VITAMIN D-3) 1000 UNITS capsule  Take 1,000 Units by mouth Daily.             digoxin (LANOXIN) 125 MCG tablet  Take 125 mcg by mouth Daily.             diltiazem XR (DILACOR XR) 180 MG 24 hr capsule  Take 1 capsule by mouth Daily.             docosanol (ABREVA) 10 % cream cream  Apply  topically 5 (Five) Times a Day.             Interferon Beta-1b (EXTAVIA SC)  Inject  under the skin Every 30 (Thirty) Days.             isosorbide mononitrate (IMDUR) 30 MG 24 hr tablet  Take 1 tablet by mouth Daily.             letrozole (FEMARA) 2.5 MG tablet  TAKE 1 TABLET BY MOUTH DAILY.             losartan (COZAAR) 100 MG tablet  Take 1 tablet by mouth Daily.             Magnesium 250 MG tablet  Take 1 tablet by mouth Daily.             Multiple Vitamins-Minerals (MULTIVITAMINS PLUS  ZINC PO)  Take 50 mg by mouth Daily.             omeprazole (priLOSEC) 40 MG capsule  Take 1 capsule by mouth Daily.             ondansetron (ZOFRAN) 4 MG tablet  Take 1 tablet by mouth 4 (Four) Times a Day As Needed for Nausea or Vomiting.             oxyCODONE-acetaminophen (PERCOCET)  MG per tablet  Take 1 tablet by mouth Every 6 (Six) Hours As Needed for Moderate Pain  or Severe Pain .             oxyCODONE-acetaminophen (PERCOCET) 5-325 MG per tablet  Take 1 tablet by mouth Every 8 (Eight) Hours As Needed for Severe Pain .             Palbociclib (IBRANCE) 100 MG capsule capsule  Take 1 capsule by mouth Daily for 21 days.             polyethylene glycol (MIRALAX) powder  Take 17 g by mouth Daily.             rivaroxaban (XARELTO) 20 MG tablet  Take 1 tablet by mouth Daily.             sucralfate (CARAFATE) 1 g tablet  TAKE 1 TABLET BY MOUTH 3 (THREE) TIMES A DAY.             venlafaxine (EFFEXOR) 37.5 MG tablet  TAKE 1 TABLET BY MOUTH DAILY.             Zinc 50 MG capsule  Take  by mouth.                 Discharge Diet:   Diet Instructions     Diet: Cardiac; Thin       Discharge Diet:  Cardiac   Fluid Consistency:  Thin                 Activity at Discharge:   Activity Instructions     Activity as Tolerated                     Discharge Care Plan/Instructions: Follow up with PCP within one week and keep scheduled appointment with Dr. Ramos on 11/30/17.    Follow-up Appointments:   Future Appointments  Date Time Provider Department [place]   11/30/2017 1:15 PM NURSE  DENIS  [facility]   11/30/2017 1:45 PM Joey Ramos MD [facility]       Test Results Pending at Discharge:  Order Current Status    Blood Culture - Blood, Preliminary result    Blood Culture - Blood, Preliminary result                This document has been electronically signed by FADI Mijares on November 25, 2017 1:54 PM

## 2017-12-04 NOTE — PROGRESS NOTES
Subjective   Johanne Mayer is a 67 y.o. female.     Chief Complaint   Patient presents with   • Follow-up     [facility], states she needs blood cultures to make sure the bacterial infection and UTI is healed, became septic   • Medication Problem     ceftin causing nausea, wanting something for this        History of Present Illness     Pt in f/u from hospital from sepsis and UTI.    Pt says she went to the ER for fever and chills and then was told that the chemo passed over into her blood stream.  She was put on antibiotics and her labs was watched closely.  She was released and was told to f/u with her PCP.  She had sepsis while in the hospital and says that she was ran through the mill of tests.  She is not only going through all of this, she is also battling cancer too.  She says she is eating and drinking at home, but states that she has had an upset stomach the last few weeks.  She denies any diarrhea at this time, but states she needs some type of blood work.  Pt is still following up with her cancer doctor.  Otherwise she says she is doing well and tolerating her medications well. She says she just wants something other than the Tums for the reflux she is having.      The following portions of the patient's history were reviewed and updated as appropriate: allergies, current medications, past family history, past medical history, past social history, past surgical history and problem list.    Review of Systems   Constitutional: Negative for activity change, appetite change, chills, fatigue, fever and unexpected weight change.   HENT: Negative for ear pain, facial swelling and hearing loss.    Eyes: Negative for discharge and visual disturbance.   Respiratory: Negative for cough, chest tightness, shortness of breath and wheezing.    Cardiovascular: Negative for chest pain, palpitations and leg swelling.   Gastrointestinal: Positive for nausea. Negative for abdominal pain.   Genitourinary: Negative for  difficulty urinating, dysuria, flank pain, frequency, hematuria and urgency.   Musculoskeletal: Negative for joint swelling and myalgias.   Skin: Negative for color change and rash.   Allergic/Immunologic: Negative for food allergies.   Neurological: Positive for weakness. Negative for dizziness, syncope, numbness and headaches.   Hematological: Negative for adenopathy.   Psychiatric/Behavioral: Negative for confusion, decreased concentration, dysphoric mood, hallucinations, self-injury, sleep disturbance and suicidal ideas. The patient is not nervous/anxious.    All other systems reviewed and are negative.      Objective   Physical Exam   Constitutional: She is oriented to person, place, and time. Vital signs are normal. She appears well-developed and well-nourished.   HENT:   Head: Normocephalic.   Right Ear: External ear normal.   Left Ear: External ear normal.   Nose: Nose normal.   Mouth/Throat: Oropharynx is clear and moist.   Eyes: Conjunctivae and EOM are normal. Pupils are equal, round, and reactive to light.   Neck: Normal range of motion. Neck supple. No JVD present. No thyromegaly present.   Cardiovascular: Normal rate, regular rhythm, normal heart sounds and intact distal pulses.    No murmur heard.  Pulmonary/Chest: Effort normal and breath sounds normal. No respiratory distress. She has no wheezes. She has no rales. She exhibits no tenderness.   Abdominal: Soft. Bowel sounds are normal. She exhibits no distension and no mass. There is no tenderness. There is no rebound and no guarding. No hernia.   Musculoskeletal: Normal range of motion. She exhibits tenderness. She exhibits no edema or deformity.   Lymphadenopathy:     She has no cervical adenopathy.   Neurological: She is alert and oriented to person, place, and time. No cranial nerve deficit. Coordination normal.   Skin: Skin is warm and dry. No rash noted. No pallor.   Psychiatric: She has a normal mood and affect. Her behavior is normal.  Judgment and thought content normal. Her mood appears not anxious. She is not agitated and not aggressive. Thought content is not paranoid and not delusional. Cognition and memory are normal. She does not exhibit a depressed mood. She expresses no homicidal and no suicidal ideation. She expresses no suicidal plans and no homicidal plans.   Nursing note and vitals reviewed.      Assessment/Plan   Johanne was seen today for follow-up and medication problem.    Diagnoses and all orders for this visit:    Nausea  Comments:  New/Ongoing    Sepsis secondary to UTI  Comments:  Resolving    Reflux esophagitis    History of breast cancer    Familial hypercholesterolemia    Essential hypertension  -     TSH; Future  -     T4; Future  -     Lipid panel; Future    Other orders  -     pantoprazole (PROTONIX) 40 MG EC tablet; Take 1 tablet by mouth Daily.  -     ondansetron (ZOFRAN) 4 MG tablet; Take 1 tablet by mouth 2 (Two) Times a Day As Needed for Nausea or Vomiting.      Protonix 40mg for reflux/nausea    Zofran 4mg BID for reflux/nausea    Scribed for Dr. Wood by Melanie Hernandez [facility] 12/04/17

## 2018-01-01 ENCOUNTER — LAB (OUTPATIENT)
Dept: ONCOLOGY | Facility: HOSPITAL | Age: 68
End: 2018-01-01

## 2018-01-01 ENCOUNTER — EPISODE CHANGES (OUTPATIENT)
Dept: CASE MANAGEMENT | Facility: OTHER | Age: 68
End: 2018-01-01

## 2018-01-01 ENCOUNTER — PREP FOR SURGERY (OUTPATIENT)
Dept: OTHER | Facility: HOSPITAL | Age: 68
End: 2018-01-01

## 2018-01-01 ENCOUNTER — APPOINTMENT (OUTPATIENT)
Dept: GENERAL RADIOLOGY | Facility: HOSPITAL | Age: 68
End: 2018-01-01

## 2018-01-01 ENCOUNTER — APPOINTMENT (OUTPATIENT)
Dept: ONCOLOGY | Facility: HOSPITAL | Age: 68
End: 2018-01-01

## 2018-01-01 ENCOUNTER — OFFICE VISIT (OUTPATIENT)
Dept: ONCOLOGY | Facility: CLINIC | Age: 68
End: 2018-01-01

## 2018-01-01 ENCOUNTER — HOSPITAL ENCOUNTER (OUTPATIENT)
Dept: CT IMAGING | Facility: HOSPITAL | Age: 68
Discharge: HOME | End: 2018-02-07
Attending: INTERNAL MEDICINE | Admitting: INTERNAL MEDICINE

## 2018-01-01 ENCOUNTER — APPOINTMENT (OUTPATIENT)
Dept: ULTRASOUND IMAGING | Facility: HOSPITAL | Age: 68
End: 2018-01-01

## 2018-01-01 ENCOUNTER — OFFICE VISIT (OUTPATIENT)
Dept: SURGERY | Facility: CLINIC | Age: 68
End: 2018-01-01

## 2018-01-01 ENCOUNTER — TELEPHONE (OUTPATIENT)
Dept: NUTRITION | Facility: HOSPITAL | Age: 68
End: 2018-01-01

## 2018-01-01 ENCOUNTER — ANESTHESIA EVENT (OUTPATIENT)
Dept: PERIOP | Facility: HOSPITAL | Age: 68
End: 2018-01-01

## 2018-01-01 ENCOUNTER — APPOINTMENT (OUTPATIENT)
Dept: PREADMISSION TESTING | Facility: HOSPITAL | Age: 68
End: 2018-01-01

## 2018-01-01 ENCOUNTER — ANESTHESIA (OUTPATIENT)
Dept: GASTROENTEROLOGY | Facility: HOSPITAL | Age: 68
End: 2018-01-01

## 2018-01-01 ENCOUNTER — INFUSION (OUTPATIENT)
Dept: ONCOLOGY | Facility: HOSPITAL | Age: 68
End: 2018-01-01

## 2018-01-01 ENCOUNTER — APPOINTMENT (OUTPATIENT)
Dept: CT IMAGING | Facility: HOSPITAL | Age: 68
End: 2018-01-01

## 2018-01-01 ENCOUNTER — HOSPITAL ENCOUNTER (INPATIENT)
Facility: HOSPITAL | Age: 68
LOS: 14 days | Discharge: HOME | End: 2018-03-14
Attending: SURGERY | Admitting: SURGERY

## 2018-01-01 ENCOUNTER — HOSPITAL ENCOUNTER (INPATIENT)
Facility: HOSPITAL | Age: 68
LOS: 7 days | End: 2018-05-14
Attending: EMERGENCY MEDICINE | Admitting: FAMILY MEDICINE

## 2018-01-01 ENCOUNTER — TELEPHONE (OUTPATIENT)
Dept: ONCOLOGY | Facility: HOSPITAL | Age: 68
End: 2018-01-01

## 2018-01-01 ENCOUNTER — DOCUMENTATION (OUTPATIENT)
Dept: ONCOLOGY | Facility: HOSPITAL | Age: 68
End: 2018-01-01

## 2018-01-01 ENCOUNTER — DOCUMENTATION (OUTPATIENT)
Dept: ONCOLOGY | Facility: CLINIC | Age: 68
End: 2018-01-01

## 2018-01-01 ENCOUNTER — APPOINTMENT (OUTPATIENT)
Dept: INTERVENTIONAL RADIOLOGY/VASCULAR | Facility: HOSPITAL | Age: 68
End: 2018-01-01
Attending: EMERGENCY MEDICINE

## 2018-01-01 ENCOUNTER — ANESTHESIA (OUTPATIENT)
Dept: PERIOP | Facility: HOSPITAL | Age: 68
End: 2018-01-01

## 2018-01-01 ENCOUNTER — ANESTHESIA EVENT (OUTPATIENT)
Dept: GASTROENTEROLOGY | Facility: HOSPITAL | Age: 68
End: 2018-01-01

## 2018-01-01 ENCOUNTER — DOCUMENTATION (OUTPATIENT)
Dept: NUTRITION | Facility: HOSPITAL | Age: 68
End: 2018-01-01

## 2018-01-01 ENCOUNTER — APPOINTMENT (OUTPATIENT)
Dept: CT IMAGING | Facility: HOSPITAL | Age: 68
End: 2018-01-01
Attending: INTERNAL MEDICINE

## 2018-01-01 ENCOUNTER — HOSPITAL ENCOUNTER (OUTPATIENT)
Facility: HOSPITAL | Age: 68
Setting detail: HOSPITAL OUTPATIENT SURGERY
Discharge: HOME | End: 2018-02-19
Attending: INTERNAL MEDICINE | Admitting: INTERNAL MEDICINE

## 2018-01-01 ENCOUNTER — LAB (OUTPATIENT)
Dept: LAB | Facility: HOSPITAL | Age: 68
End: 2018-01-01

## 2018-01-01 ENCOUNTER — TRANSCRIBE ORDERS (OUTPATIENT)
Dept: LAB | Facility: HOSPITAL | Age: 68
End: 2018-01-01

## 2018-01-01 ENCOUNTER — LAB (OUTPATIENT)
Dept: LAB | Facility: OTHER | Age: 68
End: 2018-01-01

## 2018-01-01 ENCOUNTER — HOSPITAL ENCOUNTER (INPATIENT)
Facility: HOSPITAL | Age: 68
LOS: 5 days | Discharge: HOME HEALTH | End: 2018-04-25
Attending: EMERGENCY MEDICINE | Admitting: HOSPITALIST

## 2018-01-01 ENCOUNTER — HOSPITAL ENCOUNTER (OUTPATIENT)
Dept: GENERAL RADIOLOGY | Facility: HOSPITAL | Age: 68
Discharge: HOME | End: 2018-02-16
Admitting: INTERNAL MEDICINE

## 2018-01-01 ENCOUNTER — HOSPITAL ENCOUNTER (OUTPATIENT)
Facility: HOSPITAL | Age: 68
Setting detail: OBSERVATION
LOS: 1 days | Discharge: HOME HEALTH | End: 2018-03-31
Attending: EMERGENCY MEDICINE | Admitting: INTERNAL MEDICINE

## 2018-01-01 ENCOUNTER — HOSPITAL ENCOUNTER (OUTPATIENT)
Dept: MRI IMAGING | Facility: HOSPITAL | Age: 68
Discharge: HOME | End: 2018-02-13
Admitting: INTERNAL MEDICINE

## 2018-01-01 ENCOUNTER — OFFICE VISIT (OUTPATIENT)
Dept: FAMILY MEDICINE CLINIC | Facility: CLINIC | Age: 68
End: 2018-01-01

## 2018-01-01 VITALS
TEMPERATURE: 98.3 F | HEIGHT: 62 IN | BODY MASS INDEX: 30.82 KG/M2 | OXYGEN SATURATION: 94 % | RESPIRATION RATE: 18 BRPM | WEIGHT: 167.5 LBS | SYSTOLIC BLOOD PRESSURE: 158 MMHG | HEART RATE: 72 BPM | DIASTOLIC BLOOD PRESSURE: 82 MMHG

## 2018-01-01 VITALS
TEMPERATURE: 99.2 F | SYSTOLIC BLOOD PRESSURE: 157 MMHG | DIASTOLIC BLOOD PRESSURE: 91 MMHG | HEIGHT: 62 IN | HEART RATE: 88 BPM | RESPIRATION RATE: 18 BRPM | WEIGHT: 159.6 LBS | BODY MASS INDEX: 29.37 KG/M2

## 2018-01-01 VITALS
DIASTOLIC BLOOD PRESSURE: 59 MMHG | TEMPERATURE: 97.3 F | HEART RATE: 57 BPM | BODY MASS INDEX: 29.79 KG/M2 | WEIGHT: 161.9 LBS | HEIGHT: 62 IN | SYSTOLIC BLOOD PRESSURE: 111 MMHG | RESPIRATION RATE: 18 BRPM | OXYGEN SATURATION: 97 %

## 2018-01-01 VITALS
SYSTOLIC BLOOD PRESSURE: 168 MMHG | BODY MASS INDEX: 31.81 KG/M2 | OXYGEN SATURATION: 95 % | DIASTOLIC BLOOD PRESSURE: 74 MMHG | TEMPERATURE: 98.1 F | HEIGHT: 62 IN | RESPIRATION RATE: 17 BRPM | WEIGHT: 172.84 LBS | HEART RATE: 74 BPM

## 2018-01-01 VITALS
SYSTOLIC BLOOD PRESSURE: 140 MMHG | HEIGHT: 62 IN | WEIGHT: 175.71 LBS | HEART RATE: 70 BPM | BODY MASS INDEX: 32.33 KG/M2 | DIASTOLIC BLOOD PRESSURE: 80 MMHG | RESPIRATION RATE: 18 BRPM | TEMPERATURE: 99.9 F

## 2018-01-01 VITALS
TEMPERATURE: 96.7 F | HEART RATE: 74 BPM | HEIGHT: 62 IN | BODY MASS INDEX: 30.73 KG/M2 | OXYGEN SATURATION: 94 % | WEIGHT: 167 LBS | SYSTOLIC BLOOD PRESSURE: 144 MMHG | DIASTOLIC BLOOD PRESSURE: 78 MMHG | RESPIRATION RATE: 18 BRPM

## 2018-01-01 VITALS
SYSTOLIC BLOOD PRESSURE: 129 MMHG | BODY MASS INDEX: 29.74 KG/M2 | DIASTOLIC BLOOD PRESSURE: 76 MMHG | OXYGEN SATURATION: 99 % | TEMPERATURE: 99.5 F | RESPIRATION RATE: 20 BRPM | HEIGHT: 62 IN | WEIGHT: 161.6 LBS | HEART RATE: 73 BPM

## 2018-01-01 VITALS
SYSTOLIC BLOOD PRESSURE: 146 MMHG | WEIGHT: 157 LBS | HEIGHT: 62 IN | BODY MASS INDEX: 28.89 KG/M2 | DIASTOLIC BLOOD PRESSURE: 82 MMHG

## 2018-01-01 VITALS
HEIGHT: 62 IN | WEIGHT: 152 LBS | SYSTOLIC BLOOD PRESSURE: 112 MMHG | DIASTOLIC BLOOD PRESSURE: 80 MMHG | BODY MASS INDEX: 27.97 KG/M2

## 2018-01-01 VITALS
DIASTOLIC BLOOD PRESSURE: 64 MMHG | HEIGHT: 62 IN | SYSTOLIC BLOOD PRESSURE: 72 MMHG | HEART RATE: 56 BPM | RESPIRATION RATE: 20 BRPM | BODY MASS INDEX: 27.79 KG/M2 | WEIGHT: 151 LBS | TEMPERATURE: 96.4 F

## 2018-01-01 VITALS
WEIGHT: 158 LBS | BODY MASS INDEX: 29.08 KG/M2 | HEIGHT: 62 IN | DIASTOLIC BLOOD PRESSURE: 74 MMHG | SYSTOLIC BLOOD PRESSURE: 118 MMHG

## 2018-01-01 VITALS
BODY MASS INDEX: 31.65 KG/M2 | SYSTOLIC BLOOD PRESSURE: 132 MMHG | DIASTOLIC BLOOD PRESSURE: 66 MMHG | WEIGHT: 172 LBS | HEIGHT: 62 IN

## 2018-01-01 VITALS
OXYGEN SATURATION: 95 % | BODY MASS INDEX: 29.44 KG/M2 | DIASTOLIC BLOOD PRESSURE: 70 MMHG | HEIGHT: 62 IN | SYSTOLIC BLOOD PRESSURE: 132 MMHG | WEIGHT: 160 LBS | HEART RATE: 86 BPM

## 2018-01-01 VITALS
TEMPERATURE: 92.1 F | DIASTOLIC BLOOD PRESSURE: 33 MMHG | WEIGHT: 175 LBS | HEIGHT: 62 IN | BODY MASS INDEX: 32.2 KG/M2 | RESPIRATION RATE: 22 BRPM | SYSTOLIC BLOOD PRESSURE: 76 MMHG | OXYGEN SATURATION: 100 %

## 2018-01-01 VITALS
DIASTOLIC BLOOD PRESSURE: 69 MMHG | HEIGHT: 62 IN | SYSTOLIC BLOOD PRESSURE: 161 MMHG | TEMPERATURE: 99.5 F | WEIGHT: 182.1 LBS | HEART RATE: 86 BPM | BODY MASS INDEX: 33.51 KG/M2

## 2018-01-01 DIAGNOSIS — C50.912 PRIMARY MALIGNANT NEOPLASM OF LEFT BREAST WITH METASTASIS TO OTHER SITE (HCC): ICD-10-CM

## 2018-01-01 DIAGNOSIS — K83.8 DILATED INTRAHEPATIC BILE DUCT: ICD-10-CM

## 2018-01-01 DIAGNOSIS — R10.84 ABDOMINAL PAIN, GENERALIZED: ICD-10-CM

## 2018-01-01 DIAGNOSIS — E87.6 HYPOKALEMIA: ICD-10-CM

## 2018-01-01 DIAGNOSIS — C79.51 METASTASIS TO BONE (HCC): ICD-10-CM

## 2018-01-01 DIAGNOSIS — K83.1 OBSTRUCTION OF BILE DUCT: Primary | ICD-10-CM

## 2018-01-01 DIAGNOSIS — Z17.0 MALIGNANT NEOPLASM OF UPPER-OUTER QUADRANT OF LEFT BREAST IN FEMALE, ESTROGEN RECEPTOR POSITIVE (HCC): Primary | ICD-10-CM

## 2018-01-01 DIAGNOSIS — Z09 FOLLOW UP: Primary | ICD-10-CM

## 2018-01-01 DIAGNOSIS — K83.1: ICD-10-CM

## 2018-01-01 DIAGNOSIS — N17.9 ACUTE KIDNEY INJURY (HCC): ICD-10-CM

## 2018-01-01 DIAGNOSIS — K31.5 DUODENAL OBSTRUCTION: ICD-10-CM

## 2018-01-01 DIAGNOSIS — B96.20 E COLI BACTEREMIA: ICD-10-CM

## 2018-01-01 DIAGNOSIS — N39.0 COMPLICATED URINARY TRACT INFECTION: Primary | ICD-10-CM

## 2018-01-01 DIAGNOSIS — C50.412 MALIGNANT NEOPLASM OF UPPER-OUTER QUADRANT OF LEFT BREAST IN FEMALE, ESTROGEN RECEPTOR POSITIVE (HCC): Primary | ICD-10-CM

## 2018-01-01 DIAGNOSIS — C50.412 MALIGNANT NEOPLASM OF UPPER-OUTER QUADRANT OF LEFT BREAST IN FEMALE, ESTROGEN RECEPTOR POSITIVE (HCC): ICD-10-CM

## 2018-01-01 DIAGNOSIS — Z17.0 MALIGNANT NEOPLASM OF UPPER-OUTER QUADRANT OF LEFT BREAST IN FEMALE, ESTROGEN RECEPTOR POSITIVE (HCC): ICD-10-CM

## 2018-01-01 DIAGNOSIS — C50.412 MALIGNANT NEOPLASM OF UPPER-OUTER QUADRANT OF LEFT FEMALE BREAST, UNSPECIFIED ESTROGEN RECEPTOR STATUS (HCC): ICD-10-CM

## 2018-01-01 DIAGNOSIS — N13.5 STRICTURE OR KINKING OF URETER: Primary | ICD-10-CM

## 2018-01-01 DIAGNOSIS — R59.0 LOCALIZED ENLARGED LYMPH NODES: ICD-10-CM

## 2018-01-01 DIAGNOSIS — M62.82 SECONDARY RHABDOMYOLYSIS: ICD-10-CM

## 2018-01-01 DIAGNOSIS — C79.51 SECONDARY MALIGNANT NEOPLASM OF BONE AND BONE MARROW (HCC): ICD-10-CM

## 2018-01-01 DIAGNOSIS — C50.919 METASTATIC BREAST CANCER: ICD-10-CM

## 2018-01-01 DIAGNOSIS — C50.919: Primary | ICD-10-CM

## 2018-01-01 DIAGNOSIS — I95.9 HYPOTENSION, UNSPECIFIED HYPOTENSION TYPE: Primary | ICD-10-CM

## 2018-01-01 DIAGNOSIS — C50.919 METASTATIC BREAST CANCER: Primary | ICD-10-CM

## 2018-01-01 DIAGNOSIS — C79.51 METASTASIS TO BONE (HCC): Primary | ICD-10-CM

## 2018-01-01 DIAGNOSIS — N28.89 RENAL CALYCEAL DILATION DETERMINED BY ULTRASOUND: ICD-10-CM

## 2018-01-01 DIAGNOSIS — R79.89 ELEVATED LACTIC ACID LEVEL: ICD-10-CM

## 2018-01-01 DIAGNOSIS — T45.1X5A PANCYTOPENIA DUE TO ANTINEOPLASTIC CHEMOTHERAPY (HCC): ICD-10-CM

## 2018-01-01 DIAGNOSIS — R59.1 LYMPHADENOPATHY: ICD-10-CM

## 2018-01-01 DIAGNOSIS — R78.81 E COLI BACTEREMIA: ICD-10-CM

## 2018-01-01 DIAGNOSIS — A41.9 SEVERE SEPSIS (HCC): Primary | ICD-10-CM

## 2018-01-01 DIAGNOSIS — Z17.0 ESTROGEN RECEPTOR POSITIVE: ICD-10-CM

## 2018-01-01 DIAGNOSIS — R12 HEARTBURN: ICD-10-CM

## 2018-01-01 DIAGNOSIS — N63.20 LUMP OF BREAST, LEFT: ICD-10-CM

## 2018-01-01 DIAGNOSIS — N17.9 ACUTE KIDNEY INJURY (HCC): Primary | ICD-10-CM

## 2018-01-01 DIAGNOSIS — R65.20 SEVERE SEPSIS (HCC): Primary | ICD-10-CM

## 2018-01-01 DIAGNOSIS — K31.5 DUODENAL OBSTRUCTION: Primary | ICD-10-CM

## 2018-01-01 DIAGNOSIS — Z92.89 HISTORY OF ECHOCARDIOGRAM: ICD-10-CM

## 2018-01-01 DIAGNOSIS — R97.8 OTHER ABNORMAL TUMOR MARKERS: ICD-10-CM

## 2018-01-01 DIAGNOSIS — Z79.01 CURRENT USE OF LONG TERM ANTICOAGULATION: ICD-10-CM

## 2018-01-01 DIAGNOSIS — D61.810 PANCYTOPENIA DUE TO ANTINEOPLASTIC CHEMOTHERAPY (HCC): ICD-10-CM

## 2018-01-01 DIAGNOSIS — Z74.09 IMPAIRED MOBILITY AND ACTIVITIES OF DAILY LIVING: ICD-10-CM

## 2018-01-01 DIAGNOSIS — K83.1 OBSTRUCTION OF BILE DUCT: ICD-10-CM

## 2018-01-01 DIAGNOSIS — I48.0 PAROXYSMAL ATRIAL FIBRILLATION (HCC): ICD-10-CM

## 2018-01-01 DIAGNOSIS — Z74.09 IMPAIRED PHYSICAL MOBILITY: ICD-10-CM

## 2018-01-01 DIAGNOSIS — C79.52 SECONDARY MALIGNANT NEOPLASM OF BONE AND BONE MARROW (HCC): ICD-10-CM

## 2018-01-01 DIAGNOSIS — I48.0 PAF (PAROXYSMAL ATRIAL FIBRILLATION) (HCC): ICD-10-CM

## 2018-01-01 DIAGNOSIS — I48.91 ATRIAL FIBRILLATION WITH RAPID VENTRICULAR RESPONSE (HCC): ICD-10-CM

## 2018-01-01 DIAGNOSIS — Z74.09 IMPAIRED FUNCTIONAL MOBILITY, BALANCE, GAIT, AND ENDURANCE: ICD-10-CM

## 2018-01-01 DIAGNOSIS — Z78.9 IMPAIRED MOBILITY AND ACTIVITIES OF DAILY LIVING: ICD-10-CM

## 2018-01-01 DIAGNOSIS — K85.90 ACUTE PANCREATITIS, UNSPECIFIED COMPLICATION STATUS, UNSPECIFIED PANCREATITIS TYPE: ICD-10-CM

## 2018-01-01 DIAGNOSIS — R06.09 DYSPNEA ON EXERTION: ICD-10-CM

## 2018-01-01 DIAGNOSIS — N13.5 STRICTURE OR KINKING OF URETER: ICD-10-CM

## 2018-01-01 LAB
ABO + RH BLD: NORMAL
ABO GROUP BLD: NORMAL
ALBUMIN SERPL-MCNC: 2.1 G/DL (ref 3.4–4.8)
ALBUMIN SERPL-MCNC: 2.5 G/DL (ref 3.4–4.8)
ALBUMIN SERPL-MCNC: 2.6 G/DL (ref 3.4–4.8)
ALBUMIN SERPL-MCNC: 2.9 G/DL (ref 3.4–4.8)
ALBUMIN SERPL-MCNC: 3.1 G/DL (ref 3.4–4.8)
ALBUMIN SERPL-MCNC: 3.2 G/DL (ref 3.4–4.8)
ALBUMIN SERPL-MCNC: 3.2 G/DL (ref 3.4–4.8)
ALBUMIN SERPL-MCNC: 3.4 G/DL (ref 3.4–4.8)
ALBUMIN SERPL-MCNC: 4 G/DL (ref 3.4–4.8)
ALBUMIN SERPL-MCNC: 4 G/DL (ref 3.4–4.8)
ALBUMIN SERPL-MCNC: 4.2 G/DL (ref 3.4–4.8)
ALBUMIN SERPL-MCNC: 4.3 G/DL (ref 3.4–4.8)
ALBUMIN/GLOB SERPL: 0.6 G/DL (ref 1.1–1.8)
ALBUMIN/GLOB SERPL: 0.7 G/DL (ref 1.1–1.8)
ALBUMIN/GLOB SERPL: 0.8 G/DL (ref 1.1–1.8)
ALBUMIN/GLOB SERPL: 0.9 G/DL (ref 1.1–1.8)
ALBUMIN/GLOB SERPL: 1.1 G/DL (ref 1.1–1.8)
ALBUMIN/GLOB SERPL: 1.1 G/DL (ref 1.1–1.8)
ALBUMIN/GLOB SERPL: 1.2 G/DL (ref 1.1–1.8)
ALBUMIN/GLOB SERPL: 1.2 G/DL (ref 1.1–1.8)
ALP SERPL-CCNC: 114 U/L (ref 38–126)
ALP SERPL-CCNC: 117 U/L (ref 38–126)
ALP SERPL-CCNC: 156 U/L (ref 38–126)
ALP SERPL-CCNC: 194 U/L (ref 38–126)
ALP SERPL-CCNC: 199 U/L (ref 38–126)
ALP SERPL-CCNC: 222 U/L (ref 38–126)
ALP SERPL-CCNC: 51 U/L (ref 38–126)
ALP SERPL-CCNC: 51 U/L (ref 38–126)
ALP SERPL-CCNC: 53 U/L (ref 38–126)
ALP SERPL-CCNC: 62 U/L (ref 38–126)
ALP SERPL-CCNC: 78 U/L (ref 38–126)
ALP SERPL-CCNC: 95 U/L (ref 38–126)
ALT SERPL W P-5'-P-CCNC: 110 U/L (ref 9–52)
ALT SERPL W P-5'-P-CCNC: 27 U/L (ref 9–52)
ALT SERPL W P-5'-P-CCNC: 30 U/L (ref 9–52)
ALT SERPL W P-5'-P-CCNC: 30 U/L (ref 9–52)
ALT SERPL W P-5'-P-CCNC: 33 U/L (ref 9–52)
ALT SERPL W P-5'-P-CCNC: 33 U/L (ref 9–52)
ALT SERPL W P-5'-P-CCNC: 40 U/L (ref 9–52)
ALT SERPL W P-5'-P-CCNC: 44 U/L (ref 9–52)
ALT SERPL W P-5'-P-CCNC: 49 U/L (ref 9–52)
ALT SERPL W P-5'-P-CCNC: 52 U/L (ref 9–52)
ALT SERPL W P-5'-P-CCNC: 72 U/L (ref 9–52)
ALT SERPL W P-5'-P-CCNC: 94 U/L (ref 9–52)
AMYLASE SERPL-CCNC: 141 U/L (ref 50–130)
AMYLASE SERPL-CCNC: 416 U/L (ref 50–130)
ANION GAP SERPL CALCULATED.3IONS-SCNC: 10 MMOL/L (ref 5–15)
ANION GAP SERPL CALCULATED.3IONS-SCNC: 11 MMOL/L (ref 5–15)
ANION GAP SERPL CALCULATED.3IONS-SCNC: 11 MMOL/L (ref 5–15)
ANION GAP SERPL CALCULATED.3IONS-SCNC: 12 MMOL/L (ref 5–15)
ANION GAP SERPL CALCULATED.3IONS-SCNC: 13 MMOL/L (ref 5–15)
ANION GAP SERPL CALCULATED.3IONS-SCNC: 16 MMOL/L (ref 5–15)
ANION GAP SERPL CALCULATED.3IONS-SCNC: 17 MMOL/L (ref 5–15)
ANION GAP SERPL CALCULATED.3IONS-SCNC: 18 MMOL/L (ref 5–15)
ANION GAP SERPL CALCULATED.3IONS-SCNC: 7 MMOL/L (ref 5–15)
ANION GAP SERPL CALCULATED.3IONS-SCNC: 7 MMOL/L (ref 5–15)
ANION GAP SERPL CALCULATED.3IONS-SCNC: 8 MMOL/L (ref 5–15)
ANION GAP SERPL CALCULATED.3IONS-SCNC: 9 MMOL/L (ref 5–15)
ANISOCYTOSIS BLD QL: ABNORMAL
ANISOCYTOSIS BLD QL: NORMAL
ANISOCYTOSIS BLD QL: NORMAL
APTT PPP: 37.1 SECONDS (ref 20–40.3)
ARTERIAL PATENCY WRIST A: ABNORMAL
AST SERPL-CCNC: 102 U/L (ref 14–36)
AST SERPL-CCNC: 1347 U/L (ref 14–36)
AST SERPL-CCNC: 145 U/L (ref 14–36)
AST SERPL-CCNC: 171 U/L (ref 14–36)
AST SERPL-CCNC: 175 U/L (ref 14–36)
AST SERPL-CCNC: 1829 U/L (ref 14–36)
AST SERPL-CCNC: 260 U/L (ref 14–36)
AST SERPL-CCNC: 54 U/L (ref 14–36)
AST SERPL-CCNC: 59 U/L (ref 14–36)
AST SERPL-CCNC: 59 U/L (ref 14–36)
AST SERPL-CCNC: 62 U/L (ref 14–36)
AST SERPL-CCNC: 666 U/L (ref 14–36)
ATMOSPHERIC PRESS: 751 MMHG
ATMOSPHERIC PRESS: ABNORMAL MMHG
ATMOSPHERIC PRESS: ABNORMAL MMHG
B PERT DNA SPEC QL NAA+PROBE: NOT DETECTED
BACTERIA SPEC AEROBE CULT: ABNORMAL
BACTERIA SPEC AEROBE CULT: ABNORMAL
BACTERIA SPEC AEROBE CULT: NORMAL
BACTERIA UR QL AUTO: ABNORMAL /HPF
BASE EXCESS BLDA CALC-SCNC: -5.7 MMOL/L (ref -2.4–2.4)
BASE EXCESS BLDA CALC-SCNC: -8 MMOL/L (ref 0–2)
BASE EXCESS BLDA CALC-SCNC: -8.7 MMOL/L (ref -2.4–2.4)
BASOPHILS # BLD AUTO: 0.01 10*3/MM3 (ref 0–0.2)
BASOPHILS # BLD AUTO: 0.02 10*3/MM3 (ref 0–0.2)
BASOPHILS # BLD AUTO: 0.03 10*3/MM3 (ref 0–0.2)
BASOPHILS # BLD AUTO: 0.03 10*3/MM3 (ref 0–0.2)
BASOPHILS # BLD AUTO: 0.04 10*3/MM3 (ref 0–0.2)
BASOPHILS # BLD AUTO: 0.05 10*3/MM3 (ref 0–0.2)
BASOPHILS # BLD AUTO: 0.08 10*3/MM3 (ref 0–0.2)
BASOPHILS # BLD AUTO: 0.08 10*3/MM3 (ref 0–0.2)
BASOPHILS # BLD AUTO: 0.11 10*3/MM3 (ref 0–0.2)
BASOPHILS # BLD AUTO: 0.13 10*3/MM3 (ref 0–0.2)
BASOPHILS # BLD MANUAL: 0.02 10*3/MM3 (ref 0–0.2)
BASOPHILS NFR BLD AUTO: 0.2 % (ref 0–2)
BASOPHILS NFR BLD AUTO: 0.3 % (ref 0–2)
BASOPHILS NFR BLD AUTO: 0.3 % (ref 0–2)
BASOPHILS NFR BLD AUTO: 0.4 % (ref 0–2)
BASOPHILS NFR BLD AUTO: 0.5 % (ref 0–2)
BASOPHILS NFR BLD AUTO: 0.6 % (ref 0–2)
BASOPHILS NFR BLD AUTO: 0.7 % (ref 0–2)
BASOPHILS NFR BLD AUTO: 0.7 % (ref 0–2)
BASOPHILS NFR BLD AUTO: 1 % (ref 0–2)
BASOPHILS NFR BLD AUTO: 1.4 % (ref 0–2)
BASOPHILS NFR BLD AUTO: 2.3 % (ref 0–2)
BASOPHILS NFR BLD AUTO: 2.4 % (ref 0–2)
BASOPHILS NFR BLD AUTO: 3 % (ref 0–2)
BDY SITE: ABNORMAL
BH BB BLOOD EXPIRATION DATE: NORMAL
BH BB BLOOD TYPE BARCODE: 6200
BH BB DISPENSE STATUS: NORMAL
BH BB PRODUCT CODE: NORMAL
BH BB UNIT NUMBER: NORMAL
BILIRUB SERPL-MCNC: 0.3 MG/DL (ref 0.2–1.3)
BILIRUB SERPL-MCNC: 0.4 MG/DL (ref 0.2–1.3)
BILIRUB SERPL-MCNC: 0.5 MG/DL (ref 0.2–1.3)
BILIRUB SERPL-MCNC: 0.6 MG/DL (ref 0.2–1.3)
BILIRUB SERPL-MCNC: 0.8 MG/DL (ref 0.2–1.3)
BILIRUB SERPL-MCNC: 1 MG/DL (ref 0.2–1.3)
BILIRUB SERPL-MCNC: 1.2 MG/DL (ref 0.2–1.3)
BILIRUB SERPL-MCNC: 1.8 MG/DL (ref 0.2–1.3)
BILIRUB SERPL-MCNC: 1.9 MG/DL (ref 0.2–1.3)
BILIRUB SERPL-MCNC: 3.5 MG/DL (ref 0.2–1.3)
BILIRUB UR QL STRIP: NEGATIVE
BLD GP AB SCN SERPL QL: NEGATIVE
BUN BLD-MCNC: 10 MG/DL (ref 7–21)
BUN BLD-MCNC: 11 MG/DL (ref 7–21)
BUN BLD-MCNC: 14 MG/DL (ref 7–21)
BUN BLD-MCNC: 16 MG/DL (ref 7–21)
BUN BLD-MCNC: 17 MG/DL (ref 7–21)
BUN BLD-MCNC: 18 MG/DL (ref 7–21)
BUN BLD-MCNC: 20 MG/DL (ref 7–21)
BUN BLD-MCNC: 25 MG/DL (ref 7–21)
BUN BLD-MCNC: 28 MG/DL (ref 7–21)
BUN BLD-MCNC: 31 MG/DL (ref 7–21)
BUN BLD-MCNC: 33 MG/DL (ref 7–21)
BUN BLD-MCNC: 8 MG/DL (ref 7–21)
BUN BLD-MCNC: 9 MG/DL (ref 7–21)
BUN BLD-MCNC: 9 MG/DL (ref 7–21)
BUN/CREAT SERPL: 10.2 (ref 7–25)
BUN/CREAT SERPL: 10.3 (ref 7–25)
BUN/CREAT SERPL: 10.6 (ref 7–25)
BUN/CREAT SERPL: 12 (ref 7–25)
BUN/CREAT SERPL: 14.3 (ref 7–25)
BUN/CREAT SERPL: 14.7 (ref 7–25)
BUN/CREAT SERPL: 16.3 (ref 7–25)
BUN/CREAT SERPL: 16.5 (ref 7–25)
BUN/CREAT SERPL: 16.8 (ref 7–25)
BUN/CREAT SERPL: 17.8 (ref 7–25)
BUN/CREAT SERPL: 19 (ref 7–25)
BUN/CREAT SERPL: 19 (ref 7–25)
BUN/CREAT SERPL: 19.2 (ref 7–25)
BUN/CREAT SERPL: 22.5 (ref 7–25)
BUN/CREAT SERPL: 23.3 (ref 7–25)
BUN/CREAT SERPL: 25.6 (ref 7–25)
BUN/CREAT SERPL: 29.7 (ref 7–25)
BUN/CREAT SERPL: 6.8 (ref 7–25)
BUN/CREAT SERPL: 7.1 (ref 7–25)
BUN/CREAT SERPL: 7.5 (ref 7–25)
BUN/CREAT SERPL: 7.9 (ref 7–25)
BUN/CREAT SERPL: 7.9 (ref 7–25)
BUN/CREAT SERPL: 9.1 (ref 7–25)
BUN/CREAT SERPL: 9.2 (ref 7–25)
C PNEUM DNA NPH QL NAA+NON-PROBE: NOT DETECTED
CA-I BLD-MCNC: 4.3 MG/DL (ref 4.5–4.9)
CA-I BLD-MCNC: 4.4 MG/DL (ref 4.5–4.9)
CALCIUM SPEC-SCNC: 7.4 MG/DL (ref 8.4–10.2)
CALCIUM SPEC-SCNC: 7.5 MG/DL (ref 8.4–10.2)
CALCIUM SPEC-SCNC: 7.5 MG/DL (ref 8.4–10.2)
CALCIUM SPEC-SCNC: 7.6 MG/DL (ref 8.4–10.2)
CALCIUM SPEC-SCNC: 7.7 MG/DL (ref 8.4–10.2)
CALCIUM SPEC-SCNC: 7.9 MG/DL (ref 8.4–10.2)
CALCIUM SPEC-SCNC: 8 MG/DL (ref 8.4–10.2)
CALCIUM SPEC-SCNC: 8 MG/DL (ref 8.4–10.2)
CALCIUM SPEC-SCNC: 8.1 MG/DL (ref 8.4–10.2)
CALCIUM SPEC-SCNC: 8.1 MG/DL (ref 8.4–10.2)
CALCIUM SPEC-SCNC: 8.2 MG/DL (ref 8.4–10.2)
CALCIUM SPEC-SCNC: 8.2 MG/DL (ref 8.4–10.2)
CALCIUM SPEC-SCNC: 8.3 MG/DL (ref 8.4–10.2)
CALCIUM SPEC-SCNC: 8.5 MG/DL (ref 8.4–10.2)
CALCIUM SPEC-SCNC: 8.6 MG/DL (ref 8.4–10.2)
CALCIUM SPEC-SCNC: 9.2 MG/DL (ref 8.4–10.2)
CALCIUM SPEC-SCNC: 9.4 MG/DL (ref 8.4–10.2)
CALCIUM SPEC-SCNC: 9.6 MG/DL (ref 8.4–10.2)
CALCIUM SPEC-SCNC: 9.9 MG/DL (ref 8.4–10.2)
CALCIUM SPEC-SCNC: 9.9 MG/DL (ref 8.4–10.2)
CANCER AG15-3 SERPL-ACNC: 185.9 U/ML (ref 0–25)
CANCER AG19-9 SERPL-ACNC: 43 U/ML (ref 0–35)
CANCER AG27-29 SERPL-ACNC: 337.4 U/ML (ref 0–38.6)
CEA SERPL-MCNC: 10.1 NG/ML (ref 0–5)
CHLORIDE SERPL-SCNC: 100 MMOL/L (ref 95–110)
CHLORIDE SERPL-SCNC: 103 MMOL/L (ref 95–110)
CHLORIDE SERPL-SCNC: 103 MMOL/L (ref 95–110)
CHLORIDE SERPL-SCNC: 104 MMOL/L (ref 95–110)
CHLORIDE SERPL-SCNC: 106 MMOL/L (ref 95–110)
CHLORIDE SERPL-SCNC: 108 MMOL/L (ref 95–110)
CHLORIDE SERPL-SCNC: 109 MMOL/L (ref 95–110)
CHLORIDE SERPL-SCNC: 110 MMOL/L (ref 95–110)
CHLORIDE SERPL-SCNC: 112 MMOL/L (ref 95–110)
CHLORIDE SERPL-SCNC: 112 MMOL/L (ref 95–110)
CHLORIDE SERPL-SCNC: 113 MMOL/L (ref 95–110)
CHLORIDE SERPL-SCNC: 113 MMOL/L (ref 95–110)
CHLORIDE SERPL-SCNC: 93 MMOL/L (ref 95–110)
CHLORIDE SERPL-SCNC: 94 MMOL/L (ref 95–110)
CHLORIDE SERPL-SCNC: 98 MMOL/L (ref 95–110)
CHLORIDE SERPL-SCNC: 99 MMOL/L (ref 95–110)
CHLORIDE SERPL-SCNC: 99 MMOL/L (ref 95–110)
CK MB SERPL-CCNC: 0.29 NG/ML (ref 0–5)
CK SERPL-CCNC: 1089 U/L (ref 30–135)
CLARITY UR: ABNORMAL
CLARITY UR: CLEAR
CLARITY UR: CLEAR
CO2 BLDA-SCNC: 19.8 MMOL/L (ref 23–27)
CO2 BLDA-SCNC: 20.8 MMOL/L (ref 23–27)
CO2 SERPL-SCNC: 14 MMOL/L (ref 22–31)
CO2 SERPL-SCNC: 18 MMOL/L (ref 22–31)
CO2 SERPL-SCNC: 19 MMOL/L (ref 22–31)
CO2 SERPL-SCNC: 20 MMOL/L (ref 22–31)
CO2 SERPL-SCNC: 20 MMOL/L (ref 22–31)
CO2 SERPL-SCNC: 21 MMOL/L (ref 22–31)
CO2 SERPL-SCNC: 22 MMOL/L (ref 22–31)
CO2 SERPL-SCNC: 22 MMOL/L (ref 22–31)
CO2 SERPL-SCNC: 23 MMOL/L (ref 22–31)
CO2 SERPL-SCNC: 24 MMOL/L (ref 22–31)
CO2 SERPL-SCNC: 25 MMOL/L (ref 22–31)
CO2 SERPL-SCNC: 26 MMOL/L (ref 22–31)
CO2 SERPL-SCNC: 29 MMOL/L (ref 22–31)
COLOR UR: ABNORMAL
COLOR UR: YELLOW
COLOR UR: YELLOW
CREAT BLD-MCNC: 0.84 MG/DL (ref 0.5–1)
CREAT BLD-MCNC: 0.89 MG/DL (ref 0.5–1)
CREAT BLD-MCNC: 0.9 MG/DL (ref 0.5–1)
CREAT BLD-MCNC: 0.98 MG/DL (ref 0.5–1)
CREAT BLD-MCNC: 1.03 MG/DL (ref 0.5–1)
CREAT BLD-MCNC: 1.04 MG/DL (ref 0.5–1)
CREAT BLD-MCNC: 1.05 MG/DL (ref 0.5–1)
CREAT BLD-MCNC: 1.06 MG/DL (ref 0.5–1)
CREAT BLD-MCNC: 1.07 MG/DL (ref 0.5–1)
CREAT BLD-MCNC: 1.07 MG/DL (ref 0.5–1)
CREAT BLD-MCNC: 1.1 MG/DL (ref 0.5–1)
CREAT BLD-MCNC: 1.11 MG/DL (ref 0.5–1)
CREAT BLD-MCNC: 1.12 MG/DL (ref 0.5–1)
CREAT BLD-MCNC: 1.14 MG/DL (ref 0.5–1)
CREAT BLD-MCNC: 1.14 MG/DL (ref 0.5–1)
CREAT BLD-MCNC: 1.16 MG/DL (ref 0.5–1)
CREAT BLD-MCNC: 1.17 MG/DL (ref 0.5–1)
CREAT BLD-MCNC: 1.19 MG/DL (ref 0.5–1)
CREAT BLD-MCNC: 1.2 MG/DL (ref 0.5–1)
CREAT BLD-MCNC: 1.21 MG/DL (ref 0.5–1)
CREAT BLD-MCNC: 1.23 MG/DL (ref 0.5–1)
CREAT BLD-MCNC: 1.3 MG/DL (ref 0.5–1)
CREAT BLD-MCNC: 1.33 MG/DL (ref 0.5–1)
CREAT BLD-MCNC: 1.66 MG/DL (ref 0.5–1)
D-LACTATE SERPL-SCNC: 0.9 MMOL/L (ref 0.5–2)
D-LACTATE SERPL-SCNC: 1.4 MMOL/L (ref 0.5–2)
D-LACTATE SERPL-SCNC: 1.4 MMOL/L (ref 0.5–2)
D-LACTATE SERPL-SCNC: 1.5 MMOL/L (ref 0.5–2)
D-LACTATE SERPL-SCNC: 1.6 MMOL/L (ref 0.5–2)
D-LACTATE SERPL-SCNC: 1.9 MMOL/L (ref 0.5–2)
D-LACTATE SERPL-SCNC: 2 MMOL/L (ref 0.5–2)
D-LACTATE SERPL-SCNC: 2.1 MMOL/L (ref 0.5–2)
D-LACTATE SERPL-SCNC: 2.1 MMOL/L (ref 0.5–2)
D-LACTATE SERPL-SCNC: 2.2 MMOL/L (ref 0.5–2)
D-LACTATE SERPL-SCNC: 2.3 MMOL/L (ref 0.5–2)
D-LACTATE SERPL-SCNC: 4.1 MMOL/L (ref 0.5–2)
D-LACTATE SERPL-SCNC: 4.5 MMOL/L (ref 0.5–2)
D-LACTATE SERPL-SCNC: 5.3 MMOL/L (ref 0.5–2)
D-LACTATE SERPL-SCNC: 6.7 MMOL/L (ref 0.5–2)
D-LACTATE SERPL-SCNC: 7.9 MMOL/L (ref 0.5–2)
DEPRECATED RDW RBC AUTO: 57.4 FL (ref 36.4–46.3)
DEPRECATED RDW RBC AUTO: 58.3 FL (ref 36.4–46.3)
DEPRECATED RDW RBC AUTO: 60.9 FL (ref 36.4–46.3)
DEPRECATED RDW RBC AUTO: 61 FL (ref 36.4–46.3)
DEPRECATED RDW RBC AUTO: 61.3 FL (ref 36.4–46.3)
DEPRECATED RDW RBC AUTO: 61.3 FL (ref 36.4–46.3)
DEPRECATED RDW RBC AUTO: 61.8 FL (ref 36.4–46.3)
DEPRECATED RDW RBC AUTO: 62 FL (ref 36.4–46.3)
DEPRECATED RDW RBC AUTO: 62.3 FL (ref 36.4–46.3)
DEPRECATED RDW RBC AUTO: 63 FL (ref 36.4–46.3)
DEPRECATED RDW RBC AUTO: 63.9 FL (ref 36.4–46.3)
DEPRECATED RDW RBC AUTO: 64.1 FL (ref 36.4–46.3)
DEPRECATED RDW RBC AUTO: 64.5 FL (ref 36.4–46.3)
DEPRECATED RDW RBC AUTO: 65.4 FL (ref 36.4–46.3)
DEPRECATED RDW RBC AUTO: 67.1 FL (ref 36.4–46.3)
DEPRECATED RDW RBC AUTO: 67.4 FL (ref 36.4–46.3)
DEPRECATED RDW RBC AUTO: 67.8 FL (ref 36.4–46.3)
DEPRECATED RDW RBC AUTO: 68.3 FL (ref 36.4–46.3)
DEPRECATED RDW RBC AUTO: 71.5 FL (ref 36.4–46.3)
DEPRECATED RDW RBC AUTO: 72.5 FL (ref 36.4–46.3)
DEPRECATED RDW RBC AUTO: 73 FL (ref 36.4–46.3)
DEPRECATED RDW RBC AUTO: 73.4 FL (ref 36.4–46.3)
DEPRECATED RDW RBC AUTO: 81.7 FL (ref 36.4–46.3)
DEPRECATED RDW RBC AUTO: 93.9 FL (ref 36.4–46.3)
DEPRECATED RDW RBC AUTO: 95.6 FL (ref 36.4–46.3)
DIGOXIN SERPL-MCNC: 0.6 NG/ML (ref 0.8–2)
DIGOXIN SERPL-MCNC: 0.7 NG/ML (ref 0.8–2)
DIGOXIN SERPL-MCNC: 1 NG/ML (ref 0.8–2)
EOSINOPHIL # BLD AUTO: 0 10*3/MM3 (ref 0–0.7)
EOSINOPHIL # BLD AUTO: 0.03 10*3/MM3 (ref 0–0.7)
EOSINOPHIL # BLD AUTO: 0.04 10*3/MM3 (ref 0–0.7)
EOSINOPHIL # BLD AUTO: 0.05 10*3/MM3 (ref 0–0.7)
EOSINOPHIL # BLD AUTO: 0.06 10*3/MM3 (ref 0–0.7)
EOSINOPHIL # BLD AUTO: 0.07 10*3/MM3 (ref 0–0.7)
EOSINOPHIL # BLD AUTO: 0.08 10*3/MM3 (ref 0–0.7)
EOSINOPHIL # BLD AUTO: 0.08 10*3/MM3 (ref 0–0.7)
EOSINOPHIL # BLD AUTO: 0.11 10*3/MM3 (ref 0–0.7)
EOSINOPHIL # BLD AUTO: 0.13 10*3/MM3 (ref 0–0.7)
EOSINOPHIL # BLD AUTO: 0.15 10*3/MM3 (ref 0–0.7)
EOSINOPHIL # BLD AUTO: 0.26 10*3/MM3 (ref 0–0.7)
EOSINOPHIL # BLD AUTO: 0.27 10*3/MM3 (ref 0–0.7)
EOSINOPHIL # BLD AUTO: 0.32 10*3/MM3 (ref 0–0.7)
EOSINOPHIL # BLD AUTO: 0.32 10*3/MM3 (ref 0–0.7)
EOSINOPHIL # BLD AUTO: 0.34 10*3/MM3 (ref 0–0.7)
EOSINOPHIL # BLD MANUAL: 0.05 10*3/MM3 (ref 0–0.7)
EOSINOPHIL NFR BLD AUTO: 0 % (ref 0–7)
EOSINOPHIL NFR BLD AUTO: 0.1 % (ref 0–7)
EOSINOPHIL NFR BLD AUTO: 0.8 % (ref 0–7)
EOSINOPHIL NFR BLD AUTO: 0.9 % (ref 0–7)
EOSINOPHIL NFR BLD AUTO: 1.1 % (ref 0–7)
EOSINOPHIL NFR BLD AUTO: 1.3 % (ref 0–7)
EOSINOPHIL NFR BLD AUTO: 1.4 % (ref 0–7)
EOSINOPHIL NFR BLD AUTO: 1.7 % (ref 0–7)
EOSINOPHIL NFR BLD AUTO: 1.8 % (ref 0–7)
EOSINOPHIL NFR BLD AUTO: 1.9 % (ref 0–7)
EOSINOPHIL NFR BLD AUTO: 2.4 % (ref 0–7)
EOSINOPHIL NFR BLD AUTO: 3 % (ref 0–7)
EOSINOPHIL NFR BLD AUTO: 3.2 % (ref 0–7)
EOSINOPHIL NFR BLD AUTO: 3.5 % (ref 0–7)
EOSINOPHIL NFR BLD AUTO: 3.6 % (ref 0–7)
EOSINOPHIL NFR BLD AUTO: 4.5 % (ref 0–7)
EOSINOPHIL NFR BLD MANUAL: 2 % (ref 0–7)
ERYTHROCYTE [DISTWIDTH] IN BLOOD BY AUTOMATED COUNT: 18.3 % (ref 11.5–14.5)
ERYTHROCYTE [DISTWIDTH] IN BLOOD BY AUTOMATED COUNT: 18.6 % (ref 11.5–14.5)
ERYTHROCYTE [DISTWIDTH] IN BLOOD BY AUTOMATED COUNT: 18.8 % (ref 11.5–14.5)
ERYTHROCYTE [DISTWIDTH] IN BLOOD BY AUTOMATED COUNT: 18.9 % (ref 11.5–14.5)
ERYTHROCYTE [DISTWIDTH] IN BLOOD BY AUTOMATED COUNT: 18.9 % (ref 11.5–14.5)
ERYTHROCYTE [DISTWIDTH] IN BLOOD BY AUTOMATED COUNT: 19.1 % (ref 11.5–14.5)
ERYTHROCYTE [DISTWIDTH] IN BLOOD BY AUTOMATED COUNT: 19.3 % (ref 11.5–14.5)
ERYTHROCYTE [DISTWIDTH] IN BLOOD BY AUTOMATED COUNT: 19.4 % (ref 11.5–14.5)
ERYTHROCYTE [DISTWIDTH] IN BLOOD BY AUTOMATED COUNT: 19.5 % (ref 11.5–14.5)
ERYTHROCYTE [DISTWIDTH] IN BLOOD BY AUTOMATED COUNT: 19.6 % (ref 11.5–14.5)
ERYTHROCYTE [DISTWIDTH] IN BLOOD BY AUTOMATED COUNT: 19.6 % (ref 11.5–14.5)
ERYTHROCYTE [DISTWIDTH] IN BLOOD BY AUTOMATED COUNT: 19.7 % (ref 11.5–14.5)
ERYTHROCYTE [DISTWIDTH] IN BLOOD BY AUTOMATED COUNT: 19.8 % (ref 11.5–14.5)
ERYTHROCYTE [DISTWIDTH] IN BLOOD BY AUTOMATED COUNT: 19.9 % (ref 11.5–14.5)
ERYTHROCYTE [DISTWIDTH] IN BLOOD BY AUTOMATED COUNT: 20.1 % (ref 11.5–14.5)
ERYTHROCYTE [DISTWIDTH] IN BLOOD BY AUTOMATED COUNT: 20.1 % (ref 11.5–14.5)
ERYTHROCYTE [DISTWIDTH] IN BLOOD BY AUTOMATED COUNT: 20.3 % (ref 11.5–14.5)
ERYTHROCYTE [DISTWIDTH] IN BLOOD BY AUTOMATED COUNT: 20.4 % (ref 11.5–14.5)
ERYTHROCYTE [DISTWIDTH] IN BLOOD BY AUTOMATED COUNT: 20.6 % (ref 11.5–14.5)
ERYTHROCYTE [DISTWIDTH] IN BLOOD BY AUTOMATED COUNT: 20.9 % (ref 11.5–14.5)
ERYTHROCYTE [DISTWIDTH] IN BLOOD BY AUTOMATED COUNT: 25.6 % (ref 11.5–14.5)
ERYTHROCYTE [DISTWIDTH] IN BLOOD BY AUTOMATED COUNT: 28.1 % (ref 11.5–14.5)
ERYTHROCYTE [DISTWIDTH] IN BLOOD BY AUTOMATED COUNT: 28.1 % (ref 11.5–14.5)
FLUAV H1 2009 PAND RNA NPH QL NAA+PROBE: NOT DETECTED
FLUAV H1 HA GENE NPH QL NAA+PROBE: NOT DETECTED
FLUAV H3 RNA NPH QL NAA+PROBE: NOT DETECTED
FLUAV SUBTYP SPEC NAA+PROBE: NOT DETECTED
FLUBV RNA ISLT QL NAA+PROBE: NOT DETECTED
GAS FLOW AIRWAY: 2 LPM
GFR SERPL CREATININE-BSD FRML MDRD: 31 ML/MIN/1.73 (ref 60–104)
GFR SERPL CREATININE-BSD FRML MDRD: 40 ML/MIN/1.73 (ref 60–104)
GFR SERPL CREATININE-BSD FRML MDRD: 41 ML/MIN/1.73 (ref 45–104)
GFR SERPL CREATININE-BSD FRML MDRD: 43 ML/MIN/1.73 (ref 45–104)
GFR SERPL CREATININE-BSD FRML MDRD: 44 ML/MIN/1.73 (ref 45–104)
GFR SERPL CREATININE-BSD FRML MDRD: 45 ML/MIN/1.73 (ref 45–104)
GFR SERPL CREATININE-BSD FRML MDRD: 45 ML/MIN/1.73 (ref 45–104)
GFR SERPL CREATININE-BSD FRML MDRD: 46 ML/MIN/1.73 (ref 45–104)
GFR SERPL CREATININE-BSD FRML MDRD: 46 ML/MIN/1.73 (ref 60–104)
GFR SERPL CREATININE-BSD FRML MDRD: 47 ML/MIN/1.73 (ref 45–104)
GFR SERPL CREATININE-BSD FRML MDRD: 47 ML/MIN/1.73 (ref 60–104)
GFR SERPL CREATININE-BSD FRML MDRD: 48 ML/MIN/1.73 (ref 60–104)
GFR SERPL CREATININE-BSD FRML MDRD: 49 ML/MIN/1.73 (ref 45–104)
GFR SERPL CREATININE-BSD FRML MDRD: 49 ML/MIN/1.73 (ref 45–104)
GFR SERPL CREATININE-BSD FRML MDRD: 51 ML/MIN/1.73 (ref 45–104)
GFR SERPL CREATININE-BSD FRML MDRD: 52 ML/MIN/1.73 (ref 45–104)
GFR SERPL CREATININE-BSD FRML MDRD: 53 ML/MIN/1.73 (ref 45–104)
GFR SERPL CREATININE-BSD FRML MDRD: 53 ML/MIN/1.73 (ref 60–104)
GFR SERPL CREATININE-BSD FRML MDRD: 56 ML/MIN/1.73 (ref 45–104)
GFR SERPL CREATININE-BSD FRML MDRD: 62 ML/MIN/1.73 (ref 45–104)
GFR SERPL CREATININE-BSD FRML MDRD: 63 ML/MIN/1.73 (ref 60–104)
GFR SERPL CREATININE-BSD FRML MDRD: 67 ML/MIN/1.73 (ref 45–104)
GLOBULIN UR ELPH-MCNC: 3.3 GM/DL (ref 2.3–3.5)
GLOBULIN UR ELPH-MCNC: 3.3 GM/DL (ref 2.3–3.5)
GLOBULIN UR ELPH-MCNC: 3.4 GM/DL (ref 2.3–3.5)
GLOBULIN UR ELPH-MCNC: 3.5 GM/DL (ref 2.3–3.5)
GLOBULIN UR ELPH-MCNC: 3.6 GM/DL (ref 2.3–3.5)
GLOBULIN UR ELPH-MCNC: 3.6 GM/DL (ref 2.3–3.5)
GLOBULIN UR ELPH-MCNC: 3.7 GM/DL (ref 2.3–3.5)
GLOBULIN UR ELPH-MCNC: 3.7 GM/DL (ref 2.3–3.5)
GLOBULIN UR ELPH-MCNC: 4.1 GM/DL (ref 2.3–3.5)
GLOBULIN UR ELPH-MCNC: 4.1 GM/DL (ref 2.3–3.5)
GLOBULIN UR ELPH-MCNC: 4.3 GM/DL (ref 2.3–3.5)
GLOBULIN UR ELPH-MCNC: 4.9 GM/DL (ref 2.3–3.5)
GLUCOSE BLD-MCNC: 105 MG/DL (ref 60–100)
GLUCOSE BLD-MCNC: 106 MG/DL (ref 60–100)
GLUCOSE BLD-MCNC: 108 MG/DL (ref 60–100)
GLUCOSE BLD-MCNC: 109 MG/DL (ref 60–100)
GLUCOSE BLD-MCNC: 112 MG/DL (ref 60–100)
GLUCOSE BLD-MCNC: 128 MG/DL (ref 60–100)
GLUCOSE BLD-MCNC: 136 MG/DL (ref 60–100)
GLUCOSE BLD-MCNC: 143 MG/DL (ref 60–100)
GLUCOSE BLD-MCNC: 78 MG/DL (ref 60–100)
GLUCOSE BLD-MCNC: 81 MG/DL (ref 60–100)
GLUCOSE BLD-MCNC: 81 MG/DL (ref 60–100)
GLUCOSE BLD-MCNC: 82 MG/DL (ref 60–100)
GLUCOSE BLD-MCNC: 83 MG/DL (ref 60–100)
GLUCOSE BLD-MCNC: 83 MG/DL (ref 60–100)
GLUCOSE BLD-MCNC: 84 MG/DL (ref 60–100)
GLUCOSE BLD-MCNC: 84 MG/DL (ref 60–100)
GLUCOSE BLD-MCNC: 85 MG/DL (ref 60–100)
GLUCOSE BLD-MCNC: 85 MG/DL (ref 60–100)
GLUCOSE BLD-MCNC: 93 MG/DL (ref 60–100)
GLUCOSE BLD-MCNC: 95 MG/DL (ref 60–100)
GLUCOSE BLD-MCNC: 95 MG/DL (ref 60–100)
GLUCOSE BLD-MCNC: 96 MG/DL (ref 60–100)
GLUCOSE BLD-MCNC: 96 MG/DL (ref 60–100)
GLUCOSE BLD-MCNC: 98 MG/DL (ref 60–100)
GLUCOSE BLDA-MCNC: 106 MMOL/L
GLUCOSE BLDA-MCNC: 110 MMOL/L
GLUCOSE BLDC GLUCOMTR-MCNC: 101 MG/DL (ref 70–130)
GLUCOSE UR STRIP-MCNC: NEGATIVE MG/DL
HADV DNA SPEC NAA+PROBE: NOT DETECTED
HCO3 BLDA-SCNC: 16.5 MMOL/L (ref 20–26)
HCO3 BLDA-SCNC: 18.8 MMOL/L (ref 22–26)
HCO3 BLDA-SCNC: 19.2 MMOL/L (ref 22–26)
HCOV 229E RNA SPEC QL NAA+PROBE: NOT DETECTED
HCOV HKU1 RNA SPEC QL NAA+PROBE: NOT DETECTED
HCOV NL63 RNA SPEC QL NAA+PROBE: NOT DETECTED
HCOV OC43 RNA SPEC QL NAA+PROBE: NOT DETECTED
HCT VFR BLD AUTO: 21.3 % (ref 35–45)
HCT VFR BLD AUTO: 22 % (ref 35–45)
HCT VFR BLD AUTO: 24 % (ref 35–45)
HCT VFR BLD AUTO: 24 % (ref 35–45)
HCT VFR BLD AUTO: 24.2 % (ref 35–45)
HCT VFR BLD AUTO: 24.2 % (ref 35–45)
HCT VFR BLD AUTO: 24.3 % (ref 35–45)
HCT VFR BLD AUTO: 24.4 % (ref 35–45)
HCT VFR BLD AUTO: 25.1 % (ref 35–45)
HCT VFR BLD AUTO: 25.3 % (ref 35–45)
HCT VFR BLD AUTO: 26.2 % (ref 35–45)
HCT VFR BLD AUTO: 26.7 % (ref 35–45)
HCT VFR BLD AUTO: 26.8 % (ref 35–45)
HCT VFR BLD AUTO: 27 % (ref 35–45)
HCT VFR BLD AUTO: 27.1 % (ref 35–45)
HCT VFR BLD AUTO: 27.3 % (ref 35–45)
HCT VFR BLD AUTO: 28.1 % (ref 35–45)
HCT VFR BLD AUTO: 28.3 % (ref 35–45)
HCT VFR BLD AUTO: 28.4 % (ref 35–45)
HCT VFR BLD AUTO: 28.7 % (ref 35–45)
HCT VFR BLD AUTO: 29 % (ref 35–45)
HCT VFR BLD AUTO: 29 % (ref 35–45)
HCT VFR BLD AUTO: 29.3 % (ref 35–45)
HCT VFR BLD AUTO: 29.4 % (ref 35–45)
HCT VFR BLD AUTO: 31.8 % (ref 35–45)
HCT VFR BLD CALC: 29 % (ref 38–47)
HCT VFR BLD CALC: 32 % (ref 38–47)
HGB BLD-MCNC: 6.7 G/DL (ref 12–15.5)
HGB BLD-MCNC: 6.7 G/DL (ref 12–15.5)
HGB BLD-MCNC: 7.2 G/DL (ref 12–15.5)
HGB BLD-MCNC: 7.3 G/DL (ref 12–15.5)
HGB BLD-MCNC: 7.4 G/DL (ref 12–15.5)
HGB BLD-MCNC: 7.8 G/DL (ref 12–15.5)
HGB BLD-MCNC: 7.8 G/DL (ref 12–15.5)
HGB BLD-MCNC: 8.4 G/DL (ref 12–15.5)
HGB BLD-MCNC: 8.5 G/DL (ref 12–15.5)
HGB BLD-MCNC: 8.6 G/DL (ref 12–15.5)
HGB BLD-MCNC: 8.8 G/DL (ref 12–15.5)
HGB BLD-MCNC: 9 G/DL (ref 12–15.5)
HGB BLD-MCNC: 9.1 G/DL (ref 12–15.5)
HGB BLD-MCNC: 9.3 G/DL (ref 12–15.5)
HGB BLD-MCNC: 9.3 G/DL (ref 12–15.5)
HGB BLD-MCNC: 9.4 G/DL (ref 12–15.5)
HGB BLD-MCNC: 9.4 G/DL (ref 12–15.5)
HGB BLD-MCNC: 9.7 G/DL (ref 12–15.5)
HGB BLD-MCNC: 9.7 G/DL (ref 12–15.5)
HGB BLDA-MCNC: 10 G/DL (ref 12–16)
HGB BLDA-MCNC: 10.8 G/DL (ref 12–16)
HGB UR QL STRIP.AUTO: ABNORMAL
HMPV RNA NPH QL NAA+NON-PROBE: NOT DETECTED
HOLD SPECIMEN: NORMAL
HPIV1 RNA SPEC QL NAA+PROBE: NOT DETECTED
HPIV2 RNA SPEC QL NAA+PROBE: NOT DETECTED
HPIV3 RNA NPH QL NAA+PROBE: NOT DETECTED
HPIV4 P GENE NPH QL NAA+PROBE: NOT DETECTED
HYALINE CASTS UR QL AUTO: ABNORMAL /LPF
HYPOCHROMIA BLD QL: ABNORMAL
HYPOCHROMIA BLD QL: ABNORMAL
IMM GRANULOCYTES # BLD: 0 10*3/MM3 (ref 0–0.02)
IMM GRANULOCYTES # BLD: 0.01 10*3/MM3 (ref 0–0.02)
IMM GRANULOCYTES # BLD: 0.02 10*3/MM3 (ref 0–0.02)
IMM GRANULOCYTES # BLD: 0.03 10*3/MM3 (ref 0–0.02)
IMM GRANULOCYTES # BLD: 0.04 10*3/MM3 (ref 0–0.02)
IMM GRANULOCYTES # BLD: 0.06 10*3/MM3 (ref 0–0.02)
IMM GRANULOCYTES # BLD: 0.07 10*3/MM3 (ref 0–0.02)
IMM GRANULOCYTES # BLD: 0.07 10*3/MM3 (ref 0–0.02)
IMM GRANULOCYTES # BLD: 0.09 10*3/MM3 (ref 0–0.02)
IMM GRANULOCYTES # BLD: 0.11 10*3/MM3 (ref 0–0.02)
IMM GRANULOCYTES # BLD: 0.16 10*3/MM3 (ref 0–0.02)
IMM GRANULOCYTES # BLD: 0.48 10*3/MM3 (ref 0–0.02)
IMM GRANULOCYTES NFR BLD: 0 % (ref 0–0.5)
IMM GRANULOCYTES NFR BLD: 0.2 % (ref 0–0.5)
IMM GRANULOCYTES NFR BLD: 0.2 % (ref 0–0.5)
IMM GRANULOCYTES NFR BLD: 0.3 % (ref 0–0.5)
IMM GRANULOCYTES NFR BLD: 0.4 % (ref 0–0.5)
IMM GRANULOCYTES NFR BLD: 0.5 % (ref 0–0.5)
IMM GRANULOCYTES NFR BLD: 0.5 % (ref 0–0.5)
IMM GRANULOCYTES NFR BLD: 0.7 % (ref 0–0.5)
IMM GRANULOCYTES NFR BLD: 0.7 % (ref 0–0.5)
IMM GRANULOCYTES NFR BLD: 0.8 % (ref 0–0.5)
IMM GRANULOCYTES NFR BLD: 0.8 % (ref 0–0.5)
IMM GRANULOCYTES NFR BLD: 0.9 % (ref 0–0.5)
IMM GRANULOCYTES NFR BLD: 1 % (ref 0–0.5)
IMM GRANULOCYTES NFR BLD: 1.1 % (ref 0–0.5)
IMM GRANULOCYTES NFR BLD: 1.6 % (ref 0–0.5)
IMM GRANULOCYTES NFR BLD: 1.8 % (ref 0–0.5)
INR PPP: 1.68 (ref 0.8–1.2)
IRON 24H UR-MRATE: 26 MCG/DL (ref 37–170)
IRON SATN MFR SERPL: 12 % (ref 15–50)
KETONES UR QL STRIP: NEGATIVE
LAB AP CASE REPORT: NORMAL
LAB AP SPECIAL STAINS: NORMAL
LAB AP SYNOPTIC CHECKLIST: NORMAL
LDH SERPL-CCNC: 4065 U/L (ref 313–618)
LEUKOCYTE ESTERASE UR QL STRIP.AUTO: ABNORMAL
LEUKOCYTE ESTERASE UR QL STRIP.AUTO: ABNORMAL
LEUKOCYTE ESTERASE UR QL STRIP.AUTO: NEGATIVE
LIPASE SERPL-CCNC: 2831 U/L (ref 23–300)
LIPASE SERPL-CCNC: 452 U/L (ref 23–300)
LYMPHOCYTES # BLD AUTO: 1.01 10*3/MM3 (ref 0.6–4.2)
LYMPHOCYTES # BLD AUTO: 1.1 10*3/MM3 (ref 0.6–4.2)
LYMPHOCYTES # BLD AUTO: 1.2 10*3/MM3 (ref 0.6–4.2)
LYMPHOCYTES # BLD AUTO: 1.24 10*3/MM3 (ref 0.6–4.2)
LYMPHOCYTES # BLD AUTO: 1.33 10*3/MM3 (ref 0.6–4.2)
LYMPHOCYTES # BLD AUTO: 1.59 10*3/MM3 (ref 0.6–4.2)
LYMPHOCYTES # BLD AUTO: 1.86 10*3/MM3 (ref 0.6–4.2)
LYMPHOCYTES # BLD AUTO: 1.89 10*3/MM3 (ref 0.6–4.2)
LYMPHOCYTES # BLD AUTO: 15.02 10*3/MM3 (ref 0.6–4.2)
LYMPHOCYTES # BLD AUTO: 2.35 10*3/MM3 (ref 0.6–4.2)
LYMPHOCYTES # BLD AUTO: 2.71 10*3/MM3 (ref 0.6–4.2)
LYMPHOCYTES # BLD AUTO: 3.13 10*3/MM3 (ref 0.6–4.2)
LYMPHOCYTES # BLD AUTO: 3.14 10*3/MM3 (ref 0.6–4.2)
LYMPHOCYTES # BLD AUTO: 3.44 10*3/MM3 (ref 0.6–4.2)
LYMPHOCYTES # BLD AUTO: 3.73 10*3/MM3 (ref 0.6–4.2)
LYMPHOCYTES # BLD AUTO: 3.79 10*3/MM3 (ref 0.6–4.2)
LYMPHOCYTES # BLD AUTO: 3.94 10*3/MM3 (ref 0.6–4.2)
LYMPHOCYTES # BLD AUTO: 4.4 10*3/MM3 (ref 0.6–4.2)
LYMPHOCYTES # BLD MANUAL: 1.63 10*3/MM3 (ref 0.6–4.2)
LYMPHOCYTES # BLD MANUAL: 3.51 10*3/MM3 (ref 0.6–4.2)
LYMPHOCYTES # BLD MANUAL: 5.07 10*3/MM3 (ref 0.6–4.2)
LYMPHOCYTES NFR BLD AUTO: 17.6 % (ref 10–50)
LYMPHOCYTES NFR BLD AUTO: 17.6 % (ref 10–50)
LYMPHOCYTES NFR BLD AUTO: 18.2 % (ref 10–50)
LYMPHOCYTES NFR BLD AUTO: 25.9 % (ref 10–50)
LYMPHOCYTES NFR BLD AUTO: 30.9 % (ref 10–50)
LYMPHOCYTES NFR BLD AUTO: 34 % (ref 10–50)
LYMPHOCYTES NFR BLD AUTO: 35.1 % (ref 10–50)
LYMPHOCYTES NFR BLD AUTO: 41.2 % (ref 10–50)
LYMPHOCYTES NFR BLD AUTO: 41.8 % (ref 10–50)
LYMPHOCYTES NFR BLD AUTO: 42 % (ref 10–50)
LYMPHOCYTES NFR BLD AUTO: 42.1 % (ref 10–50)
LYMPHOCYTES NFR BLD AUTO: 43.4 % (ref 10–50)
LYMPHOCYTES NFR BLD AUTO: 43.7 % (ref 10–50)
LYMPHOCYTES NFR BLD AUTO: 45.3 % (ref 10–50)
LYMPHOCYTES NFR BLD AUTO: 45.4 % (ref 10–50)
LYMPHOCYTES NFR BLD AUTO: 47.1 % (ref 10–50)
LYMPHOCYTES NFR BLD AUTO: 49.5 % (ref 10–50)
LYMPHOCYTES NFR BLD AUTO: 51.6 % (ref 10–50)
LYMPHOCYTES NFR BLD MANUAL: 15 % (ref 10–50)
LYMPHOCYTES NFR BLD MANUAL: 21 % (ref 10–50)
LYMPHOCYTES NFR BLD MANUAL: 3 % (ref 0–12)
LYMPHOCYTES NFR BLD MANUAL: 3 % (ref 0–12)
LYMPHOCYTES NFR BLD MANUAL: 4 % (ref 0–12)
LYMPHOCYTES NFR BLD MANUAL: 66 % (ref 10–50)
Lab: NORMAL
M PNEUMO IGG SER IA-ACNC: NOT DETECTED
MACROCYTES BLD QL SMEAR: ABNORMAL
MAGNESIUM SERPL-MCNC: 1.9 MG/DL (ref 1.6–2.3)
MAGNESIUM SERPL-MCNC: 2.2 MG/DL (ref 1.6–2.3)
MCH RBC QN AUTO: 26.8 PG (ref 26.5–34)
MCH RBC QN AUTO: 26.8 PG (ref 26.5–34)
MCH RBC QN AUTO: 27.1 PG (ref 26.5–34)
MCH RBC QN AUTO: 27.2 PG (ref 26.5–34)
MCH RBC QN AUTO: 27.2 PG (ref 26.5–34)
MCH RBC QN AUTO: 27.6 PG (ref 26.5–34)
MCH RBC QN AUTO: 28.3 PG (ref 26.5–34)
MCH RBC QN AUTO: 28.7 PG (ref 26.5–34)
MCH RBC QN AUTO: 28.9 PG (ref 26.5–34)
MCH RBC QN AUTO: 29 PG (ref 26.5–34)
MCH RBC QN AUTO: 29 PG (ref 26.5–34)
MCH RBC QN AUTO: 29.1 PG (ref 26.5–34)
MCH RBC QN AUTO: 29.1 PG (ref 26.5–34)
MCH RBC QN AUTO: 29.7 PG (ref 26.5–34)
MCH RBC QN AUTO: 30.5 PG (ref 26.5–34)
MCH RBC QN AUTO: 30.6 PG (ref 26.5–34)
MCH RBC QN AUTO: 30.7 PG (ref 26.5–34)
MCH RBC QN AUTO: 30.8 PG (ref 26.5–34)
MCH RBC QN AUTO: 31 PG (ref 26.5–34)
MCH RBC QN AUTO: 31 PG (ref 26.5–34)
MCH RBC QN AUTO: 31.2 PG (ref 26.5–34)
MCH RBC QN AUTO: 31.6 PG (ref 26.5–34)
MCH RBC QN AUTO: 31.8 PG (ref 26.5–34)
MCHC RBC AUTO-ENTMCNC: 30 G/DL (ref 31.4–36)
MCHC RBC AUTO-ENTMCNC: 30.4 G/DL (ref 31.4–36)
MCHC RBC AUTO-ENTMCNC: 30.5 G/DL (ref 31.4–36)
MCHC RBC AUTO-ENTMCNC: 30.6 G/DL (ref 31.4–36)
MCHC RBC AUTO-ENTMCNC: 30.6 G/DL (ref 31.4–36)
MCHC RBC AUTO-ENTMCNC: 31 G/DL (ref 31.4–36)
MCHC RBC AUTO-ENTMCNC: 31.1 G/DL (ref 31.4–36)
MCHC RBC AUTO-ENTMCNC: 31.4 G/DL (ref 31.4–36)
MCHC RBC AUTO-ENTMCNC: 31.5 G/DL (ref 31.4–36)
MCHC RBC AUTO-ENTMCNC: 31.5 G/DL (ref 31.4–36)
MCHC RBC AUTO-ENTMCNC: 32 G/DL (ref 31.4–36)
MCHC RBC AUTO-ENTMCNC: 32 G/DL (ref 31.4–36)
MCHC RBC AUTO-ENTMCNC: 32.1 G/DL (ref 31.4–36)
MCHC RBC AUTO-ENTMCNC: 32.2 G/DL (ref 31.4–36)
MCHC RBC AUTO-ENTMCNC: 32.2 G/DL (ref 31.4–36)
MCHC RBC AUTO-ENTMCNC: 32.8 G/DL (ref 31.4–36)
MCHC RBC AUTO-ENTMCNC: 33 G/DL (ref 31.4–36)
MCHC RBC AUTO-ENTMCNC: 33 G/DL (ref 31.4–36)
MCHC RBC AUTO-ENTMCNC: 33.1 G/DL (ref 31.4–36)
MCHC RBC AUTO-ENTMCNC: 33.2 G/DL (ref 31.4–36)
MCV RBC AUTO: 102.3 FL (ref 80–98)
MCV RBC AUTO: 85.7 FL (ref 80–98)
MCV RBC AUTO: 86.9 FL (ref 80–98)
MCV RBC AUTO: 87.3 FL (ref 80–98)
MCV RBC AUTO: 87.7 FL (ref 80–98)
MCV RBC AUTO: 88.1 FL (ref 80–98)
MCV RBC AUTO: 88.2 FL (ref 80–98)
MCV RBC AUTO: 89 FL (ref 80–98)
MCV RBC AUTO: 89 FL (ref 80–98)
MCV RBC AUTO: 89.4 FL (ref 80–98)
MCV RBC AUTO: 89.9 FL (ref 80–98)
MCV RBC AUTO: 90.1 FL (ref 80–98)
MCV RBC AUTO: 90.6 FL (ref 80–98)
MCV RBC AUTO: 90.7 FL (ref 80–98)
MCV RBC AUTO: 91.4 FL (ref 80–98)
MCV RBC AUTO: 92 FL (ref 80–98)
MCV RBC AUTO: 92.7 FL (ref 80–98)
MCV RBC AUTO: 95.8 FL (ref 80–98)
MCV RBC AUTO: 96.4 FL (ref 80–98)
MCV RBC AUTO: 96.7 FL (ref 80–98)
MCV RBC AUTO: 97.3 FL (ref 80–98)
MCV RBC AUTO: 98.3 FL (ref 80–98)
MCV RBC AUTO: 98.5 FL (ref 80–98)
MCV RBC AUTO: 98.5 FL (ref 80–98)
MCV RBC AUTO: 98.8 FL (ref 80–98)
METAMYELOCYTES NFR BLD MANUAL: 1 % (ref 0–0)
MODALITY: ABNORMAL
MONOCYTES # BLD AUTO: 0.07 10*3/MM3 (ref 0–0.9)
MONOCYTES # BLD AUTO: 0.13 10*3/MM3 (ref 0–0.9)
MONOCYTES # BLD AUTO: 0.16 10*3/MM3 (ref 0–0.9)
MONOCYTES # BLD AUTO: 0.24 10*3/MM3 (ref 0–0.9)
MONOCYTES # BLD AUTO: 0.25 10*3/MM3 (ref 0–0.9)
MONOCYTES # BLD AUTO: 0.38 10*3/MM3 (ref 0–0.9)
MONOCYTES # BLD AUTO: 0.42 10*3/MM3 (ref 0–0.9)
MONOCYTES # BLD AUTO: 0.44 10*3/MM3 (ref 0–0.9)
MONOCYTES # BLD AUTO: 0.47 10*3/MM3 (ref 0–0.9)
MONOCYTES # BLD AUTO: 0.47 10*3/MM3 (ref 0–0.9)
MONOCYTES # BLD AUTO: 0.51 10*3/MM3 (ref 0–0.9)
MONOCYTES # BLD AUTO: 0.51 10*3/MM3 (ref 0–0.9)
MONOCYTES # BLD AUTO: 0.54 10*3/MM3 (ref 0–0.9)
MONOCYTES # BLD AUTO: 0.55 10*3/MM3 (ref 0–0.9)
MONOCYTES # BLD AUTO: 0.63 10*3/MM3 (ref 0–0.9)
MONOCYTES # BLD AUTO: 0.72 10*3/MM3 (ref 0–0.9)
MONOCYTES # BLD AUTO: 0.72 10*3/MM3 (ref 0–0.9)
MONOCYTES # BLD AUTO: 0.93 10*3/MM3 (ref 0–0.9)
MONOCYTES # BLD AUTO: 2.25 10*3/MM3 (ref 0–0.9)
MONOCYTES NFR BLD AUTO: 3 % (ref 0–12)
MONOCYTES NFR BLD AUTO: 4.8 % (ref 0–12)
MONOCYTES NFR BLD AUTO: 5.5 % (ref 0–12)
MONOCYTES NFR BLD AUTO: 5.7 % (ref 0–12)
MONOCYTES NFR BLD AUTO: 5.7 % (ref 0–12)
MONOCYTES NFR BLD AUTO: 6.1 % (ref 0–12)
MONOCYTES NFR BLD AUTO: 6.2 % (ref 0–12)
MONOCYTES NFR BLD AUTO: 6.3 % (ref 0–12)
MONOCYTES NFR BLD AUTO: 6.4 % (ref 0–12)
MONOCYTES NFR BLD AUTO: 6.6 % (ref 0–12)
MONOCYTES NFR BLD AUTO: 6.8 % (ref 0–12)
MONOCYTES NFR BLD AUTO: 7.3 % (ref 0–12)
MONOCYTES NFR BLD AUTO: 7.4 % (ref 0–12)
MONOCYTES NFR BLD AUTO: 7.5 % (ref 0–12)
MONOCYTES NFR BLD AUTO: 7.7 % (ref 0–12)
MONOCYTES NFR BLD AUTO: 8.3 % (ref 0–12)
MONOCYTES NFR BLD AUTO: 8.9 % (ref 0–12)
MONOCYTES NFR BLD AUTO: 9.1 % (ref 0–12)
MYELOCYTES NFR BLD MANUAL: 1 % (ref 0–0)
NEUTROPHILS # BLD AUTO: 0.69 10*3/MM3 (ref 2–8.6)
NEUTROPHILS # BLD AUTO: 0.81 10*3/MM3 (ref 2–8.6)
NEUTROPHILS # BLD AUTO: 1.02 10*3/MM3 (ref 2–8.6)
NEUTROPHILS # BLD AUTO: 12.5 10*3/MM3 (ref 2–8.6)
NEUTROPHILS # BLD AUTO: 17.86 10*3/MM3 (ref 2–8.6)
NEUTROPHILS # BLD AUTO: 18.93 10*3/MM3 (ref 2–8.6)
NEUTROPHILS # BLD AUTO: 2.17 10*3/MM3 (ref 2–8.6)
NEUTROPHILS # BLD AUTO: 3.25 10*3/MM3 (ref 2–8.6)
NEUTROPHILS # BLD AUTO: 3.29 10*3/MM3 (ref 2–8.6)
NEUTROPHILS # BLD AUTO: 3.34 10*3/MM3 (ref 2–8.6)
NEUTROPHILS # BLD AUTO: 3.5 10*3/MM3 (ref 2–8.6)
NEUTROPHILS # BLD AUTO: 3.6 10*3/MM3 (ref 2–8.6)
NEUTROPHILS # BLD AUTO: 3.86 10*3/MM3 (ref 2–8.6)
NEUTROPHILS # BLD AUTO: 3.95 10*3/MM3 (ref 2–8.6)
NEUTROPHILS # BLD AUTO: 4.25 10*3/MM3 (ref 2–8.6)
NEUTROPHILS # BLD AUTO: 4.36 10*3/MM3 (ref 2–8.6)
NEUTROPHILS # BLD AUTO: 4.46 10*3/MM3 (ref 2–8.6)
NEUTROPHILS # BLD AUTO: 4.76 10*3/MM3 (ref 2–8.6)
NEUTROPHILS # BLD AUTO: 4.92 10*3/MM3 (ref 2–8.6)
NEUTROPHILS # BLD AUTO: 4.92 10*3/MM3 (ref 2–8.6)
NEUTROPHILS # BLD AUTO: 5.55 10*3/MM3 (ref 2–8.6)
NEUTROPHILS NFR BLD AUTO: 38.1 % (ref 37–80)
NEUTROPHILS NFR BLD AUTO: 38.9 % (ref 37–80)
NEUTROPHILS NFR BLD AUTO: 41.1 % (ref 37–80)
NEUTROPHILS NFR BLD AUTO: 42.7 % (ref 37–80)
NEUTROPHILS NFR BLD AUTO: 43.1 % (ref 37–80)
NEUTROPHILS NFR BLD AUTO: 43.8 % (ref 37–80)
NEUTROPHILS NFR BLD AUTO: 46.2 % (ref 37–80)
NEUTROPHILS NFR BLD AUTO: 46.9 % (ref 37–80)
NEUTROPHILS NFR BLD AUTO: 46.9 % (ref 37–80)
NEUTROPHILS NFR BLD AUTO: 48.3 % (ref 37–80)
NEUTROPHILS NFR BLD AUTO: 49.1 % (ref 37–80)
NEUTROPHILS NFR BLD AUTO: 58.5 % (ref 37–80)
NEUTROPHILS NFR BLD AUTO: 59.5 % (ref 37–80)
NEUTROPHILS NFR BLD AUTO: 61 % (ref 37–80)
NEUTROPHILS NFR BLD AUTO: 62.7 % (ref 37–80)
NEUTROPHILS NFR BLD AUTO: 73.3 % (ref 37–80)
NEUTROPHILS NFR BLD AUTO: 74.2 % (ref 37–80)
NEUTROPHILS NFR BLD AUTO: 74.5 % (ref 37–80)
NEUTROPHILS NFR BLD MANUAL: 27 % (ref 37–80)
NEUTROPHILS NFR BLD MANUAL: 72 % (ref 37–80)
NEUTROPHILS NFR BLD MANUAL: 80 % (ref 37–80)
NEUTS BAND NFR BLD MANUAL: 1 % (ref 0–5)
NEUTS BAND NFR BLD MANUAL: 1 % (ref 0–5)
NEUTS BAND NFR BLD MANUAL: 2 % (ref 0–5)
NITRITE UR QL STRIP: NEGATIVE
NRBC BLD MANUAL-RTO: 0 /100 WBC (ref 0–0)
NRBC BLD MANUAL-RTO: 1.3 /100 WBC (ref 0–0)
NRBC BLD MANUAL-RTO: 1.4 /100 WBC (ref 0–0)
NRBC BLD MANUAL-RTO: 1.8 /100 WBC (ref 0–0)
NRBC BLD MANUAL-RTO: 2.2 /100 WBC (ref 0–0)
NRBC SPEC MANUAL: 36 /100 WBC (ref 0–0)
NRBC SPEC MANUAL: 8 /100 WBC (ref 0–0)
PATH REPORT.FINAL DX SPEC: NORMAL
PATH REPORT.GROSS SPEC: NORMAL
PCO2 BLDA: 28.8 MM HG (ref 35–45)
PCO2 BLDA: 33.2 MM HG (ref 35–45)
PCO2 BLDA: 51.2 MM HG (ref 35–45)
PH BLDA: 7.19 PH UNITS (ref 7.35–7.45)
PH BLDA: 7.37 PH UNITS (ref 7.35–7.45)
PH BLDA: 7.37 PH UNITS (ref 7.35–7.45)
PH UR STRIP.AUTO: 6.5 [PH] (ref 5–9)
PH UR STRIP.AUTO: 7 [PH] (ref 5–9)
PH UR STRIP.AUTO: 8.5 [PH] (ref 5–9)
PLATELET # BLD AUTO: 104 10*3/MM3 (ref 150–450)
PLATELET # BLD AUTO: 112 10*3/MM3 (ref 150–450)
PLATELET # BLD AUTO: 125 10*3/MM3 (ref 150–450)
PLATELET # BLD AUTO: 134 10*3/MM3 (ref 150–450)
PLATELET # BLD AUTO: 137 10*3/MM3 (ref 150–450)
PLATELET # BLD AUTO: 154 10*3/MM3 (ref 150–450)
PLATELET # BLD AUTO: 207 10*3/MM3 (ref 150–450)
PLATELET # BLD AUTO: 218 10*3/MM3 (ref 150–450)
PLATELET # BLD AUTO: 227 10*3/MM3 (ref 150–450)
PLATELET # BLD AUTO: 243 10*3/MM3 (ref 150–450)
PLATELET # BLD AUTO: 245 10*3/MM3 (ref 150–450)
PLATELET # BLD AUTO: 248 10*3/MM3 (ref 150–450)
PLATELET # BLD AUTO: 25 10*3/MM3 (ref 150–450)
PLATELET # BLD AUTO: 307 10*3/MM3 (ref 150–450)
PLATELET # BLD AUTO: 32 10*3/MM3 (ref 150–450)
PLATELET # BLD AUTO: 346 10*3/MM3 (ref 150–450)
PLATELET # BLD AUTO: 359 10*3/MM3 (ref 150–450)
PLATELET # BLD AUTO: 359 10*3/MM3 (ref 150–450)
PLATELET # BLD AUTO: 36 10*3/MM3 (ref 150–450)
PLATELET # BLD AUTO: 44 10*3/MM3 (ref 150–450)
PLATELET # BLD AUTO: 45 10*3/MM3 (ref 150–450)
PLATELET # BLD AUTO: 56 10*3/MM3 (ref 150–450)
PLATELET # BLD AUTO: 65 10*3/MM3 (ref 150–450)
PLATELET # BLD AUTO: 78 10*3/MM3 (ref 150–450)
PLATELET # BLD AUTO: 84 10*3/MM3 (ref 150–450)
PMV BLD AUTO: 10.5 FL (ref 8–12)
PMV BLD AUTO: 10.5 FL (ref 8–12)
PMV BLD AUTO: 11 FL (ref 8–12)
PMV BLD AUTO: 8.3 FL (ref 8–12)
PMV BLD AUTO: 8.4 FL (ref 8–12)
PMV BLD AUTO: 8.6 FL (ref 8–12)
PMV BLD AUTO: 8.7 FL (ref 8–12)
PMV BLD AUTO: 8.8 FL (ref 8–12)
PMV BLD AUTO: 8.8 FL (ref 8–12)
PMV BLD AUTO: 8.9 FL (ref 8–12)
PMV BLD AUTO: 9 FL (ref 8–12)
PMV BLD AUTO: 9.1 FL (ref 8–12)
PMV BLD AUTO: 9.1 FL (ref 8–12)
PMV BLD AUTO: 9.3 FL (ref 8–12)
PMV BLD AUTO: 9.3 FL (ref 8–12)
PMV BLD AUTO: 9.5 FL (ref 8–12)
PMV BLD AUTO: 9.6 FL (ref 8–12)
PMV BLD AUTO: 9.6 FL (ref 8–12)
PMV BLD AUTO: 9.7 FL (ref 8–12)
PMV BLD AUTO: ABNORMAL FL (ref 8–12)
PO2 BLDA: 113.3 MM HG (ref 80–105)
PO2 BLDA: 313.3 MM HG (ref 80–105)
PO2 BLDA: 99.7 MM HG (ref 83–108)
POLYCHROMASIA BLD QL SMEAR: ABNORMAL
POLYCHROMASIA BLD QL SMEAR: ABNORMAL
POLYCHROMASIA BLD QL SMEAR: NORMAL
POTASSIUM BLD-SCNC: 3.1 MMOL/L (ref 3.5–5.1)
POTASSIUM BLD-SCNC: 3.2 MMOL/L (ref 3.5–5.1)
POTASSIUM BLD-SCNC: 3.2 MMOL/L (ref 3.5–5.1)
POTASSIUM BLD-SCNC: 3.3 MMOL/L (ref 3.5–5.1)
POTASSIUM BLD-SCNC: 3.5 MMOL/L (ref 3.5–5.1)
POTASSIUM BLD-SCNC: 3.6 MMOL/L (ref 3.5–5.1)
POTASSIUM BLD-SCNC: 3.7 MMOL/L (ref 3.5–5.1)
POTASSIUM BLD-SCNC: 3.7 MMOL/L (ref 3.5–5.1)
POTASSIUM BLD-SCNC: 3.9 MMOL/L (ref 3.5–5.1)
POTASSIUM BLD-SCNC: 3.9 MMOL/L (ref 3.5–5.1)
POTASSIUM BLD-SCNC: 4 MMOL/L (ref 3.5–5.1)
POTASSIUM BLD-SCNC: 4.3 MMOL/L (ref 3.5–5.1)
POTASSIUM BLD-SCNC: 4.3 MMOL/L (ref 3.5–5.1)
POTASSIUM BLD-SCNC: 5 MMOL/L (ref 3.5–5.1)
POTASSIUM BLD-SCNC: 5.3 MMOL/L (ref 3.5–5.1)
POTASSIUM BLDA-SCNC: 3.98 MMOL/L (ref 3.6–4.9)
POTASSIUM BLDA-SCNC: 4.32 MMOL/L (ref 3.6–4.9)
PROT SERPL-MCNC: 5.4 G/DL (ref 6.3–8.6)
PROT SERPL-MCNC: 6.1 G/DL (ref 6.3–8.6)
PROT SERPL-MCNC: 6.3 G/DL (ref 6.3–8.6)
PROT SERPL-MCNC: 6.4 G/DL (ref 6.3–8.6)
PROT SERPL-MCNC: 7 G/DL (ref 6.3–8.6)
PROT SERPL-MCNC: 7.3 G/DL (ref 6.3–8.6)
PROT SERPL-MCNC: 7.4 G/DL (ref 6.3–8.6)
PROT SERPL-MCNC: 7.5 G/DL (ref 6.3–8.6)
PROT SERPL-MCNC: 7.5 G/DL (ref 6.3–8.6)
PROT SERPL-MCNC: 7.9 G/DL (ref 6.3–8.6)
PROT SERPL-MCNC: 7.9 G/DL (ref 6.3–8.6)
PROT SERPL-MCNC: 8.3 G/DL (ref 6.3–8.6)
PROT UR QL STRIP: ABNORMAL
PROTHROMBIN TIME: 19.2 SECONDS (ref 11.1–15.3)
RBC # BLD AUTO: 2.19 10*6/MM3 (ref 3.77–5.16)
RBC # BLD AUTO: 2.46 10*6/MM3 (ref 3.77–5.16)
RBC # BLD AUTO: 2.53 10*6/MM3 (ref 3.77–5.16)
RBC # BLD AUTO: 2.54 10*6/MM3 (ref 3.77–5.16)
RBC # BLD AUTO: 2.61 10*6/MM3 (ref 3.77–5.16)
RBC # BLD AUTO: 2.64 10*6/MM3 (ref 3.77–5.16)
RBC # BLD AUTO: 2.71 10*6/MM3 (ref 3.77–5.16)
RBC # BLD AUTO: 2.72 10*6/MM3 (ref 3.77–5.16)
RBC # BLD AUTO: 2.72 10*6/MM3 (ref 3.77–5.16)
RBC # BLD AUTO: 2.73 10*6/MM3 (ref 3.77–5.16)
RBC # BLD AUTO: 2.74 10*6/MM3 (ref 3.77–5.16)
RBC # BLD AUTO: 2.75 10*6/MM3 (ref 3.77–5.16)
RBC # BLD AUTO: 2.76 10*6/MM3 (ref 3.77–5.16)
RBC # BLD AUTO: 2.88 10*6/MM3 (ref 3.77–5.16)
RBC # BLD AUTO: 2.97 10*6/MM3 (ref 3.77–5.16)
RBC # BLD AUTO: 3.03 10*6/MM3 (ref 3.77–5.16)
RBC # BLD AUTO: 3.07 10*6/MM3 (ref 3.77–5.16)
RBC # BLD AUTO: 3.1 10*6/MM3 (ref 3.77–5.16)
RBC # BLD AUTO: 3.11 10*6/MM3 (ref 3.77–5.16)
RBC # BLD AUTO: 3.14 10*6/MM3 (ref 3.77–5.16)
RBC # BLD AUTO: 3.2 10*6/MM3 (ref 3.77–5.16)
RBC # BLD AUTO: 3.22 10*6/MM3 (ref 3.77–5.16)
RBC # BLD AUTO: 3.23 10*6/MM3 (ref 3.77–5.16)
RBC # BLD AUTO: 3.27 10*6/MM3 (ref 3.77–5.16)
RBC # BLD AUTO: 3.43 10*6/MM3 (ref 3.77–5.16)
RBC # UR: ABNORMAL /HPF
REF LAB TEST METHOD: ABNORMAL
RH BLD: POSITIVE
RHINOVIRUS RNA SPEC NAA+PROBE: NOT DETECTED
RSV RNA NPH QL NAA+NON-PROBE: NOT DETECTED
SAO2 % BLDCOA: 97.9 % (ref 94–99)
SAO2 % BLDCOA: 98.4 %
SAO2 % BLDCOA: 99.6 % (ref 94–100)
SMALL PLATELETS BLD QL SMEAR: ABNORMAL
SMALL PLATELETS BLD QL SMEAR: ADEQUATE
SMALL PLATELETS BLD QL SMEAR: NORMAL
SODIUM BLD-SCNC: 130 MMOL/L (ref 137–145)
SODIUM BLD-SCNC: 131 MMOL/L (ref 137–145)
SODIUM BLD-SCNC: 131 MMOL/L (ref 137–145)
SODIUM BLD-SCNC: 132 MMOL/L (ref 137–145)
SODIUM BLD-SCNC: 134 MMOL/L (ref 137–145)
SODIUM BLD-SCNC: 135 MMOL/L (ref 137–145)
SODIUM BLD-SCNC: 136 MMOL/L (ref 137–145)
SODIUM BLD-SCNC: 136 MMOL/L (ref 137–145)
SODIUM BLD-SCNC: 137 MMOL/L (ref 137–145)
SODIUM BLD-SCNC: 139 MMOL/L (ref 137–145)
SODIUM BLD-SCNC: 140 MMOL/L (ref 137–145)
SODIUM BLD-SCNC: 140 MMOL/L (ref 137–145)
SODIUM BLD-SCNC: 141 MMOL/L (ref 137–145)
SODIUM BLD-SCNC: 144 MMOL/L (ref 137–145)
SODIUM BLD-SCNC: 146 MMOL/L (ref 137–145)
SODIUM BLDA-SCNC: 137.7 MMOL/L (ref 138–146)
SODIUM BLDA-SCNC: 139.6 MMOL/L (ref 138–146)
SP GR UR STRIP: 1.01 (ref 1–1.03)
SP GR UR STRIP: 1.01 (ref 1–1.03)
SP GR UR STRIP: 1.02 (ref 1–1.03)
SQUAMOUS #/AREA URNS HPF: ABNORMAL /HPF
T&S EXPIRATION DATE: NORMAL
TIBC SERPL-MCNC: 210 MCG/DL (ref 265–497)
TROPONIN I SERPL-MCNC: 0.14 NG/ML
TROPONIN I SERPL-MCNC: 0.15 NG/ML
TROPONIN I SERPL-MCNC: 0.16 NG/ML
TROPONIN I SERPL-MCNC: 0.2 NG/ML
UNIT  ABO: NORMAL
UNIT  RH: NORMAL
UROBILINOGEN UR QL STRIP: ABNORMAL
VANCOMYCIN PEAK SERPL-MCNC: 26.26 MCG/ML (ref 30–40)
VANCOMYCIN SERPL-MCNC: 8.64 MCG/ML
VANCOMYCIN TROUGH SERPL-MCNC: 11.66 MCG/ML (ref 10–15)
VANCOMYCIN TROUGH SERPL-MCNC: 11.77 MCG/ML (ref 10–15)
VENTILATOR MODE: ABNORMAL
WBC MORPH BLD: NORMAL
WBC NRBC COR # BLD: 10.52 10*3/MM3 (ref 3.2–9.8)
WBC NRBC COR # BLD: 10.82 10*3/MM3 (ref 3.2–9.8)
WBC NRBC COR # BLD: 2.13 10*3/MM3 (ref 3.2–9.8)
WBC NRBC COR # BLD: 2.47 10*3/MM3 (ref 3.2–9.8)
WBC NRBC COR # BLD: 2.63 10*3/MM3 (ref 3.2–9.8)
WBC NRBC COR # BLD: 23.37 10*3/MM3 (ref 3.2–9.8)
WBC NRBC COR # BLD: 24.14 10*3/MM3 (ref 3.2–9.8)
WBC NRBC COR # BLD: 3.45 10*3/MM3 (ref 3.2–9.8)
WBC NRBC COR # BLD: 30.37 10*3/MM3 (ref 3.2–9.8)
WBC NRBC COR # BLD: 4.51 10*3/MM3 (ref 3.2–9.8)
WBC NRBC COR # BLD: 5.39 10*3/MM3 (ref 3.2–9.8)
WBC NRBC COR # BLD: 5.73 10*3/MM3 (ref 3.2–9.8)
WBC NRBC COR # BLD: 5.97 10*3/MM3 (ref 3.2–9.8)
WBC NRBC COR # BLD: 6.15 10*3/MM3 (ref 3.2–9.8)
WBC NRBC COR # BLD: 6.6 10*3/MM3 (ref 3.2–9.8)
WBC NRBC COR # BLD: 6.6 10*3/MM3 (ref 3.2–9.8)
WBC NRBC COR # BLD: 7.23 10*3/MM3 (ref 3.2–9.8)
WBC NRBC COR # BLD: 7.46 10*3/MM3 (ref 3.2–9.8)
WBC NRBC COR # BLD: 7.57 10*3/MM3 (ref 3.2–9.8)
WBC NRBC COR # BLD: 7.6 10*3/MM3 (ref 3.2–9.8)
WBC NRBC COR # BLD: 7.61 10*3/MM3 (ref 3.2–9.8)
WBC NRBC COR # BLD: 7.98 10*3/MM3 (ref 3.2–9.8)
WBC NRBC COR # BLD: 8.35 10*3/MM3 (ref 3.2–9.8)
WBC NRBC COR # BLD: 8.87 10*3/MM3 (ref 3.2–9.8)
WBC NRBC COR # BLD: 9.01 10*3/MM3 (ref 3.2–9.8)
WBC UR QL AUTO: ABNORMAL /HPF
WHOLE BLOOD HOLD SPECIMEN: NORMAL

## 2018-01-01 PROCEDURE — 96372 THER/PROPH/DIAG INJ SC/IM: CPT | Performed by: INTERNAL MEDICINE

## 2018-01-01 PROCEDURE — 25010000002 ONDANSETRON PER 1 MG: Performed by: FAMILY MEDICINE

## 2018-01-01 PROCEDURE — 99285 EMERGENCY DEPT VISIT HI MDM: CPT

## 2018-01-01 PROCEDURE — 1123F ACP DISCUSS/DSCN MKR DOCD: CPT | Performed by: INTERNAL MEDICINE

## 2018-01-01 PROCEDURE — 83690 ASSAY OF LIPASE: CPT | Performed by: EMERGENCY MEDICINE

## 2018-01-01 PROCEDURE — 25010000002 HEPARIN (PORCINE) PER 1000 UNITS: Performed by: SURGERY

## 2018-01-01 PROCEDURE — 25010000002 ONDANSETRON PER 1 MG: Performed by: SURGERY

## 2018-01-01 PROCEDURE — 76775 US EXAM ABDO BACK WALL LIM: CPT

## 2018-01-01 PROCEDURE — 88341 IMHCHEM/IMCYTCHM EA ADD ANTB: CPT | Performed by: SURGERY

## 2018-01-01 PROCEDURE — 99215 OFFICE O/P EST HI 40 MIN: CPT | Performed by: INTERNAL MEDICINE

## 2018-01-01 PROCEDURE — 25010000002 HYDROMORPHONE PER 4 MG: Performed by: SURGERY

## 2018-01-01 PROCEDURE — 25010000002 MIDAZOLAM PER 1 MG: Performed by: NURSE ANESTHETIST, CERTIFIED REGISTERED

## 2018-01-01 PROCEDURE — 82550 ASSAY OF CK (CPK): CPT | Performed by: EMERGENCY MEDICINE

## 2018-01-01 PROCEDURE — 25010000002 PHENYLEPHRINE PER 1 ML: Performed by: NURSE ANESTHETIST, CERTIFIED REGISTERED

## 2018-01-01 PROCEDURE — 85027 COMPLETE CBC AUTOMATED: CPT | Performed by: INTERNAL MEDICINE

## 2018-01-01 PROCEDURE — 99284 EMERGENCY DEPT VISIT MOD MDM: CPT

## 2018-01-01 PROCEDURE — 88341 IMHCHEM/IMCYTCHM EA ADD ANTB: CPT | Performed by: PATHOLOGY

## 2018-01-01 PROCEDURE — 88305 TISSUE EXAM BY PATHOLOGIST: CPT | Performed by: INTERNAL MEDICINE

## 2018-01-01 PROCEDURE — 80048 BASIC METABOLIC PNL TOTAL CA: CPT | Performed by: SURGERY

## 2018-01-01 PROCEDURE — 85007 BL SMEAR W/DIFF WBC COUNT: CPT | Performed by: FAMILY MEDICINE

## 2018-01-01 PROCEDURE — BT1D1ZZ FLUOROSCOPY OF RIGHT KIDNEY, URETER AND BLADDER USING LOW OSMOLAR CONTRAST: ICD-10-PCS | Performed by: UROLOGY

## 2018-01-01 PROCEDURE — 80048 BASIC METABOLIC PNL TOTAL CA: CPT | Performed by: FAMILY MEDICINE

## 2018-01-01 PROCEDURE — 97530 THERAPEUTIC ACTIVITIES: CPT

## 2018-01-01 PROCEDURE — 80053 COMPREHEN METABOLIC PANEL: CPT | Performed by: INTERNAL MEDICINE

## 2018-01-01 PROCEDURE — 80162 ASSAY OF DIGOXIN TOTAL: CPT | Performed by: INTERNAL MEDICINE

## 2018-01-01 PROCEDURE — 85025 COMPLETE CBC W/AUTO DIFF WBC: CPT

## 2018-01-01 PROCEDURE — 25010000002 MORPHINE PER 10 MG: Performed by: INTERNAL MEDICINE

## 2018-01-01 PROCEDURE — G0378 HOSPITAL OBSERVATION PER HR: HCPCS

## 2018-01-01 PROCEDURE — 25010000002 ONDANSETRON PER 1 MG: Performed by: PHYSICIAN ASSISTANT

## 2018-01-01 PROCEDURE — C2617 STENT, NON-COR, TEM W/O DEL: HCPCS | Performed by: UROLOGY

## 2018-01-01 PROCEDURE — 25010000002 CEFOXITIN PER 1 G: Performed by: SURGERY

## 2018-01-01 PROCEDURE — 0T768DZ DILATION OF RIGHT URETER WITH INTRALUMINAL DEVICE, VIA NATURAL OR ARTIFICIAL OPENING ENDOSCOPIC: ICD-10-PCS | Performed by: UROLOGY

## 2018-01-01 PROCEDURE — 71046 X-RAY EXAM CHEST 2 VIEWS: CPT

## 2018-01-01 PROCEDURE — 83605 ASSAY OF LACTIC ACID: CPT | Performed by: EMERGENCY MEDICINE

## 2018-01-01 PROCEDURE — 85025 COMPLETE CBC W/AUTO DIFF WBC: CPT | Performed by: FAMILY MEDICINE

## 2018-01-01 PROCEDURE — 25010000002 FENTANYL CITRATE (PF) 100 MCG/2ML SOLUTION: Performed by: NURSE ANESTHETIST, CERTIFIED REGISTERED

## 2018-01-01 PROCEDURE — 93010 ELECTROCARDIOGRAM REPORT: CPT | Performed by: INTERNAL MEDICINE

## 2018-01-01 PROCEDURE — 74176 CT ABD & PELVIS W/O CONTRAST: CPT

## 2018-01-01 PROCEDURE — 02HV33Z INSERTION OF INFUSION DEVICE INTO SUPERIOR VENA CAVA, PERCUTANEOUS APPROACH: ICD-10-PCS | Performed by: FAMILY MEDICINE

## 2018-01-01 PROCEDURE — 97116 GAIT TRAINING THERAPY: CPT

## 2018-01-01 PROCEDURE — 36415 COLL VENOUS BLD VENIPUNCTURE: CPT

## 2018-01-01 PROCEDURE — 97535 SELF CARE MNGMENT TRAINING: CPT

## 2018-01-01 PROCEDURE — 80053 COMPREHEN METABOLIC PANEL: CPT

## 2018-01-01 PROCEDURE — 85007 BL SMEAR W/DIFF WBC COUNT: CPT | Performed by: INTERNAL MEDICINE

## 2018-01-01 PROCEDURE — 94799 UNLISTED PULMONARY SVC/PX: CPT

## 2018-01-01 PROCEDURE — 99024 POSTOP FOLLOW-UP VISIT: CPT | Performed by: SURGERY

## 2018-01-01 PROCEDURE — 25010000002 HYDRALAZINE PER 20 MG: Performed by: INTERNAL MEDICINE

## 2018-01-01 PROCEDURE — 80053 COMPREHEN METABOLIC PANEL: CPT | Performed by: FAMILY MEDICINE

## 2018-01-01 PROCEDURE — 96375 TX/PRO/DX INJ NEW DRUG ADDON: CPT

## 2018-01-01 PROCEDURE — 80048 BASIC METABOLIC PNL TOTAL CA: CPT | Performed by: INTERNAL MEDICINE

## 2018-01-01 PROCEDURE — 63710000001 PROMETHAZINE PER 25 MG: Performed by: FAMILY MEDICINE

## 2018-01-01 PROCEDURE — 88300 SURGICAL PATH GROSS: CPT | Performed by: PATHOLOGY

## 2018-01-01 PROCEDURE — 85025 COMPLETE CBC W/AUTO DIFF WBC: CPT | Performed by: EMERGENCY MEDICINE

## 2018-01-01 PROCEDURE — 80202 ASSAY OF VANCOMYCIN: CPT | Performed by: FAMILY MEDICINE

## 2018-01-01 PROCEDURE — 25010000002 MORPHINE PER 10 MG: Performed by: FAMILY MEDICINE

## 2018-01-01 PROCEDURE — 86850 RBC ANTIBODY SCREEN: CPT | Performed by: SURGERY

## 2018-01-01 PROCEDURE — 25010000002 METOCLOPRAMIDE PER 10 MG: Performed by: SURGERY

## 2018-01-01 PROCEDURE — 83605 ASSAY OF LACTIC ACID: CPT | Performed by: FAMILY MEDICINE

## 2018-01-01 PROCEDURE — 71260 CT THORAX DX C+: CPT

## 2018-01-01 PROCEDURE — 96365 THER/PROPH/DIAG IV INF INIT: CPT

## 2018-01-01 PROCEDURE — 25010000002 LEVOFLOXACIN PER 250 MG: Performed by: INTERNAL MEDICINE

## 2018-01-01 PROCEDURE — 82962 GLUCOSE BLOOD TEST: CPT

## 2018-01-01 PROCEDURE — 81001 URINALYSIS AUTO W/SCOPE: CPT | Performed by: PHYSICIAN ASSISTANT

## 2018-01-01 PROCEDURE — 25010000002 IOPAMIDOL 61 % SOLUTION: Performed by: FAMILY MEDICINE

## 2018-01-01 PROCEDURE — 25010000002 ONDANSETRON PER 1 MG: Performed by: NURSE ANESTHETIST, CERTIFIED REGISTERED

## 2018-01-01 PROCEDURE — 25010000002 PROPOFOL 10 MG/ML EMULSION: Performed by: NURSE ANESTHETIST, CERTIFIED REGISTERED

## 2018-01-01 PROCEDURE — G0463 HOSPITAL OUTPT CLINIC VISIT: HCPCS | Performed by: INTERNAL MEDICINE

## 2018-01-01 PROCEDURE — 25010000002 LEVOFLOXACIN PER 250 MG: Performed by: HOSPITALIST

## 2018-01-01 PROCEDURE — 86901 BLOOD TYPING SEROLOGIC RH(D): CPT | Performed by: ANESTHESIOLOGY

## 2018-01-01 PROCEDURE — 25010000002 MEROPENEM: Performed by: EMERGENCY MEDICINE

## 2018-01-01 PROCEDURE — 74420 UROGRAPHY RTRGR +-KUB: CPT

## 2018-01-01 PROCEDURE — 85730 THROMBOPLASTIN TIME PARTIAL: CPT | Performed by: EMERGENCY MEDICINE

## 2018-01-01 PROCEDURE — 87633 RESP VIRUS 12-25 TARGETS: CPT | Performed by: FAMILY MEDICINE

## 2018-01-01 PROCEDURE — 82378 CARCINOEMBRYONIC ANTIGEN: CPT

## 2018-01-01 PROCEDURE — 88291 CYTO/MOLECULAR REPORT: CPT

## 2018-01-01 PROCEDURE — C1769 GUIDE WIRE: HCPCS | Performed by: SURGERY

## 2018-01-01 PROCEDURE — 76604 US EXAM CHEST: CPT

## 2018-01-01 PROCEDURE — 96361 HYDRATE IV INFUSION ADD-ON: CPT | Performed by: INTERNAL MEDICINE

## 2018-01-01 PROCEDURE — 85027 COMPLETE CBC AUTOMATED: CPT | Performed by: SURGERY

## 2018-01-01 PROCEDURE — 74177 CT ABD & PELVIS W/CONTRAST: CPT

## 2018-01-01 PROCEDURE — 87040 BLOOD CULTURE FOR BACTERIA: CPT | Performed by: EMERGENCY MEDICINE

## 2018-01-01 PROCEDURE — 83735 ASSAY OF MAGNESIUM: CPT | Performed by: EMERGENCY MEDICINE

## 2018-01-01 PROCEDURE — 25010000002 METOCLOPRAMIDE PER 10 MG: Performed by: INTERNAL MEDICINE

## 2018-01-01 PROCEDURE — 83735 ASSAY OF MAGNESIUM: CPT | Performed by: INTERNAL MEDICINE

## 2018-01-01 PROCEDURE — 88305 TISSUE EXAM BY PATHOLOGIST: CPT | Performed by: PATHOLOGY

## 2018-01-01 PROCEDURE — 94760 N-INVAS EAR/PLS OXIMETRY 1: CPT

## 2018-01-01 PROCEDURE — 86850 RBC ANTIBODY SCREEN: CPT | Performed by: ANESTHESIOLOGY

## 2018-01-01 PROCEDURE — 0TP98DZ REMOVAL OF INTRALUMINAL DEVICE FROM URETER, VIA NATURAL OR ARTIFICIAL OPENING ENDOSCOPIC: ICD-10-PCS | Performed by: UROLOGY

## 2018-01-01 PROCEDURE — 88360 TUMOR IMMUNOHISTOCHEM/MANUAL: CPT | Performed by: SURGERY

## 2018-01-01 PROCEDURE — 88307 TISSUE EXAM BY PATHOLOGIST: CPT | Performed by: SURGERY

## 2018-01-01 PROCEDURE — 0F170ZB BYPASS COMMON HEPATIC DUCT TO SMALL INTESTINE, OPEN APPROACH: ICD-10-PCS | Performed by: SURGERY

## 2018-01-01 PROCEDURE — 84484 ASSAY OF TROPONIN QUANT: CPT | Performed by: EMERGENCY MEDICINE

## 2018-01-01 PROCEDURE — 36415 COLL VENOUS BLD VENIPUNCTURE: CPT | Performed by: INTERNAL MEDICINE

## 2018-01-01 PROCEDURE — 80053 COMPREHEN METABOLIC PANEL: CPT | Performed by: EMERGENCY MEDICINE

## 2018-01-01 PROCEDURE — 86900 BLOOD TYPING SEROLOGIC ABO: CPT | Performed by: SURGERY

## 2018-01-01 PROCEDURE — 86900 BLOOD TYPING SEROLOGIC ABO: CPT

## 2018-01-01 PROCEDURE — 74246 X-RAY XM UPR GI TRC 2CNTRST: CPT

## 2018-01-01 PROCEDURE — 83615 LACTATE (LD) (LDH) ENZYME: CPT | Performed by: EMERGENCY MEDICINE

## 2018-01-01 PROCEDURE — 25010000002 IOPAMIDOL 61 % SOLUTION: Performed by: UROLOGY

## 2018-01-01 PROCEDURE — 80053 COMPREHEN METABOLIC PANEL: CPT | Performed by: SURGERY

## 2018-01-01 PROCEDURE — 86923 COMPATIBILITY TEST ELECTRIC: CPT

## 2018-01-01 PROCEDURE — 85025 COMPLETE CBC W/AUTO DIFF WBC: CPT | Performed by: SURGERY

## 2018-01-01 PROCEDURE — 51702 INSERT TEMP BLADDER CATH: CPT

## 2018-01-01 PROCEDURE — 25010000002 ONDANSETRON PER 1 MG: Performed by: INTERNAL MEDICINE

## 2018-01-01 PROCEDURE — 97166 OT EVAL MOD COMPLEX 45 MIN: CPT

## 2018-01-01 PROCEDURE — G8979 MOBILITY GOAL STATUS: HCPCS

## 2018-01-01 PROCEDURE — 86850 RBC ANTIBODY SCREEN: CPT | Performed by: INTERNAL MEDICINE

## 2018-01-01 PROCEDURE — 83690 ASSAY OF LIPASE: CPT

## 2018-01-01 PROCEDURE — 25010000002 PROMETHAZINE PER 50 MG: Performed by: INTERNAL MEDICINE

## 2018-01-01 PROCEDURE — 86300 IMMUNOASSAY TUMOR CA 15-3: CPT

## 2018-01-01 PROCEDURE — 0 IOPAMIDOL 61 % SOLUTION: Performed by: SURGERY

## 2018-01-01 PROCEDURE — 86901 BLOOD TYPING SEROLOGIC RH(D): CPT | Performed by: INTERNAL MEDICINE

## 2018-01-01 PROCEDURE — 86900 BLOOD TYPING SEROLOGIC ABO: CPT | Performed by: EMERGENCY MEDICINE

## 2018-01-01 PROCEDURE — 83605 ASSAY OF LACTIC ACID: CPT | Performed by: INTERNAL MEDICINE

## 2018-01-01 PROCEDURE — 25010000002 ONDANSETRON PER 1 MG

## 2018-01-01 PROCEDURE — 25010000002 PROMETHAZINE PER 50 MG: Performed by: FAMILY MEDICINE

## 2018-01-01 PROCEDURE — G8978 MOBILITY CURRENT STATUS: HCPCS

## 2018-01-01 PROCEDURE — 25010000002 VANCOMYCIN PER 500 MG: Performed by: FAMILY MEDICINE

## 2018-01-01 PROCEDURE — 88274 CYTOGENETICS 25-99: CPT

## 2018-01-01 PROCEDURE — 85025 COMPLETE CBC W/AUTO DIFF WBC: CPT | Performed by: INTERNAL MEDICINE

## 2018-01-01 PROCEDURE — 97162 PT EVAL MOD COMPLEX 30 MIN: CPT

## 2018-01-01 PROCEDURE — C1758 CATHETER, URETERAL: HCPCS | Performed by: UROLOGY

## 2018-01-01 PROCEDURE — 82150 ASSAY OF AMYLASE: CPT

## 2018-01-01 PROCEDURE — G8987 SELF CARE CURRENT STATUS: HCPCS

## 2018-01-01 PROCEDURE — 82803 BLOOD GASES ANY COMBINATION: CPT | Performed by: NURSE ANESTHETIST, CERTIFIED REGISTERED

## 2018-01-01 PROCEDURE — 25010000002 VANCOMYCIN PER 500 MG: Performed by: PHYSICIAN ASSISTANT

## 2018-01-01 PROCEDURE — 0D160ZA BYPASS STOMACH TO JEJUNUM, OPEN APPROACH: ICD-10-PCS | Performed by: SURGERY

## 2018-01-01 PROCEDURE — 85007 BL SMEAR W/DIFF WBC COUNT: CPT | Performed by: EMERGENCY MEDICINE

## 2018-01-01 PROCEDURE — 25010000002 LEVOFLOXACIN PER 250 MG: Performed by: FAMILY MEDICINE

## 2018-01-01 PROCEDURE — 88342 IMHCHEM/IMCYTCHM 1ST ANTB: CPT | Performed by: SURGERY

## 2018-01-01 PROCEDURE — 88377 M/PHMTRC ALYS ISHQUANT/SEMIQ: CPT | Performed by: SURGERY

## 2018-01-01 PROCEDURE — 93005 ELECTROCARDIOGRAM TRACING: CPT | Performed by: INTERNAL MEDICINE

## 2018-01-01 PROCEDURE — 81001 URINALYSIS AUTO W/SCOPE: CPT | Performed by: FAMILY MEDICINE

## 2018-01-01 PROCEDURE — 63710000001 DEXAMETHASONE PER 0.25 MG: Performed by: INTERNAL MEDICINE

## 2018-01-01 PROCEDURE — 88342 IMHCHEM/IMCYTCHM 1ST ANTB: CPT | Performed by: PATHOLOGY

## 2018-01-01 PROCEDURE — 97110 THERAPEUTIC EXERCISES: CPT

## 2018-01-01 PROCEDURE — C2627 CATH, SUPRAPUBIC/CYSTOSCOPIC: HCPCS | Performed by: SURGERY

## 2018-01-01 PROCEDURE — 96361 HYDRATE IV INFUSION ADD-ON: CPT

## 2018-01-01 PROCEDURE — 25010000002 IOPAMIDOL 61 % SOLUTION: Performed by: EMERGENCY MEDICINE

## 2018-01-01 PROCEDURE — 85025 COMPLETE CBC W/AUTO DIFF WBC: CPT | Performed by: NURSE PRACTITIONER

## 2018-01-01 PROCEDURE — 25010000002 GADOTERIDOL PER 1 ML: Performed by: INTERNAL MEDICINE

## 2018-01-01 PROCEDURE — 25010000002 DENOSUMAB 120 MG/1.7ML SOLUTION: Performed by: INTERNAL MEDICINE

## 2018-01-01 PROCEDURE — 85610 PROTHROMBIN TIME: CPT | Performed by: EMERGENCY MEDICINE

## 2018-01-01 PROCEDURE — 88360 TUMOR IMMUNOHISTOCHEM/MANUAL: CPT | Performed by: PATHOLOGY

## 2018-01-01 PROCEDURE — 47001 NDL BIOPSY LVR TM OTH MAJ PX: CPT | Performed by: SURGERY

## 2018-01-01 PROCEDURE — 36430 TRANSFUSION BLD/BLD COMPNT: CPT

## 2018-01-01 PROCEDURE — C1769 GUIDE WIRE: HCPCS | Performed by: UROLOGY

## 2018-01-01 PROCEDURE — 80053 COMPREHEN METABOLIC PANEL: CPT | Performed by: NURSE PRACTITIONER

## 2018-01-01 PROCEDURE — 86301 IMMUNOASSAY TUMOR CA 19-9: CPT

## 2018-01-01 PROCEDURE — 93005 ELECTROCARDIOGRAM TRACING: CPT | Performed by: FAMILY MEDICINE

## 2018-01-01 PROCEDURE — 93005 ELECTROCARDIOGRAM TRACING: CPT | Performed by: PHYSICIAN ASSISTANT

## 2018-01-01 PROCEDURE — 63710000001 BARIUM SULFATE 96 % RECONSTITUTED SUSPENSION: Performed by: RADIOLOGY

## 2018-01-01 PROCEDURE — 83540 ASSAY OF IRON: CPT | Performed by: INTERNAL MEDICINE

## 2018-01-01 PROCEDURE — 87798 DETECT AGENT NOS DNA AMP: CPT | Performed by: FAMILY MEDICINE

## 2018-01-01 PROCEDURE — 87086 URINE CULTURE/COLONY COUNT: CPT | Performed by: UROLOGY

## 2018-01-01 PROCEDURE — 96376 TX/PRO/DX INJ SAME DRUG ADON: CPT

## 2018-01-01 PROCEDURE — 25010000002 NEOSTIGMINE PER 0.5 MG: Performed by: NURSE ANESTHETIST, CERTIFIED REGISTERED

## 2018-01-01 PROCEDURE — 88271 CYTOGENETICS DNA PROBE: CPT

## 2018-01-01 PROCEDURE — 0FB20ZX EXCISION OF LEFT LOBE LIVER, OPEN APPROACH, DIAGNOSTIC: ICD-10-PCS | Performed by: SURGERY

## 2018-01-01 PROCEDURE — 94762 N-INVAS EAR/PLS OXIMTRY CONT: CPT

## 2018-01-01 PROCEDURE — 99214 OFFICE O/P EST MOD 30 MIN: CPT | Performed by: INTERNAL MEDICINE

## 2018-01-01 PROCEDURE — 80162 ASSAY OF DIGOXIN TOTAL: CPT | Performed by: PHYSICIAN ASSISTANT

## 2018-01-01 PROCEDURE — 83550 IRON BINDING TEST: CPT | Performed by: INTERNAL MEDICINE

## 2018-01-01 PROCEDURE — 43820 GASTROJEJUNOSTOMY WO VAGOTMY: CPT | Performed by: SURGERY

## 2018-01-01 PROCEDURE — 25010000002 MORPHINE PER 10 MG: Performed by: EMERGENCY MEDICINE

## 2018-01-01 PROCEDURE — 99214 OFFICE O/P EST MOD 30 MIN: CPT | Performed by: SURGERY

## 2018-01-01 PROCEDURE — 25010000002 ENOXAPARIN PER 10 MG: Performed by: FAMILY MEDICINE

## 2018-01-01 PROCEDURE — 86901 BLOOD TYPING SEROLOGIC RH(D): CPT | Performed by: EMERGENCY MEDICINE

## 2018-01-01 PROCEDURE — 87581 M.PNEUMON DNA AMP PROBE: CPT | Performed by: FAMILY MEDICINE

## 2018-01-01 PROCEDURE — 25010000002 HEPARIN (PORCINE) PER 1000 UNITS: Performed by: FAMILY MEDICINE

## 2018-01-01 PROCEDURE — 74183 MRI ABD W/O CNTR FLWD CNTR: CPT

## 2018-01-01 PROCEDURE — 36600 WITHDRAWAL OF ARTERIAL BLOOD: CPT

## 2018-01-01 PROCEDURE — 0 IOPAMIDOL 61 % SOLUTION: Performed by: INTERNAL MEDICINE

## 2018-01-01 PROCEDURE — 84484 ASSAY OF TROPONIN QUANT: CPT | Performed by: FAMILY MEDICINE

## 2018-01-01 PROCEDURE — 87077 CULTURE AEROBIC IDENTIFY: CPT | Performed by: UROLOGY

## 2018-01-01 PROCEDURE — A9270 NON-COVERED ITEM OR SERVICE: HCPCS | Performed by: RADIOLOGY

## 2018-01-01 PROCEDURE — 0D9A0ZZ DRAINAGE OF JEJUNUM, OPEN APPROACH: ICD-10-PCS | Performed by: SURGERY

## 2018-01-01 PROCEDURE — 86850 RBC ANTIBODY SCREEN: CPT | Performed by: EMERGENCY MEDICINE

## 2018-01-01 PROCEDURE — 88307 TISSUE EXAM BY PATHOLOGIST: CPT | Performed by: PATHOLOGY

## 2018-01-01 PROCEDURE — 76937 US GUIDE VASCULAR ACCESS: CPT

## 2018-01-01 PROCEDURE — C1758 CATHETER, URETERAL: HCPCS | Performed by: SURGERY

## 2018-01-01 PROCEDURE — C2617 STENT, NON-COR, TEM W/O DEL: HCPCS | Performed by: SURGERY

## 2018-01-01 PROCEDURE — 96366 THER/PROPH/DIAG IV INF ADDON: CPT

## 2018-01-01 PROCEDURE — 86900 BLOOD TYPING SEROLOGIC ABO: CPT | Performed by: INTERNAL MEDICINE

## 2018-01-01 PROCEDURE — P9016 RBC LEUKOCYTES REDUCED: HCPCS

## 2018-01-01 PROCEDURE — 87186 SC STD MICRODIL/AGAR DIL: CPT | Performed by: UROLOGY

## 2018-01-01 PROCEDURE — 88300 SURGICAL PATH GROSS: CPT | Performed by: UROLOGY

## 2018-01-01 PROCEDURE — 86901 BLOOD TYPING SEROLOGIC RH(D): CPT | Performed by: SURGERY

## 2018-01-01 PROCEDURE — 87086 URINE CULTURE/COLONY COUNT: CPT | Performed by: FAMILY MEDICINE

## 2018-01-01 PROCEDURE — 93005 ELECTROCARDIOGRAM TRACING: CPT

## 2018-01-01 PROCEDURE — 96360 HYDRATION IV INFUSION INIT: CPT | Performed by: INTERNAL MEDICINE

## 2018-01-01 PROCEDURE — 82553 CREATINE MB FRACTION: CPT | Performed by: EMERGENCY MEDICINE

## 2018-01-01 PROCEDURE — 25010000002 HYDROMORPHONE PER 4 MG: Performed by: NURSE ANESTHETIST, CERTIFIED REGISTERED

## 2018-01-01 PROCEDURE — 44310 ILEOSTOMY/JEJUNOSTOMY: CPT | Performed by: SURGERY

## 2018-01-01 PROCEDURE — A9576 INJ PROHANCE MULTIPACK: HCPCS | Performed by: INTERNAL MEDICINE

## 2018-01-01 PROCEDURE — G8988 SELF CARE GOAL STATUS: HCPCS

## 2018-01-01 PROCEDURE — 87086 URINE CULTURE/COLONY COUNT: CPT | Performed by: INTERNAL MEDICINE

## 2018-01-01 PROCEDURE — 82150 ASSAY OF AMYLASE: CPT | Performed by: EMERGENCY MEDICINE

## 2018-01-01 PROCEDURE — 70470 CT HEAD/BRAIN W/O & W/DYE: CPT

## 2018-01-01 PROCEDURE — P9021 RED BLOOD CELLS UNIT: HCPCS

## 2018-01-01 PROCEDURE — 25010000002 SUCCINYLCHOLINE PER 20 MG: Performed by: NURSE ANESTHETIST, CERTIFIED REGISTERED

## 2018-01-01 PROCEDURE — 25010000002 LEVOFLOXACIN PER 250 MG: Performed by: PHYSICIAN ASSISTANT

## 2018-01-01 PROCEDURE — 93005 ELECTROCARDIOGRAM TRACING: CPT | Performed by: EMERGENCY MEDICINE

## 2018-01-01 PROCEDURE — C1751 CATH, INF, PER/CENT/MIDLINE: HCPCS

## 2018-01-01 PROCEDURE — 82803 BLOOD GASES ANY COMBINATION: CPT

## 2018-01-01 PROCEDURE — 25010000002 DEXAMETHASONE PER 1 MG: Performed by: NURSE ANESTHETIST, CERTIFIED REGISTERED

## 2018-01-01 PROCEDURE — 47785 FUSE BILE DUCTS AND BOWEL: CPT | Performed by: SURGERY

## 2018-01-01 PROCEDURE — 86900 BLOOD TYPING SEROLOGIC ABO: CPT | Performed by: ANESTHESIOLOGY

## 2018-01-01 PROCEDURE — 0DH60UZ INSERTION OF FEEDING DEVICE INTO STOMACH, OPEN APPROACH: ICD-10-PCS | Performed by: SURGERY

## 2018-01-01 PROCEDURE — 81001 URINALYSIS AUTO W/SCOPE: CPT | Performed by: EMERGENCY MEDICINE

## 2018-01-01 PROCEDURE — 87486 CHLMYD PNEUM DNA AMP PROBE: CPT | Performed by: FAMILY MEDICINE

## 2018-01-01 PROCEDURE — 99212 OFFICE O/P EST SF 10 MIN: CPT | Performed by: INTERNAL MEDICINE

## 2018-01-01 PROCEDURE — B548ZZA ULTRASONOGRAPHY OF SUPERIOR VENA CAVA, GUIDANCE: ICD-10-PCS | Performed by: FAMILY MEDICINE

## 2018-01-01 PROCEDURE — 25010000002 MORPHINE PER 10 MG: Performed by: SURGERY

## 2018-01-01 DEVICE — URETERAL STENT
Type: IMPLANTABLE DEVICE | Status: FUNCTIONAL
Brand: POLARIS™ ULTRA

## 2018-01-01 DEVICE — URETERAL STENT
Type: IMPLANTABLE DEVICE | Site: URETER | Status: FUNCTIONAL
Brand: POLARIS™ ULTRA

## 2018-01-01 RX ORDER — PALBOCICLIB 100 MG/1
CAPSULE ORAL
Qty: 21 CAPSULE | Refills: 1 | Status: SHIPPED | OUTPATIENT
Start: 2018-01-01 | End: 2018-01-01

## 2018-01-01 RX ORDER — ZOLPIDEM TARTRATE 5 MG/1
5 TABLET ORAL NIGHTLY PRN
Qty: 20 TABLET | Refills: 0 | Status: SHIPPED | OUTPATIENT
Start: 2018-01-01 | End: 2018-01-01 | Stop reason: SDUPTHER

## 2018-01-01 RX ORDER — ONDANSETRON 4 MG/1
4 TABLET, ORALLY DISINTEGRATING ORAL EVERY 6 HOURS PRN
Status: DISCONTINUED | OUTPATIENT
Start: 2018-01-01 | End: 2018-01-01 | Stop reason: HOSPADM

## 2018-01-01 RX ORDER — METOPROLOL TARTRATE 5 MG/5ML
10 INJECTION INTRAVENOUS ONCE
Status: COMPLETED | OUTPATIENT
Start: 2018-01-01 | End: 2018-01-01

## 2018-01-01 RX ORDER — SODIUM CHLORIDE 9 MG/ML
75 INJECTION, SOLUTION INTRAVENOUS CONTINUOUS
Status: DISCONTINUED | OUTPATIENT
Start: 2018-01-01 | End: 2018-01-01 | Stop reason: HOSPADM

## 2018-01-01 RX ORDER — HYDRALAZINE HYDROCHLORIDE 20 MG/ML
10 INJECTION INTRAMUSCULAR; INTRAVENOUS EVERY 6 HOURS
Status: DISCONTINUED | OUTPATIENT
Start: 2018-01-01 | End: 2018-01-01 | Stop reason: HOSPADM

## 2018-01-01 RX ORDER — NALOXONE HCL 0.4 MG/ML
0.4 VIAL (ML) INJECTION
Status: DISCONTINUED | OUTPATIENT
Start: 2018-01-01 | End: 2018-01-01 | Stop reason: HOSPADM

## 2018-01-01 RX ORDER — DILTIAZEM HYDROCHLORIDE 180 MG/1
180 CAPSULE, COATED, EXTENDED RELEASE ORAL
Status: DISCONTINUED | OUTPATIENT
Start: 2018-01-01 | End: 2018-01-01 | Stop reason: HOSPADM

## 2018-01-01 RX ORDER — METOCLOPRAMIDE HYDROCHLORIDE 5 MG/ML
10 INJECTION INTRAMUSCULAR; INTRAVENOUS EVERY 6 HOURS
Status: DISCONTINUED | OUTPATIENT
Start: 2018-01-01 | End: 2018-01-01 | Stop reason: HOSPADM

## 2018-01-01 RX ORDER — MORPHINE SULFATE 2 MG/ML
2 INJECTION, SOLUTION INTRAMUSCULAR; INTRAVENOUS EVERY 4 HOURS PRN
Status: DISCONTINUED | OUTPATIENT
Start: 2018-01-01 | End: 2018-01-01 | Stop reason: HOSPADM

## 2018-01-01 RX ORDER — SODIUM CHLORIDE 0.9 % (FLUSH) 0.9 %
1-10 SYRINGE (ML) INJECTION AS NEEDED
Status: CANCELLED | OUTPATIENT
Start: 2018-01-01

## 2018-01-01 RX ORDER — SODIUM CHLORIDE, SODIUM GLUCONATE, SODIUM ACETATE, POTASSIUM CHLORIDE, AND MAGNESIUM CHLORIDE 526; 502; 368; 37; 30 MG/100ML; MG/100ML; MG/100ML; MG/100ML; MG/100ML
1000 INJECTION, SOLUTION INTRAVENOUS CONTINUOUS
Status: ACTIVE | OUTPATIENT
Start: 2018-01-01 | End: 2018-01-01

## 2018-01-01 RX ORDER — ASPIRIN 81 MG/1
81 TABLET ORAL DAILY
Status: DISCONTINUED | OUTPATIENT
Start: 2018-01-01 | End: 2018-01-01 | Stop reason: HOSPADM

## 2018-01-01 RX ORDER — CALCIUM GLUCONATE 94 MG/ML
1 INJECTION, SOLUTION INTRAVENOUS ONCE
Status: DISCONTINUED | OUTPATIENT
Start: 2018-01-01 | End: 2018-01-01

## 2018-01-01 RX ORDER — SODIUM CHLORIDE 9 MG/ML
100 INJECTION, SOLUTION INTRAVENOUS CONTINUOUS
Status: DISCONTINUED | OUTPATIENT
Start: 2018-01-01 | End: 2018-01-01

## 2018-01-01 RX ORDER — SODIUM CHLORIDE 9 MG/ML
100 INJECTION, SOLUTION INTRAVENOUS ONCE
Status: COMPLETED | OUTPATIENT
Start: 2018-01-01 | End: 2018-01-01

## 2018-01-01 RX ORDER — POTASSIUM CHLORIDE 750 MG/1
40 CAPSULE, EXTENDED RELEASE ORAL ONCE
Status: CANCELLED
Start: 2018-01-01 | End: 2018-01-01

## 2018-01-01 RX ORDER — OXYCODONE AND ACETAMINOPHEN 10; 325 MG/1; MG/1
1 TABLET ORAL EVERY 4 HOURS PRN
Qty: 30 TABLET | Refills: 0 | Status: SHIPPED | OUTPATIENT
Start: 2018-01-01 | End: 2018-01-01

## 2018-01-01 RX ORDER — DIGOXIN 125 MCG
125 TABLET ORAL
Status: DISCONTINUED | OUTPATIENT
Start: 2018-01-01 | End: 2018-01-01 | Stop reason: HOSPADM

## 2018-01-01 RX ORDER — SODIUM CHLORIDE 0.9 % (FLUSH) 0.9 %
1-10 SYRINGE (ML) INJECTION AS NEEDED
Status: DISCONTINUED | OUTPATIENT
Start: 2018-01-01 | End: 2018-01-01 | Stop reason: HOSPADM

## 2018-01-01 RX ORDER — ONDANSETRON 2 MG/ML
4 INJECTION INTRAMUSCULAR; INTRAVENOUS ONCE
Status: COMPLETED | OUTPATIENT
Start: 2018-01-01 | End: 2018-01-01

## 2018-01-01 RX ORDER — LEVOFLOXACIN 5 MG/ML
500 INJECTION, SOLUTION INTRAVENOUS ONCE
Status: DISCONTINUED | OUTPATIENT
Start: 2018-01-01 | End: 2018-01-01 | Stop reason: HOSPADM

## 2018-01-01 RX ORDER — POTASSIUM CHLORIDE 750 MG/1
40 CAPSULE, EXTENDED RELEASE ORAL ONCE
Status: COMPLETED | OUTPATIENT
Start: 2018-01-01 | End: 2018-01-01

## 2018-01-01 RX ORDER — FENTANYL CITRATE 50 UG/ML
INJECTION, SOLUTION INTRAMUSCULAR; INTRAVENOUS AS NEEDED
Status: DISCONTINUED | OUTPATIENT
Start: 2018-01-01 | End: 2018-01-01 | Stop reason: SURG

## 2018-01-01 RX ORDER — SODIUM CHLORIDE 9 MG/ML
100 INJECTION, SOLUTION INTRAVENOUS CONTINUOUS
Status: DISCONTINUED | OUTPATIENT
Start: 2018-01-01 | End: 2018-01-01 | Stop reason: DRUGHIGH

## 2018-01-01 RX ORDER — NYSTATIN 100000 [USP'U]/G
POWDER TOPICAL EVERY 12 HOURS SCHEDULED
Status: DISCONTINUED | OUTPATIENT
Start: 2018-01-01 | End: 2018-01-01 | Stop reason: HOSPADM

## 2018-01-01 RX ORDER — FAMOTIDINE 10 MG/ML
20 INJECTION, SOLUTION INTRAVENOUS EVERY 12 HOURS SCHEDULED
Status: DISCONTINUED | OUTPATIENT
Start: 2018-01-01 | End: 2018-01-01 | Stop reason: CLARIF

## 2018-01-01 RX ORDER — PANTOPRAZOLE SODIUM 40 MG/1
40 TABLET, DELAYED RELEASE ORAL DAILY
Status: DISCONTINUED | OUTPATIENT
Start: 2018-01-01 | End: 2018-01-01 | Stop reason: HOSPADM

## 2018-01-01 RX ORDER — SODIUM CHLORIDE 0.9 % (FLUSH) 0.9 %
10 SYRINGE (ML) INJECTION AS NEEDED
Status: DISCONTINUED | OUTPATIENT
Start: 2018-01-01 | End: 2018-01-01 | Stop reason: HOSPADM

## 2018-01-01 RX ORDER — FLUMAZENIL 0.1 MG/ML
0.2 INJECTION INTRAVENOUS AS NEEDED
Status: DISCONTINUED | OUTPATIENT
Start: 2018-01-01 | End: 2018-01-01 | Stop reason: HOSPADM

## 2018-01-01 RX ORDER — MORPHINE SULFATE 2 MG/ML
1 INJECTION, SOLUTION INTRAMUSCULAR; INTRAVENOUS EVERY 4 HOURS PRN
Status: DISCONTINUED | OUTPATIENT
Start: 2018-01-01 | End: 2018-01-01 | Stop reason: HOSPADM

## 2018-01-01 RX ORDER — ONDANSETRON 2 MG/ML
4 INJECTION INTRAMUSCULAR; INTRAVENOUS ONCE AS NEEDED
Status: DISCONTINUED | OUTPATIENT
Start: 2018-01-01 | End: 2018-01-01 | Stop reason: HOSPADM

## 2018-01-01 RX ORDER — DOCUSATE SODIUM 100 MG/1
100 CAPSULE, LIQUID FILLED ORAL 2 TIMES DAILY
Status: DISCONTINUED | OUTPATIENT
Start: 2018-01-01 | End: 2018-01-01 | Stop reason: HOSPADM

## 2018-01-01 RX ORDER — LABETALOL HYDROCHLORIDE 5 MG/ML
5 INJECTION, SOLUTION INTRAVENOUS
Status: DISCONTINUED | OUTPATIENT
Start: 2018-01-01 | End: 2018-01-01 | Stop reason: HOSPADM

## 2018-01-01 RX ORDER — FENTANYL 12 UG/H
1 PATCH TRANSDERMAL
Qty: 3 PATCH | Refills: 0 | Status: SHIPPED | OUTPATIENT
Start: 2018-01-01

## 2018-01-01 RX ORDER — DIPHENHYDRAMINE HYDROCHLORIDE 50 MG/ML
12.5 INJECTION INTRAMUSCULAR; INTRAVENOUS
Status: DISCONTINUED | OUTPATIENT
Start: 2018-01-01 | End: 2018-01-01 | Stop reason: HOSPADM

## 2018-01-01 RX ORDER — ATORVASTATIN CALCIUM 10 MG/1
10 TABLET, FILM COATED ORAL DAILY
Qty: 30 TABLET | Refills: 5 | Status: SHIPPED | OUTPATIENT
Start: 2018-01-01

## 2018-01-01 RX ORDER — ZOLPIDEM TARTRATE 5 MG/1
5 TABLET ORAL NIGHTLY
Status: DISCONTINUED | OUTPATIENT
Start: 2018-01-01 | End: 2018-01-01 | Stop reason: HOSPADM

## 2018-01-01 RX ORDER — LEVOFLOXACIN 5 MG/ML
750 INJECTION, SOLUTION INTRAVENOUS
Status: DISCONTINUED | OUTPATIENT
Start: 2018-01-01 | End: 2018-01-01 | Stop reason: HOSPADM

## 2018-01-01 RX ORDER — FAMOTIDINE 20 MG/50ML
20 INJECTION, SOLUTION INTRAVENOUS EVERY 12 HOURS SCHEDULED
Status: DISCONTINUED | OUTPATIENT
Start: 2018-01-01 | End: 2018-01-01 | Stop reason: CLARIF

## 2018-01-01 RX ORDER — MIDAZOLAM HYDROCHLORIDE 1 MG/ML
INJECTION INTRAMUSCULAR; INTRAVENOUS AS NEEDED
Status: DISCONTINUED | OUTPATIENT
Start: 2018-01-01 | End: 2018-01-01 | Stop reason: SURG

## 2018-01-01 RX ORDER — SODIUM CHLORIDE 9 MG/ML
125 INJECTION, SOLUTION INTRAVENOUS CONTINUOUS
Status: DISCONTINUED | OUTPATIENT
Start: 2018-01-01 | End: 2018-01-01 | Stop reason: SDUPTHER

## 2018-01-01 RX ORDER — NALOXONE HCL 0.4 MG/ML
0.1 VIAL (ML) INJECTION
Status: DISCONTINUED | OUTPATIENT
Start: 2018-01-01 | End: 2018-01-01 | Stop reason: HOSPADM

## 2018-01-01 RX ORDER — PROMETHAZINE HYDROCHLORIDE 25 MG/ML
12.5 INJECTION, SOLUTION INTRAMUSCULAR; INTRAVENOUS EVERY 8 HOURS PRN
Status: DISCONTINUED | OUTPATIENT
Start: 2018-01-01 | End: 2018-01-01

## 2018-01-01 RX ORDER — ONDANSETRON 4 MG/1
4 TABLET, FILM COATED ORAL EVERY 6 HOURS PRN
Status: DISCONTINUED | OUTPATIENT
Start: 2018-01-01 | End: 2018-01-01 | Stop reason: HOSPADM

## 2018-01-01 RX ORDER — LIDOCAINE HYDROCHLORIDE 20 MG/ML
INJECTION, SOLUTION INFILTRATION; PERINEURAL AS NEEDED
Status: DISCONTINUED | OUTPATIENT
Start: 2018-01-01 | End: 2018-01-01

## 2018-01-01 RX ORDER — DEXAMETHASONE 4 MG/1
4 TABLET ORAL
Qty: 2020 TABLET | Refills: 1 | Status: SHIPPED | OUTPATIENT
Start: 2018-01-01 | End: 2018-01-01

## 2018-01-01 RX ORDER — ONDANSETRON 4 MG/1
4 TABLET, FILM COATED ORAL 4 TIMES DAILY PRN
Qty: 40 TABLET | Refills: 3 | Status: SHIPPED | OUTPATIENT
Start: 2018-01-01

## 2018-01-01 RX ORDER — HEPARIN SODIUM 5000 [USP'U]/ML
5000 INJECTION, SOLUTION INTRAVENOUS; SUBCUTANEOUS EVERY 8 HOURS SCHEDULED
Status: DISCONTINUED | OUTPATIENT
Start: 2018-01-01 | End: 2018-01-01 | Stop reason: SDUPTHER

## 2018-01-01 RX ORDER — OXYCODONE AND ACETAMINOPHEN 10; 325 MG/1; MG/1
1 TABLET ORAL EVERY 6 HOURS PRN
Qty: 120 TABLET | Refills: 0 | Status: SHIPPED | OUTPATIENT
Start: 2018-01-01 | End: 2018-01-01 | Stop reason: SDUPTHER

## 2018-01-01 RX ORDER — POTASSIUM CHLORIDE 750 MG/1
40 CAPSULE, EXTENDED RELEASE ORAL DAILY
Status: DISCONTINUED | OUTPATIENT
Start: 2018-01-01 | End: 2018-01-01 | Stop reason: HOSPADM

## 2018-01-01 RX ORDER — EPHEDRINE SULFATE 50 MG/ML
5 INJECTION, SOLUTION INTRAVENOUS ONCE AS NEEDED
Status: DISCONTINUED | OUTPATIENT
Start: 2018-01-01 | End: 2018-01-01 | Stop reason: HOSPADM

## 2018-01-01 RX ORDER — GLYCOPYRROLATE 0.2 MG/ML
INJECTION INTRAMUSCULAR; INTRAVENOUS AS NEEDED
Status: DISCONTINUED | OUTPATIENT
Start: 2018-01-01 | End: 2018-01-01 | Stop reason: SURG

## 2018-01-01 RX ORDER — VECURONIUM BROMIDE 20 MG/20ML
INJECTION, POWDER, LYOPHILIZED, FOR SOLUTION INTRAVENOUS AS NEEDED
Status: DISCONTINUED | OUTPATIENT
Start: 2018-01-01 | End: 2018-01-01 | Stop reason: SURG

## 2018-01-01 RX ORDER — FAMOTIDINE 10 MG/ML
20 INJECTION, SOLUTION INTRAVENOUS EVERY 12 HOURS SCHEDULED
Status: DISCONTINUED | OUTPATIENT
Start: 2018-01-01 | End: 2018-01-01 | Stop reason: HOSPADM

## 2018-01-01 RX ORDER — METOCLOPRAMIDE HYDROCHLORIDE 5 MG/ML
10 INJECTION INTRAMUSCULAR; INTRAVENOUS
Status: DISCONTINUED | OUTPATIENT
Start: 2018-01-01 | End: 2018-01-01

## 2018-01-01 RX ORDER — ZINC SULFATE 50(220)MG
220 CAPSULE ORAL DAILY
Status: DISCONTINUED | OUTPATIENT
Start: 2018-01-01 | End: 2018-01-01 | Stop reason: HOSPADM

## 2018-01-01 RX ORDER — ONDANSETRON 2 MG/ML
4 INJECTION INTRAMUSCULAR; INTRAVENOUS ONCE AS NEEDED
Status: COMPLETED | OUTPATIENT
Start: 2018-01-01 | End: 2018-01-01

## 2018-01-01 RX ORDER — PROMETHAZINE HYDROCHLORIDE 25 MG/ML
12.5 INJECTION, SOLUTION INTRAMUSCULAR; INTRAVENOUS EVERY 6 HOURS PRN
Status: DISCONTINUED | OUTPATIENT
Start: 2018-01-01 | End: 2018-01-01 | Stop reason: HOSPADM

## 2018-01-01 RX ORDER — ACETAMINOPHEN 650 MG/1
650 SUPPOSITORY RECTAL ONCE AS NEEDED
Status: DISCONTINUED | OUTPATIENT
Start: 2018-01-01 | End: 2018-01-01 | Stop reason: HOSPADM

## 2018-01-01 RX ORDER — PROPOFOL 10 MG/ML
VIAL (ML) INTRAVENOUS AS NEEDED
Status: DISCONTINUED | OUTPATIENT
Start: 2018-01-01 | End: 2018-01-01 | Stop reason: SURG

## 2018-01-01 RX ORDER — LETROZOLE 2.5 MG/1
2.5 TABLET, FILM COATED ORAL DAILY
COMMUNITY
End: 2018-01-01 | Stop reason: SDUPTHER

## 2018-01-01 RX ORDER — ONDANSETRON 2 MG/ML
INJECTION INTRAMUSCULAR; INTRAVENOUS AS NEEDED
Status: DISCONTINUED | OUTPATIENT
Start: 2018-01-01 | End: 2018-01-01 | Stop reason: SURG

## 2018-01-01 RX ORDER — LETROZOLE 2.5 MG/1
2.5 TABLET, FILM COATED ORAL DAILY
Status: DISCONTINUED | OUTPATIENT
Start: 2018-01-01 | End: 2018-01-01 | Stop reason: HOSPADM

## 2018-01-01 RX ORDER — LEVOFLOXACIN 5 MG/ML
500 INJECTION, SOLUTION INTRAVENOUS EVERY 24 HOURS
Status: DISCONTINUED | OUTPATIENT
Start: 2018-01-01 | End: 2018-01-01 | Stop reason: HOSPADM

## 2018-01-01 RX ORDER — LEVOFLOXACIN 500 MG/1
500 TABLET, FILM COATED ORAL DAILY
Qty: 6 TABLET | Refills: 0 | Status: SHIPPED | OUTPATIENT
Start: 2018-01-01 | End: 2018-01-01

## 2018-01-01 RX ORDER — ONDANSETRON 2 MG/ML
INJECTION INTRAMUSCULAR; INTRAVENOUS
Status: COMPLETED
Start: 2018-01-01 | End: 2018-01-01

## 2018-01-01 RX ORDER — SODIUM CHLORIDE, SODIUM LACTATE, POTASSIUM CHLORIDE, CALCIUM CHLORIDE 600; 310; 30; 20 MG/100ML; MG/100ML; MG/100ML; MG/100ML
INJECTION, SOLUTION INTRAVENOUS CONTINUOUS PRN
Status: DISCONTINUED | OUTPATIENT
Start: 2018-01-01 | End: 2018-01-01 | Stop reason: SURG

## 2018-01-01 RX ORDER — DILTIAZEM HYDROCHLORIDE 5 MG/ML
10 INJECTION INTRAVENOUS ONCE
Status: DISCONTINUED | OUTPATIENT
Start: 2018-01-01 | End: 2018-01-01

## 2018-01-01 RX ORDER — HYDROMORPHONE HCL 110MG/55ML
PATIENT CONTROLLED ANALGESIA SYRINGE INTRAVENOUS AS NEEDED
Status: DISCONTINUED | OUTPATIENT
Start: 2018-01-01 | End: 2018-01-01 | Stop reason: SURG

## 2018-01-01 RX ORDER — DILTIAZEM HYDROCHLORIDE 180 MG/1
CAPSULE, COATED, EXTENDED RELEASE ORAL
Qty: 30 CAPSULE | Refills: 3 | OUTPATIENT
Start: 2018-01-01

## 2018-01-01 RX ORDER — NALOXONE HCL 0.4 MG/ML
0.2 VIAL (ML) INJECTION AS NEEDED
Status: DISCONTINUED | OUTPATIENT
Start: 2018-01-01 | End: 2018-01-01 | Stop reason: HOSPADM

## 2018-01-01 RX ORDER — LIDOCAINE HYDROCHLORIDE 20 MG/ML
5 INJECTION, SOLUTION EPIDURAL; INFILTRATION; INTRACAUDAL; PERINEURAL ONCE
Status: COMPLETED | OUTPATIENT
Start: 2018-01-01 | End: 2018-01-01

## 2018-01-01 RX ORDER — HEPARIN SODIUM 5000 [USP'U]/ML
5000 INJECTION, SOLUTION INTRAVENOUS; SUBCUTANEOUS EVERY 8 HOURS SCHEDULED
Status: DISCONTINUED | OUTPATIENT
Start: 2018-01-01 | End: 2018-01-01

## 2018-01-01 RX ORDER — ATORVASTATIN CALCIUM 10 MG/1
10 TABLET, FILM COATED ORAL NIGHTLY
Status: DISCONTINUED | OUTPATIENT
Start: 2018-01-01 | End: 2018-01-01 | Stop reason: HOSPADM

## 2018-01-01 RX ORDER — ONDANSETRON 2 MG/ML
4 INJECTION INTRAMUSCULAR; INTRAVENOUS EVERY 6 HOURS PRN
Status: DISCONTINUED | OUTPATIENT
Start: 2018-01-01 | End: 2018-01-01 | Stop reason: HOSPADM

## 2018-01-01 RX ORDER — FENTANYL 12 UG/H
1 PATCH TRANSDERMAL
Status: DISCONTINUED | OUTPATIENT
Start: 2018-01-01 | End: 2018-01-01 | Stop reason: HOSPADM

## 2018-01-01 RX ORDER — LIDOCAINE HYDROCHLORIDE 20 MG/ML
INJECTION, SOLUTION INFILTRATION; PERINEURAL AS NEEDED
Status: DISCONTINUED | OUTPATIENT
Start: 2018-01-01 | End: 2018-01-01 | Stop reason: SURG

## 2018-01-01 RX ORDER — PROMETHAZINE HYDROCHLORIDE 25 MG/1
25 TABLET ORAL EVERY 6 HOURS PRN
Status: DISCONTINUED | OUTPATIENT
Start: 2018-01-01 | End: 2018-01-01 | Stop reason: HOSPADM

## 2018-01-01 RX ORDER — ZOLPIDEM TARTRATE 5 MG/1
5 TABLET ORAL NIGHTLY PRN
Status: DISCONTINUED | OUTPATIENT
Start: 2018-01-01 | End: 2018-01-01 | Stop reason: HOSPADM

## 2018-01-01 RX ORDER — ONDANSETRON 2 MG/ML
4 INJECTION INTRAMUSCULAR; INTRAVENOUS EVERY 4 HOURS
Status: DISCONTINUED | OUTPATIENT
Start: 2018-01-01 | End: 2018-01-01 | Stop reason: HOSPADM

## 2018-01-01 RX ORDER — SUCRALFATE 1 G/1
1 TABLET ORAL 3 TIMES DAILY
COMMUNITY

## 2018-01-01 RX ORDER — L. ACIDOPHILUS/L.BULGARICUS 1MM CELL
1 TABLET ORAL 3 TIMES DAILY
Qty: 15 TABLET | Refills: 0 | Status: SHIPPED | OUTPATIENT
Start: 2018-01-01 | End: 2018-01-01

## 2018-01-01 RX ORDER — SODIUM CHLORIDE 9 MG/ML
75 INJECTION, SOLUTION INTRAVENOUS CONTINUOUS
Status: DISCONTINUED | OUTPATIENT
Start: 2018-01-01 | End: 2018-01-01

## 2018-01-01 RX ORDER — FERROUS SULFATE TAB EC 324 MG (65 MG FE EQUIVALENT) 324 (65 FE) MG
324 TABLET DELAYED RESPONSE ORAL 2 TIMES DAILY WITH MEALS
Status: DISCONTINUED | OUTPATIENT
Start: 2018-01-01 | End: 2018-01-01

## 2018-01-01 RX ORDER — METOPROLOL TARTRATE 5 MG/5ML
5 INJECTION INTRAVENOUS ONCE
Status: COMPLETED | OUTPATIENT
Start: 2018-01-01 | End: 2018-01-01

## 2018-01-01 RX ORDER — LEVOFLOXACIN 5 MG/ML
500 INJECTION, SOLUTION INTRAVENOUS ONCE
Status: CANCELLED | OUTPATIENT
Start: 2018-01-01 | End: 2018-01-01

## 2018-01-01 RX ORDER — LEVOFLOXACIN 5 MG/ML
750 INJECTION, SOLUTION INTRAVENOUS
Status: DISCONTINUED | OUTPATIENT
Start: 2018-01-01 | End: 2018-01-01 | Stop reason: DRUGHIGH

## 2018-01-01 RX ORDER — ACETAMINOPHEN 325 MG/1
650 TABLET ORAL ONCE AS NEEDED
Status: DISCONTINUED | OUTPATIENT
Start: 2018-01-01 | End: 2018-01-01 | Stop reason: HOSPADM

## 2018-01-01 RX ORDER — SUCRALFATE 1 G/1
1 TABLET ORAL 3 TIMES DAILY
Status: DISCONTINUED | OUTPATIENT
Start: 2018-01-01 | End: 2018-01-01 | Stop reason: HOSPADM

## 2018-01-01 RX ORDER — DEXAMETHASONE 4 MG/1
4 TABLET ORAL
Status: DISCONTINUED | OUTPATIENT
Start: 2018-01-01 | End: 2018-01-01 | Stop reason: HOSPADM

## 2018-01-01 RX ORDER — L. ACIDOPHILUS/L.BULGARICUS 1MM CELL
1 TABLET ORAL 3 TIMES DAILY
Status: DISCONTINUED | OUTPATIENT
Start: 2018-01-01 | End: 2018-01-01 | Stop reason: HOSPADM

## 2018-01-01 RX ORDER — FAMOTIDINE 20 MG/50ML
20 INJECTION, SOLUTION INTRAVENOUS EVERY 12 HOURS SCHEDULED
Status: DISCONTINUED | OUTPATIENT
Start: 2018-01-01 | End: 2018-01-01

## 2018-01-01 RX ORDER — DEXTROSE, SODIUM CHLORIDE, AND POTASSIUM CHLORIDE 5; .45; .15 G/100ML; G/100ML; G/100ML
50 INJECTION INTRAVENOUS CONTINUOUS
Status: DISCONTINUED | OUTPATIENT
Start: 2018-01-01 | End: 2018-01-01

## 2018-01-01 RX ORDER — LEVOFLOXACIN 500 MG/1
500 TABLET, FILM COATED ORAL DAILY
Qty: 5 TABLET | Refills: 0 | Status: SHIPPED | OUTPATIENT
Start: 2018-01-01 | End: 2018-01-01

## 2018-01-01 RX ORDER — MELATONIN
1000 DAILY
Status: DISCONTINUED | OUTPATIENT
Start: 2018-01-01 | End: 2018-01-01 | Stop reason: HOSPADM

## 2018-01-01 RX ORDER — LEVOFLOXACIN 5 MG/ML
750 INJECTION, SOLUTION INTRAVENOUS ONCE
Status: COMPLETED | OUTPATIENT
Start: 2018-01-01 | End: 2018-01-01

## 2018-01-01 RX ORDER — OXYCODONE AND ACETAMINOPHEN 10; 325 MG/1; MG/1
1 TABLET ORAL EVERY 6 HOURS PRN
Qty: 120 TABLET | Refills: 0 | Status: SHIPPED | OUTPATIENT
Start: 2018-01-01

## 2018-01-01 RX ORDER — PROMETHAZINE HYDROCHLORIDE 25 MG/1
25 TABLET ORAL EVERY 6 HOURS PRN
Qty: 40 TABLET | Refills: 3 | Status: SHIPPED | OUTPATIENT
Start: 2018-01-01

## 2018-01-01 RX ORDER — DEXTROSE AND SODIUM CHLORIDE 5; .45 G/100ML; G/100ML
20 INJECTION, SOLUTION INTRAVENOUS CONTINUOUS
Status: DISCONTINUED | OUTPATIENT
Start: 2018-01-01 | End: 2018-01-01 | Stop reason: HOSPADM

## 2018-01-01 RX ORDER — LEVOFLOXACIN 5 MG/ML
750 INJECTION, SOLUTION INTRAVENOUS EVERY 24 HOURS
Status: DISCONTINUED | OUTPATIENT
Start: 2018-01-01 | End: 2018-01-01 | Stop reason: HOSPADM

## 2018-01-01 RX ORDER — METOPROLOL TARTRATE 5 MG/5ML
5 INJECTION INTRAVENOUS EVERY 6 HOURS PRN
Status: DISCONTINUED | OUTPATIENT
Start: 2018-01-01 | End: 2018-01-01 | Stop reason: HOSPADM

## 2018-01-01 RX ORDER — LORAZEPAM 2 MG/ML
0.5 INJECTION INTRAMUSCULAR
Status: DISCONTINUED | OUTPATIENT
Start: 2018-01-01 | End: 2018-01-01 | Stop reason: HOSPADM

## 2018-01-01 RX ORDER — OXYCODONE AND ACETAMINOPHEN 10; 325 MG/1; MG/1
1 TABLET ORAL EVERY 4 HOURS PRN
Status: DISCONTINUED | OUTPATIENT
Start: 2018-01-01 | End: 2018-01-01 | Stop reason: HOSPADM

## 2018-01-01 RX ORDER — ZOLPIDEM TARTRATE 5 MG/1
5 TABLET ORAL NIGHTLY
Status: CANCELLED | OUTPATIENT
Start: 2018-01-01 | End: 2018-01-01

## 2018-01-01 RX ORDER — ALBUTEROL SULFATE 2.5 MG/3ML
2.5 SOLUTION RESPIRATORY (INHALATION) ONCE AS NEEDED
Status: DISCONTINUED | OUTPATIENT
Start: 2018-01-01 | End: 2018-01-01 | Stop reason: HOSPADM

## 2018-01-01 RX ORDER — METOPROLOL TARTRATE 5 MG/5ML
5 INJECTION INTRAVENOUS ONCE AS NEEDED
Status: COMPLETED | OUTPATIENT
Start: 2018-01-01 | End: 2018-01-01

## 2018-01-01 RX ORDER — ACETAMINOPHEN 500 MG
500 TABLET ORAL EVERY 6 HOURS PRN
Status: DISCONTINUED | OUTPATIENT
Start: 2018-01-01 | End: 2018-01-01 | Stop reason: HOSPADM

## 2018-01-01 RX ORDER — SODIUM CHLORIDE 9 MG/ML
100 INJECTION, SOLUTION INTRAVENOUS CONTINUOUS
Status: CANCELLED | OUTPATIENT
Start: 2018-01-01

## 2018-01-01 RX ORDER — LACTOBACILLUS ACIDOPHILUS / LACTOBACILLUS BULGARICUS 100 MILLION CFU STRENGTH
1 GRANULES ORAL 3 TIMES DAILY
Qty: 15 PACKET | Refills: 0 | Status: SHIPPED | OUTPATIENT
Start: 2018-01-01 | End: 2018-01-01

## 2018-01-01 RX ORDER — POLYETHYLENE GLYCOL 3350 17 G/17G
17 POWDER, FOR SOLUTION ORAL DAILY
Status: DISCONTINUED | OUTPATIENT
Start: 2018-01-01 | End: 2018-01-01 | Stop reason: HOSPADM

## 2018-01-01 RX ORDER — DEXAMETHASONE 4 MG/1
4 TABLET ORAL
Qty: 20 TABLET | Refills: 1 | Status: SHIPPED | OUTPATIENT
Start: 2018-01-01 | End: 2018-05-16

## 2018-01-01 RX ORDER — ATORVASTATIN CALCIUM 10 MG/1
10 TABLET, FILM COATED ORAL DAILY
Status: DISCONTINUED | OUTPATIENT
Start: 2018-01-01 | End: 2018-01-01 | Stop reason: HOSPADM

## 2018-01-01 RX ORDER — HYDROMORPHONE HCL IN 0.9% NACL 0.2 MG/ML
PLASTIC BAG, INJECTION (ML) INTRAVENOUS CONTINUOUS
Status: DISCONTINUED | OUTPATIENT
Start: 2018-01-01 | End: 2018-01-01

## 2018-01-01 RX ORDER — ZOLPIDEM TARTRATE 5 MG/1
5 TABLET ORAL ONCE
Status: COMPLETED | OUTPATIENT
Start: 2018-01-01 | End: 2018-01-01

## 2018-01-01 RX ORDER — SODIUM CHLORIDE 9 MG/ML
125 INJECTION, SOLUTION INTRAVENOUS CONTINUOUS
Status: DISCONTINUED | OUTPATIENT
Start: 2018-01-01 | End: 2018-01-01

## 2018-01-01 RX ORDER — FAMOTIDINE 20 MG/50ML
20 INJECTION, SOLUTION INTRAVENOUS DAILY
Status: DISCONTINUED | OUTPATIENT
Start: 2018-01-01 | End: 2018-01-01 | Stop reason: HOSPADM

## 2018-01-01 RX ORDER — ONDANSETRON 4 MG/1
4 TABLET, ORALLY DISINTEGRATING ORAL ONCE
Status: DISCONTINUED | OUTPATIENT
Start: 2018-01-01 | End: 2018-01-01

## 2018-01-01 RX ORDER — NALOXONE HCL 0.4 MG/ML
0.4 VIAL (ML) INJECTION
Status: DISCONTINUED | OUTPATIENT
Start: 2018-01-01 | End: 2018-01-01

## 2018-01-01 RX ORDER — MELATONIN
1000 DAILY
COMMUNITY

## 2018-01-01 RX ORDER — DEXTROSE, SODIUM CHLORIDE, AND POTASSIUM CHLORIDE 5; .45; .15 G/100ML; G/100ML; G/100ML
100 INJECTION INTRAVENOUS CONTINUOUS
Status: DISCONTINUED | OUTPATIENT
Start: 2018-01-01 | End: 2018-01-01

## 2018-01-01 RX ORDER — OXYCODONE HYDROCHLORIDE AND ACETAMINOPHEN 5; 325 MG/1; MG/1
2 TABLET ORAL EVERY 6 HOURS PRN
Status: DISCONTINUED | OUTPATIENT
Start: 2018-01-01 | End: 2018-01-01 | Stop reason: HOSPADM

## 2018-01-01 RX ORDER — POTASSIUM CHLORIDE 750 MG/1
20 CAPSULE, EXTENDED RELEASE ORAL ONCE
Status: COMPLETED | OUTPATIENT
Start: 2018-01-01 | End: 2018-01-01

## 2018-01-01 RX ORDER — ACETAMINOPHEN 325 MG/1
650 TABLET ORAL EVERY 4 HOURS PRN
Status: DISCONTINUED | OUTPATIENT
Start: 2018-01-01 | End: 2018-01-01 | Stop reason: HOSPADM

## 2018-01-01 RX ORDER — SODIUM CHLORIDE 9 MG/ML
INJECTION, SOLUTION INTRAVENOUS
Status: COMPLETED
Start: 2018-01-01 | End: 2018-01-01

## 2018-01-01 RX ORDER — SUCCINYLCHOLINE CHLORIDE 20 MG/ML
INJECTION INTRAMUSCULAR; INTRAVENOUS AS NEEDED
Status: DISCONTINUED | OUTPATIENT
Start: 2018-01-01 | End: 2018-01-01 | Stop reason: SURG

## 2018-01-01 RX ORDER — DEXAMETHASONE 4 MG/1
4 TABLET ORAL
Qty: 20 TABLET | Refills: 0 | Status: CANCELLED | OUTPATIENT
Start: 2018-01-01 | End: 2018-05-16

## 2018-01-01 RX ORDER — ZOLPIDEM TARTRATE 5 MG/1
5 TABLET ORAL NIGHTLY PRN
Qty: 30 TABLET | Refills: 0 | Status: SHIPPED | OUTPATIENT
Start: 2018-01-01

## 2018-01-01 RX ORDER — ATORVASTATIN CALCIUM 10 MG/1
10 TABLET, FILM COATED ORAL DAILY
Status: DISCONTINUED | OUTPATIENT
Start: 2018-01-01 | End: 2018-01-01

## 2018-01-01 RX ORDER — SODIUM CHLORIDE 9 MG/ML
INJECTION, SOLUTION INTRAVENOUS CONTINUOUS PRN
Status: DISCONTINUED | OUTPATIENT
Start: 2018-01-01 | End: 2018-01-01 | Stop reason: SURG

## 2018-01-01 RX ORDER — DEXAMETHASONE SODIUM PHOSPHATE 4 MG/ML
INJECTION, SOLUTION INTRA-ARTICULAR; INTRALESIONAL; INTRAMUSCULAR; INTRAVENOUS; SOFT TISSUE AS NEEDED
Status: DISCONTINUED | OUTPATIENT
Start: 2018-01-01 | End: 2018-01-01 | Stop reason: SURG

## 2018-01-01 RX ORDER — LEVOFLOXACIN 5 MG/ML
750 INJECTION, SOLUTION INTRAVENOUS
Status: COMPLETED | OUTPATIENT
Start: 2018-01-01 | End: 2018-01-01

## 2018-01-01 RX ORDER — MEPERIDINE HYDROCHLORIDE 50 MG/ML
12.5 INJECTION INTRAMUSCULAR; INTRAVENOUS; SUBCUTANEOUS
Status: DISCONTINUED | OUTPATIENT
Start: 2018-01-01 | End: 2018-01-01 | Stop reason: HOSPADM

## 2018-01-01 RX ORDER — LETROZOLE 2.5 MG/1
TABLET, FILM COATED ORAL
Qty: 30 TABLET | Refills: 3 | Status: SHIPPED | OUTPATIENT
Start: 2018-01-01

## 2018-01-01 RX ORDER — ISOSORBIDE MONONITRATE 30 MG/1
30 TABLET, EXTENDED RELEASE ORAL
Status: DISCONTINUED | OUTPATIENT
Start: 2018-01-01 | End: 2018-01-01 | Stop reason: HOSPADM

## 2018-01-01 RX ADMIN — FENTANYL CITRATE 25 MCG: 50 INJECTION, SOLUTION INTRAMUSCULAR; INTRAVENOUS at 18:30

## 2018-01-01 RX ADMIN — HYDROCORTISONE: 1 CREAM TOPICAL at 09:22

## 2018-01-01 RX ADMIN — FAMOTIDINE 20 MG: 20 INJECTION, SOLUTION INTRAVENOUS at 08:25

## 2018-01-01 RX ADMIN — ONDANSETRON 4 MG: 2 INJECTION INTRAMUSCULAR; INTRAVENOUS at 18:21

## 2018-01-01 RX ADMIN — VANCOMYCIN HYDROCHLORIDE 1000 MG: 1 INJECTION, POWDER, LYOPHILIZED, FOR SOLUTION INTRAVENOUS at 15:02

## 2018-01-01 RX ADMIN — DOCUSATE SODIUM 100 MG: 100 CAPSULE, LIQUID FILLED ORAL at 08:03

## 2018-01-01 RX ADMIN — FERROUS SULFATE TAB EC 324 MG (65 MG FE EQUIVALENT) 324 MG: 324 (65 FE) TABLET DELAYED RESPONSE at 08:32

## 2018-01-01 RX ADMIN — GLYCOPYRROLATE 0.4 MG: 0.2 INJECTION, SOLUTION INTRAMUSCULAR; INTRAVENOUS at 12:40

## 2018-01-01 RX ADMIN — ONDANSETRON HYDROCHLORIDE 4 MG: 2 INJECTION INTRAMUSCULAR; INTRAVENOUS at 11:25

## 2018-01-01 RX ADMIN — ONDANSETRON HYDROCHLORIDE 4 MG: 2 INJECTION INTRAMUSCULAR; INTRAVENOUS at 20:32

## 2018-01-01 RX ADMIN — ZOLPIDEM TARTRATE 5 MG: 5 TABLET ORAL at 23:26

## 2018-01-01 RX ADMIN — VITAMIN D, TAB 1000IU (100/BT) 1000 UNITS: 25 TAB at 08:14

## 2018-01-01 RX ADMIN — FAMOTIDINE 20 MG: 10 INJECTION, SOLUTION INTRAVENOUS at 08:34

## 2018-01-01 RX ADMIN — SUCRALFATE 1 G: 1 TABLET ORAL at 08:37

## 2018-01-01 RX ADMIN — METOCLOPRAMIDE 10 MG: 5 INJECTION, SOLUTION INTRAMUSCULAR; INTRAVENOUS at 08:37

## 2018-01-01 RX ADMIN — LEVOFLOXACIN 750 MG: 5 INJECTION, SOLUTION INTRAVENOUS at 20:32

## 2018-01-01 RX ADMIN — LETROZOLE 2.5 MG: 2.5 TABLET ORAL at 10:30

## 2018-01-01 RX ADMIN — PROMETHAZINE HYDROCHLORIDE 12.5 MG: 25 INJECTION INTRAMUSCULAR; INTRAVENOUS at 09:19

## 2018-01-01 RX ADMIN — SODIUM CHLORIDE 75 ML/HR: 9 INJECTION, SOLUTION INTRAVENOUS at 21:40

## 2018-01-01 RX ADMIN — SODIUM CHLORIDE 100 ML/HR: 900 INJECTION, SOLUTION INTRAVENOUS at 12:29

## 2018-01-01 RX ADMIN — PROMETHAZINE HYDROCHLORIDE 12.5 MG: 25 INJECTION INTRAMUSCULAR; INTRAVENOUS at 09:18

## 2018-01-01 RX ADMIN — LIDOCAINE HYDROCHLORIDE 5 ML: 20 SOLUTION ORAL; TOPICAL at 21:36

## 2018-01-01 RX ADMIN — MORPHINE SULFATE 2 MG: 2 INJECTION, SOLUTION INTRAMUSCULAR; INTRAVENOUS at 09:49

## 2018-01-01 RX ADMIN — FAMOTIDINE 20 MG: 20 INJECTION, SOLUTION INTRAVENOUS at 21:15

## 2018-01-01 RX ADMIN — SODIUM CHLORIDE 1 G: 900 INJECTION INTRAVENOUS at 21:11

## 2018-01-01 RX ADMIN — FERROUS SULFATE TAB EC 324 MG (65 MG FE EQUIVALENT) 324 MG: 324 (65 FE) TABLET DELAYED RESPONSE at 18:17

## 2018-01-01 RX ADMIN — FERROUS SULFATE TAB EC 324 MG (65 MG FE EQUIVALENT) 324 MG: 324 (65 FE) TABLET DELAYED RESPONSE at 08:51

## 2018-01-01 RX ADMIN — PROMETHAZINE HYDROCHLORIDE 12.5 MG: 25 INJECTION INTRAMUSCULAR; INTRAVENOUS at 21:08

## 2018-01-01 RX ADMIN — FENTANYL CITRATE 100 MCG: 50 INJECTION, SOLUTION INTRAMUSCULAR; INTRAVENOUS at 09:45

## 2018-01-01 RX ADMIN — SODIUM CHLORIDE 1 G: 900 INJECTION INTRAVENOUS at 13:26

## 2018-01-01 RX ADMIN — PANTOPRAZOLE SODIUM 40 MG: 40 TABLET, DELAYED RELEASE ORAL at 08:47

## 2018-01-01 RX ADMIN — VITAMIN D, TAB 1000IU (100/BT) 1000 UNITS: 25 TAB at 09:29

## 2018-01-01 RX ADMIN — LACTOBACILLUS TAB 1 TABLET: TAB at 08:57

## 2018-01-01 RX ADMIN — ONDANSETRON 4 MG: 2 INJECTION INTRAMUSCULAR; INTRAVENOUS at 21:30

## 2018-01-01 RX ADMIN — DENOSUMAB 120 MG: 120 INJECTION SUBCUTANEOUS at 14:19

## 2018-01-01 RX ADMIN — LEVOFLOXACIN 750 MG: 5 INJECTION, SOLUTION INTRAVENOUS at 21:02

## 2018-01-01 RX ADMIN — MIDAZOLAM 2 MG: 1 INJECTION INTRAMUSCULAR; INTRAVENOUS at 09:35

## 2018-01-01 RX ADMIN — SODIUM CHLORIDE 1 G: 900 INJECTION INTRAVENOUS at 20:25

## 2018-01-01 RX ADMIN — PANTOPRAZOLE SODIUM 40 MG: 40 TABLET, DELAYED RELEASE ORAL at 10:31

## 2018-01-01 RX ADMIN — ONDANSETRON 4 MG: 2 INJECTION INTRAMUSCULAR; INTRAVENOUS at 21:17

## 2018-01-01 RX ADMIN — METOCLOPRAMIDE 10 MG: 5 INJECTION, SOLUTION INTRAMUSCULAR; INTRAVENOUS at 08:24

## 2018-01-01 RX ADMIN — HYDROMORPHONE HYDROCHLORIDE: 2 INJECTION INTRAMUSCULAR; INTRAVENOUS; SUBCUTANEOUS at 13:06

## 2018-01-01 RX ADMIN — ACETAMINOPHEN 500 MG: 500 TABLET ORAL at 09:29

## 2018-01-01 RX ADMIN — ONDANSETRON HYDROCHLORIDE 4 MG: 2 INJECTION INTRAMUSCULAR; INTRAVENOUS at 23:53

## 2018-01-01 RX ADMIN — HEPARIN SODIUM 5000 UNITS: 5000 INJECTION, SOLUTION INTRAVENOUS; SUBCUTANEOUS at 15:30

## 2018-01-01 RX ADMIN — PANTOPRAZOLE SODIUM 40 MG: 40 TABLET, DELAYED RELEASE ORAL at 18:09

## 2018-01-01 RX ADMIN — DOCUSATE SODIUM 100 MG: 100 CAPSULE, LIQUID FILLED ORAL at 20:23

## 2018-01-01 RX ADMIN — SODIUM CHLORIDE 100 ML/HR: 900 INJECTION, SOLUTION INTRAVENOUS at 10:02

## 2018-01-01 RX ADMIN — HEPARIN SODIUM 5000 UNITS: 5000 INJECTION, SOLUTION INTRAVENOUS; SUBCUTANEOUS at 16:53

## 2018-01-01 RX ADMIN — SODIUM CHLORIDE 1000 ML: 900 INJECTION, SOLUTION INTRAVENOUS at 14:08

## 2018-01-01 RX ADMIN — SODIUM CHLORIDE 1 G: 900 INJECTION INTRAVENOUS at 05:53

## 2018-01-01 RX ADMIN — METOCLOPRAMIDE 10 MG: 5 INJECTION, SOLUTION INTRAMUSCULAR; INTRAVENOUS at 20:34

## 2018-01-01 RX ADMIN — PROMETHAZINE HYDROCHLORIDE 25 MG: 25 TABLET ORAL at 23:26

## 2018-01-01 RX ADMIN — HYDROCORTISONE: 1 CREAM TOPICAL at 21:47

## 2018-01-01 RX ADMIN — LEVOFLOXACIN 750 MG: 5 INJECTION, SOLUTION INTRAVENOUS at 19:25

## 2018-01-01 RX ADMIN — MORPHINE SULFATE 2 MG: 4 INJECTION INTRAVENOUS at 22:09

## 2018-01-01 RX ADMIN — HEPARIN SODIUM 5000 UNITS: 5000 INJECTION, SOLUTION INTRAVENOUS; SUBCUTANEOUS at 22:21

## 2018-01-01 RX ADMIN — VITAMIN D, TAB 1000IU (100/BT) 1000 UNITS: 25 TAB at 08:44

## 2018-01-01 RX ADMIN — SODIUM CHLORIDE 100 ML/HR: 900 INJECTION, SOLUTION INTRAVENOUS at 20:47

## 2018-01-01 RX ADMIN — HEPARIN SODIUM 5000 UNITS: 5000 INJECTION, SOLUTION INTRAVENOUS; SUBCUTANEOUS at 12:52

## 2018-01-01 RX ADMIN — VANCOMYCIN HYDROCHLORIDE 1250 MG: 5 INJECTION, POWDER, LYOPHILIZED, FOR SOLUTION INTRAVENOUS at 13:04

## 2018-01-01 RX ADMIN — MORPHINE SULFATE 1 MG: 4 INJECTION INTRAVENOUS at 14:38

## 2018-01-01 RX ADMIN — ONDANSETRON HYDROCHLORIDE 4 MG: 2 INJECTION INTRAMUSCULAR; INTRAVENOUS at 17:03

## 2018-01-01 RX ADMIN — MORPHINE SULFATE 2 MG: 4 INJECTION INTRAVENOUS at 10:22

## 2018-01-01 RX ADMIN — ZOLPIDEM TARTRATE 5 MG: 5 TABLET ORAL at 23:37

## 2018-01-01 RX ADMIN — SUCRALFATE 1 G: 1 TABLET ORAL at 10:30

## 2018-01-01 RX ADMIN — HEPARIN SODIUM 5000 UNITS: 5000 INJECTION, SOLUTION INTRAVENOUS; SUBCUTANEOUS at 05:30

## 2018-01-01 RX ADMIN — SODIUM CHLORIDE 125 ML/HR: 9 INJECTION, SOLUTION INTRAVENOUS at 21:25

## 2018-01-01 RX ADMIN — ONDANSETRON HYDROCHLORIDE 4 MG: 2 INJECTION INTRAMUSCULAR; INTRAVENOUS at 15:21

## 2018-01-01 RX ADMIN — VANCOMYCIN HYDROCHLORIDE 1000 MG: 1 INJECTION, POWDER, LYOPHILIZED, FOR SOLUTION INTRAVENOUS at 17:25

## 2018-01-01 RX ADMIN — FENTANYL 1 PATCH: 12 PATCH TRANSDERMAL at 14:38

## 2018-01-01 RX ADMIN — VANCOMYCIN HYDROCHLORIDE 1000 MG: 1 INJECTION, POWDER, LYOPHILIZED, FOR SOLUTION INTRAVENOUS at 17:02

## 2018-01-01 RX ADMIN — SODIUM CHLORIDE 1 G: 900 INJECTION INTRAVENOUS at 05:42

## 2018-01-01 RX ADMIN — SODIUM CHLORIDE 100 ML/HR: 900 INJECTION, SOLUTION INTRAVENOUS at 13:44

## 2018-01-01 RX ADMIN — HEPARIN SODIUM 5000 UNITS: 5000 INJECTION, SOLUTION INTRAVENOUS; SUBCUTANEOUS at 06:21

## 2018-01-01 RX ADMIN — FAMOTIDINE 20 MG: 20 INJECTION, SOLUTION INTRAVENOUS at 22:19

## 2018-01-01 RX ADMIN — FAMOTIDINE 20 MG: 10 INJECTION, SOLUTION INTRAVENOUS at 08:42

## 2018-01-01 RX ADMIN — DILTIAZEM HYDROCHLORIDE 180 MG: 180 CAPSULE, COATED, EXTENDED RELEASE ORAL at 08:24

## 2018-01-01 RX ADMIN — HEPARIN SODIUM 5000 UNITS: 5000 INJECTION, SOLUTION INTRAVENOUS; SUBCUTANEOUS at 05:28

## 2018-01-01 RX ADMIN — FAMOTIDINE 20 MG: 10 INJECTION, SOLUTION INTRAVENOUS at 08:33

## 2018-01-01 RX ADMIN — METOCLOPRAMIDE 10 MG: 5 INJECTION, SOLUTION INTRAMUSCULAR; INTRAVENOUS at 08:39

## 2018-01-01 RX ADMIN — ONDANSETRON HYDROCHLORIDE 4 MG: 2 INJECTION INTRAMUSCULAR; INTRAVENOUS at 23:15

## 2018-01-01 RX ADMIN — HEPARIN SODIUM 5000 UNITS: 5000 INJECTION, SOLUTION INTRAVENOUS; SUBCUTANEOUS at 05:51

## 2018-01-01 RX ADMIN — METOPROLOL TARTRATE 10 MG: 5 INJECTION, SOLUTION INTRAVENOUS at 13:30

## 2018-01-01 RX ADMIN — OXYCODONE HYDROCHLORIDE AND ACETAMINOPHEN 1 TABLET: 10; 325 TABLET ORAL at 08:46

## 2018-01-01 RX ADMIN — FENTANYL 1 PATCH: 12 PATCH TRANSDERMAL at 14:41

## 2018-01-01 RX ADMIN — SUCRALFATE 1 G: 1 TABLET ORAL at 08:58

## 2018-01-01 RX ADMIN — LIDOCAINE HYDROCHLORIDE 50 MG: 20 INJECTION, SOLUTION INFILTRATION; PERINEURAL at 09:45

## 2018-01-01 RX ADMIN — MEROPENEM 1 G: 1 INJECTION, POWDER, FOR SOLUTION INTRAVENOUS at 12:40

## 2018-01-01 RX ADMIN — ASPIRIN 81 MG: 81 TABLET, COATED ORAL at 09:34

## 2018-01-01 RX ADMIN — ONDANSETRON 8 MG: 2 INJECTION INTRAMUSCULAR; INTRAVENOUS at 21:17

## 2018-01-01 RX ADMIN — ONDANSETRON 8 MG: 2 INJECTION INTRAMUSCULAR; INTRAVENOUS at 17:30

## 2018-01-01 RX ADMIN — METOCLOPRAMIDE 10 MG: 5 INJECTION, SOLUTION INTRAMUSCULAR; INTRAVENOUS at 13:45

## 2018-01-01 RX ADMIN — PROPOFOL 120 MG: 10 INJECTION, EMULSION INTRAVENOUS at 18:22

## 2018-01-01 RX ADMIN — ZOLPIDEM TARTRATE 5 MG: 5 TABLET ORAL at 23:27

## 2018-01-01 RX ADMIN — Medication 10 MG: at 15:35

## 2018-01-01 RX ADMIN — VECURONIUM BROMIDE 5 MG: 1 INJECTION, POWDER, LYOPHILIZED, FOR SOLUTION INTRAVENOUS at 09:45

## 2018-01-01 RX ADMIN — ASPIRIN 81 MG: 81 TABLET, COATED ORAL at 08:02

## 2018-01-01 RX ADMIN — SUCRALFATE 1 G: 1 TABLET ORAL at 17:24

## 2018-01-01 RX ADMIN — SODIUM CHLORIDE 100 ML/HR: 9 INJECTION, SOLUTION INTRAVENOUS at 09:29

## 2018-01-01 RX ADMIN — HEPARIN SODIUM 5000 UNITS: 5000 INJECTION, SOLUTION INTRAVENOUS; SUBCUTANEOUS at 21:49

## 2018-01-01 RX ADMIN — ONDANSETRON 4 MG: 2 INJECTION INTRAMUSCULAR; INTRAVENOUS at 11:00

## 2018-01-01 RX ADMIN — ONDANSETRON HYDROCHLORIDE 4 MG: 2 INJECTION INTRAMUSCULAR; INTRAVENOUS at 16:31

## 2018-01-01 RX ADMIN — PROMETHAZINE HYDROCHLORIDE 12.5 MG: 25 INJECTION INTRAMUSCULAR; INTRAVENOUS at 21:17

## 2018-01-01 RX ADMIN — DEXTROSE AND SODIUM CHLORIDE 20 ML/HR: 5; 450 INJECTION, SOLUTION INTRAVENOUS at 15:50

## 2018-01-01 RX ADMIN — FENTANYL CITRATE 25 MCG: 50 INJECTION, SOLUTION INTRAMUSCULAR; INTRAVENOUS at 18:22

## 2018-01-01 RX ADMIN — DIGOXIN 125 MCG: 125 TABLET ORAL at 13:33

## 2018-01-01 RX ADMIN — ONDANSETRON HYDROCHLORIDE 4 MG: 2 INJECTION INTRAMUSCULAR; INTRAVENOUS at 12:18

## 2018-01-01 RX ADMIN — ATORVASTATIN CALCIUM 10 MG: 10 TABLET, FILM COATED ORAL at 20:23

## 2018-01-01 RX ADMIN — METOCLOPRAMIDE 10 MG: 5 INJECTION, SOLUTION INTRAMUSCULAR; INTRAVENOUS at 14:45

## 2018-01-01 RX ADMIN — FAMOTIDINE 20 MG: 10 INJECTION, SOLUTION INTRAVENOUS at 21:30

## 2018-01-01 RX ADMIN — OXYCODONE HYDROCHLORIDE AND ACETAMINOPHEN 1 TABLET: 10; 325 TABLET ORAL at 08:52

## 2018-01-01 RX ADMIN — METOROPROLOL TARTRATE 5 MG: 5 INJECTION, SOLUTION INTRAVENOUS at 11:43

## 2018-01-01 RX ADMIN — METOPROLOL TARTRATE 5 MG: 5 INJECTION INTRAVENOUS at 12:30

## 2018-01-01 RX ADMIN — HEPARIN SODIUM 5000 UNITS: 5000 INJECTION, SOLUTION INTRAVENOUS; SUBCUTANEOUS at 13:39

## 2018-01-01 RX ADMIN — MORPHINE SULFATE 2 MG: 4 INJECTION INTRAVENOUS at 19:21

## 2018-01-01 RX ADMIN — FERROUS SULFATE TAB EC 324 MG (65 MG FE EQUIVALENT) 324 MG: 324 (65 FE) TABLET DELAYED RESPONSE at 17:02

## 2018-01-01 RX ADMIN — METOCLOPRAMIDE 10 MG: 5 INJECTION, SOLUTION INTRAMUSCULAR; INTRAVENOUS at 20:45

## 2018-01-01 RX ADMIN — SODIUM CHLORIDE 1 G: 900 INJECTION INTRAVENOUS at 21:12

## 2018-01-01 RX ADMIN — POTASSIUM CHLORIDE 40 MEQ: 750 CAPSULE, EXTENDED RELEASE ORAL at 14:19

## 2018-01-01 RX ADMIN — METOCLOPRAMIDE 10 MG: 5 INJECTION, SOLUTION INTRAMUSCULAR; INTRAVENOUS at 18:14

## 2018-01-01 RX ADMIN — SODIUM CHLORIDE 125 ML/HR: 9 INJECTION, SOLUTION INTRAVENOUS at 14:49

## 2018-01-01 RX ADMIN — ZOLPIDEM TARTRATE 5 MG: 5 TABLET ORAL at 23:49

## 2018-01-01 RX ADMIN — ZOLPIDEM TARTRATE 5 MG: 5 TABLET ORAL at 00:38

## 2018-01-01 RX ADMIN — HEPARIN SODIUM 5000 UNITS: 5000 INJECTION, SOLUTION INTRAVENOUS; SUBCUTANEOUS at 21:39

## 2018-01-01 RX ADMIN — LEVOFLOXACIN 500 MG: 5 INJECTION, SOLUTION INTRAVENOUS at 18:50

## 2018-01-01 RX ADMIN — HEPARIN SODIUM 5000 UNITS: 5000 INJECTION, SOLUTION INTRAVENOUS; SUBCUTANEOUS at 14:12

## 2018-01-01 RX ADMIN — MORPHINE SULFATE 2 MG: 4 INJECTION INTRAVENOUS at 13:33

## 2018-01-01 RX ADMIN — PANTOPRAZOLE SODIUM 40 MG: 40 TABLET, DELAYED RELEASE ORAL at 08:44

## 2018-01-01 RX ADMIN — FAMOTIDINE 20 MG: 10 INJECTION, SOLUTION INTRAVENOUS at 21:45

## 2018-01-01 RX ADMIN — NYSTATIN: 100000 POWDER TOPICAL at 05:39

## 2018-01-01 RX ADMIN — Medication 10 MG: at 01:32

## 2018-01-01 RX ADMIN — ONDANSETRON 8 MG: 2 INJECTION INTRAMUSCULAR; INTRAVENOUS at 09:53

## 2018-01-01 RX ADMIN — DIGOXIN 125 MCG: 125 TABLET ORAL at 11:12

## 2018-01-01 RX ADMIN — HEPARIN SODIUM 5000 UNITS: 5000 INJECTION, SOLUTION INTRAVENOUS; SUBCUTANEOUS at 21:15

## 2018-01-01 RX ADMIN — PROMETHAZINE HYDROCHLORIDE 25 MG: 25 TABLET ORAL at 01:14

## 2018-01-01 RX ADMIN — SUCRALFATE 1 G: 1 TABLET ORAL at 08:02

## 2018-01-01 RX ADMIN — POLYETHYLENE GLYCOL 3350 17 G: 17 POWDER, FOR SOLUTION ORAL at 10:29

## 2018-01-01 RX ADMIN — FAMOTIDINE 20 MG: 10 INJECTION, SOLUTION INTRAVENOUS at 21:49

## 2018-01-01 RX ADMIN — ONDANSETRON HYDROCHLORIDE 4 MG: 2 INJECTION INTRAMUSCULAR; INTRAVENOUS at 20:55

## 2018-01-01 RX ADMIN — ONDANSETRON HYDROCHLORIDE 4 MG: 2 INJECTION INTRAMUSCULAR; INTRAVENOUS at 12:13

## 2018-01-01 RX ADMIN — FAMOTIDINE 20 MG: 20 INJECTION, SOLUTION INTRAVENOUS at 09:10

## 2018-01-01 RX ADMIN — ONDANSETRON 4 MG: 2 INJECTION INTRAMUSCULAR; INTRAVENOUS at 14:00

## 2018-01-01 RX ADMIN — NYSTATIN: 100000 POWDER TOPICAL at 18:56

## 2018-01-01 RX ADMIN — FAMOTIDINE 20 MG: 10 INJECTION, SOLUTION INTRAVENOUS at 21:00

## 2018-01-01 RX ADMIN — DOCUSATE SODIUM 100 MG: 100 CAPSULE, LIQUID FILLED ORAL at 20:37

## 2018-01-01 RX ADMIN — Medication 10 MG: at 15:24

## 2018-01-01 RX ADMIN — FERROUS SULFATE TAB EC 324 MG (65 MG FE EQUIVALENT) 324 MG: 324 (65 FE) TABLET DELAYED RESPONSE at 17:24

## 2018-01-01 RX ADMIN — ATORVASTATIN CALCIUM 10 MG: 10 TABLET, FILM COATED ORAL at 21:37

## 2018-01-01 RX ADMIN — PHENYLEPHRINE HYDROCHLORIDE 100 MCG: 10 INJECTION INTRAVENOUS at 11:49

## 2018-01-01 RX ADMIN — CEFOXITIN SODIUM 2 G: 2 POWDER, FOR SOLUTION INTRAVENOUS at 09:30

## 2018-01-01 RX ADMIN — METOCLOPRAMIDE 10 MG: 5 INJECTION, SOLUTION INTRAMUSCULAR; INTRAVENOUS at 09:10

## 2018-01-01 RX ADMIN — HEPARIN SODIUM 5000 UNITS: 5000 INJECTION, SOLUTION INTRAVENOUS; SUBCUTANEOUS at 06:25

## 2018-01-01 RX ADMIN — HYDROMORPHONE HYDROCHLORIDE: 2 INJECTION INTRAMUSCULAR; INTRAVENOUS; SUBCUTANEOUS at 14:33

## 2018-01-01 RX ADMIN — METOCLOPRAMIDE 10 MG: 5 INJECTION, SOLUTION INTRAMUSCULAR; INTRAVENOUS at 15:30

## 2018-01-01 RX ADMIN — SODIUM CHLORIDE 75 ML/HR: 9 INJECTION, SOLUTION INTRAVENOUS at 12:42

## 2018-01-01 RX ADMIN — ZOLPIDEM TARTRATE 5 MG: 5 TABLET ORAL at 23:28

## 2018-01-01 RX ADMIN — OXYCODONE HYDROCHLORIDE AND ACETAMINOPHEN 1 TABLET: 10; 325 TABLET ORAL at 11:03

## 2018-01-01 RX ADMIN — HYDROCORTISONE: 1 CREAM TOPICAL at 01:59

## 2018-01-01 RX ADMIN — SODIUM CHLORIDE 1 G: 900 INJECTION INTRAVENOUS at 23:07

## 2018-01-01 RX ADMIN — DIGOXIN 125 MCG: 125 TABLET ORAL at 12:41

## 2018-01-01 RX ADMIN — IOPAMIDOL 80 ML: 612 INJECTION, SOLUTION INTRAVENOUS at 19:04

## 2018-01-01 RX ADMIN — SODIUM CHLORIDE 125 ML/HR: 900 INJECTION, SOLUTION INTRAVENOUS at 17:46

## 2018-01-01 RX ADMIN — HYDROCORTISONE: 1 CREAM TOPICAL at 09:38

## 2018-01-01 RX ADMIN — SODIUM CHLORIDE 125 ML/HR: 9 INJECTION, SOLUTION INTRAVENOUS at 07:13

## 2018-01-01 RX ADMIN — FAMOTIDINE 20 MG: 20 INJECTION, SOLUTION INTRAVENOUS at 17:34

## 2018-01-01 RX ADMIN — CEFOXITIN SODIUM 2 G: 2 POWDER, FOR SOLUTION INTRAVENOUS at 01:00

## 2018-01-01 RX ADMIN — METOCLOPRAMIDE 10 MG: 5 INJECTION, SOLUTION INTRAMUSCULAR; INTRAVENOUS at 00:54

## 2018-01-01 RX ADMIN — POLYETHYLENE GLYCOL 3350 17 G: 17 POWDER, FOR SOLUTION ORAL at 20:24

## 2018-01-01 RX ADMIN — SUCRALFATE 1 G: 1 TABLET ORAL at 15:52

## 2018-01-01 RX ADMIN — ONDANSETRON HYDROCHLORIDE 4 MG: 2 INJECTION INTRAMUSCULAR; INTRAVENOUS at 21:32

## 2018-01-01 RX ADMIN — CEFOXITIN SODIUM 2 G: 2 POWDER, FOR SOLUTION INTRAVENOUS at 08:45

## 2018-01-01 RX ADMIN — ATORVASTATIN CALCIUM 10 MG: 10 TABLET, FILM COATED ORAL at 20:55

## 2018-01-01 RX ADMIN — PANTOPRAZOLE SODIUM 40 MG: 40 TABLET, DELAYED RELEASE ORAL at 08:51

## 2018-01-01 RX ADMIN — HEPARIN SODIUM 5000 UNITS: 5000 INJECTION, SOLUTION INTRAVENOUS; SUBCUTANEOUS at 05:40

## 2018-01-01 RX ADMIN — DEXAMETHASONE SODIUM PHOSPHATE 4 MG: 4 INJECTION, SOLUTION INTRAMUSCULAR; INTRAVENOUS at 18:43

## 2018-01-01 RX ADMIN — SUCRALFATE 1 G: 1 TABLET ORAL at 21:37

## 2018-01-01 RX ADMIN — PANTOPRAZOLE SODIUM 40 MG: 40 TABLET, DELAYED RELEASE ORAL at 08:14

## 2018-01-01 RX ADMIN — SUCRALFATE 1 G: 1 TABLET ORAL at 20:23

## 2018-01-01 RX ADMIN — SUCRALFATE 1 G: 1 TABLET ORAL at 20:55

## 2018-01-01 RX ADMIN — OXYCODONE HYDROCHLORIDE AND ACETAMINOPHEN 1 TABLET: 10; 325 TABLET ORAL at 13:20

## 2018-01-01 RX ADMIN — ONDANSETRON 4 MG: 2 SOLUTION INTRAMUSCULAR; INTRAVENOUS at 11:00

## 2018-01-01 RX ADMIN — SODIUM CHLORIDE 125 ML/HR: 900 INJECTION, SOLUTION INTRAVENOUS at 05:51

## 2018-01-01 RX ADMIN — POTASSIUM CHLORIDE 20 MEQ: 750 CAPSULE, EXTENDED RELEASE ORAL at 13:09

## 2018-01-01 RX ADMIN — Medication 200 MG: at 09:29

## 2018-01-01 RX ADMIN — DOCUSATE SODIUM 100 MG: 100 CAPSULE, LIQUID FILLED ORAL at 08:57

## 2018-01-01 RX ADMIN — LACTOBACILLUS TAB 1 TABLET: TAB at 15:52

## 2018-01-01 RX ADMIN — SODIUM CHLORIDE 2 G: 900 INJECTION INTRAVENOUS at 12:32

## 2018-01-01 RX ADMIN — POLYETHYLENE GLYCOL 3350 17 G: 17 POWDER, FOR SOLUTION ORAL at 21:32

## 2018-01-01 RX ADMIN — DOCUSATE SODIUM 100 MG: 100 CAPSULE, LIQUID FILLED ORAL at 20:56

## 2018-01-01 RX ADMIN — DILTIAZEM HYDROCHLORIDE 180 MG: 180 CAPSULE, COATED, EXTENDED RELEASE ORAL at 10:31

## 2018-01-01 RX ADMIN — METOCLOPRAMIDE 10 MG: 5 INJECTION, SOLUTION INTRAMUSCULAR; INTRAVENOUS at 13:22

## 2018-01-01 RX ADMIN — NYSTATIN: 100000 POWDER TOPICAL at 05:53

## 2018-01-01 RX ADMIN — VITAMIN D, TAB 1000IU (100/BT) 1000 UNITS: 25 TAB at 08:02

## 2018-01-01 RX ADMIN — SUCRALFATE 1 G: 1 TABLET ORAL at 08:32

## 2018-01-01 RX ADMIN — FENTANYL CITRATE 50 MCG: 50 INJECTION, SOLUTION INTRAMUSCULAR; INTRAVENOUS at 10:15

## 2018-01-01 RX ADMIN — VECURONIUM BROMIDE 1 MG: 1 INJECTION, POWDER, LYOPHILIZED, FOR SOLUTION INTRAVENOUS at 11:38

## 2018-01-01 RX ADMIN — SODIUM CHLORIDE 1 G: 900 INJECTION INTRAVENOUS at 21:36

## 2018-01-01 RX ADMIN — LIDOCAINE HYDROCHLORIDE 5 ML: 20 INJECTION, SOLUTION EPIDURAL; INFILTRATION; INTRACAUDAL; PERINEURAL at 12:59

## 2018-01-01 RX ADMIN — VECURONIUM BROMIDE 2 MG: 1 INJECTION, POWDER, LYOPHILIZED, FOR SOLUTION INTRAVENOUS at 11:00

## 2018-01-01 RX ADMIN — Medication 10 MG: at 09:21

## 2018-01-01 RX ADMIN — POTASSIUM CHLORIDE 40 MEQ: 750 CAPSULE, EXTENDED RELEASE ORAL at 08:56

## 2018-01-01 RX ADMIN — DOCUSATE SODIUM 100 MG: 100 CAPSULE, LIQUID FILLED ORAL at 08:48

## 2018-01-01 RX ADMIN — PANTOPRAZOLE SODIUM 40 MG: 40 TABLET, DELAYED RELEASE ORAL at 08:28

## 2018-01-01 RX ADMIN — SUCRALFATE 1 G: 1 TABLET ORAL at 20:37

## 2018-01-01 RX ADMIN — SODIUM CHLORIDE 100 ML/HR: 900 INJECTION, SOLUTION INTRAVENOUS at 23:30

## 2018-01-01 RX ADMIN — SODIUM CHLORIDE 1 G: 900 INJECTION INTRAVENOUS at 15:35

## 2018-01-01 RX ADMIN — PROMETHAZINE HYDROCHLORIDE 12.5 MG: 25 INJECTION INTRAMUSCULAR; INTRAVENOUS at 21:44

## 2018-01-01 RX ADMIN — DOCUSATE SODIUM 100 MG: 100 CAPSULE, LIQUID FILLED ORAL at 08:15

## 2018-01-01 RX ADMIN — OXYCODONE HYDROCHLORIDE AND ACETAMINOPHEN 1 TABLET: 10; 325 TABLET ORAL at 09:02

## 2018-01-01 RX ADMIN — METOCLOPRAMIDE 10 MG: 5 INJECTION, SOLUTION INTRAMUSCULAR; INTRAVENOUS at 01:44

## 2018-01-01 RX ADMIN — ONDANSETRON HYDROCHLORIDE 4 MG: 2 INJECTION INTRAMUSCULAR; INTRAVENOUS at 11:38

## 2018-01-01 RX ADMIN — HEPARIN SODIUM 5000 UNITS: 5000 INJECTION, SOLUTION INTRAVENOUS; SUBCUTANEOUS at 06:24

## 2018-01-01 RX ADMIN — METOCLOPRAMIDE 10 MG: 5 INJECTION, SOLUTION INTRAMUSCULAR; INTRAVENOUS at 20:32

## 2018-01-01 RX ADMIN — PROMETHAZINE HYDROCHLORIDE 12.5 MG: 25 INJECTION INTRAMUSCULAR; INTRAVENOUS at 04:35

## 2018-01-01 RX ADMIN — VECURONIUM BROMIDE 2 MG: 1 INJECTION, POWDER, LYOPHILIZED, FOR SOLUTION INTRAVENOUS at 10:15

## 2018-01-01 RX ADMIN — HEPARIN SODIUM 5000 UNITS: 5000 INJECTION, SOLUTION INTRAVENOUS; SUBCUTANEOUS at 22:19

## 2018-01-01 RX ADMIN — OXYCODONE HYDROCHLORIDE AND ACETAMINOPHEN 1 TABLET: 10; 325 TABLET ORAL at 23:56

## 2018-01-01 RX ADMIN — SODIUM CHLORIDE 75 ML/HR: 9 INJECTION, SOLUTION INTRAVENOUS at 02:35

## 2018-01-01 RX ADMIN — PROMETHAZINE HYDROCHLORIDE 12.5 MG: 25 INJECTION INTRAMUSCULAR; INTRAVENOUS at 08:52

## 2018-01-01 RX ADMIN — FAMOTIDINE 20 MG: 10 INJECTION, SOLUTION INTRAVENOUS at 08:12

## 2018-01-01 RX ADMIN — SUCRALFATE 1 G: 1 TABLET ORAL at 08:47

## 2018-01-01 RX ADMIN — METOCLOPRAMIDE 10 MG: 5 INJECTION, SOLUTION INTRAMUSCULAR; INTRAVENOUS at 08:35

## 2018-01-01 RX ADMIN — SUCRALFATE 1 G: 1 TABLET ORAL at 16:37

## 2018-01-01 RX ADMIN — FAMOTIDINE 20 MG: 10 INJECTION, SOLUTION INTRAVENOUS at 08:35

## 2018-01-01 RX ADMIN — SODIUM CHLORIDE 100 ML/HR: 900 INJECTION, SOLUTION INTRAVENOUS at 04:24

## 2018-01-01 RX ADMIN — ISOSORBIDE MONONITRATE 30 MG: 30 TABLET, EXTENDED RELEASE ORAL at 09:30

## 2018-01-01 RX ADMIN — FERROUS SULFATE TAB EC 324 MG (65 MG FE EQUIVALENT) 324 MG: 324 (65 FE) TABLET DELAYED RESPONSE at 17:12

## 2018-01-01 RX ADMIN — SODIUM CHLORIDE 125 ML/HR: 9 INJECTION, SOLUTION INTRAVENOUS at 07:12

## 2018-01-01 RX ADMIN — FAMOTIDINE 20 MG: 10 INJECTION, SOLUTION INTRAVENOUS at 22:01

## 2018-01-01 RX ADMIN — DIGOXIN 125 MCG: 125 TABLET ORAL at 11:35

## 2018-01-01 RX ADMIN — METOCLOPRAMIDE 10 MG: 5 INJECTION, SOLUTION INTRAMUSCULAR; INTRAVENOUS at 08:03

## 2018-01-01 RX ADMIN — HYDROMORPHONE HYDROCHLORIDE: 2 INJECTION INTRAMUSCULAR; INTRAVENOUS; SUBCUTANEOUS at 19:43

## 2018-01-01 RX ADMIN — SODIUM CHLORIDE 1 G: 900 INJECTION INTRAVENOUS at 13:59

## 2018-01-01 RX ADMIN — FAMOTIDINE 20 MG: 20 INJECTION, SOLUTION INTRAVENOUS at 09:38

## 2018-01-01 RX ADMIN — HEPARIN SODIUM 5000 UNITS: 5000 INJECTION, SOLUTION INTRAVENOUS; SUBCUTANEOUS at 05:39

## 2018-01-01 RX ADMIN — SODIUM CHLORIDE 75 ML/HR: 9 INJECTION, SOLUTION INTRAVENOUS at 14:11

## 2018-01-01 RX ADMIN — OXYCODONE HYDROCHLORIDE AND ACETAMINOPHEN 1 TABLET: 10; 325 TABLET ORAL at 00:53

## 2018-01-01 RX ADMIN — SUCRALFATE 1 G: 1 TABLET ORAL at 15:21

## 2018-01-01 RX ADMIN — HYDROCORTISONE: 1 CREAM TOPICAL at 21:37

## 2018-01-01 RX ADMIN — HEPARIN SODIUM 5000 UNITS: 5000 INJECTION, SOLUTION INTRAVENOUS; SUBCUTANEOUS at 06:01

## 2018-01-01 RX ADMIN — OXYCODONE HYDROCHLORIDE AND ACETAMINOPHEN 1 TABLET: 10; 325 TABLET ORAL at 18:06

## 2018-01-01 RX ADMIN — FENTANYL CITRATE 25 MCG: 50 INJECTION, SOLUTION INTRAMUSCULAR; INTRAVENOUS at 16:07

## 2018-01-01 RX ADMIN — FAMOTIDINE 20 MG: 20 INJECTION, SOLUTION INTRAVENOUS at 14:20

## 2018-01-01 RX ADMIN — OXYCODONE HYDROCHLORIDE AND ACETAMINOPHEN 1 TABLET: 10; 325 TABLET ORAL at 13:32

## 2018-01-01 RX ADMIN — FERROUS SULFATE TAB EC 324 MG (65 MG FE EQUIVALENT) 324 MG: 324 (65 FE) TABLET DELAYED RESPONSE at 20:34

## 2018-01-01 RX ADMIN — SUCRALFATE 1 G: 1 TABLET ORAL at 23:26

## 2018-01-01 RX ADMIN — ASPIRIN 81 MG: 81 TABLET, COATED ORAL at 08:57

## 2018-01-01 RX ADMIN — ONDANSETRON HYDROCHLORIDE 4 MG: 2 INJECTION INTRAMUSCULAR; INTRAVENOUS at 08:37

## 2018-01-01 RX ADMIN — NYSTATIN: 100000 POWDER TOPICAL at 17:02

## 2018-01-01 RX ADMIN — DOCUSATE SODIUM 100 MG: 100 CAPSULE, LIQUID FILLED ORAL at 21:32

## 2018-01-01 RX ADMIN — FENTANYL CITRATE 25 MCG: 50 INJECTION, SOLUTION INTRAMUSCULAR; INTRAVENOUS at 18:46

## 2018-01-01 RX ADMIN — SODIUM CHLORIDE 100 ML/HR: 900 INJECTION, SOLUTION INTRAVENOUS at 21:40

## 2018-01-01 RX ADMIN — ONDANSETRON 4 MG: 2 INJECTION INTRAMUSCULAR; INTRAVENOUS at 18:43

## 2018-01-01 RX ADMIN — SUCRALFATE 1 G: 1 TABLET ORAL at 17:02

## 2018-01-01 RX ADMIN — SUCRALFATE 1 G: 1 TABLET ORAL at 20:32

## 2018-01-01 RX ADMIN — Medication 10 MG: at 20:30

## 2018-01-01 RX ADMIN — POLYETHYLENE GLYCOL 3350 17 G: 17 POWDER, FOR SOLUTION ORAL at 20:38

## 2018-01-01 RX ADMIN — METOCLOPRAMIDE 10 MG: 5 INJECTION, SOLUTION INTRAMUSCULAR; INTRAVENOUS at 08:42

## 2018-01-01 RX ADMIN — HEPARIN SODIUM 5000 UNITS: 5000 INJECTION, SOLUTION INTRAVENOUS; SUBCUTANEOUS at 21:45

## 2018-01-01 RX ADMIN — PANTOPRAZOLE SODIUM 40 MG: 40 TABLET, DELAYED RELEASE ORAL at 08:58

## 2018-01-01 RX ADMIN — ASPIRIN 81 MG: 81 TABLET, COATED ORAL at 10:30

## 2018-01-01 RX ADMIN — METOCLOPRAMIDE 10 MG: 5 INJECTION, SOLUTION INTRAMUSCULAR; INTRAVENOUS at 01:47

## 2018-01-01 RX ADMIN — FAMOTIDINE 20 MG: 10 INJECTION, SOLUTION INTRAVENOUS at 20:35

## 2018-01-01 RX ADMIN — METOCLOPRAMIDE 10 MG: 5 INJECTION, SOLUTION INTRAMUSCULAR; INTRAVENOUS at 09:02

## 2018-01-01 RX ADMIN — METOCLOPRAMIDE 10 MG: 5 INJECTION, SOLUTION INTRAMUSCULAR; INTRAVENOUS at 18:45

## 2018-01-01 RX ADMIN — SUCRALFATE 1 G: 1 TABLET ORAL at 08:28

## 2018-01-01 RX ADMIN — ZOLPIDEM TARTRATE 5 MG: 5 TABLET ORAL at 21:37

## 2018-01-01 RX ADMIN — SUCRALFATE 1 G: 1 TABLET ORAL at 17:11

## 2018-01-01 RX ADMIN — ONDANSETRON HYDROCHLORIDE 4 MG: 2 INJECTION INTRAMUSCULAR; INTRAVENOUS at 08:56

## 2018-01-01 RX ADMIN — ONDANSETRON HYDROCHLORIDE 4 MG: 2 INJECTION INTRAMUSCULAR; INTRAVENOUS at 20:45

## 2018-01-01 RX ADMIN — DIGOXIN 125 MCG: 125 TABLET ORAL at 12:15

## 2018-01-01 RX ADMIN — MORPHINE SULFATE 2 MG: 4 INJECTION INTRAVENOUS at 02:23

## 2018-01-01 RX ADMIN — PROMETHAZINE HYDROCHLORIDE 25 MG: 25 TABLET ORAL at 08:56

## 2018-01-01 RX ADMIN — SODIUM CHLORIDE 75 ML/HR: 9 INJECTION, SOLUTION INTRAVENOUS at 21:37

## 2018-01-01 RX ADMIN — HEPARIN SODIUM 5000 UNITS: 5000 INJECTION, SOLUTION INTRAVENOUS; SUBCUTANEOUS at 13:24

## 2018-01-01 RX ADMIN — ONDANSETRON HYDROCHLORIDE 4 MG: 2 INJECTION INTRAMUSCULAR; INTRAVENOUS at 08:03

## 2018-01-01 RX ADMIN — HEPARIN SODIUM 5000 UNITS: 5000 INJECTION, SOLUTION INTRAVENOUS; SUBCUTANEOUS at 22:01

## 2018-01-01 RX ADMIN — POTASSIUM CHLORIDE, DEXTROSE MONOHYDRATE AND SODIUM CHLORIDE 100 ML/HR: 150; 5; 450 INJECTION, SOLUTION INTRAVENOUS at 08:32

## 2018-01-01 RX ADMIN — ONDANSETRON HYDROCHLORIDE 4 MG: 2 INJECTION INTRAMUSCULAR; INTRAVENOUS at 17:23

## 2018-01-01 RX ADMIN — MORPHINE SULFATE 2 MG: 4 INJECTION INTRAVENOUS at 05:51

## 2018-01-01 RX ADMIN — OXYCODONE HYDROCHLORIDE AND ACETAMINOPHEN 2 TABLET: 5; 325 TABLET ORAL at 10:30

## 2018-01-01 RX ADMIN — ONDANSETRON HYDROCHLORIDE 4 MG: 2 INJECTION INTRAMUSCULAR; INTRAVENOUS at 23:49

## 2018-01-01 RX ADMIN — VITAMIN D, TAB 1000IU (100/BT) 1000 UNITS: 25 TAB at 08:57

## 2018-01-01 RX ADMIN — ZOLPIDEM TARTRATE 5 MG: 5 TABLET ORAL at 22:04

## 2018-01-01 RX ADMIN — DIGOXIN 125 MCG: 125 TABLET ORAL at 12:24

## 2018-01-01 RX ADMIN — PROMETHAZINE HYDROCHLORIDE 12.5 MG: 25 INJECTION INTRAMUSCULAR; INTRAVENOUS at 13:23

## 2018-01-01 RX ADMIN — ATORVASTATIN CALCIUM 10 MG: 10 TABLET, FILM COATED ORAL at 21:32

## 2018-01-01 RX ADMIN — VITAMIN D, TAB 1000IU (100/BT) 1000 UNITS: 25 TAB at 08:28

## 2018-01-01 RX ADMIN — ATORVASTATIN CALCIUM 10 MG: 10 TABLET, FILM COATED ORAL at 21:02

## 2018-01-01 RX ADMIN — FAMOTIDINE 20 MG: 20 INJECTION, SOLUTION INTRAVENOUS at 09:21

## 2018-01-01 RX ADMIN — IOPAMIDOL 80 ML: 612 INJECTION, SOLUTION INTRAVENOUS at 10:00

## 2018-01-01 RX ADMIN — ENOXAPARIN SODIUM 30 MG: 30 INJECTION SUBCUTANEOUS at 18:09

## 2018-01-01 RX ADMIN — VANCOMYCIN HYDROCHLORIDE 1250 MG: 5 INJECTION, POWDER, LYOPHILIZED, FOR SOLUTION INTRAVENOUS at 13:22

## 2018-01-01 RX ADMIN — VANCOMYCIN HYDROCHLORIDE 1250 MG: 1 INJECTION, POWDER, LYOPHILIZED, FOR SOLUTION INTRAVENOUS at 13:17

## 2018-01-01 RX ADMIN — METOCLOPRAMIDE 10 MG: 5 INJECTION, SOLUTION INTRAMUSCULAR; INTRAVENOUS at 21:03

## 2018-01-01 RX ADMIN — OXYCODONE HYDROCHLORIDE AND ACETAMINOPHEN 2 TABLET: 5; 325 TABLET ORAL at 20:16

## 2018-01-01 RX ADMIN — Medication 10 MG: at 09:37

## 2018-01-01 RX ADMIN — LIDOCAINE HYDROCHLORIDE 80 MG: 20 INJECTION, SOLUTION INFILTRATION; PERINEURAL at 18:22

## 2018-01-01 RX ADMIN — DOCUSATE SODIUM 100 MG: 100 CAPSULE, LIQUID FILLED ORAL at 08:37

## 2018-01-01 RX ADMIN — VITAMIN D, TAB 1000IU (100/BT) 1000 UNITS: 25 TAB at 08:37

## 2018-01-01 RX ADMIN — SODIUM CHLORIDE 1000 ML: 900 INJECTION, SOLUTION INTRAVENOUS at 11:54

## 2018-01-01 RX ADMIN — HEPARIN SODIUM 5000 UNITS: 5000 INJECTION, SOLUTION INTRAVENOUS; SUBCUTANEOUS at 20:42

## 2018-01-01 RX ADMIN — FERROUS SULFATE TAB EC 324 MG (65 MG FE EQUIVALENT) 324 MG: 324 (65 FE) TABLET DELAYED RESPONSE at 08:44

## 2018-01-01 RX ADMIN — SODIUM CHLORIDE 1 G: 900 INJECTION INTRAVENOUS at 14:15

## 2018-01-01 RX ADMIN — DILTIAZEM HYDROCHLORIDE 180 MG: 180 CAPSULE, COATED, EXTENDED RELEASE ORAL at 09:33

## 2018-01-01 RX ADMIN — PANTOPRAZOLE SODIUM 40 MG: 40 TABLET, DELAYED RELEASE ORAL at 08:37

## 2018-01-01 RX ADMIN — VITAMIN D, TAB 1000IU (100/BT) 1000 UNITS: 25 TAB at 08:50

## 2018-01-01 RX ADMIN — LEVOFLOXACIN 750 MG: 5 INJECTION, SOLUTION INTRAVENOUS at 12:08

## 2018-01-01 RX ADMIN — GADOTERIDOL 17 ML: 279.3 INJECTION, SOLUTION INTRAVENOUS at 16:05

## 2018-01-01 RX ADMIN — ONDANSETRON HYDROCHLORIDE 4 MG: 2 INJECTION, SOLUTION INTRAMUSCULAR; INTRAVENOUS at 05:50

## 2018-01-01 RX ADMIN — POTASSIUM CHLORIDE 40 MEQ: 750 CAPSULE, EXTENDED RELEASE ORAL at 15:21

## 2018-01-01 RX ADMIN — Medication 200 MG: at 10:30

## 2018-01-01 RX ADMIN — SUCRALFATE 1 G: 1 TABLET ORAL at 16:31

## 2018-01-01 RX ADMIN — PANTOPRAZOLE SODIUM 40 MG: 40 TABLET, DELAYED RELEASE ORAL at 09:29

## 2018-01-01 RX ADMIN — PROMETHAZINE HYDROCHLORIDE 12.5 MG: 25 INJECTION INTRAMUSCULAR; INTRAVENOUS at 14:38

## 2018-01-01 RX ADMIN — LACTOBACILLUS TAB 1 TABLET: TAB at 21:37

## 2018-01-01 RX ADMIN — SUCRALFATE 1 G: 1 TABLET ORAL at 15:02

## 2018-01-01 RX ADMIN — NYSTATIN: 100000 POWDER TOPICAL at 05:16

## 2018-01-01 RX ADMIN — SODIUM CHLORIDE 1 G: 900 INJECTION INTRAVENOUS at 05:28

## 2018-01-01 RX ADMIN — POLYETHYLENE GLYCOL 3350 17 G: 17 POWDER, FOR SOLUTION ORAL at 21:02

## 2018-01-01 RX ADMIN — METOROPROLOL TARTRATE 5 MG: 5 INJECTION, SOLUTION INTRAVENOUS at 11:23

## 2018-01-01 RX ADMIN — HEPARIN SODIUM 5000 UNITS: 5000 INJECTION, SOLUTION INTRAVENOUS; SUBCUTANEOUS at 06:28

## 2018-01-01 RX ADMIN — METOCLOPRAMIDE 10 MG: 5 INJECTION, SOLUTION INTRAMUSCULAR; INTRAVENOUS at 13:33

## 2018-01-01 RX ADMIN — PROMETHAZINE HYDROCHLORIDE 12.5 MG: 25 INJECTION INTRAMUSCULAR; INTRAVENOUS at 20:08

## 2018-01-01 RX ADMIN — SODIUM CHLORIDE 100 ML/HR: 900 INJECTION, SOLUTION INTRAVENOUS at 09:29

## 2018-01-01 RX ADMIN — VITAMIN D, TAB 1000IU (100/BT) 1000 UNITS: 25 TAB at 10:31

## 2018-01-01 RX ADMIN — POLYETHYLENE GLYCOL 3350 17 G: 17 POWDER, FOR SOLUTION ORAL at 09:29

## 2018-01-01 RX ADMIN — HEPARIN SODIUM 5000 UNITS: 5000 INJECTION, SOLUTION INTRAVENOUS; SUBCUTANEOUS at 21:00

## 2018-01-01 RX ADMIN — SODIUM CHLORIDE 1000 ML: 900 INJECTION, SOLUTION INTRAVENOUS at 11:35

## 2018-01-01 RX ADMIN — HEPARIN SODIUM 5000 UNITS: 5000 INJECTION, SOLUTION INTRAVENOUS; SUBCUTANEOUS at 06:05

## 2018-01-01 RX ADMIN — ONDANSETRON HYDROCHLORIDE 4 MG: 2 INJECTION INTRAMUSCULAR; INTRAVENOUS at 08:51

## 2018-01-01 RX ADMIN — ONDANSETRON HYDROCHLORIDE 4 MG: 2 INJECTION INTRAMUSCULAR; INTRAVENOUS at 11:27

## 2018-01-01 RX ADMIN — SODIUM CHLORIDE 100 ML/HR: 900 INJECTION, SOLUTION INTRAVENOUS at 16:33

## 2018-01-01 RX ADMIN — FERROUS SULFATE TAB EC 324 MG (65 MG FE EQUIVALENT) 324 MG: 324 (65 FE) TABLET DELAYED RESPONSE at 17:34

## 2018-01-01 RX ADMIN — Medication 200 MG: at 23:26

## 2018-01-01 RX ADMIN — METOCLOPRAMIDE 10 MG: 5 INJECTION, SOLUTION INTRAMUSCULAR; INTRAVENOUS at 03:23

## 2018-01-01 RX ADMIN — HYDROMORPHONE HYDROCHLORIDE: 2 INJECTION INTRAMUSCULAR; INTRAVENOUS; SUBCUTANEOUS at 08:34

## 2018-01-01 RX ADMIN — HEPARIN SODIUM 5000 UNITS: 5000 INJECTION, SOLUTION INTRAVENOUS; SUBCUTANEOUS at 20:34

## 2018-01-01 RX ADMIN — FENTANYL CITRATE 25 MCG: 50 INJECTION, SOLUTION INTRAMUSCULAR; INTRAVENOUS at 18:44

## 2018-01-01 RX ADMIN — ZOLPIDEM TARTRATE 5 MG: 5 TABLET ORAL at 01:14

## 2018-01-01 RX ADMIN — PROMETHAZINE HYDROCHLORIDE 12.5 MG: 25 INJECTION INTRAMUSCULAR; INTRAVENOUS at 00:34

## 2018-01-01 RX ADMIN — VANCOMYCIN HYDROCHLORIDE 1000 MG: 1 INJECTION, POWDER, LYOPHILIZED, FOR SOLUTION INTRAVENOUS at 16:22

## 2018-01-01 RX ADMIN — SODIUM CHLORIDE 100 ML/HR: 900 INJECTION, SOLUTION INTRAVENOUS at 04:14

## 2018-01-01 RX ADMIN — OXYCODONE HYDROCHLORIDE AND ACETAMINOPHEN 1 TABLET: 10; 325 TABLET ORAL at 08:34

## 2018-01-01 RX ADMIN — HEPARIN SODIUM 5000 UNITS: 5000 INJECTION, SOLUTION INTRAVENOUS; SUBCUTANEOUS at 14:49

## 2018-01-01 RX ADMIN — DOCUSATE SODIUM 100 MG: 100 CAPSULE, LIQUID FILLED ORAL at 08:28

## 2018-01-01 RX ADMIN — VANCOMYCIN HYDROCHLORIDE 1250 MG: 5 INJECTION, POWDER, LYOPHILIZED, FOR SOLUTION INTRAVENOUS at 12:26

## 2018-01-01 RX ADMIN — METOCLOPRAMIDE 10 MG: 5 INJECTION, SOLUTION INTRAMUSCULAR; INTRAVENOUS at 08:34

## 2018-01-01 RX ADMIN — ATORVASTATIN CALCIUM 10 MG: 10 TABLET, FILM COATED ORAL at 20:46

## 2018-01-01 RX ADMIN — RIVAROXABAN 15 MG: 15 TABLET, FILM COATED ORAL at 09:29

## 2018-01-01 RX ADMIN — FENTANYL CITRATE 25 MCG: 50 INJECTION, SOLUTION INTRAMUSCULAR; INTRAVENOUS at 16:04

## 2018-01-01 RX ADMIN — ONDANSETRON HYDROCHLORIDE 4 MG: 2 INJECTION INTRAMUSCULAR; INTRAVENOUS at 19:55

## 2018-01-01 RX ADMIN — ONDANSETRON 4 MG: 2 INJECTION INTRAMUSCULAR; INTRAVENOUS at 11:19

## 2018-01-01 RX ADMIN — DOCUSATE SODIUM 100 MG: 100 CAPSULE, LIQUID FILLED ORAL at 20:55

## 2018-01-01 RX ADMIN — METOROPROLOL TARTRATE 10 MG: 5 INJECTION, SOLUTION INTRAVENOUS at 13:54

## 2018-01-01 RX ADMIN — FAMOTIDINE 20 MG: 20 INJECTION, SOLUTION INTRAVENOUS at 21:03

## 2018-01-01 RX ADMIN — BARIUM SULFATE 183 ML: 960 POWDER, FOR SUSPENSION ORAL at 09:15

## 2018-01-01 RX ADMIN — SUCRALFATE 1 G: 1 TABLET ORAL at 08:43

## 2018-01-01 RX ADMIN — PROPOFOL 100 MG: 10 INJECTION, EMULSION INTRAVENOUS at 09:45

## 2018-01-01 RX ADMIN — SODIUM CHLORIDE: 9 INJECTION, SOLUTION INTRAVENOUS at 11:18

## 2018-01-01 RX ADMIN — ONDANSETRON HYDROCHLORIDE 4 MG: 2 INJECTION, SOLUTION INTRAMUSCULAR; INTRAVENOUS at 14:39

## 2018-01-01 RX ADMIN — MORPHINE SULFATE 1 MG: 4 INJECTION INTRAVENOUS at 03:21

## 2018-01-01 RX ADMIN — SODIUM CHLORIDE 1 G: 900 INJECTION INTRAVENOUS at 21:46

## 2018-01-01 RX ADMIN — POTASSIUM CHLORIDE 40 MEQ: 750 CAPSULE, EXTENDED RELEASE ORAL at 09:52

## 2018-01-01 RX ADMIN — DEXAMETHASONE 4 MG: 4 TABLET ORAL at 08:56

## 2018-01-01 RX ADMIN — SODIUM CHLORIDE 100 ML/HR: 900 INJECTION, SOLUTION INTRAVENOUS at 03:21

## 2018-01-01 RX ADMIN — SODIUM CHLORIDE 1000 ML: 9 INJECTION, SOLUTION INTRAVENOUS at 14:11

## 2018-01-01 RX ADMIN — LEVOFLOXACIN 750 MG: 5 INJECTION, SOLUTION INTRAVENOUS at 20:55

## 2018-01-01 RX ADMIN — SUCRALFATE 1 G: 1 TABLET ORAL at 21:01

## 2018-01-01 RX ADMIN — SUCRALFATE 1 G: 1 TABLET ORAL at 20:16

## 2018-01-01 RX ADMIN — SODIUM CHLORIDE 100 ML/HR: 900 INJECTION, SOLUTION INTRAVENOUS at 14:40

## 2018-01-01 RX ADMIN — VITAMIN D, TAB 1000IU (100/BT) 1000 UNITS: 25 TAB at 18:09

## 2018-01-01 RX ADMIN — PANTOPRAZOLE SODIUM 40 MG: 40 TABLET, DELAYED RELEASE ORAL at 08:03

## 2018-01-01 RX ADMIN — MORPHINE SULFATE 2 MG: 4 INJECTION INTRAVENOUS at 09:19

## 2018-01-01 RX ADMIN — HYDROMORPHONE HYDROCHLORIDE: 2 INJECTION INTRAMUSCULAR; INTRAVENOUS; SUBCUTANEOUS at 06:29

## 2018-01-01 RX ADMIN — SODIUM CHLORIDE 125 ML/HR: 900 INJECTION, SOLUTION INTRAVENOUS at 14:39

## 2018-01-01 RX ADMIN — ONDANSETRON HYDROCHLORIDE 4 MG: 2 INJECTION INTRAMUSCULAR; INTRAVENOUS at 23:28

## 2018-01-01 RX ADMIN — ONDANSETRON HYDROCHLORIDE 4 MG: 2 INJECTION INTRAMUSCULAR; INTRAVENOUS at 11:11

## 2018-01-01 RX ADMIN — METOCLOPRAMIDE 10 MG: 5 INJECTION, SOLUTION INTRAMUSCULAR; INTRAVENOUS at 01:13

## 2018-01-01 RX ADMIN — HYDROCORTISONE: 1 CREAM TOPICAL at 09:21

## 2018-01-01 RX ADMIN — FAMOTIDINE 20 MG: 10 INJECTION, SOLUTION INTRAVENOUS at 08:23

## 2018-01-01 RX ADMIN — FAMOTIDINE 20 MG: 20 INJECTION, SOLUTION INTRAVENOUS at 20:42

## 2018-01-01 RX ADMIN — ASPIRIN 81 MG: 81 TABLET, COATED ORAL at 08:37

## 2018-01-01 RX ADMIN — METOCLOPRAMIDE 10 MG: 5 INJECTION, SOLUTION INTRAMUSCULAR; INTRAVENOUS at 21:31

## 2018-01-01 RX ADMIN — HEPARIN SODIUM 5000 UNITS: 5000 INJECTION, SOLUTION INTRAVENOUS; SUBCUTANEOUS at 13:46

## 2018-01-01 RX ADMIN — ATORVASTATIN CALCIUM 10 MG: 10 TABLET, FILM COATED ORAL at 20:56

## 2018-01-01 RX ADMIN — ONDANSETRON HYDROCHLORIDE 4 MG: 2 INJECTION INTRAMUSCULAR; INTRAVENOUS at 11:55

## 2018-01-01 RX ADMIN — METOPROLOL TARTRATE 5 MG: 5 INJECTION, SOLUTION INTRAVENOUS at 10:01

## 2018-01-01 RX ADMIN — FAMOTIDINE 20 MG: 10 INJECTION, SOLUTION INTRAVENOUS at 09:22

## 2018-01-01 RX ADMIN — SUCRALFATE 1 G: 1 TABLET ORAL at 21:32

## 2018-01-01 RX ADMIN — SUCRALFATE 1 G: 1 TABLET ORAL at 08:14

## 2018-01-01 RX ADMIN — MORPHINE SULFATE 2 MG: 4 INJECTION INTRAVENOUS at 21:35

## 2018-01-01 RX ADMIN — ONDANSETRON HYDROCHLORIDE 4 MG: 2 INJECTION INTRAMUSCULAR; INTRAVENOUS at 11:22

## 2018-01-01 RX ADMIN — POTASSIUM CHLORIDE, DEXTROSE MONOHYDRATE AND SODIUM CHLORIDE 50 ML/HR: 150; 5; 450 INJECTION, SOLUTION INTRAVENOUS at 12:03

## 2018-01-01 RX ADMIN — PROMETHAZINE HYDROCHLORIDE 12.5 MG: 25 INJECTION INTRAMUSCULAR; INTRAVENOUS at 00:06

## 2018-01-01 RX ADMIN — ZINC SULFATE CAP 220 MG (50 MG ELEMENTAL ZN) 220 MG: 220 (50 ZN) CAP at 09:29

## 2018-01-01 RX ADMIN — SODIUM CHLORIDE, POTASSIUM CHLORIDE, SODIUM LACTATE AND CALCIUM CHLORIDE: 600; 310; 30; 20 INJECTION, SOLUTION INTRAVENOUS at 10:03

## 2018-01-01 RX ADMIN — ONDANSETRON HYDROCHLORIDE 4 MG: 2 INJECTION INTRAMUSCULAR; INTRAVENOUS at 17:12

## 2018-01-01 RX ADMIN — OXYCODONE HYDROCHLORIDE AND ACETAMINOPHEN 1 TABLET: 10; 325 TABLET ORAL at 09:10

## 2018-01-01 RX ADMIN — DIGOXIN 125 MCG: 125 TABLET ORAL at 12:43

## 2018-01-01 RX ADMIN — HEPARIN SODIUM 5000 UNITS: 5000 INJECTION, SOLUTION INTRAVENOUS; SUBCUTANEOUS at 14:48

## 2018-01-01 RX ADMIN — PROMETHAZINE HYDROCHLORIDE 12.5 MG: 25 INJECTION INTRAMUSCULAR; INTRAVENOUS at 16:58

## 2018-01-01 RX ADMIN — ONDANSETRON HYDROCHLORIDE 4 MG: 2 INJECTION INTRAMUSCULAR; INTRAVENOUS at 23:27

## 2018-01-01 RX ADMIN — PROMETHAZINE HYDROCHLORIDE 25 MG: 25 TABLET ORAL at 09:56

## 2018-01-01 RX ADMIN — FAMOTIDINE 20 MG: 20 INJECTION, SOLUTION INTRAVENOUS at 08:40

## 2018-01-01 RX ADMIN — SODIUM CHLORIDE: 900 INJECTION, SOLUTION INTRAVENOUS at 11:30

## 2018-01-01 RX ADMIN — SODIUM CHLORIDE 125 ML/HR: 900 INJECTION, SOLUTION INTRAVENOUS at 17:35

## 2018-01-01 RX ADMIN — SUCRALFATE 1 G: 1 TABLET ORAL at 20:34

## 2018-01-01 RX ADMIN — SODIUM CHLORIDE, SODIUM GLUCONATE, SODIUM ACETATE, POTASSIUM CHLORIDE, AND MAGNESIUM CHLORIDE: 526; 502; 368; 37; 30 INJECTION, SOLUTION INTRAVENOUS at 18:18

## 2018-01-01 RX ADMIN — SODIUM CHLORIDE 100 ML/HR: 900 INJECTION, SOLUTION INTRAVENOUS at 04:21

## 2018-01-01 RX ADMIN — METOPROLOL TARTRATE 10 MG: 5 INJECTION, SOLUTION INTRAVENOUS at 21:45

## 2018-01-01 RX ADMIN — LIDOCAINE HYDROCHLORIDE 60 MG: 20 INJECTION, SOLUTION INFILTRATION; PERINEURAL at 16:06

## 2018-01-01 RX ADMIN — HEPARIN SODIUM 5000 UNITS: 5000 INJECTION, SOLUTION INTRAVENOUS; SUBCUTANEOUS at 21:47

## 2018-01-01 RX ADMIN — ONDANSETRON HYDROCHLORIDE 4 MG: 2 INJECTION INTRAMUSCULAR; INTRAVENOUS at 08:31

## 2018-01-01 RX ADMIN — Medication 10 ML: at 04:36

## 2018-01-01 RX ADMIN — ONDANSETRON HYDROCHLORIDE 4 MG: 2 INJECTION INTRAMUSCULAR; INTRAVENOUS at 04:34

## 2018-01-01 RX ADMIN — DIGOXIN 125 MCG: 125 TABLET ORAL at 13:41

## 2018-01-01 RX ADMIN — MORPHINE SULFATE 1 MG: 4 INJECTION INTRAVENOUS at 09:53

## 2018-01-01 RX ADMIN — ZINC SULFATE CAP 220 MG (50 MG ELEMENTAL ZN) 220 MG: 220 (50 ZN) CAP at 10:30

## 2018-01-01 RX ADMIN — Medication 10 ML: at 18:18

## 2018-01-01 RX ADMIN — PROMETHAZINE HYDROCHLORIDE 12.5 MG: 25 INJECTION INTRAMUSCULAR; INTRAVENOUS at 08:48

## 2018-01-01 RX ADMIN — ONDANSETRON HYDROCHLORIDE 4 MG: 2 INJECTION INTRAMUSCULAR; INTRAVENOUS at 20:38

## 2018-01-01 RX ADMIN — ONDANSETRON HYDROCHLORIDE 4 MG: 2 INJECTION INTRAMUSCULAR; INTRAVENOUS at 04:09

## 2018-01-01 RX ADMIN — Medication 10 ML: at 08:04

## 2018-01-01 RX ADMIN — Medication 3 MG: at 12:40

## 2018-01-01 RX ADMIN — ASPIRIN 81 MG: 81 TABLET, COATED ORAL at 08:50

## 2018-01-01 RX ADMIN — ZOLPIDEM TARTRATE 5 MG: 5 TABLET ORAL at 23:43

## 2018-01-01 RX ADMIN — ONDANSETRON HYDROCHLORIDE 4 MG: 2 INJECTION INTRAMUSCULAR; INTRAVENOUS at 08:02

## 2018-01-01 RX ADMIN — ONDANSETRON HYDROCHLORIDE 4 MG: 2 INJECTION INTRAMUSCULAR; INTRAVENOUS at 20:34

## 2018-01-01 RX ADMIN — FAMOTIDINE 20 MG: 20 INJECTION, SOLUTION INTRAVENOUS at 20:34

## 2018-01-01 RX ADMIN — ONDANSETRON 4 MG: 2 INJECTION INTRAMUSCULAR; INTRAVENOUS at 14:50

## 2018-01-01 RX ADMIN — SODIUM CHLORIDE 100 ML/HR: 900 INJECTION, SOLUTION INTRAVENOUS at 14:37

## 2018-01-01 RX ADMIN — PROPOFOL 150 MG: 10 INJECTION, EMULSION INTRAVENOUS at 16:06

## 2018-01-01 RX ADMIN — VANCOMYCIN HYDROCHLORIDE 1000 MG: 1 INJECTION, POWDER, LYOPHILIZED, FOR SOLUTION INTRAVENOUS at 17:11

## 2018-01-01 RX ADMIN — FENTANYL 1 PATCH: 12 PATCH TRANSDERMAL at 14:00

## 2018-01-01 RX ADMIN — CEFOXITIN SODIUM 2 G: 2 POWDER, FOR SOLUTION INTRAVENOUS at 19:55

## 2018-01-01 RX ADMIN — METOCLOPRAMIDE 10 MG: 5 INJECTION, SOLUTION INTRAMUSCULAR; INTRAVENOUS at 02:00

## 2018-01-01 RX ADMIN — FAMOTIDINE 20 MG: 10 INJECTION, SOLUTION INTRAVENOUS at 11:01

## 2018-01-01 RX ADMIN — LETROZOLE 2.5 MG: 2.5 TABLET ORAL at 09:29

## 2018-01-01 RX ADMIN — METOCLOPRAMIDE 10 MG: 5 INJECTION, SOLUTION INTRAMUSCULAR; INTRAVENOUS at 12:52

## 2018-01-01 RX ADMIN — SODIUM CHLORIDE 2 G: 900 INJECTION INTRAVENOUS at 15:12

## 2018-01-01 RX ADMIN — POTASSIUM CHLORIDE, DEXTROSE MONOHYDRATE AND SODIUM CHLORIDE 100 ML/HR: 150; 5; 450 INJECTION, SOLUTION INTRAVENOUS at 23:56

## 2018-01-01 RX ADMIN — DOCUSATE SODIUM 100 MG: 100 CAPSULE, LIQUID FILLED ORAL at 20:32

## 2018-01-01 RX ADMIN — VANCOMYCIN HYDROCHLORIDE 1000 MG: 1 INJECTION, POWDER, LYOPHILIZED, FOR SOLUTION INTRAVENOUS at 12:59

## 2018-01-01 RX ADMIN — ONDANSETRON HYDROCHLORIDE 4 MG: 2 INJECTION INTRAMUSCULAR; INTRAVENOUS at 15:53

## 2018-01-01 RX ADMIN — FERROUS SULFATE TAB EC 324 MG (65 MG FE EQUIVALENT) 324 MG: 324 (65 FE) TABLET DELAYED RESPONSE at 08:28

## 2018-01-01 RX ADMIN — ATORVASTATIN CALCIUM 10 MG: 10 TABLET, FILM COATED ORAL at 09:29

## 2018-01-01 RX ADMIN — SODIUM CHLORIDE 1 G: 900 INJECTION INTRAVENOUS at 05:17

## 2018-01-01 RX ADMIN — SODIUM CHLORIDE 1 G: 900 INJECTION INTRAVENOUS at 05:16

## 2018-01-01 RX ADMIN — ONDANSETRON HYDROCHLORIDE 4 MG: 2 INJECTION INTRAMUSCULAR; INTRAVENOUS at 08:44

## 2018-01-01 RX ADMIN — POLYETHYLENE GLYCOL 3350 17 G: 17 POWDER, FOR SOLUTION ORAL at 20:56

## 2018-01-01 RX ADMIN — ONDANSETRON 8 MG: 2 INJECTION INTRAMUSCULAR; INTRAVENOUS at 13:34

## 2018-01-01 RX ADMIN — FAMOTIDINE 20 MG: 20 INJECTION, SOLUTION INTRAVENOUS at 08:20

## 2018-01-01 RX ADMIN — OXYCODONE HYDROCHLORIDE AND ACETAMINOPHEN 1 TABLET: 10; 325 TABLET ORAL at 00:54

## 2018-01-01 RX ADMIN — SUCCINYLCHOLINE CHLORIDE 120 MG: 20 INJECTION, SOLUTION INTRAMUSCULAR; INTRAVENOUS at 16:06

## 2018-01-01 RX ADMIN — ATORVASTATIN CALCIUM 10 MG: 10 TABLET, FILM COATED ORAL at 10:30

## 2018-01-01 RX ADMIN — POLYETHYLENE GLYCOL 3350 17 G: 17 POWDER, FOR SOLUTION ORAL at 20:55

## 2018-01-01 RX ADMIN — SUCRALFATE 1 G: 1 TABLET ORAL at 20:46

## 2018-01-01 RX ADMIN — VITAMIN D, TAB 1000IU (100/BT) 1000 UNITS: 25 TAB at 08:47

## 2018-01-01 RX ADMIN — FERROUS SULFATE TAB EC 324 MG (65 MG FE EQUIVALENT) 324 MG: 324 (65 FE) TABLET DELAYED RESPONSE at 08:37

## 2018-01-01 RX ADMIN — METOCLOPRAMIDE 10 MG: 5 INJECTION, SOLUTION INTRAMUSCULAR; INTRAVENOUS at 13:20

## 2018-01-01 RX ADMIN — ONDANSETRON HYDROCHLORIDE 4 MG: 2 INJECTION INTRAMUSCULAR; INTRAVENOUS at 04:40

## 2018-01-01 RX ADMIN — ISOSORBIDE MONONITRATE 30 MG: 30 TABLET, EXTENDED RELEASE ORAL at 10:31

## 2018-01-01 RX ADMIN — ONDANSETRON HYDROCHLORIDE 4 MG: 2 INJECTION INTRAMUSCULAR; INTRAVENOUS at 12:24

## 2018-01-01 RX ADMIN — ONDANSETRON HYDROCHLORIDE 4 MG: 2 INJECTION INTRAMUSCULAR; INTRAVENOUS at 08:27

## 2018-01-01 RX ADMIN — ASPIRIN 81 MG: 81 TABLET, COATED ORAL at 08:14

## 2018-01-01 RX ADMIN — HEPARIN SODIUM 5000 UNITS: 5000 INJECTION, SOLUTION INTRAVENOUS; SUBCUTANEOUS at 06:12

## 2018-01-01 RX ADMIN — ZOLPIDEM TARTRATE 5 MG: 5 TABLET ORAL at 00:05

## 2018-01-01 RX ADMIN — RIVAROXABAN 15 MG: 15 TABLET, FILM COATED ORAL at 10:31

## 2018-01-01 RX ADMIN — SODIUM CHLORIDE 1 G: 900 INJECTION INTRAVENOUS at 14:11

## 2018-01-01 RX ADMIN — ATORVASTATIN CALCIUM 10 MG: 10 TABLET, FILM COATED ORAL at 20:38

## 2018-01-01 RX ADMIN — Medication 10 MG: at 20:42

## 2018-01-01 RX ADMIN — HEPARIN SODIUM 5000 UNITS: 5000 INJECTION, SOLUTION INTRAVENOUS; SUBCUTANEOUS at 21:30

## 2018-01-01 RX ADMIN — FAMOTIDINE 20 MG: 10 INJECTION, SOLUTION INTRAVENOUS at 20:39

## 2018-01-01 RX ADMIN — DOCUSATE SODIUM 100 MG: 100 CAPSULE, LIQUID FILLED ORAL at 21:02

## 2018-01-01 RX ADMIN — DIGOXIN 125 MCG: 125 TABLET ORAL at 11:53

## 2018-01-01 RX ADMIN — PANTOPRAZOLE SODIUM 40 MG: 40 TABLET, DELAYED RELEASE ORAL at 08:32

## 2018-01-01 RX ADMIN — FENTANYL CITRATE 25 MCG: 50 INJECTION, SOLUTION INTRAMUSCULAR; INTRAVENOUS at 16:15

## 2018-01-01 RX ADMIN — FENTANYL CITRATE 50 MCG: 50 INJECTION, SOLUTION INTRAMUSCULAR; INTRAVENOUS at 10:00

## 2018-01-01 RX ADMIN — OXYCODONE HYDROCHLORIDE AND ACETAMINOPHEN 1 TABLET: 10; 325 TABLET ORAL at 18:21

## 2018-01-01 RX ADMIN — SODIUM CHLORIDE 1000 ML: 900 INJECTION, SOLUTION INTRAVENOUS at 12:26

## 2018-01-01 RX ADMIN — METOCLOPRAMIDE 10 MG: 5 INJECTION, SOLUTION INTRAMUSCULAR; INTRAVENOUS at 16:31

## 2018-01-01 RX ADMIN — OXYCODONE HYDROCHLORIDE AND ACETAMINOPHEN 1 TABLET: 10; 325 TABLET ORAL at 08:24

## 2018-01-01 RX ADMIN — SODIUM CHLORIDE 1000 ML: 9 INJECTION, SOLUTION INTRAVENOUS at 20:42

## 2018-01-01 RX ADMIN — IOPAMIDOL 90 ML: 612 INJECTION, SOLUTION INTRAVENOUS at 16:45

## 2018-01-01 RX ADMIN — Medication 10 MG: at 03:30

## 2018-01-01 RX ADMIN — DILTIAZEM HYDROCHLORIDE 180 MG: 180 CAPSULE, COATED, EXTENDED RELEASE ORAL at 13:33

## 2018-01-01 RX ADMIN — Medication 200 MG: at 20:16

## 2018-01-01 RX ADMIN — SODIUM CHLORIDE 1 G: 900 INJECTION INTRAVENOUS at 15:24

## 2018-01-01 RX ADMIN — VANCOMYCIN HYDROCHLORIDE 1000 MG: 1 INJECTION, POWDER, LYOPHILIZED, FOR SOLUTION INTRAVENOUS at 15:21

## 2018-01-01 RX ADMIN — SODIUM CHLORIDE 100 ML/HR: 900 INJECTION, SOLUTION INTRAVENOUS at 07:09

## 2018-01-01 RX ADMIN — ASPIRIN 81 MG: 81 TABLET, COATED ORAL at 08:43

## 2018-01-01 RX ADMIN — SUCRALFATE 1 G: 1 TABLET ORAL at 08:51

## 2018-01-01 RX ADMIN — DOCUSATE SODIUM 100 MG: 100 CAPSULE, LIQUID FILLED ORAL at 08:51

## 2018-01-01 RX ADMIN — FAMOTIDINE 20 MG: 20 INJECTION, SOLUTION INTRAVENOUS at 08:24

## 2018-01-01 RX ADMIN — METOCLOPRAMIDE 10 MG: 5 INJECTION, SOLUTION INTRAMUSCULAR; INTRAVENOUS at 21:45

## 2018-01-01 RX ADMIN — Medication 10 MG: at 13:59

## 2018-01-01 RX ADMIN — SUCRALFATE 1 G: 1 TABLET ORAL at 09:29

## 2018-01-01 RX ADMIN — FAMOTIDINE 20 MG: 20 INJECTION, SOLUTION INTRAVENOUS at 09:02

## 2018-01-01 RX ADMIN — HYDROCORTISONE: 1 CREAM TOPICAL at 21:24

## 2018-01-01 RX ADMIN — NYSTATIN: 100000 POWDER TOPICAL at 19:31

## 2018-01-01 RX ADMIN — SODIUM CHLORIDE 1 G: 900 INJECTION INTRAVENOUS at 21:17

## 2018-01-01 RX ADMIN — FERROUS SULFATE TAB EC 324 MG (65 MG FE EQUIVALENT) 324 MG: 324 (65 FE) TABLET DELAYED RESPONSE at 08:02

## 2018-01-01 RX ADMIN — RIVAROXABAN 20 MG: 10 TABLET, FILM COATED ORAL at 17:25

## 2018-01-01 RX ADMIN — FENTANYL CITRATE 25 MCG: 50 INJECTION, SOLUTION INTRAMUSCULAR; INTRAVENOUS at 16:11

## 2018-01-01 RX ADMIN — HEPARIN SODIUM 5000 UNITS: 5000 INJECTION, SOLUTION INTRAVENOUS; SUBCUTANEOUS at 14:09

## 2018-01-01 RX ADMIN — FERROUS SULFATE TAB EC 324 MG (65 MG FE EQUIVALENT) 324 MG: 324 (65 FE) TABLET DELAYED RESPONSE at 08:14

## 2018-01-01 RX ADMIN — FAMOTIDINE 20 MG: 20 INJECTION, SOLUTION INTRAVENOUS at 08:32

## 2018-01-01 RX ADMIN — ASPIRIN 81 MG: 81 TABLET, COATED ORAL at 08:32

## 2018-01-01 RX ADMIN — VITAMIN D, TAB 1000IU (100/BT) 1000 UNITS: 25 TAB at 08:32

## 2018-01-01 RX ADMIN — METOCLOPRAMIDE 10 MG: 5 INJECTION, SOLUTION INTRAMUSCULAR; INTRAVENOUS at 01:12

## 2018-01-01 RX ADMIN — ONDANSETRON 8 MG: 2 INJECTION INTRAMUSCULAR; INTRAVENOUS at 03:46

## 2018-01-01 RX ADMIN — LEVOFLOXACIN 750 MG: 5 INJECTION, SOLUTION INTRAVENOUS at 21:38

## 2018-01-01 RX ADMIN — ONDANSETRON 8 MG: 2 INJECTION INTRAMUSCULAR; INTRAVENOUS at 11:19

## 2018-01-01 RX ADMIN — SODIUM CHLORIDE 125 ML/HR: 9 INJECTION, SOLUTION INTRAVENOUS at 00:04

## 2018-01-01 RX ADMIN — HYDROMORPHONE HYDROCHLORIDE 0.5 MG: 2 INJECTION INTRAMUSCULAR; INTRAVENOUS; SUBCUTANEOUS at 13:02

## 2018-01-01 RX ADMIN — MORPHINE SULFATE 4 MG: 4 INJECTION, SOLUTION INTRAMUSCULAR; INTRAVENOUS at 12:43

## 2018-01-01 RX ADMIN — SODIUM CHLORIDE 1 G: 900 INJECTION INTRAVENOUS at 05:39

## 2018-01-01 RX ADMIN — ONDANSETRON HYDROCHLORIDE 4 MG: 2 INJECTION INTRAMUSCULAR; INTRAVENOUS at 20:23

## 2018-01-08 PROBLEM — E87.6 HYPOKALEMIA: Status: ACTIVE | Noted: 2018-01-01

## 2018-01-08 NOTE — PATIENT INSTRUCTIONS
Patient Instructions for CT Scan    · Your CT scan is being done without any oral contrast.  Your CT scan may be done with IV contrast, if you have an allergy to iodine--please tell your nurse.    · Do not eat or drink 4 hours prior to scan.     · You may take your medications with sips of water, except DO NOT take your diabetic pill the morning of the test.    Arrive at the Outpatient Surgery entrance at [facility] 20 minutes prior to appointment time.    You will receive a phone call with an appointment for your CT scan.  Please call our office, if someone does not contact you with 3 days.    Belem Wong RN  January 8, 2018  1:58 PM              CT Scan  A computed tomography (CT) scan is a specialized X-ray scan. It uses X-rays and a computer to make pictures of different areas of your body. A CT scan can offer more detailed information than a regular X-ray exam. The CT scan provides data about internal organs, soft tissue structures, blood vessels, and bones.   The CT scanner is a large machine that takes pictures of your body as you move through the opening.   LET YOUR HEALTH CARE PROVIDER KNOW ABOUT:  · Any allergies you have.    · All medicines you are taking, including vitamins, herbs, eye drops, creams, and over-the-counter medicines.    · Previous problems you or members of your family have had with the use of anesthetics.    · Any blood disorders you have.    · Previous surgeries you have had.    · Medical conditions you have.  RISKS AND COMPLICATIONS   Generally, this is a safe procedure. However, as with any procedure, problems can occur. Possible problems include:   · An allergic reaction to the contrast material.    · Development of cancer from excessive exposure to radiation. The risk of this is small.    BEFORE THE PROCEDURE   · The day before the test, stop drinking caffeinated beverages. These include energy drinks, tea, soda, coffee, and hot chocolate.    · On the day of  the test:  ¨ About 4 hours before the test, stop eating and drinking anything but water as advised by your health care provider.    ¨ Avoid wearing jewelry. You will have to partly or fully undress and wear a hospital gown.  PROCEDURE   · You will be asked to lie on a table with your arms above your head.    · If contrast dye is to be used for the test, an IV tube will be inserted in your arm. The contrast dye will be injected into the IV tube. You might feel warm, or you may get a metallic taste in your mouth.    · The table you will be lying on will move into a large machine that will do the scanning.    · You will be able to see, hear, and talk to the person running the machine while you are in it. Follow that person's directions.    · The CT machine will move around you to take pictures. Do not move while it is scanning. This helps to get a good image.    · When the best possible pictures have been taken, the machine will be turned off. The table will be moved out of the machine. The IV tube will then be removed.  AFTER THE PROCEDURE   Ask your health care provider when to follow up for your test results.     This information is not intended to replace advice given to you by your health care provider. Make sure you discuss any questions you have with your health care provider.     Document Released: 01/25/2006 Document Revised: 12/23/2014 Document Reviewed: 08/25/2014  Elsevier Interactive Patient Education ©2017 [employer]

## 2018-01-08 NOTE — PROGRESS NOTES
DATE OF VISIT: 1/8/2018    REASON FOR VISIT:  Metastatic lobular carcinoma of left breast with a bone metastasis.    HISTORY OF PRESENT ILLNESS:    67-year-old female with a past medical history significant for left breast cancer with diffuse skeletal metastasis diagnosed in 2014, currently on letrozole and Palbociclib with Xgeva is here for follow-up visit today.  In May of 2017 in view of disease progression on CT scan patient was changed over to Palbociclib with letrozole.  Patient is scheduled to start her eighth cycle of Palbociclib 100 mg by mouth daily along with letrozole today on January 8, 2018.  She is here for follow-up visit today.  Complains of intermittent nosebleed when she blows her nose.  Denies any abnormal swollen and anywhere in the body.  Denies any change in her chronic back and hip pain.        PAST MEDICAL HISTORY:    Past Medical History:   Diagnosis Date   • Atrial fibrillation    • Carcinoma, female breast, infiltrating lobular     LEFT side with axillary lymph node metastases      • Essential hypertension    • GERD (gastroesophageal reflux disease)    • Heartburn    • History of echocardiogram 10/13/2011   • Localized enlarged lymph nodes    • Lump of breast, left    • Lymphadenopathy      LEFT axilla-this is confirmed by my personal review of the ultrasound      • Osteoarthritis        SOCIAL HISTORY:    Social History   Substance Use Topics   • Smoking status: Never Smoker   • Smokeless tobacco: Never Used   • Alcohol use No       Surgical History :  Past Surgical History:   Procedure Laterality Date   • APPENDECTOMY     • BREAST SURGERY  11/21/2014    Ultrasound directed mammotome biopsy of the left breast with clip placement, lesion at the 5:30 position 5 cm. from the nipple.Left axillary lymph node biopsy, open.   • CHOLECYSTECTOMY     • COLONOSCOPY  12/12/2011    Mild diverticulosis in sigmoid colon. Stool collected for study. Hemorrhoids found.   • COLONOSCOPY N/A 2/7/2017     "Procedure: COLONOSCOPY;  Surgeon: Yevgeniy Ware MD;  Location: [facility] ENDOSCOPY;  Service:    • INJECTION OF MEDICATION  08/12/2013    Kenalog (19)      • TONSILLECTOMY     • TUBAL ABDOMINAL LIGATION     • UPPER GASTROINTESTINAL ENDOSCOPY  06/10/2014    Normal hypopharynx, esophagus, symmetrical & patent pylorus. Hiatus hernia in GE junction. Polyps in antrum & fundus. Multiple biopsies taken. Prominant CBD in medial duodenal wall. The ampulla has a normal appearance.       ALLERGIES:    Allergies   Allergen Reactions   • Tape Itching   • Eggs Or Egg-Derived Products Diarrhea and Nausea And Vomiting   • Influenza Vaccines    • Lortab [Hydrocodone-Acetaminophen] Hallucinations   • Penicillins      \"PASS OUT\"       REVIEW OF SYSTEMS:      CONSTITUTIONAL: Positive for fatigue.  Positive for hot flashes.  No fever, chills, or night sweats.     HEENT:  No epistaxis, mouth sores, or difficulty swallowing.    RESPIRATORY:  No new shortness of breath or cough at present.    CARDIOVASCULAR:  No chest pain or palpitations.    GASTROINTESTINAL: Complains of  Intermittent constipation.  Complains of hemorrhoidal bleed secondary to constipation.  No abdominal pain, nausea, vomiting, .    GENITOURINARY:  No dysuria or hematuria.    MUSCULOSKELETAL:  Complains of bilateral hip pain and stiffness, which is getting worse.  Complains of bilateral shoulder stiffness.    NEUROLOGICAL:  No tingling or numbness. No new headache or dizziness.     LYMPHATICS:  Denies any abnormal swollen and anywhere in the body.    SKIN:  Complains of skin nodule that has not progressed since starting chemotherapy.  Complains of easy bruising on bilateral upper extremity.        PHYSICAL EXAMINATION:      VITAL SIGNS:    /69  Pulse 86  Temp 99.5 °F (37.5 °C) Comment: re checked  Ht 157.5 cm (62\")  Wt 82.6 kg (182 lb 1.6 oz)  LMP  (LMP Unknown)  BMI 33.31 kg/m2    GENERAL:  Not in any distress.     EYES:  Mild pallor. No icterus.  " Extraocular movement intact.    NECK:  No adenopathy.  No JVD.    RESPIRATORY:  Fair air entry bilaterally. No rhonchi or wheezing.    CARDIOVASCULAR:  S1, S2. Regular rate and rhythm.  Systolic murmur present.    ABDOMEN:  Soft, nontender. Bowel sounds present.    EXTREMITIES:  No edema.  No calf  Tenderness.  Bruising present in bilateral upper extremity.    NEUROLOGICAL:  Alert, awake, oriented x3. No motor or sensory deficits.  Cranial nerves II-12 grossly intact.        DIAGNOSTIC DATA:    Glucose   Date Value Ref Range Status   01/08/2018 136 (H) 60 - 100 mg/dL Final     Sodium   Date Value Ref Range Status   01/08/2018 136 (L) 137 - 145 mmol/L Final     Potassium   Date Value Ref Range Status   01/08/2018 3.3 (L) 3.5 - 5.1 mmol/L Final     CO2   Date Value Ref Range Status   01/08/2018 26.0 22.0 - 31.0 mmol/L Final     Chloride   Date Value Ref Range Status   01/08/2018 99 95 - 110 mmol/L Final     Anion Gap   Date Value Ref Range Status   01/08/2018 11.0 5.0 - 15.0 mmol/L Final     Creatinine   Date Value Ref Range Status   01/08/2018 1.05 (H) 0.50 - 1.00 mg/dL Final     BUN   Date Value Ref Range Status   01/08/2018 20 7 - 21 mg/dL Final     BUN/Creatinine Ratio   Date Value Ref Range Status   01/08/2018 19.0 7.0 - 25.0 Final     Calcium   Date Value Ref Range Status   01/08/2018 9.9 8.4 - 10.2 mg/dL Final     eGFR Non  Amer   Date Value Ref Range Status   01/08/2018 52 45 - 104 mL/min/1.73 Final     Alkaline Phosphatase   Date Value Ref Range Status   01/08/2018 51 38 - 126 U/L Final     Total Protein   Date Value Ref Range Status   01/08/2018 7.9 6.3 - 8.6 g/dL Final     ALT (SGPT)   Date Value Ref Range Status   01/08/2018 33 9 - 52 U/L Final     AST (SGOT)   Date Value Ref Range Status   01/08/2018 62 (H) 14 - 36 U/L Final     Total Bilirubin   Date Value Ref Range Status   01/08/2018 0.6 0.2 - 1.3 mg/dL Final     Albumin   Date Value Ref Range Status   01/08/2018 4.30 3.40 - 4.80 g/dL Final      Globulin   Date Value Ref Range Status   01/08/2018 3.6 (H) 2.3 - 3.5 gm/dL Final     A/G Ratio   Date Value Ref Range Status   01/08/2018 1.2 1.1 - 1.8 g/dL Final     Lab Results   Component Value Date    WBC 2.63 (L) 01/08/2018    HGB 9.1 (L) 01/08/2018    HCT 28.3 (L) 01/08/2018    MCV 98.3 (H) 01/08/2018     (L) 01/08/2018     Lab Results   Component Value Date    NEUTROABS 1.02 (L) 01/08/2018    FERRITIN 226.00 02/09/2017    OSLOVLQT67 625 02/09/2017     Lab Results   Component Value Date     187.1 (H) 12/04/2017    LABCA2 320.6 (H) 12/04/2017     Component      Latest Ref Rng & Units 8/17/2017 9/21/2017 11/30/2017 12/4/2017   CA 27.29      0.0 - 38.6 U/mL 224.0 (H) 277.3 (H) 327.9 (H) 320.6 (H)         RADIOLOGY DATA :    CT of chest, abdomen and pelvis with contrast done on September 8, 2017 showed:  IMPRESSION:  CONCLUSION:    1. Widespread osteosclerotic metastatic disease.  2. No changes visualized from the prior study.         Nuclear bone scan done on April 21, 2017 showed:  FINDINGS:      The uptake and distribution of radiotracer activity demonstrates:         Interval progression of disease demonstrating relative intensity  in previously noted sites of abnormal activity, additionally with  new sites in both the axial and appendicular skeleton.      The kidneys are visualized bilaterally.   .   IMPRESSION:  CONCLUSION:      1. Interval progression of disease.              ASSESSMENT AND PLAN:      1.  Metastatic lobular carcinoma of left breast with a diffuse blastic lesion involving the spine and pelvis, ER/GA positive, HER-2/shelly negative diagnosed in December 2014.  Initially patient was started on Arimidex with Xgeva, in November 2015 patient had a restaging scan done which showed worsening of the metastases and patient was changed to Aromasin along with Xgeva.  In February 2016 in view of worsening metastasis patient was started on Faslodex with Xgeva.ReStaging  Work up done in  October 2016 showed worsening of metastatic disease, so patient was put on tamoxifen along with Xgeva In view of worsening bone metastasis based on the bone scan done in April 2017 as well as restaging of CT of chest abdomen and pelvis done on May 4, 2017.  Currently taking Palbociclib 100 mg by mouth daily and letrozole.  In view of her neutropenia with ANC of 1000 we will hold chemotherapy today.  We will recheck CBC next week if neutrophil count is greater than 1500 and resume Palbociclib.  Patient was instructed to continue taking letrozole every day.  Prior to next clinic visit in 5 weeks we'll get CT of chest, abdomen and pelvis with contrast for restaging.  This recommendation were discussed with patient and her daughter.    2.  Pancytopenia: Most likely secondary to Palbociclib.  Patient was instructed to come to the emergency room she starts having any bleeding or any fever.  We will monitored with CBC for now.    3.  Hot flashes: Patient is enough Effexor with her for now    4.  Bone metastases: Continue with Xgeva for now.  Patient was again explained about possible side effects including jaw pain and osteonecrosis of jaw.  She is scheduled to get a dose of Xgeva today .    5.  Constipation: Currently taking Benefiber, stool softener and laxative.  Continue with same for now.    6.  Prescriptions: Prescription for Percocet 10/325 with 120 tablet has been provided today on January 8, 2018.  Prescription for  MiraLAX was sent to her pharmacy  on October 26, 2017.    7.  Health maintenance: Patient does not smoke.  She is not a candidate for further screening colonoscopy in view of metastatic breast cancer.  She remains full code.       Joey Ramos MD  1/8/2018  4:43 PM

## 2018-01-18 NOTE — TELEPHONE ENCOUNTER
Patient needs refill sent to [facility] pharmacy. Patient was supposed to have CBC done on 1/15 but cancelled due to weather. She is going to try to go and get it done either today or Monday.

## 2018-01-22 NOTE — PROGRESS NOTES
Spoke to the patient by phone today. April Meng NP reviewed the results of the CBC. Her ANC is adequate to restart the Ibrance. I have instructed the patient to restart today. The patient verbalized unserstanding. We reviewed her follow up appointment as well.

## 2018-02-05 NOTE — TELEPHONE ENCOUNTER
----- Message from Concepción Silverman sent at 2/5/2018  1:00 PM CST -----  She was wondering why her lab/injection is today. It's usually on the same day that she see's Richard. Does she need to reschedule todays appt.?

## 2018-02-12 PROBLEM — N17.9 ACUTE KIDNEY INJURY (HCC): Status: ACTIVE | Noted: 2018-01-01

## 2018-02-12 PROBLEM — N28.89 RENAL CALYCEAL DILATION DETERMINED BY ULTRASOUND: Status: ACTIVE | Noted: 2018-01-01

## 2018-02-12 PROBLEM — K83.8 DILATED INTRAHEPATIC BILE DUCT: Status: ACTIVE | Noted: 2018-01-01

## 2018-02-12 NOTE — PROGRESS NOTES
DATE OF VISIT: 2/12/2018    REASON FOR VISIT:  Metastatic lobular carcinoma of left breast with a bone metastasis.    HISTORY OF PRESENT ILLNESS:    67-year-old female with a past medical history significant for left breast cancer with diffuse skeletal metastasis diagnosed in 2014, currently on letrozole and Palbociclib with Xgeva is here for follow-up visit today.  In May of 2017 in view of disease progression on CT scan patient was changed over to Palbociclib with letrozole.  She finished her eighth cycle of Palbociclib with letrozole on February 10, 2018.  Patient had a restaging CT of chest, abdomen and pelvis with contrast done on February 7, 2018, she is here to discuss the result of CT scan and further recommendation.  Complains of 10 pound weight loss in last 1 month.  Complains of worsening nausea and vomiting especially after eating.  Complains of constipation.  Complains of chronic stiffness and pain involving back and hip joint.  Denies any new enlarged lymph nodes anywhere in the body.        PAST MEDICAL HISTORY:    Past Medical History:   Diagnosis Date   • Atrial fibrillation    • Carcinoma, female breast, infiltrating lobular     LEFT side with axillary lymph node metastases      • Essential hypertension    • GERD (gastroesophageal reflux disease)    • Heartburn    • History of echocardiogram 10/13/2011   • Localized enlarged lymph nodes    • Lump of breast, left    • Lymphadenopathy      LEFT axilla-this is confirmed by my personal review of the ultrasound      • Osteoarthritis        SOCIAL HISTORY:    Social History   Substance Use Topics   • Smoking status: Never Smoker   • Smokeless tobacco: Never Used   • Alcohol use No       Surgical History :  Past Surgical History:   Procedure Laterality Date   • APPENDECTOMY     • BREAST SURGERY  11/21/2014    Ultrasound directed mammotome biopsy of the left breast with clip placement, lesion at the 5:30 position 5 cm. from the nipple.Left axillary lymph  "node biopsy, open.   • CHOLECYSTECTOMY     • COLONOSCOPY  12/12/2011    Mild diverticulosis in sigmoid colon. Stool collected for study. Hemorrhoids found.   • COLONOSCOPY N/A 2/7/2017    Procedure: COLONOSCOPY;  Surgeon: Yevgeniy Ware MD;  Location: [facility] ENDOSCOPY;  Service:    • INJECTION OF MEDICATION  08/12/2013    Kenalog (19)      • TONSILLECTOMY     • TUBAL ABDOMINAL LIGATION     • UPPER GASTROINTESTINAL ENDOSCOPY  06/10/2014    Normal hypopharynx, esophagus, symmetrical & patent pylorus. Hiatus hernia in GE junction. Polyps in antrum & fundus. Multiple biopsies taken. Prominant CBD in medial duodenal wall. The ampulla has a normal appearance.       ALLERGIES:    Allergies   Allergen Reactions   • Tape Itching   • Eggs Or Egg-Derived Products Diarrhea and Nausea And Vomiting   • Influenza Vaccines    • Lortab [Hydrocodone-Acetaminophen] Hallucinations   • Penicillins      \"PASS OUT\"         FAMILY HISTORY:  Family History   Problem Relation Age of Onset   • Heart failure Mother      congested   • Lung cancer Father    • Heart attack Other    • Lung cancer Other    • Colon cancer Neg Hx    • Stomach cancer Neg Hx          REVIEW OF SYSTEMS:      CONSTITUTIONAL: Positive for fatigue.  Positive for hot flashes.Complains of 10 pound weight loss in last 1 month.  No fever, chills, or night sweats.     HEENT:  No epistaxis, mouth sores, or difficulty swallowing.    RESPIRATORY:  No new shortness of breath or cough at present.    CARDIOVASCULAR:  No chest pain or palpitations.    GASTROINTESTINAL:   Complains of nausea and vomiting that has been ongoing for last 3 weeks.  Complains of constipation.  Denies any abdominal pain.    GENITOURINARY:  No dysuria or hematuria.    MUSCULOSKELETAL:  Complains of bilateral hip pain and stiffness.  Complains of bilateral shoulder stiffness.    NEUROLOGICAL:  No tingling or numbness. No new headache or dizziness.     LYMPHATICS:  Denies any abnormal swollen and " "anywhere in the body.    SKIN:  Complains of skin nodule that has not progressed since starting chemotherapy.  Complains of easy bruising on bilateral upper extremity.        PHYSICAL EXAMINATION:      VITAL SIGNS:    /80  Pulse 70  Temp 99.9 °F (37.7 °C) (Temporal Artery )   Resp 18  Ht 157.5 cm (62.01\")  Wt 79.7 kg (175 lb 11.3 oz)  LMP  (LMP Unknown)  BMI 32.13 kg/m2    GENERAL:  Not in any distress.     EYES:  Mild pallor. No icterus.  Extraocular movement intact.    NECK:  No adenopathy.  No JVD.    RESPIRATORY:  Fair air entry bilaterally. No rhonchi or wheezing.    CARDIOVASCULAR:  S1, S2. Regular rate and rhythm.  Systolic murmur present.    ABDOMEN:  Soft, nontender. Bowel sounds present.  No organomegaly appreciated.    EXTREMITIES:  No edema.  No calf  Tenderness.  Bruising present in bilateral upper extremity.    NEUROLOGICAL:  Alert, awake, oriented x3. No motor or sensory deficits.  Cranial nerves II-12 grossly intact.        DIAGNOSTIC DATA:    Glucose   Date Value Ref Range Status   02/07/2018 95 60 - 100 mg/dL Final     Sodium   Date Value Ref Range Status   02/07/2018 135 (L) 137 - 145 mmol/L Final     Potassium   Date Value Ref Range Status   02/07/2018 4.0 3.5 - 5.1 mmol/L Final     CO2   Date Value Ref Range Status   02/07/2018 29.0 22.0 - 31.0 mmol/L Final     Chloride   Date Value Ref Range Status   02/07/2018 98 95 - 110 mmol/L Final     Anion Gap   Date Value Ref Range Status   02/07/2018 8.0 5.0 - 15.0 mmol/L Final     Creatinine   Date Value Ref Range Status   02/07/2018 1.30 (H) 0.50 - 1.00 mg/dL Final     BUN   Date Value Ref Range Status   02/07/2018 25 (H) 7 - 21 mg/dL Final     BUN/Creatinine Ratio   Date Value Ref Range Status   02/07/2018 19.2 7.0 - 25.0 Final     Calcium   Date Value Ref Range Status   02/07/2018 9.9 8.4 - 10.2 mg/dL Final     eGFR Non  Amer   Date Value Ref Range Status   02/07/2018 41 (L) 45 - 104 mL/min/1.73 Final     Alkaline Phosphatase "   Date Value Ref Range Status   02/07/2018 51 38 - 126 U/L Final     Total Protein   Date Value Ref Range Status   02/07/2018 7.4 6.3 - 8.6 g/dL Final     ALT (SGPT)   Date Value Ref Range Status   02/07/2018 27 9 - 52 U/L Final     AST (SGOT)   Date Value Ref Range Status   02/07/2018 54 (H) 14 - 36 U/L Final     Total Bilirubin   Date Value Ref Range Status   02/07/2018 0.3 0.2 - 1.3 mg/dL Final     Albumin   Date Value Ref Range Status   02/07/2018 4.00 3.40 - 4.80 g/dL Final     Globulin   Date Value Ref Range Status   02/07/2018 3.4 2.3 - 3.5 gm/dL Final     A/G Ratio   Date Value Ref Range Status   02/07/2018 1.2 1.1 - 1.8 g/dL Final     Lab Results   Component Value Date    WBC 2.47 (L) 02/07/2018    HGB 8.4 (L) 02/07/2018    HCT 25.3 (L) 02/07/2018    MCV 95.8 02/07/2018     (L) 02/07/2018     Lab Results   Component Value Date    NEUTROABS 0.69 (L) 02/07/2018    FERRITIN 226.00 02/09/2017    WTAIUJIB08 625 02/09/2017     Lab Results   Component Value Date     185.9 (H) 02/07/2018    LABCA2 337.4 (H) 02/07/2018     Component      Latest Ref Rng & Units 11/30/2017 12/4/2017 2/7/2018   CA 15-3      0.0 - 25.0 U/mL 179.4 (H) 187.1 (H) 185.9 (H)       Component      Latest Ref Rng & Units 9/21/2017 11/30/2017 12/4/2017 2/7/2018   CA 27.29      0.0 - 38.6 U/mL 277.3 (H) 327.9 (H) 320.6 (H) 337.4 (H)             RADIOLOGY DATA :    CT of chest, abdomen and pelvis with contrast done on September 8, 2017 showed:  IMPRESSION:  CONCLUSION:    1. Widespread osteosclerotic metastatic disease.  2. No changes visualized from the prior study.         Nuclear bone scan done on April 21, 2017 showed:  FINDINGS:      The uptake and distribution of radiotracer activity demonstrates:         Interval progression of disease demonstrating relative intensity  in previously noted sites of abnormal activity, additionally with  new sites in both the axial and appendicular skeleton.      The kidneys are visualized  bilaterally.   .   IMPRESSION:  CONCLUSION:      1. Interval progression of disease.              ASSESSMENT AND PLAN:      1.  Metastatic lobular carcinoma of left breast with a diffuse blastic lesion involving the spine and pelvis, ER/WA positive, HER-2/shelly negative diagnosed in December 2014.  Initially patient was started on Arimidex with Xgeva, in November 2015 patient had a restaging scan done which showed worsening of the metastases and patient was changed to Aromasin along with Xgeva.  In February 2016 in view of worsening metastasis patient was started on Faslodex with Xgeva.ReStaging  Work up done in October 2016 showed worsening of metastatic disease, so patient was put on tamoxifen along with Xgeva In view of worsening bone metastasis based on the bone scan done in April 2017 as well as restaging of CT of chest abdomen and pelvis done on May 4, 2017.  Patient has received total 8 cycles of Palbociclib a FROM May 2017 until February 10, 2018.  In view of CT scan showing possible antral mass with dilated intrahepatic biliary duct, we will hold Palbociclib for now.  Case was discussed with Dr. Lemus earlier today, will order an MRCP and after that we will refer her to Dr. Lemus on an urgent basis to see if she is a candidate for any stent placement for dilated intrahepatic biliary duct.  This recommendation were discussed with patient and her family.  Result of CT scan were also discussed.  We will see her back in about 2 weeks after follow-up with Dr. Lemus to discuss further treatment option.  Her future care or treatment option will include intravenous chemotherapy as she is exhausted all oral option.    2.  Pancytopenia: Most likely secondary to Palbociclib.  Patient was instructed to come to the emergency room she starts having any bleeding or any fever.  We will monitored with CBC for now.    3.  Hot flashes: Patient is enough Effexor with her for now    4.  Bone metastases: Continue with Xgeva for  now.  Patient was again explained about possible side effects including jaw pain and osteonecrosis of jaw.  We'll hold Xgeva in view of elevated creatinine done last week.    5.  Acute kidney injury: Patient was found to have acute kidney injury with creatinine of 1.30 last week.  Patient complains of multiple episodes of nausea and vomiting.  We will give her 1 L of normal saline today.    6.  Dilated intrahepatic bile duct: CT scan done on February 7, 2018 shows dilated intrahepatic bile duct with possible antral mass.  This abnormality on CT scan were discussed with patient and her family.  We are ordering MRCP for tomorrow after that referral has been placed for Dr. Lemus.  Case was also discussed with Dr. Lemus.    7.  Dilated calyceal system of the right renal pelvis: There is no etiology on CT scan, we will refer her to urology team for further evaluation.    8.  Constipation: Currently taking Benefiber, stool softener and laxative.  Continue with same for now.    9.  Prescriptions: Prescription for Percocet 10/325 with 120 tablet has been provided on January 8, 2018.  Prescription for  MiraLAX was sent to her pharmacy  on October 26, 2017.    10.  Health maintenance: Patient does not smoke.  She is not a candidate for further screening colonoscopy in view of metastatic breast cancer.  She remains full code.       Joey Ramos MD  2/12/2018  4:46 PM

## 2018-02-13 NOTE — TELEPHONE ENCOUNTER
Patient is scheduled for MRI this afternoon but states she has problems with claustrophobia. Dr. Ramos informed and he ordered Xanax 0.5 mg x1 to be taken 30 minutes prior to MRI. Prescription called in to [facility]. Patient's daughter informed of this and she states understanding.

## 2018-02-16 NOTE — PROGRESS NOTES
Adult Outpatient Nutrition  Assessment    Patient Name:  Johanne Mayer  YOB: 1950  MRN: [id number]    Assessment Date:  Late entry from 2/12/18 visit    CHART REVIEW  Johanne Mayer  1950  67 y.o.  Wt: 175 lb  Ht: 62 in  Dx: breast cancer with bone mets  Tx: oral chemo in past--starting IV chemo in future    PATIENT/FAMILY COMMENTS   Weight Comments: Has lost 19 lb in 6 months--194 lb 8/5/17, 182 lb 1/8/18  Diet: regular  Food Allergies: eggs  Supplements: willing to try  Meals: 4-5 small  Food Preparation: has help  Nutrition Concerns:  *10% weight loss/6 mon  *acid reflux  *nausea/vomiting once daily for past 1 1/2 mon--usually 1 hour after meals (takes anti-emetic x2 daily)  *constipation (stool softeners)  *dehydration (IV fluid today)    Tolerates some foods/beverages better than others. Does best with hot jacqueline/milk/hot tea/banana/yogurt/cottage cheese/potatoes. Scheduled to see Dr. Lemus.      GOAL(s)  Optimize nutrition in attempt to prevent further loss of LBM      EDUCATION  -High calorie high protein diet (small frequent feedings)  -Supplementation        * Provided samples of commercial supplements & discount coupons--recipes          for homemade supplements as well.  -Importance of staying hydrated    To reinforce education, written information with RD's name & number provided.  Pt receptive to suggestions, and agreed to call on dietitian as needed.                                                                                                    Electronically signed by:  Bianca Mendoza RD  02/16/18 7:27 AM

## 2018-02-19 NOTE — NURSING NOTE
Unable to pass ERCP scope, ERCP not completed.    Per MD EGD scope used to obtain antrum and duodenal bx.

## 2018-02-19 NOTE — PLAN OF CARE
Problem: Patient Care Overview (Adult)  Goal: Plan of Care Review  Outcome: Outcome(s) achieved Date Met: 02/19/18 02/19/18 1727   Coping/Psychosocial Response Interventions   Plan Of Care Reviewed With patient   Patient Care Overview   Progress no change   Outcome Evaluation   Outcome Summary/Follow up Plan vss       Problem: GI Endoscopy (Adult)  Goal: Signs and Symptoms of Listed Potential Problems Will be Absent or Manageable (GI Endoscopy)  Outcome: Outcome(s) achieved Date Met: 02/19/18 02/19/18 1727   GI Endoscopy   Problems Assessed (GI Endoscopy) all   Problems Present (GI Endoscopy) none       Problem: Perioperative Period (Adult)  Goal: Signs and Symptoms of Listed Potential Problems Will be Absent or Manageable (Perioperative Period)  Outcome: Outcome(s) achieved Date Met: 02/19/18 02/19/18 1727   Perioperative Period   Problems Assessed (Perioperative Period) all   Problems Present (Perioperative Period) none

## 2018-02-19 NOTE — H&P
Justin Valencia DO,[facility]  Gastroenterology  Hepatology  Endoscopy  Board Certified in Internal Medicine and gastroenterology  [address], [address]  [place]. [address]  T- [phone]   F - [phone]     GASTROENTEROLOGY HISTORY AND PHYSICAL  NOTE   JUSTIN VALENCIA DO.         SUBJECTIVE:   2/19/2018    Name: Johanne Mayer  DOD: 1950        Chief Complaint:       Subjective : Recurrent nausea with vomiting with evidence of obstruction of the duodenum and biliary and pancreatic systems    Patient is 67 y.o. female presents with desire for elective ERCP with attempts at decompressing the biliary obstruction.      ROS/HISTORY/ CURRENT MEDICATIONS/OBJECTIVE/VS/PE:   Review of Systems:   Review of Systems    History:     Past Medical History:   Diagnosis Date   • Atrial fibrillation    • Carcinoma, female breast, infiltrating lobular     LEFT side with axillary lymph node metastases      • Essential hypertension    • GERD (gastroesophageal reflux disease)    • Heartburn    • History of echocardiogram 10/13/2011   • Localized enlarged lymph nodes    • Lump of breast, left    • Lymphadenopathy      LEFT axilla-this is confirmed by my personal review of the ultrasound      • Osteoarthritis      Past Surgical History:   Procedure Laterality Date   • APPENDECTOMY     • BREAST SURGERY  11/21/2014    Ultrasound directed mammotome biopsy of the left breast with clip placement, lesion at the 5:30 position 5 cm. from the nipple.Left axillary lymph node biopsy, open.   • CHOLECYSTECTOMY     • COLONOSCOPY  12/12/2011    Mild diverticulosis in sigmoid colon. Stool collected for study. Hemorrhoids found.   • COLONOSCOPY N/A 2/7/2017    Procedure: COLONOSCOPY;  Surgeon: Yevgeniy Ware MD;  Location: [facility] ENDOSCOPY;  Service:    • INJECTION OF MEDICATION  08/12/2013    Kenalog (19)      • TONSILLECTOMY     • TUBAL ABDOMINAL LIGATION     • UPPER GASTROINTESTINAL ENDOSCOPY  06/10/2014     Normal hypopharynx, esophagus, symmetrical & patent pylorus. Hiatus hernia in GE junction. Polyps in antrum & fundus. Multiple biopsies taken. Prominant CBD in medial duodenal wall. The ampulla has a normal appearance.     Family History   Problem Relation Age of Onset   • Heart failure Mother      congested   • Lung cancer Father    • Heart attack Other    • Lung cancer Other    • Colon cancer Neg Hx    • Stomach cancer Neg Hx      Social History   Substance Use Topics   • Smoking status: Never Smoker   • Smokeless tobacco: Never Used   • Alcohol use No     Prescriptions Prior to Admission   Medication Sig Dispense Refill Last Dose   • acetaminophen (TYLENOL) 500 MG tablet Take 500 mg by mouth Every 6 (Six) Hours As Needed for Mild Pain .   2/18/2018 at Unknown time   • atorvastatin (LIPITOR) 10 MG tablet Take 1 tablet by mouth Daily. 30 tablet 5 2/18/2018 at Unknown time   • Calcium Carb-Cholecalciferol (CALCIUM 600 + D PO) Take 1 capsule by mouth 2 (Two) Times a Day.   2/18/2018 at Unknown time   • cholecalciferol (VITAMIN D3) 1000 units tablet Take 1,000 Units by mouth Daily.   2/18/2018 at Unknown time   • digoxin (LANOXIN) 125 MCG tablet Take 125 mcg by mouth Daily.   2/19/2018 at Unknown time   • diltiazem XR (DILACOR XR) 180 MG 24 hr capsule Take 1 capsule by mouth Daily. 30 capsule 3 2/19/2018 at Unknown time   • isosorbide mononitrate (IMDUR) 30 MG 24 hr tablet Take 1 tablet by mouth Daily. 30 tablet 0 2/19/2018 at Unknown time   • letrozole (FEMARA) 2.5 MG tablet Take 2.5 mg by mouth Daily.   2/18/2018 at Unknown time   • losartan (COZAAR) 100 MG tablet Take 1 tablet by mouth Daily. 30 tablet 0 2/18/2018 at Unknown time   • Magnesium 250 MG tablet Take 1 tablet by mouth Daily.   2/18/2018 at Unknown time   • ondansetron (ZOFRAN) 4 MG tablet Take 1 tablet by mouth 2 (Two) Times a Day As Needed for Nausea or Vomiting. 40 tablet 5 2/18/2018 at Unknown time   • oxyCODONE-acetaminophen (PERCOCET)  MG per  tablet Take 1 tablet by mouth Every 6 (Six) Hours As Needed for Moderate Pain  or Severe Pain . 120 tablet 0 2/18/2018 at Unknown time   • pantoprazole (PROTONIX) 40 MG EC tablet Take 1 tablet by mouth Daily. 30 tablet 5 2/19/2018 at Unknown time   • polyethylene glycol (MIRALAX) powder Take 17 g by mouth Daily. 527 g 1 2/18/2018 at Unknown time   • sucralfate (CARAFATE) 1 g tablet Take 1 g by mouth 3 (Three) Times a Day.   2/18/2018 at Unknown time   • Zinc 50 MG capsule Take 1 tablet by mouth Daily.   2/18/2018 at Unknown time   • aspirin 81 MG EC tablet Take 1 tablet by mouth Daily. (Patient taking differently: Take 81 mg by mouth Daily. Last dose 2/15/18) 30 tablet 0 2/16/2018   • Interferon Beta-1b (EXTAVIA SC) Inject  under the skin Every 30 (Thirty) Days.   1/24/2018   • rivaroxaban (XARELTO) 20 MG tablet Take 1 tablet by mouth Daily. (Patient taking differently: Take 20 mg by mouth Daily. Last dose 2/13/18) 30 tablet 11 2/15/2018     Allergies:  Tape; Eggs or egg-derived products; Influenza vaccines; Lortab [hydrocodone-acetaminophen]; and Penicillins    I have reviewed the patients medical history, surgical history and family history in the available medical record system.     Current Medications:     Current Facility-Administered Medications   Medication Dose Route Frequency Provider Last Rate Last Dose   • acetaminophen (TYLENOL) tablet 650 mg  650 mg Oral Once PRN Bill Ernst CRNA        Or   • acetaminophen (TYLENOL) suppository 650 mg  650 mg Rectal Once PRN Bill Ernst CRNA       • diphenhydrAMINE (BENADRYL) injection 12.5 mg  12.5 mg Intravenous Q15 Min PRN Bill Ernst CRNA       • ePHEDrine injection 5 mg  5 mg Intravenous Once PRN Bill Ernst CRNA       • flumazenil (ROMAZICON) injection 0.2 mg  0.2 mg Intravenous PRN Bill Ernst CRNA       • HYDROmorphone (DILAUDID) injection 0.5 mg  0.5 mg Intravenous Q5 Min PRN Bill Ernst CRNA       • labetalol  (NORMODYNE,TRANDATE) injection 5 mg  5 mg Intravenous Q5 Min PRN Bill Ernst CRNA       • meperidine (DEMEROL) injection 12.5 mg  12.5 mg Intravenous Q5 Min PRN Bill Ernst CRNA       • naloxone (NARCAN) injection 0.2 mg  0.2 mg Intravenous PRN Bill Ernst CRNA       • ondansetron (ZOFRAN) injection 4 mg  4 mg Intravenous Once PRN Bill Ernst CRNA           Objective     Physical Exam:   Temp:  [97.5 °F (36.4 °C)] 97.5 °F (36.4 °C)  Heart Rate:  [84] 84  Resp:  [18] 18  BP: (122)/(68) 122/68    Physical Exam:  General Appearance:    Alert, cooperative, in no acute distress   Head:    Normocephalic, without obvious abnormality, atraumatic   Eyes:            Lids and lashes normal, conjunctivae and sclerae normal, no   icterus, no pallor, corneas clear, PERRLA   Ears:    Ears appear intact with no abnormalities noted   Throat:   No oral lesions, no thrush, oral mucosa moist   Neck:   No adenopathy, supple, trachea midline, no thyromegaly, no     carotid bruit, no JVD   Back:     No kyphosis present, no scoliosis present, no skin lesions,       erythema or scars, no tenderness to percussion or                   palpation,   range of motion normal   Lungs:     Clear to auscultation,respirations regular, even and                   unlabored    Heart:    Regular rhythm and normal rate, normal S1 and S2, no            murmur, no gallop, no rub, no click   Breast Exam:    Deferred   Abdomen:     Normal bowel sounds, no masses, no organomegaly, soft        non-tender, non-distended, no guarding, no rebound                 tenderness   Genitalia:    Deferred   Extremities:   Moves all extremities well, no edema, no cyanosis, no              redness   Pulses:   Pulses palpable and equal bilaterally   Skin:   No bleeding, bruising or rash   Lymph nodes:   No palpable adenopathy   Neurologic:   Cranial nerves 2 - 12 grossly intact, sensation intact, DTR        present and equal bilaterally       Results Review:     Lab Results   Component Value Date    WBC 2.13 (L) 02/16/2018    WBC 2.47 (L) 02/07/2018    WBC 4.51 01/19/2018    HGB 7.8 (L) 02/16/2018    HGB 8.4 (L) 02/07/2018    HGB 9.7 (L) 01/19/2018    HCT 24.4 (L) 02/16/2018    HCT 25.3 (L) 02/07/2018    HCT 31.8 (L) 01/19/2018    PLT 78 (L) 02/16/2018     (L) 02/07/2018     01/19/2018       Results from last 7 days  Lab Units 02/16/18  1017   ALK PHOS U/L 53   ALT (SGPT) U/L 30   AST (SGOT) U/L 59*       Results from last 7 days  Lab Units 02/16/18  1017   BILIRUBIN mg/dL 0.3   ALK PHOS U/L 53     No results found for: LIPASE  Lab Results   Component Value Date    INR 1.45 (H) 08/24/2017    INR 1.09 07/31/2017          Radiology Review:  Imaging Results (last 72 hours)     ** No results found for the last 72 hours. **           I reviewed the patient's new clinical results.  I reviewed the patient's new imaging results and agree with the interpretation.     ASSESSMENT/PLAN:   ASSESSMENT:   1.  Duodenal Mass.  2.  Obstruction of the gastric outlet  3.  Obstruction of pancreatic and biliary    PLAN:   1.  Endoscopic retrograde cholangiogram with biopsy and stenting if feasible    Risk and benefits associated with the procedure are reviewed with the patient.  The patient wishes to proceed      Cecil Lemus DO  02/19/18  3:44 PM

## 2018-02-19 NOTE — ANESTHESIA POSTPROCEDURE EVALUATION
Patient: Johanne Mayer    Procedure Summary     Date Anesthesia Start Anesthesia Stop Room / Location    02/19/18 3199 0915 [facility] ENDOSCOPY 2 / [facility] ENDOSCOPY       Procedure Diagnosis Surgeon Provider    ENDOSCOPIC RETROGRADE CHOLANGIOPANCREATOGRAPHY (N/A ) Stenosis of bile duct  (Stenosis of bile duct [K83.1]) DO Bill Cohen CRNA          Anesthesia Type: MAC  Last vitals  BP   142/72 (02/19/18 1644)   Temp   98.6 °F (37 °C) (02/19/18 1644)   Pulse   74 (02/19/18 1644)   Resp   16 (02/19/18 1644)     SpO2   94 % (02/19/18 1644)     Post Anesthesia Care and Evaluation    Patient location during evaluation: bedside  Patient participation: complete - patient participated  Level of consciousness: awake and alert  Pain score: 0  Pain management: adequate  Airway patency: patent  Anesthetic complications: No anesthetic complications  PONV Status: none  Cardiovascular status: acceptable  Respiratory status: acceptable  Hydration status: acceptable

## 2018-02-19 NOTE — ANESTHESIA PROCEDURE NOTES
Airway  Urgency: elective    Airway not difficult    General Information and Staff    Patient location during procedure: OR  CRNA: AUSTYN MAZARIEGOS    Indications and Patient Condition  Indications for airway management: airway protection    Preoxygenated: yes  Mask difficulty assessment: 2 - vent by mask + OA or adjuvant +/- NMBA    Final Airway Details  Final airway type: endotracheal airway      Successful airway: ETT  Cuffed: yes   Successful intubation technique: direct laryngoscopy  Facilitating devices/methods: intubating stylet  Endotracheal tube insertion site: oral  Blade: Dai  Blade size: #4  ETT size: 7.0 mm  Cormack-Lehane Classification: grade I - full view of glottis  Placement verified by: chest auscultation and capnometry   Measured from: lips  ETT to lips (cm): 20  Number of attempts at approach: 1

## 2018-02-19 NOTE — PLAN OF CARE
Problem: Patient Care Overview (Adult)  Goal: Plan of Care Review  Outcome: Ongoing (interventions implemented as appropriate)   02/19/18 4428   Coping/Psychosocial Response Interventions   Plan Of Care Reviewed With patient   Patient Care Overview   Progress improving   Outcome Evaluation   Outcome Summary/Follow up Plan VSS. no c/o pain or discomfort. meets d/c criteria       Problem: Perioperative Period (Adult)  Goal: Signs and Symptoms of Listed Potential Problems Will be Absent or Manageable (Perioperative Period)  Outcome: Ongoing (interventions implemented as appropriate)

## 2018-02-19 NOTE — ANESTHESIA PREPROCEDURE EVALUATION
Anesthesia Evaluation     Patient summary reviewed and Nursing notes reviewed   NPO Solid Status: > 8 hours  NPO Liquid Status: > 2 hours           Airway   Mallampati: II  TM distance: >3 FB  Neck ROM: full  no difficulty expected  Dental    (+) lower dentures and upper dentures    Pulmonary - normal exam   Cardiovascular     Rhythm: irregular  Rate: normal        Neuro/Psych  GI/Hepatic/Renal/Endo      Musculoskeletal (-) negative ROS    Abdominal    Substance History      OB/GYN          Other                        Anesthesia Plan    ASA 4     MAC     intravenous induction   Anesthetic plan and risks discussed with patient.    Plan discussed with CRNA.

## 2018-02-20 PROBLEM — K31.5 DUODENAL OBSTRUCTION: Status: ACTIVE | Noted: 2018-01-01

## 2018-02-23 NOTE — PAT
DR. HERNANDES REVIEWED EKG AND HISTORY.  SPOKE WITH PATIENT AND DAUGHTER.  NO ORDERS REC'D AT THIS TIME.

## 2018-02-23 NOTE — DISCHARGE INSTRUCTIONS
[facility]  Pre-op Information and Guidelines    You will be called after 2 p.m. the day before your surgery (Friday for Monday surgery) and notified of your time for arrival and approximate surgery time.  If you have not received a call by 4P.M., please contact Same Day Surgery at [phone] of if outside [facility] call [phone].    Please Follow these Important Safety Guidelines:    • The morning of your procedure, take only the medications listed below with   A sip of water:_____________________________________________       ______________________________________________    • DO NOT eat or drink anything after 12:00 midnight the night before surgery  Specific instructions concerning drinking clear liquids will be discussed during  the pre-surgery instruction call the day before your surgery.    • If you take a blood thinner (ex. Plavix, Coumadin, aspirin), ask your doctor when to stop it before surgery  STOP DATE: _________________    • Only 2 visitors are allowed in patient rooms at a time  Your visitors will be asked to wait in the lobby until the admission process is complete with the exception of a parent with a child and patients in need of special assistance.    • YOU CANNOT DRIVE YOURSELF HOME  You must be accompanied by someone who will be responsible for driving you home after surgery and for your care at home.    • DO NOT chew gum, use breath mints, hard candy, or smoke the day of surgery  • DO NOT drink alcohol for at least 24 hours before your surgery  • DO NOT wear any jewelry and remove all body piercing before coming to the hospital  • DO NOT wear make-up to the hospital  • If you are having surgery on an extremity (arm/leg/foot) remove nail polish/artificial nails on the surgical side  • Clothing, glasses, contacts, dentures, and hairpieces must be removed before surgery  • Bathe the night before or the morning of your surgery and do not use powders/lotions on  skin.

## 2018-02-26 NOTE — PROGRESS NOTES
CHIEF COMPLAINT:    Biliary obstruction    HISTORY OF PRESENT ILLNESS:    Johanne Mayer is a 67 y.o. female who has a history of prior metastatic breast cancer for which she has been undergoing treatment.  She recently had repeat imaging showing a mass in the right upper retroperitoneum, concerning for possible metastatic disease.  She has also noted nausea and vomiting since December.  She is unable to tolerate any solid food at this point and is maintaining her self on liquids and ensure supplements.  She notes approximately 20 pounds weight loss in the past 2 months.  She denies any jaundice or fevers.  She has been seen by Dr. Lemus and an ERCP was attempted, but was unsuccessful due to duodenal obstruction.  She is here today to discuss possible bypass of her biliary tree as well as gastrojejunostomy to allow her to eat.    Past Medical History:   Diagnosis Date   • Atrial fibrillation    • Carcinoma, female breast, infiltrating lobular     LEFT side with axillary lymph node metastases      • Essential hypertension    • GERD (gastroesophageal reflux disease)    • Heartburn    • History of echocardiogram 10/13/2011   • Localized enlarged lymph nodes    • Lump of breast, left    • Lymphadenopathy      LEFT axilla-this is confirmed by my personal review of the ultrasound      • Osteoarthritis        Past Surgical History:   Procedure Laterality Date   • APPENDECTOMY     • BREAST SURGERY  11/21/2014    Ultrasound directed mammotome biopsy of the left breast with clip placement, lesion at the 5:30 position 5 cm. from the nipple.Left axillary lymph node biopsy, open.   • CHOLECYSTECTOMY     • COLONOSCOPY  12/12/2011    Mild diverticulosis in sigmoid colon. Stool collected for study. Hemorrhoids found.   • COLONOSCOPY N/A 2/7/2017    Procedure: COLONOSCOPY;  Surgeon: Yevgeniy Ware MD;  Location: [facility] ENDOSCOPY;  Service:    • ERCP N/A 2/19/2018    Procedure: ENDOSCOPIC RETROGRADE  CHOLANGIOPANCREATOGRAPHY;  Surgeon: Cecil Lemus DO;  Location: [facility] ENDOSCOPY;  Service:    • INJECTION OF MEDICATION  08/12/2013    Kenalog (19)      • TONSILLECTOMY     • TUBAL ABDOMINAL LIGATION     • UPPER GASTROINTESTINAL ENDOSCOPY  06/10/2014    Normal hypopharynx, esophagus, symmetrical & patent pylorus. Hiatus hernia in GE junction. Polyps in antrum & fundus. Multiple biopsies taken. Prominant CBD in medial duodenal wall. The ampulla has a normal appearance.       Prior to Admission medications    Medication Sig Start Date End Date Taking? Authorizing Provider   acetaminophen (TYLENOL) 500 MG tablet Take 500 mg by mouth Every 6 (Six) Hours As Needed for Mild Pain .   Yes Historical Provider, MD   aspirin 81 MG EC tablet Take 1 tablet by mouth Daily.  Patient taking differently: Take 81 mg by mouth Daily. Last dose 2/15/18 8/5/17  Yes Chris Frey MD   atorvastatin (LIPITOR) 10 MG tablet Take 1 tablet by mouth Daily. 8/9/17  Yes Jenaro Wood MD   Calcium Carb-Cholecalciferol (CALCIUM 600 + D PO) Take 1 capsule by mouth 2 (Two) Times a Day.   Yes Historical Provider, MD   cholecalciferol (VITAMIN D3) 1000 units tablet Take 1,000 Units by mouth Daily.   Yes Historical Provider, MD   digoxin (LANOXIN) 125 MCG tablet Take 125 mcg by mouth Daily.   Yes Historical Provider, MD   diltiazem XR (DILACOR XR) 180 MG 24 hr capsule Take 1 capsule by mouth Daily. 8/23/17  Yes FADI Childs   Interferon Beta-1b (EXTAVIA SC) Inject 1 mL under the skin Every 30 (Thirty) Days.   Yes Historical Provider, MD   isosorbide mononitrate (IMDUR) 30 MG 24 hr tablet Take 1 tablet by mouth Daily. 8/5/17  Yes Chris Frey MD   letrozole (FEMARA) 2.5 MG tablet Take 2.5 mg by mouth Daily.   Yes Historical Provider, MD   losartan (COZAAR) 100 MG tablet Take 1 tablet by mouth Daily. 8/5/17  Yes Chris Frey MD   Magnesium 250 MG tablet Take 1 tablet by mouth Daily.   Yes Historical Provider, MD   ondansetron  "(ZOFRAN) 4 MG tablet Take 1 tablet by mouth 2 (Two) Times a Day As Needed for Nausea or Vomiting. 12/4/17  Yes Jenaro Wood MD   oxyCODONE-acetaminophen (PERCOCET)  MG per tablet Take 1 tablet by mouth Every 6 (Six) Hours As Needed for Moderate Pain  or Severe Pain . 1/8/18  Yes Joey Ramos MD   pantoprazole (PROTONIX) 40 MG EC tablet Take 1 tablet by mouth Daily. 12/4/17  Yes Jenaro Wood MD   polyethylene glycol (MIRALAX) powder Take 17 g by mouth Daily. 10/26/17  Yes Joey Ramos MD   rivaroxaban (XARELTO) 20 MG tablet Take 1 tablet by mouth Daily.  Patient taking differently: Take 20 mg by mouth Daily. Last dose 2/13/18 8/23/17  Yes FADI Childs   sucralfate (CARAFATE) 1 g tablet Take 1 g by mouth 3 (Three) Times a Day.   Yes Historical Provider, MD   Zinc 50 MG capsule Take 1 tablet by mouth Daily.   Yes Historical Provider, MD   letrozole (FEMARA) 2.5 MG tablet TAKE 1 TABLET BY MOUTH DAILY. 2/22/18   Joey Ramos MD       Allergies   Allergen Reactions   • Tape Itching   • Eggs Or Egg-Derived Products Diarrhea and Nausea And Vomiting   • Influenza Vaccines Other (See Comments)     ALLERGY TO EGGS   • Lortab [Hydrocodone-Acetaminophen] Hallucinations   • Penicillins      \"PASS OUT\"       Family History   Problem Relation Age of Onset   • Heart failure Mother      congested   • Lung cancer Father    • Heart attack Other    • Lung cancer Other    • Colon cancer Neg Hx    • Stomach cancer Neg Hx        Social History     Social History   • Marital status:      Spouse name: N/A   • Number of children: N/A   • Years of education: N/A     Occupational History   • Not on file.     Social History Main Topics   • Smoking status: Never Smoker   • Smokeless tobacco: Never Used   • Alcohol use No   • Drug use: No   • Sexual activity: Defer     Other Topics Concern   • Not on file     Social History Narrative       Review of Systems   Constitutional: Positive for activity change, " "appetite change and unexpected weight change. Negative for chills and fever.   HENT: Negative for hearing loss, nosebleeds and trouble swallowing.    Eyes: Negative for visual disturbance.   Respiratory: Negative for apnea, cough, choking, chest tightness, shortness of breath, wheezing and stridor.    Cardiovascular: Negative for chest pain, palpitations and leg swelling.   Gastrointestinal: Positive for abdominal pain, nausea and vomiting. Negative for abdominal distention, blood in stool, constipation and diarrhea.   Endocrine: Negative for cold intolerance, heat intolerance, polydipsia, polyphagia and polyuria.   Genitourinary: Negative for difficulty urinating, dysuria, frequency, hematuria and urgency.   Musculoskeletal: Positive for arthralgias and back pain. Negative for myalgias and neck pain.   Skin: Negative for color change, pallor and rash.   Allergic/Immunologic: Negative for immunocompromised state.   Neurological: Negative for dizziness, seizures, syncope, light-headedness, numbness and headaches.   Hematological: Negative for adenopathy.   Psychiatric/Behavioral: Negative for suicidal ideas. The patient is not nervous/anxious.        Objective     /66  Ht 157.5 cm (62\")  Wt 78 kg (172 lb)  LMP  (LMP Unknown)  BMI 31.46 kg/m2    Physical Exam   Constitutional: She is oriented to person, place, and time. She appears well-developed and well-nourished. No distress.   HENT:   Head: Normocephalic and atraumatic.   Eyes: Conjunctivae and EOM are normal. Pupils are equal, round, and reactive to light. Right eye exhibits no discharge. Left eye exhibits no discharge.   Neck: Normal range of motion. Neck supple. No JVD present. No tracheal deviation present. No thyromegaly present.   Cardiovascular: Normal rate, regular rhythm and normal heart sounds.  Exam reveals no gallop and no friction rub.    No murmur heard.  Pulmonary/Chest: Effort normal and breath sounds normal. No respiratory distress. She " has no wheezes. She has no rales. She exhibits no tenderness.   Abdominal: Soft. She exhibits no distension and no mass. There is no tenderness. There is no rebound and no guarding. No hernia.   Musculoskeletal: Normal range of motion. She exhibits no edema, tenderness or deformity.   Neurological: She is alert and oriented to person, place, and time. No cranial nerve deficit.   Skin: Skin is warm and dry. No rash noted. She is not diaphoretic. No erythema. No pallor.   Psychiatric: She has a normal mood and affect. Her behavior is normal. Judgment and thought content normal.       DIAGNOSTIC DATA:    CT reviewed showing large mass in the right upper retroperitoneum with duodenal obstruction, biliary ductal dilation, right hydronephrosis    ASSESSMENT:    Biliary, duodenal, ureter obstruction due to right-sided retroperitoneal mass    PLAN:    Given her history of metastatic breast cancer this may represent further metastatic disease.  This was discussed with she and her family today.  Currently, due to her weight loss and biliary obstruction I believe that she does need at least a palliative bypass.  She is agreeable to this.  Additionally, I discussed with her the need for placement of the stent in the right ureter to allow for drainage of the right kidney.  I will ask Dr. Griffith to assist in this.  The risks and benefits of exploratory laparotomy, biopsy of retroperitoneal mass, gastrojejunostomy, Ru-en-Y had echo jejunostomy, gastrostomy tube placement, jejunostomy tube placement, cystoscopy with placement of right ureteral stent were all discussed with the patient and she is agreeable to proceeding.  She is scheduled for surgery on 2/28/2018.    Will hold Xarelto preop.        This document has been electronically signed by Yevgeniy Ware MD on February 26, 2018 7:52 AM

## 2018-02-28 NOTE — ANESTHESIA PROCEDURE NOTES
Peripheral IV    Patient location during procedure: OR  Line placed for Fluids/Medication Admin.  Preanesthetic Checklist  Completed: patient identified, site marked, surgical consent, pre-op evaluation, timeout performed, IV checked, risks and benefits discussed and monitors and equipment checked  Peripheral IV Prep   Patient position: supine   Prep: ChloraPrep  Patient monitoring: heart rate, cardiac monitor and continuous pulse ox  Peripheral IV Procedure   Laterality:right  Location:  Arm  Catheter size: 18 G         Post Assessment   Dressing Type: tape and transparent.    IV Dressing/Site: clean and intact

## 2018-02-28 NOTE — ANESTHESIA POSTPROCEDURE EVALUATION
Patient: Johanne Mayer    Procedure Summary     Date Anesthesia Start Anesthesia Stop Room / Location    02/28/18 0939 1347 [facility] OR 09 / [facility] OR       Procedure Diagnosis Surgeon Provider    Laparotomy with gastrojejunostomy, gastrostomy placement, Ru en Y hepaticojejunostomy, jejunostomy.    LIVER BIOPSY (DR. WARE FIRST) (N/A Abdomen); CYSTOSCOPY RETROGRADE PYELOGRAM AND STENT INSERTION         (C-ARM)       (Right ) Duodenal obstruction  (Duodenal obstruction [K31.5]) Yevgeniy Ware MD; MD Chava Monae MD          Anesthesia Type: general  Last vitals  BP   142/97 (02/28/18 0911)   Temp   99.1 °F (37.3 °C) (02/28/18 0911)   Pulse   91 (02/28/18 0911)   Resp   18 (02/28/18 0911)     SpO2   96 % (02/28/18 0911)     Post Anesthesia Care and Evaluation    Patient location during evaluation: PACU  Patient participation: complete - patient participated  Level of consciousness: awake  Pain score: 0  Pain management: adequate  Airway patency: patent  Anesthetic complications: No anesthetic complications  PONV Status: none  Cardiovascular status: acceptable  Respiratory status: acceptable  Hydration status: acceptable

## 2018-02-28 NOTE — ANESTHESIA PROCEDURE NOTES
Airway  Urgency: elective    Airway not difficult    General Information and Staff    Patient location during procedure: OR    Indications and Patient Condition  Indications for airway management: airway protection    Preoxygenated: yes  Mask difficulty assessment: 2 - vent by mask + OA or adjuvant +/- NMBA    Final Airway Details  Final airway type: endotracheal airway      Successful airway: ETT  Cuffed: yes   Successful intubation technique: direct laryngoscopy  Endotracheal tube insertion site: oral  Blade: Dai  Blade size: #3  ETT size: 7.5 mm  Cormack-Lehane Classification: grade I - full view of glottis  Placement verified by: chest auscultation and capnometry   Measured from: lips  ETT to lips (cm): 23  Number of attempts at approach: 1

## 2018-02-28 NOTE — ANESTHESIA PREPROCEDURE EVALUATION
Anesthesia Evaluation     Patient summary reviewed and Nursing notes reviewed   NPO Solid Status: > 8 hours  NPO Liquid Status: > 8 hours           Airway   Mallampati: II  TM distance: >3 FB  Neck ROM: full  possible difficult intubation  Dental    (+) lower dentures and upper dentures    Pulmonary - normal exam    breath sounds clear to auscultation  (+) shortness of breath,   Cardiovascular   Exercise tolerance: good (4-7 METS)    ECG reviewed  PT is on anticoagulation therapy  Rhythm: irregular  Rate: normal    (+) hypertension, dysrhythmias Atrial Fib, hyperlipidemia,     ROS comment: EKG:Sinus with 1st degree AV block. EF 60-65%.    Neuro/Psych  GI/Hepatic/Renal/Endo    (+)  GERD well controlled,      ROS Comment: HGB 8.4 HCT 26.2 2/23/18.    Musculoskeletal     Abdominal    Substance History      OB/GYN          Other   (+) arthritis   history of cancer (Breast.) remission                  Anesthesia Plan    ASA 4     general   (Discussed central line if necessary,arterial line,additional peripheral IV, post op ventilation and patient,daughters understand possible complications,risks and agrees.)  intravenous induction   Anesthetic plan and risks discussed with patient and child.

## 2018-02-28 NOTE — ANESTHESIA PROCEDURE NOTES
Arterial Line    Patient location during procedure: OR   Line placed for hemodynamic monitoring and ABGs/Labs/ISTAT.  Performed By   Anesthesiologist: ANA BARRERA  Preanesthetic Checklist  Completed: patient identified, site marked, surgical consent, pre-op evaluation, timeout performed, IV checked, risks and benefits discussed and monitors and equipment checked  Arterial Line Prep   Sterile Tech: cap, gloves, mask and sterile barriers  Prep: ChloraPrep  Patient monitoring: blood pressure monitoring, continuous pulse oximetry and EKG  Arterial Line Procedure   Laterality:right  Location:  radial artery  Catheter size: 20 G   Guidance: landmark technique  Number of attempts: 1  Successful placement: yes          Post Assessment   Dressing Type: occlusive dressing applied and secured with tape.   Complications no

## 2018-03-01 NOTE — PLAN OF CARE
Problem: Patient Care Overview (Adult)  Goal: Plan of Care Review   03/01/18 0737   Coping/Psychosocial Response Interventions   Plan Of Care Reviewed With patient   Patient Care Overview   Progress progress toward functional goals as expected     Goal: Discharge Needs Assessment  Outcome: Ongoing (interventions implemented as appropriate)   03/01/18 0737   Discharge Needs Assessment   Concerns To Be Addressed no discharge needs identified   Readmission Within The Last 30 Days planned readmission   Equipment Needed After Discharge none   Living Environment   Transportation Available family or friend will provide   Current Health   Outpatient/Agency/Support Group Needs home hospice   Anticipated Changes Related to Illness none   Self-Care   Equipment Currently Used at Home cane, quad;walker, rolling       Problem: Fall Risk (Adult)  Goal: Identify Related Risk Factors and Signs and Symptoms   03/01/18 0737   Fall Risk   Fall Risk: Related Risk Factors environment unfamiliar     Goal: Absence of Falls  Outcome: Ongoing (interventions implemented as appropriate)   03/01/18 0737   Fall Risk (Adult)   Absence of Falls making progress toward outcome       Problem: Pain, Acute (Adult)  Goal: Identify Related Risk Factors and Signs and Symptoms   03/01/18 0737   Pain, Acute   Related Risk Factors (Acute Pain) disease process;persistent pain   Signs and Symptoms (Acute Pain) fatigue/weakness;guarding/abnormal posturing/positioning     Goal: Acceptable Pain Control/Comfort Level  Outcome: Ongoing (interventions implemented as appropriate)   03/01/18 0737   Pain, Acute (Adult)   Acceptable Pain Control/Comfort Level making progress toward outcome       Problem: Infection, Risk/Actual (Adult)  Goal: Identify Related Risk Factors and Signs and Symptoms   03/01/18 0737   Infection, Risk/Actual   Infection, Risk/Actual: Related Risk Factors skin integrity impairment;surgery/procedure     Goal: Infection  Prevention/Resolution  Outcome: Ongoing (interventions implemented as appropriate)

## 2018-03-01 NOTE — PLAN OF CARE
Problem: Patient Care Overview (Adult)  Goal: Plan of Care Review  Outcome: Ongoing (interventions implemented as appropriate)   03/01/18 4536   Coping/Psychosocial Response Interventions   Plan Of Care Reviewed With patient;daughter;caregiver   Patient Care Overview   Progress no change   Outcome Evaluation   Outcome Summary/Follow up Plan Pt is s/p extensive abd Gi surgery. Poor tolerance to po PTA with a wt loss of 26# in the last 2 months, 13% of her UBW. Will monitor tx plans.        Problem: Nutrition, Imbalanced: Inadequate Oral Intake (Adult)  Goal: Identify Related Risk Factors and Signs and Symptoms  Outcome: Ongoing (interventions implemented as appropriate)    Goal: Improved Oral Intake  Outcome: Ongoing (interventions implemented as appropriate)    Goal: Prevent Further Weight Loss  Outcome: Ongoing (interventions implemented as appropriate)

## 2018-03-01 NOTE — CONSULTS
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  She had a Ru en Y hepaticojejunostomy with gastro-jejunostomy and gastrostomy placement.  She was eating poorly pta and could only take in milkshakes for the last several weeks with resulting wt loss of ~26#.  RD will monitor tx plans and the need for nutrition support.           Reason for Assessment       03/01/18 1413    Reason for Assessment    Reason For Assessment/Visit identified at risk by screening criteria    Identified At Risk By Screening Criteria MST SCORE 2+    Gastrointestinal Other (comment);Vomiting;Nausea   Duodenal obstruction                 Nutrition/Diet History       03/01/18 1414    Nutrition/Diet History    Typical Food/Fluid Intake Pt claims that she only could take milkshakes for the last 2 weeks prior to surgery.  She could not eat food due to N&V.  She lost 26# in 2 months.              Anthropometrics       03/01/18 1421    Anthropometrics    Height 157.5 cm (62\")    Weight 75.8 kg (167 lb)    Ideal Body Weight (IBW)    Ideal Body Weight (IBW), Female 50.83    % Ideal Body Weight 149.33    Body Mass Index (BMI)    BMI (kg/m2) 30.61    BMI Grade 30 - 34.9- obesity - grade I      03/01/18 1416    Anthropometrics (Special Considerations)    RD Calculated     RD Calculated %             Labs/Tests/Procedures/Meds       03/01/18 1421    Labs/Tests/Procedures/Meds    Labs/Tests Review Reviewed;Alb    Medication Review Reviewed, pertinent            Physical Findings       03/01/18 1422    Physical Findings/Assessment    Additional Documentation Physical Appearance (Group)    Physical Appearance    Overall Physical Appearance on oxygen " "therapy    Tubes gastro-jejunostomy tube;gastrostomy tube            Estimated/Assessed Needs       03/01/18 1422    Calculation Measurements    Weight Used For Calculations 75.8 kg (167 lb)    Height Used for Calculations 1.575 m (5' 2\")    Estimated/Assessed Energy Needs    Energy Need Method [facility]    Age 67    RMR ([facility] Equation) 1235.76    Activity Factors ([facility])  Out of bed, ambulatory  1.3    Total estimated needs ([facility]) ~1560    Estimated/Assessed Protein Needs    Weight Used for Protein Calculation 75.8 kg (167 lb)    Protein (gm/kg) 1.2    1.2 Gm Protein (gm) 90.9    Estimated Protein Range 76--91    Estimated/Assessed Fluid Needs    Fluid Need Method RDA method    RDA Method (mL)  1560            Nutrition Prescription Ordered       03/01/18 1423    Nutrition Prescription PO    Current PO Diet NPO              Electronically signed by:  Melia Cool RD  03/01/18 2:27 PM  "

## 2018-03-01 NOTE — PROGRESS NOTES
Malnutrition Severity Assessment    Patient Name:  Johanne Mayer  YOB: 1950  MRN: [id number]  Admit Date:  2/28/2018    Patient meets criteria for : Moderate malnutrition    Comments: Pt presents at moderate malnutrition As evidenced by poor po tolerance over the last several months with minimal intake and wt loss of ~26#  Which is 13% of her UBW.  Physical signs of fat loss present.  Pt with a hx of Breast Cancer with mets.  Now post op surgery for Retroperitoneum mass.      Malnutrition Type: Acute Illness/Injury Malnutrition     Malnutrition Type (last 8 hours)      Malnutrition Severity Assessment       03/01/18 1441    Malnutrition Severity Assessment    Malnutrition Type Acute Illness/Injury Malnutrition      03/01/18 1441    Physical Signs of Malnutrition (Acute)    Fat Loss Mild      03/01/18 1441    Weight Status (Acute)    %UBW Mild (86-90%)    Weight Loss Severe (>7.5% / 3 mo)      03/01/18 1441    Energy Intake Status (Acute)    Energy Intake Severe (< or equal to 50% / > or equal to 5d)      03/01/18 1441    Criteria Met (Must meet criteria for severity in at least 2 of these categories: M Wasting, Fat Loss, Fluid, Secondary Signs, Wt. Status, Intake)    Patient meets criteria for  Moderate malnutrition          Electronically signed by:  Melia Cool RD  03/01/18 2:43 PM

## 2018-03-01 NOTE — PLAN OF CARE
Problem: Patient Care Overview (Adult)  Goal: Discharge Needs Assessment  Outcome: Ongoing (interventions implemented as appropriate)   03/01/18 154   Discharge Needs Assessment   Discharge Planning Comments new patient to 3e

## 2018-03-01 NOTE — MEDICAL STUDENT
GENERAL SURGERY PROGRESS NOTE  Chief Complaint:  Surgery Follow up   LOS: 1 day       Subjective     Interval History:     Johanne Mayer is doing well this morning. She denies passing gas, standing or sitting up. Gal[place] catheter is still in place. Drain shows no signs of bile. Gtube shows some bile.     Objective     Vital Signs  Temp:  [97.4 °F (36.3 °C)-99.4 °F (37.4 °C)] 99.4 °F (37.4 °C)  Heart Rate:  [] 92  Resp:  [18-22] 22  BP: (134-210)/(62-97) 167/72  Arterial Line BP: ()/(60-83) 152/79    Physical Exam:   Patient is alert and oriented. No acute distress. Drain shows no signs of bile. Breath sounds normal bilaterally with no rales, rhonchi, or wheezes.   Labs:  Lab Results (last 24 hours)     Procedure Component Value Units Date/Time    Tissue Pathology Exam [[id number]] Collected:  02/28/18 1209    Specimen:  Tissue from Liver Updated:  02/28/18 1310    Blood Gas, Arterial [[id number]]  (Abnormal) Collected:  02/28/18 1336    Specimen:  Arterial Blood Updated:  02/28/18 1341     Site --      Not performed at this site.        Christopher's Test --      Not performed at this site.        pH, Arterial 7.193 (L) pH units      pCO2, Arterial 51.2 (H) mm Hg      pO2, Arterial 313.3 (H) mm Hg      HCO3, Arterial 19.2 (L) mmol/L      Base Excess, Arterial -8.7 (L) mmol/L      O2 Saturation, Arterial 99.6 %      Hemoglobin, Blood Gas 10.0 (L) g/dL      Hematocrit, Blood Gas 29.0 %      CO2 Content 20.8 (L)     Sodium, Arterial 137.7 (L) mmol/L      Potassium, Arterial 3.98 mmol/L      Glucose, Arterial 110 mmol/L      Barometric Pressure for Blood Gas -- mmHg       Not performed at this site.        Modality --      Not performed at this site.        Ionized Calcium 4.40 (L) mg/dL     Blood Gas, Arterial [[id number]]  (Abnormal) Collected:  02/28/18 9322    Specimen:  Arterial Blood Updated:  02/28/18 2452     Site --      Not performed at this site.        Christopher's Test --      Not performed at this site.         pH, Arterial 7.371 pH units      pCO2, Arterial 33.2 (L) mm Hg      pO2, Arterial 113.3 (H) mm Hg      HCO3, Arterial 18.8 (L) mmol/L      Base Excess, Arterial -5.7 (L) mmol/L      O2 Saturation, Arterial 98.4 %      Hemoglobin, Blood Gas 10.8 (L) g/dL      Hematocrit, Blood Gas 32.0 (L) %      CO2 Content 19.8 (L)     Sodium, Arterial 139.6 mmol/L      Potassium, Arterial 4.32 mmol/L      Glucose, Arterial 106 mmol/L      Barometric Pressure for Blood Gas -- mmHg       Not performed at this site.        Modality --      Not performed at this site.        Ionized Calcium 4.30 (L) mg/dL     CBC & Differential [[id number]] Collected:  03/01/18 0319    Specimen:  Blood Updated:  03/01/18 0400    Narrative:       The following orders were created for panel order CBC & Differential.  Procedure                               Abnormality         Status                     ---------                               -----------         ------                     CBC Auto Differential[[id number]]        Abnormal            Final result                 Please view results for these tests on the individual orders.    CBC Auto Differential [[id number]]  (Abnormal) Collected:  03/01/18 0319    Specimen:  Blood Updated:  03/01/18 0400     WBC 5.73 10*3/mm3      RBC 3.03 (L) 10*6/mm3      Hemoglobin 9.0 (L) g/dL      Hematocrit 28.1 (L) %      MCV 92.7 fL      MCH 29.7 pg      MCHC 32.0 g/dL      RDW 19.8 (H) %      RDW-SD 67.4 (H) fl      MPV 9.1 fL      Platelets 218 10*3/mm3      Neutrophil % 74.2 %      Lymphocyte % 17.6 %      Monocyte % 7.7 %      Eosinophil % 0.0 %      Basophil % 0.3 %      Immature Grans % 0.2 %      Neutrophils, Absolute 4.25 10*3/mm3      Lymphocytes, Absolute 1.01 10*3/mm3      Monocytes, Absolute 0.44 10*3/mm3      Eosinophils, Absolute 0.00 10*3/mm3      Basophils, Absolute 0.02 10*3/mm3      Immature Grans, Absolute 0.01 10*3/mm3     Comprehensive Metabolic Panel [[id number]]  (Abnormal) Collected:   03/01/18 0319    Specimen:  Blood Updated:  03/01/18 0410     Glucose 95 mg/dL      BUN 14 mg/dL      Creatinine 0.98 mg/dL      Sodium 144 mmol/L      Potassium 3.9 mmol/L      Chloride 113 (H) mmol/L      CO2 19.0 (L) mmol/L      Calcium 7.5 (L) mg/dL      Total Protein 6.4 g/dL      Albumin 3.10 (L) g/dL      ALT (SGPT) 52 U/L      AST (SGOT) 102 (H) U/L      Alkaline Phosphatase 78 U/L      Total Bilirubin 1.2 mg/dL      eGFR Non African Amer 56 mL/min/1.73      Globulin 3.3 gm/dL      A/G Ratio 0.9 (L) g/dL      BUN/Creatinine Ratio 14.3     Anion Gap 12.0 mmol/L            Results Review:     Labs and imaging for today were reviewed.    Assessment/Plan     Johanne Mayer is a 67 y.o. female who is post-op day 1.      Continue NPO diet. Continue current medications. Monitor drain for signs of bile.          This document has been electronically signed by Shahbaz Espinoza on March 1, 2018 7:08 AM        Shahbaz Espinoza  03/01/18  7:08 AM

## 2018-03-01 NOTE — PLAN OF CARE
Problem: Perioperative Period (Adult)  Goal: Signs and Symptoms of Listed Potential Problems Will be Absent or Manageable (Perioperative Period)  Outcome: Outcome(s) achieved Date Met: 02/28/18

## 2018-03-01 NOTE — OP NOTE
CYSTOSCOPY RETROGRADE PYELOGRAM AND STENT INSERTION  Procedure Note    Johanne Mayer  2/28/2018    Pre-op Diagnosis:   Duodenal obstruction [K31.5]    Post-op Diagnosis:     Post-Op Diagnosis Codes:     * Duodenal obstruction [K31.5]      Procedure(s):  Laparotomy with gastrojejunostomy, gastrostomy placement, Ru en Y hepaticojejunostomy, jejunostomy.    LIVER BIOPSY (DR. SIMS FIRST)  CYSTOSCOPY RETROGRADE PYELOGRAM AND STENT INSERTION         (C-ARM)          Surgeon(s):  MD Yevgeniy Chan MD    Anesthesia: General    Staff:   Circulator: Vandana Bojorquez RN; Tianna Comer RN; Shima Smyth RN  Scrub Person: Kaia Wood; Shandra Teresa  Assistant: Palak Troncoso CSA    Estimated Blood Loss: 250 mL    Specimens:                  ID Type Source Tests Collected by Time Destination   A : liver lesion Tissue Liver TISSUE PATHOLOGY EXAM Yevgeniy Sims MD 2/28/2018 1209          Drains:   Drain/Device Site 02/28/18 1224 midline  (Active)   Insertion Site clean and dry 2/28/2018  8:00 PM   Drainage Characteristics/Odor sanguineous 2/28/2018  8:00 PM   Drainage Amount moderate 2/28/2018  8:00 PM   General output (mL) 50 2/28/2018  5:00 PM       Enterostomy Tube 02/28/18 1210 gastrostomy tube without balloon LUQ (Active)   Securement taped to abdomen 2/28/2018  8:00 PM   Suction Setting/Drainage Method dependent drainage 2/28/2018  4:55 PM       Enterostomy Tube 02/28/18 1211 gastrostomy tube without balloon  (Active)   Securement taped to abdomen 2/28/2018  8:00 PM   Dressing dry and intact 2/28/2018  8:00 PM       Urethral Catheter 02/28/18 0955 100% silicone 16 10 10 (Active)   Daily Indications Selected surgeries ( tract, abdomen) 2/28/2018  8:00 PM   Tolerance signs/symptoms of discomfort 2/28/2018  8:00 PM       Urethral Catheter 02/28/18 1330 100% silicone 16 10 10 (Active)   Daily Indications Monitoring of strict I &O 2/28/2018  8:00 PM   Catheter care  done Yes 2/28/2018  8:00 PM   Urine Output (mL) 1400 2/28/2018  5:00 PM           Findings: Right hydronephrosis    Complications: None    Indications: Same    Description of Procedure: Patient brought the was operating room after Dr. Ware's surgery she's placed in the dorsolithotomy position.  I placed a 22 cystoscope into the bladder itself and a sterile prep and drape of genitalia prior to this.  The right ureter was catheterized with a retrograde catheter 6 cc contrast placed up the ureter.  The proximal ureter was narrowed and also strictured from extrinsic pressure.  This was accomplished we put a 0.35 Glidewire past this in the renal pelvis and over the top guidewire through the cystoscope we placed a 6 x 28 double-J stent.  Bladder was drained, scope was removed, and 16 Galicia catheter was anchored with 10 cc placed in the balloon.  This was placed to bedside drainage.  Transported to recovery having tolerated the procedure well.    Yevgeniy Griffith MD     Date: 2/28/2018  Time: 10:05 PM

## 2018-03-01 NOTE — PAYOR COMM NOTE
"Johanne Young (67 y.o. Female)     Date of Birth Social Security Number Address Home Phone MRN    1950  [address] [place] [id number] [phone] [id number]    [employer] Marital Status          Voodoo        Admission Date Admission Type Admitting Provider Attending Provider Department, Room/Bed    2/28/18 Elective Yevgeniy Ware MD Armstrong, James Edward, MD [facility] CRITICAL CARE, 14/A    Discharge Date Discharge Disposition Discharge Destination                      Attending Provider: Yevgeniy Ware MD     Allergies:  Tape, Eggs Or Egg-derived Products, Influenza Vaccines, Lortab [Hydrocodone-acetaminophen], Penicillins    Isolation:  None   Infection:  None   Code Status:  FULL    Ht:  157.5 cm (62.01\")   Wt:  78.2 kg (172 lb 6.4 oz)    Admission Cmt:  None   Principal Problem:  Duodenal obstruction [K31.5] More...                 Active Insurance as of 2/28/2018     Primary Coverage     Payor Plan Insurance Group Employer/Plan Group    MEDICARE MEDICARE A & B      Payor Plan Address Payor Plan Phone Number Effective From Effective To    [address] [phone] 1/1/2006     [place] [address]       Subscriber Name Subscriber Birth Date Member ID       JOHANNE YOUNG 1950 [id number]           Secondary Coverage     Payor Plan Insurance Group Employer/Plan Group    [employer]ICAID      Payor Plan Address Payor Plan Phone Number Effective From Effective To    [address] [address] [phone] 4/3/2017     [place] [address]       Subscriber Name Subscriber Birth Date Member ID       JOHANNE YOUNG 1950 [id number]                 Emergency Contacts      (Rel.) Home Phone Work Phone Mobile Phone    Palak Bateman (Daughter) [phone] -- [phone]    Ky Good (Daughter) -- -- [phone]        Cass Stacy, [facility]  [phone]    " "Phone  [phone]     afx  Cont. Stay review    Vital Signs (last 24 hours)       02/28 0700  -  03/01 0659 03/01 0700  -  03/01 0909   Most Recent    Temp (°F) 97.4 -  99.4       99.4 (37.4)    Heart Rate 78 -  101       92    Resp 18 -  22       22    /62 -  (!) 210/83       167/72    SpO2 (%) 91 -  100       94          Hospital Medications (all)       Dose Frequency Start End    ceFOXITin (MEFOXIN) in NS 2 g/10ml IV PUSH syringe 2 g Once 2/28/2018 2/28/2018    Sig - Route: Infuse 10 mL into a venous catheter 1 (One) Time. - Intravenous    ceFOXITin (MEFOXIN) in NS 2 g/10ml IV PUSH syringe 2 g Every 6 Hours 2/28/2018 3/1/2018    Sig - Route: Infuse 10 mL into a venous catheter Every 6 (Six) Hours. - Intravenous    famotidine (PEPCID) injection 20 mg 20 mg Every 12 Hours Scheduled 3/1/2018     Sig - Route: Infuse 2 mL into a venous catheter Every 12 (Twelve) Hours. - Intravenous    heparin (porcine) 5000 UNIT/ML injection 5,000 Units 5,000 Units Every 8 Hours Scheduled 3/1/2018     Sig - Route: Inject 1 mL under the skin Every 8 (Eight) Hours. - Subcutaneous    HYDROmorphone (DILAUDID) injection 0.5 mg 0.5 mg Every 2 Hours PRN 2/28/2018 3/10/2018    Sig - Route: Infuse 0.5 mL into a venous catheter Every 2 (Two) Hours As Needed for Severe Pain . - Intravenous    Linked Group 1:  \"And\" Linked Group Details        HYDROmorphone (DILAUDID) PCA 0.2 mg/mL 30 mL syringe  Continuous 2/28/2018 3/14/2018    Sig - Route: Infuse  into a venous catheter Continuous. - Intravenous    iopamidol (ISOVUE-300) 61 % injection  As Needed 2/28/2018     Sig: As Needed.    naloxone (NARCAN) injection 0.1 mg 0.1 mg Every 5 Minutes PRN 2/28/2018     Sig - Route: Infuse 0.25 mL into a venous catheter Every 5 (Five) Minutes As Needed for Respiratory Depression. - Intravenous    Linked Group 1:  \"And\" Linked Group Details        naloxone (NARCAN) injection 0.1 mg 0.1 mg Every 5 Minutes PRN 2/28/2018     Sig - Route: Infuse 0.25 mL " "into a venous catheter Every 5 (Five) Minutes As Needed for Opioid Reversal or Respiratory Depression (see administration instructions). - Intravenous    ondansetron (ZOFRAN) injection 4 mg 4 mg Every 6 Hours PRN 2/28/2018     Sig - Route: Infuse 2 mL into a venous catheter Every 6 (Six) Hours As Needed for Nausea or Vomiting. - Intravenous    Linked Group 2:  \"Or\" Linked Group Details        ondansetron (ZOFRAN) injection 4 mg 4 mg Once As Needed 2/28/2018 2/28/2018    Sig - Route: Infuse 2 mL into a venous catheter 1 (One) Time As Needed for Nausea or Vomiting. - Intravenous    ondansetron (ZOFRAN) tablet 4 mg 4 mg Every 6 Hours PRN 2/28/2018     Sig - Route: Take 1 tablet by mouth Every 6 (Six) Hours As Needed for Nausea or Vomiting. - Oral    Linked Group 2:  \"Or\" Linked Group Details        ondansetron ODT (ZOFRAN-ODT) disintegrating tablet 4 mg 4 mg Every 6 Hours PRN 2/28/2018     Sig - Route: Take 1 tablet by mouth Every 6 (Six) Hours As Needed for Nausea or Vomiting. - Oral    Linked Group 2:  \"Or\" Linked Group Details        sodium chloride 0.9 % infusion 125 mL/hr Continuous 2/28/2018     Sig - Route: Infuse 125 mL/hr into a venous catheter Continuous. - Intravenous    albuterol (PROVENTIL) nebulizer solution 0.083% 2.5 mg/3mL (Discontinued) 2.5 mg Once As Needed 2/28/2018 2/28/2018    Sig - Route: Take 2.5 mg by nebulization 1 (One) Time As Needed for Wheezing or Shortness of Air (bronchospasm). - Nebulization    Reason for Discontinue: Patient Transfer    HYDROmorphone (DILAUDID) injection 0.5 mg (Discontinued) 0.5 mg Every 15 Minutes PRN 2/28/2018 2/28/2018    Sig - Route: Infuse 0.5 mL into a venous catheter Every 15 (Fifteen) Minutes As Needed for Moderate Pain  or Severe Pain . - Intravenous    Reason for Discontinue: Patient Transfer    meperidine (DEMEROL) injection 12.5 mg (Discontinued) 12.5 mg Every 5 Minutes PRN 2/28/2018 2/28/2018    Sig - Route: Infuse 0.25 mL into a venous catheter Every 5 " (Five) Minutes As Needed for Shivering (May repeat x 1). - Intravenous    Reason for Discontinue: Patient Transfer    sodium chloride 0.9 % flush 1-10 mL (Discontinued) 1-10 mL As Needed 2/28/2018 2/28/2018    Sig - Route: Infuse 1-10 mL into a venous catheter As Needed for Line Care. - Intravenous    Reason for Discontinue: Patient Transfer    sodium chloride 0.9 % infusion (Discontinued) 100 mL/hr Continuous 2/28/2018 2/28/2018    Sig - Route: Infuse 100 mL/hr into a venous catheter Continuous. - Intravenous    Reason for Discontinue: Dose adjustment            Lab Results (last 24 hours)     Procedure Component Value Units Date/Time    Tissue Pathology Exam [[id number]] Collected:  02/28/18 1209    Specimen:  Tissue from Liver Updated:  02/28/18 1310    Blood Gas, Arterial [[id number]]  (Abnormal) Collected:  02/28/18 1336    Specimen:  Arterial Blood Updated:  02/28/18 1341     Site --      Not performed at this site.        Christopher's Test --      Not performed at this site.        pH, Arterial 7.193 (L) pH units      pCO2, Arterial 51.2 (H) mm Hg      pO2, Arterial 313.3 (H) mm Hg      HCO3, Arterial 19.2 (L) mmol/L      Base Excess, Arterial -8.7 (L) mmol/L      O2 Saturation, Arterial 99.6 %      Hemoglobin, Blood Gas 10.0 (L) g/dL      Hematocrit, Blood Gas 29.0 %      CO2 Content 20.8 (L)     Sodium, Arterial 137.7 (L) mmol/L      Potassium, Arterial 3.98 mmol/L      Glucose, Arterial 110 mmol/L      Barometric Pressure for Blood Gas -- mmHg       Not performed at this site.        Modality --      Not performed at this site.        Ionized Calcium 4.40 (L) mg/dL     Blood Gas, Arterial [[id number]]  (Abnormal) Collected:  02/28/18 1845    Specimen:  Arterial Blood Updated:  02/28/18 1852     Site --      Not performed at this site.        Christopher's Test --      Not performed at this site.        pH, Arterial 7.371 pH units      pCO2, Arterial 33.2 (L) mm Hg      pO2, Arterial 113.3 (H) mm Hg      HCO3, Arterial  18.8 (L) mmol/L      Base Excess, Arterial -5.7 (L) mmol/L      O2 Saturation, Arterial 98.4 %      Hemoglobin, Blood Gas 10.8 (L) g/dL      Hematocrit, Blood Gas 32.0 (L) %      CO2 Content 19.8 (L)     Sodium, Arterial 139.6 mmol/L      Potassium, Arterial 4.32 mmol/L      Glucose, Arterial 106 mmol/L      Barometric Pressure for Blood Gas -- mmHg       Not performed at this site.        Modality --      Not performed at this site.        Ionized Calcium 4.30 (L) mg/dL     CBC & Differential [[id number]] Collected:  03/01/18 0319    Specimen:  Blood Updated:  03/01/18 0400    Narrative:       The following orders were created for panel order CBC & Differential.  Procedure                               Abnormality         Status                     ---------                               -----------         ------                     CBC Auto Differential[[id number]]        Abnormal            Final result                 Please view results for these tests on the individual orders.    CBC Auto Differential [[id number]]  (Abnormal) Collected:  03/01/18 0319    Specimen:  Blood Updated:  03/01/18 0400     WBC 5.73 10*3/mm3      RBC 3.03 (L) 10*6/mm3      Hemoglobin 9.0 (L) g/dL      Hematocrit 28.1 (L) %      MCV 92.7 fL      MCH 29.7 pg      MCHC 32.0 g/dL      RDW 19.8 (H) %      RDW-SD 67.4 (H) fl      MPV 9.1 fL      Platelets 218 10*3/mm3      Neutrophil % 74.2 %      Lymphocyte % 17.6 %      Monocyte % 7.7 %      Eosinophil % 0.0 %      Basophil % 0.3 %      Immature Grans % 0.2 %      Neutrophils, Absolute 4.25 10*3/mm3      Lymphocytes, Absolute 1.01 10*3/mm3      Monocytes, Absolute 0.44 10*3/mm3      Eosinophils, Absolute 0.00 10*3/mm3      Basophils, Absolute 0.02 10*3/mm3      Immature Grans, Absolute 0.01 10*3/mm3     Comprehensive Metabolic Panel [[id number]]  (Abnormal) Collected:  03/01/18 0319    Specimen:  Blood Updated:  03/01/18 0410     Glucose 95 mg/dL      BUN 14 mg/dL      Creatinine 0.98 mg/dL       Sodium 144 mmol/L      Potassium 3.9 mmol/L      Chloride 113 (H) mmol/L      CO2 19.0 (L) mmol/L      Calcium 7.5 (L) mg/dL      Total Protein 6.4 g/dL      Albumin 3.10 (L) g/dL      ALT (SGPT) 52 U/L      AST (SGOT) 102 (H) U/L      Alkaline Phosphatase 78 U/L      Total Bilirubin 1.2 mg/dL      eGFR Non African Amer 56 mL/min/1.73      Globulin 3.3 gm/dL      A/G Ratio 0.9 (L) g/dL      BUN/Creatinine Ratio 14.3     Anion Gap 12.0 mmol/L         Imaging Results (last 24 hours)     Procedure Component Value Units Date/Time    FL Retrograde Pyelogram In OR [[id number]] Resulted:  03/01/18 0818     Updated:  03/01/18 0818        Ventilator/Non-Invasive Ventilation Settings     None           Operative/Procedure Notes (last 72 hours) (Notes from 2/26/2018  9:09 AM through 3/1/2018  9:09 AM)      Yevgeniy Sims MD at 2/28/2018  1:48 PM  Version 1 of 1         COLON RESECTION LEFT  Progress Note    Johanne Mayer  2/28/2018    Pre-op Diagnosis:   Duodenal obstruction [K31.5]       Post-Op Diagnosis Codes:     * Duodenal obstruction [K31.5]    Procedure/CPT® Codes:      Procedure(s):  Laparotomy with gastrojejunostomy, gastrostomy placement, Ru en Y hepaticojejunostomy, jejunostomy.    LIVER BIOPSY (DR. SIMS FIRST)    CYSTOSCOPY RETROGRADE PYELOGRAM AND STENT INSERTION         (C-ARM)          Surgeon(s):  MD Yevgneiy Chan MD    Anesthesia: General    Staff:   Circulator: Vandana Bojorquez RN; Tianna Comer RN; Shima Smyth RN  Scrub Person: Kaia Wood; Shandra Teresa  Assistant: Palak Troncoso CSA    Estimated Blood Loss: 250 mL    Urine Voided: 200 mL    Specimens:                  ID Type Source Tests Collected by Time Destination   A : liver lesion Tissue Liver TISSUE PATHOLOGY EXAM Yevgeniy Sims MD 2/28/2018 1209          Drains:   Drain/Device Site 02/28/18 1224 midline  (Active)       Enterostomy Tube 02/28/18 1210 gastrostomy tube  without balloon LUQ (Active)       Enterostomy Tube 02/28/18 1211 gastrostomy tube without balloon  (Active)       Urethral Catheter 02/28/18 0955 100% silicone 16 10 10 (Active)           Findings: large RUQ tumor causing biliary obstruction and duodenal obstruction    Complications: none          This document has been electronically signed by Yevgeniy Ware MD on February 28, 2018 1:48 PM          Yevgeniy Ware MD     Date: 2/28/2018  Time: 1:48 PM         Electronically signed by Yevgeniy Ware MD at 2/28/2018  1:49 PM      Yevgeniy Ware MD at 2/28/2018  4:22 PM  Version 1 of 1         Operative Note    Johanne Hurstford  2/28/2018    Pre-op Diagnosis:   Duodenal obstruction [K31.5]    Post-op Diagnosis:     Post-Op Diagnosis Codes:     * Duodenal obstruction [K31.5]    Procedure/CPT® Codes:      Procedure(s):  Laparotomy with gastrojejunostomy, gastrostomy placement, Ru en Y hepaticojejunostomy, jejunostomy.    LIVER BIOPSY           Surgeon(s):  Yevgeniy Ware MD    Anesthesia: General    Staff:   Circulator: Vandana Bojorquez RN; Tianna Comer RN; Shima Smyth RN  Scrub Person: Kaia Wood; Shandra Teresa  Assistant: Palak Troncoso CSA    Estimated Blood Loss: 250 mL    Specimens:                  ID Type Source Tests Collected by Time Destination   A : liver lesion Tissue Liver TISSUE PATHOLOGY EXAM Yevgeniy Ware MD 2/28/2018 1209          Drains:   Drain/Device Site 02/28/18 1224 midline  (Active)   Insertion Site clean and dry;dressing intact 2/28/2018  3:40 PM   Drainage Characteristics/Odor sanguineous 2/28/2018  3:40 PM   Drainage Amount moderate 2/28/2018  3:40 PM   General output (mL) 75 2/28/2018  1:55 PM       Enterostomy Tube 02/28/18 1210 gastrostomy tube without balloon LUQ (Active)   Securement taped to abdomen 2/28/2018  3:40 PM   Suction Setting/Drainage Method dependent drainage 2/28/2018  3:40 PM       Enterostomy Tube  02/28/18 1211 gastrostomy tube without balloon  (Active)   Securement taped to abdomen 2/28/2018  3:40 PM   Dressing dry and intact 2/28/2018  3:40 PM       Urethral Catheter 02/28/18 0955 100% silicone 16 10 10 (Active)   Daily Indications Selected surgeries ( tract, abdomen) 2/28/2018  3:40 PM   Tolerance no signs/symptoms of discomfort 2/28/2018  1:40 PM       Urethral Catheter 02/28/18 1330 100% silicone 16 10 10 (Active)           Findings: Large mass and right upper quadrant consistent with malignancy, multiple liver lesions.  Duodenal obstruction, common bile duct obstruction    Complications: None    Indication: Enlarging mass and right upper quadrant causing duodenal, biliary, ureteral obstruction.  History of metastatic breast cancer    Operative Note:    The patient was seen and consented preoperatively.  Following this she was taking the operating room and placed in supine position on the OR table.  Gen. anesthetic was administered and the patient was orotracheally intubated without incident.  Additional support lines were placed by anesthesia.  Galicia catheter was placed sterilely by nursing.  A preoperative briefing was performed.  The abdomen was then prepped and draped in normal sterile fashion and a timeout was performed.    Following timeout an upper midline incision was made with a knife and subcutaneous tissues were divided with Bovie electrocautery.  The midline fascia was then exposed.  The fascia was then incised using the cautery as well.  The peritoneum was then opened.  There were adhesions in the right upper quadrant of the omentum to the abdominal wall related to the patient's prior open cholecystectomy.  These adhesions were taken down sharply.  Once this was done the adhesions from the right edge of the liver to the underlying bowel were then taken down sharply as well.  There was a large mass in the right upper quadrant and a portion of this appeared to be adherent to and possibly  invading the right lobe of the liver.  Additional adhesions were lysed in this area and the common duct was visualized and appeared to be dilated to at least 2 cm in diameter.    The omentum was adhered to the inferior abdominal wall as well and these adhesions were also taken down so that the omentum was completely free.  Once this was done transverse colon was identified.  It appeared to be adherent to the right upper quadrant mass but did not appear to be obstructed.  The ligament of Treitz was identified and the small intestine was examined from ligament of Treitz all the way down to the ileocecal valve and appeared to be healthy.  I then returned back to the ligament of Treitz and identified the clear area in the transverse mesocolon to the patient's left of the ligament of Treitz.  A window was then created in the transverse mesocolon.  The lesser omentum was divided so that the stomach's posterior aspect could be visualized.  The posterior aspect of the lesser curvature of the stomach was then brought down through the transverse mesocolon.  Approximately 15 cm distal to the ligament of Treitz sutures were placed through the small intestine to secure it to the posterior and inferior aspect of the stomach for a planned gastrojejunostomy.  Approximately 30 cm distal to this a MARILEE stapler was used to divide the small intestine.  An Enseal device was then used to divide the omentum down near its base.  An additional window was created in the transverse mesocolon to the patient's right of the middle colic vascular pedicle area this Ru limb of bowel was then brought up through the transverse mesocolon defect and appeared to reach up to the common duct without any undue tension.  The staple line was then oversewn with interrupted Vicryl sutures.  A ductotomy was then created in the anterior aspect of the common bile duct.  An enterotomy was made in the Ru limb of bowel as well.  A side to side hand sewn  anastomosis was then created between the anterior common bile duct and the side of the small intestine using interrupted 3-0 Vicryl sutures.  The Ru limb of small bowel was then secured in place at the transverse mesocolon using interrupted 3-0 Vicryl suture.  The bowel was then followed downward for approximately 30 cm.  The distal end of stapled towel was then identified as well.  Stay sutures of 3-0 Vicryl were then placed to position the 2 segments of bowel and a side-to-side fashion.  The staple line on the gastric limb of the intestine was then partially removed using scissors.  An enterotomy was made in the side of the adjacent bowel was coming from the hepaticojejunostomy.  A 55 mm MARILEE stapler was then placed in each lumen and used to create a stapled side-to-side anastomosis.  The open ends of the bowel were then closed using a 60 mm TA stapler.  The anastomosis was palpated and found to be widely patent.  The stay sutures were then tied down and additional sutures were placed in the crotch of the anastomosis to reinforce this area.  The area where the 2 mesenteries overlapped and created a mesenteric defect was then closed using interrupted 3-0 Vicryl sutures as well.    Attention was then turned to creation of a gastrojejunostomy.  The previously oriented bowel was identified adjacent to the inferior aspect of the posterior stomach.  A gastrotomy was made using the Bovie electrocautery.  An enterotomy was made using the Bovie electrocautery.  The 55 mm MARILEE stapler was then placed intraluminally and used to create a side to side anastomosis with a single firing of the stapler.  The TA stapler was then used to close the open aspect of the small intestine and stomach.  Staple line was then oversewn using interrupted 3-0 Vicryl sutures.  Both ends of the staple line were also reinforced using interrupted 3-0 Vicryl sutures.  The anastomosis was palpated and found to be widely patent.  The inferior border of  the stomach was then sutured to the mesenteric defect so that the stomach would remain below the transverse mesocolon.    Attention was then turned to placement of a gastrostomy tube.  On the mid body of the stomach a pursestring suture of 3-0 Vicryl was placed.  A gastrotomy was made.  A 22 Cambodian Malecot tube was inserted in stomach.  The pursestring suture was tied down.  A Witzel tunnel of approximately 5 cm length was then created using interrupted 3-0 Vicryl sutures.  The gastrostomy tube was then brought out through a separate stab incision in the left upper quadrant and secured to the skin using a nylon suture.    Attention was then turned to placement of the jejunostomy tube.  Approximate 15 cm distal to the jejunojejunostomy a pursestring stitch was placed.  An enterotomy was created and a 16 Cambodian Malecot tube was inserted into the lumen of the bowel.  The pursestring suture was tied down.  A Witzel tunnel approximate 5 cm in length was then created using interrupted 3-0 Vicryl sutures.  The jejunostomy tube was then brought out through the abdominal wall via a separate stab incision in the left mid abdomen.  It was then secured to the skin using a nylon suture.  A 19 Cambodian round drain was then placed into the right upper quadrant and brought out through a right upper quadrant incision and secured to the skin using nylon suture.  The end of the drain was placed adjacent to the hepaticojejunostomy.    One of the firm lesions on the anterior surface of the left lobe of the liver was then removed using a 15 blade scalpel and passed off field as a permanent specimen.  Hemostasis was obtained at this site with Bovie electrocautery.  The abdomen was then irrigated.  All sponge, instrument, and needle counts were confirmed be correct.  The midline fascia was closed using a running #1 PDS suture.  The skin was closed using staples.  Appropriate dressings were applied.    Dr. Griffith then scrubbed into the  operating room to perform his cystoscopy and right ureteral stent placement which was dictated separately by him.  The patient was then awakened and returned to the recovery room in stable condition.        This document has been electronically signed by Yevgeniy Sims MD on February 28, 2018 4:36 PM        Yevgeniy Sims MD     Date: 2/28/2018  Time: 4:22 PM         Electronically signed by Yevgeniy Sims MD at 2/28/2018  4:36 PM      Yevgeniy Griffith MD at 2/28/2018 10:05 PM  Version 1 of 1         CYSTOSCOPY RETROGRADE PYELOGRAM AND STENT INSERTION  Procedure Note    Johanne Mayer  2/28/2018    Pre-op Diagnosis:   Duodenal obstruction [K31.5]    Post-op Diagnosis:     Post-Op Diagnosis Codes:     * Duodenal obstruction [K31.5]      Procedure(s):  Laparotomy with gastrojejunostomy, gastrostomy placement, Ru en Y hepaticojejunostomy, jejunostomy.    LIVER BIOPSY (DR. SIMS FIRST)  CYSTOSCOPY RETROGRADE PYELOGRAM AND STENT INSERTION         (C-ARM)          Surgeon(s):  MD Yevgeniy Chan MD    Anesthesia: General    Staff:   Circulator: Vandana Bojorquez RN; Tianna Comer RN; Shima Smyth RN  Scrub Person: Kaia Wood; Shandra Teresa  Assistant: Palak Troncoso CSA    Estimated Blood Loss: 250 mL    Specimens:                  ID Type Source Tests Collected by Time Destination   A : liver lesion Tissue Liver TISSUE PATHOLOGY EXAM Yevgeniy Sims MD 2/28/2018 1209          Drains:   Drain/Device Site 02/28/18 1224 midline  (Active)   Insertion Site clean and dry 2/28/2018  8:00 PM   Drainage Characteristics/Odor sanguineous 2/28/2018  8:00 PM   Drainage Amount moderate 2/28/2018  8:00 PM   General output (mL) 50 2/28/2018  5:00 PM       Enterostomy Tube 02/28/18 1210 gastrostomy tube without balloon LUQ (Active)   Securement taped to abdomen 2/28/2018  8:00 PM   Suction Setting/Drainage Method dependent drainage 2/28/2018  4:55 PM        Enterostomy Tube 02/28/18 1211 gastrostomy tube without balloon  (Active)   Securement taped to abdomen 2/28/2018  8:00 PM   Dressing dry and intact 2/28/2018  8:00 PM       Urethral Catheter 02/28/18 0955 100% silicone 16 10 10 (Active)   Daily Indications Selected surgeries ( tract, abdomen) 2/28/2018  8:00 PM   Tolerance signs/symptoms of discomfort 2/28/2018  8:00 PM       Urethral Catheter 02/28/18 1330 100% silicone 16 10 10 (Active)   Daily Indications Monitoring of strict I &O 2/28/2018  8:00 PM   Catheter care done Yes 2/28/2018  8:00 PM   Urine Output (mL) 1400 2/28/2018  5:00 PM           Findings: Right hydronephrosis    Complications: None    Indications: Same    Description of Procedure: Patient brought the was operating room after Dr. Ware's surgery she's placed in the dorsolithotomy position.  The place a 22 cystoscope open the bladder itself and a sterile prep and drape of genitalia prior to this right ureter was catheterized with a retrograde catheter 6 cc contrast placed up the ureter proximal ureter was narrowed and also strictured from extrinsic pressure.  This was accomplished we put a 0.35 Glidewire past this in the renal pelvis and over the top guidewire through the Pastor we placed a 6 x 28 double-J stent.  Bladder strain scope was removed and 16 [place] catheter was anchored 10 cc placed in the balloon    Yevgeniy Griffith MD     Date: 2/28/2018  Time: 10:05 PM       Electronically signed by Yevgeniy Griffith MD at 2/28/2018 10:08 PM           Physician Progress Notes (last 24 hours) (Notes from 2/28/2018  9:09 AM through 3/1/2018  9:09 AM)      Yevgeniy Ware MD at 3/1/2018  7:18 AM  Version 1 of 1         GENERAL SURGERY PROGRESS NOTE  Chief Complaint:  Surgery Follow up   LOS: 1 day       Subjective     Interval History:     Feels well this AM. Sitting up in chair. No complaints this AM.    Objective     Vital Signs  Temp:  [97.4 °F (36.3 °C)-99.4 °F (37.4 °C)] 99.4 °F  (37.4 °C)  Heart Rate:  [] 92  Resp:  [18-22] 22  BP: (134-210)/(62-97) 167/72  Arterial Line BP: ()/(60-83) 152/79    Physical Exam:   Abdomen soft, tubes in place.  Labs:  Lab Results (last 24 hours)     Procedure Component Value Units Date/Time    Tissue Pathology Exam [[id number]] Collected:  02/28/18 1209    Specimen:  Tissue from Liver Updated:  02/28/18 1310    Blood Gas, Arterial [[id number]]  (Abnormal) Collected:  02/28/18 1336    Specimen:  Arterial Blood Updated:  02/28/18 1341     Site --      Not performed at this site.        Christopher's Test --      Not performed at this site.        pH, Arterial 7.193 (L) pH units      pCO2, Arterial 51.2 (H) mm Hg      pO2, Arterial 313.3 (H) mm Hg      HCO3, Arterial 19.2 (L) mmol/L      Base Excess, Arterial -8.7 (L) mmol/L      O2 Saturation, Arterial 99.6 %      Hemoglobin, Blood Gas 10.0 (L) g/dL      Hematocrit, Blood Gas 29.0 %      CO2 Content 20.8 (L)     Sodium, Arterial 137.7 (L) mmol/L      Potassium, Arterial 3.98 mmol/L      Glucose, Arterial 110 mmol/L      Barometric Pressure for Blood Gas -- mmHg       Not performed at this site.        Modality --      Not performed at this site.        Ionized Calcium 4.40 (L) mg/dL     Blood Gas, Arterial [[id number]]  (Abnormal) Collected:  02/28/18 1845    Specimen:  Arterial Blood Updated:  02/28/18 1852     Site --      Not performed at this site.        Christopher's Test --      Not performed at this site.        pH, Arterial 7.371 pH units      pCO2, Arterial 33.2 (L) mm Hg      pO2, Arterial 113.3 (H) mm Hg      HCO3, Arterial 18.8 (L) mmol/L      Base Excess, Arterial -5.7 (L) mmol/L      O2 Saturation, Arterial 98.4 %      Hemoglobin, Blood Gas 10.8 (L) g/dL      Hematocrit, Blood Gas 32.0 (L) %      CO2 Content 19.8 (L)     Sodium, Arterial 139.6 mmol/L      Potassium, Arterial 4.32 mmol/L      Glucose, Arterial 106 mmol/L      Barometric Pressure for Blood Gas -- mmHg       Not performed at this site.         Modality --      Not performed at this site.        Ionized Calcium 4.30 (L) mg/dL     CBC & Differential [[id number]] Collected:  03/01/18 0319    Specimen:  Blood Updated:  03/01/18 0400    Narrative:       The following orders were created for panel order CBC & Differential.  Procedure                               Abnormality         Status                     ---------                               -----------         ------                     CBC Auto Differential[[id number]]        Abnormal            Final result                 Please view results for these tests on the individual orders.    CBC Auto Differential [[id number]]  (Abnormal) Collected:  03/01/18 0319    Specimen:  Blood Updated:  03/01/18 0400     WBC 5.73 10*3/mm3      RBC 3.03 (L) 10*6/mm3      Hemoglobin 9.0 (L) g/dL      Hematocrit 28.1 (L) %      MCV 92.7 fL      MCH 29.7 pg      MCHC 32.0 g/dL      RDW 19.8 (H) %      RDW-SD 67.4 (H) fl      MPV 9.1 fL      Platelets 218 10*3/mm3      Neutrophil % 74.2 %      Lymphocyte % 17.6 %      Monocyte % 7.7 %      Eosinophil % 0.0 %      Basophil % 0.3 %      Immature Grans % 0.2 %      Neutrophils, Absolute 4.25 10*3/mm3      Lymphocytes, Absolute 1.01 10*3/mm3      Monocytes, Absolute 0.44 10*3/mm3      Eosinophils, Absolute 0.00 10*3/mm3      Basophils, Absolute 0.02 10*3/mm3      Immature Grans, Absolute 0.01 10*3/mm3     Comprehensive Metabolic Panel [[id number]]  (Abnormal) Collected:  03/01/18 0319    Specimen:  Blood Updated:  03/01/18 0410     Glucose 95 mg/dL      BUN 14 mg/dL      Creatinine 0.98 mg/dL      Sodium 144 mmol/L      Potassium 3.9 mmol/L      Chloride 113 (H) mmol/L      CO2 19.0 (L) mmol/L      Calcium 7.5 (L) mg/dL      Total Protein 6.4 g/dL      Albumin 3.10 (L) g/dL      ALT (SGPT) 52 U/L      AST (SGOT) 102 (H) U/L      Alkaline Phosphatase 78 U/L      Total Bilirubin 1.2 mg/dL      eGFR Non African Amer 56 mL/min/1.73      Globulin 3.3 gm/dL      A/G Ratio 0.9  (L) g/dL      BUN/Creatinine Ratio 14.3     Anion Gap 12.0 mmol/L            Results Review:     Labs and imaging for today were reviewed.    Assessment/Plan     Johanne Mayer is a 67 y.o. female who is s/p loop gastrojejunostomy, Ru En Y hepaticojejunostomy, Gtube, Jtube, Right ureteral stent.      Overall looks well.  DC Arterial Line.  Anticipate going to floor today.  Add pepcid for ulcer prophylaxis.  Keep alfred today.          This document has been electronically signed by Yevgeniy Ware MD on March 1, 2018 7:18 AM        Yevgeniy Ware MD  03/01/18  7:18 AM           Electronically signed by Yevgeniy Ware MD at 3/1/2018  7:20 AM        Medical Student Notes (last 24 hours) (Notes from 2/28/2018  9:09 AM through 3/1/2018  9:09 AM)     No notes of this type exist for this encounter.        Consult Notes (last 24 hours) (Notes from 2/28/2018  9:09 AM through 3/1/2018  9:09 AM)     No notes of this type exist for this encounter.

## 2018-03-01 NOTE — PROGRESS NOTES
GENERAL SURGERY PROGRESS NOTE  Chief Complaint:  Surgery Follow up   LOS: 1 day       Subjective     Interval History:     Feels well this AM. Sitting up in chair. No complaints this AM.    Objective     Vital Signs  Temp:  [97.4 °F (36.3 °C)-99.4 °F (37.4 °C)] 99.4 °F (37.4 °C)  Heart Rate:  [] 92  Resp:  [18-22] 22  BP: (134-210)/(62-97) 167/72  Arterial Line BP: ()/(60-83) 152/79    Physical Exam:   Abdomen soft, tubes in place.  Labs:  Lab Results (last 24 hours)     Procedure Component Value Units Date/Time    Tissue Pathology Exam [[id number]] Collected:  02/28/18 1209    Specimen:  Tissue from Liver Updated:  02/28/18 1310    Blood Gas, Arterial [[id number]]  (Abnormal) Collected:  02/28/18 1336    Specimen:  Arterial Blood Updated:  02/28/18 1341     Site --      Not performed at this site.        Christopher's Test --      Not performed at this site.        pH, Arterial 7.193 (L) pH units      pCO2, Arterial 51.2 (H) mm Hg      pO2, Arterial 313.3 (H) mm Hg      HCO3, Arterial 19.2 (L) mmol/L      Base Excess, Arterial -8.7 (L) mmol/L      O2 Saturation, Arterial 99.6 %      Hemoglobin, Blood Gas 10.0 (L) g/dL      Hematocrit, Blood Gas 29.0 %      CO2 Content 20.8 (L)     Sodium, Arterial 137.7 (L) mmol/L      Potassium, Arterial 3.98 mmol/L      Glucose, Arterial 110 mmol/L      Barometric Pressure for Blood Gas -- mmHg       Not performed at this site.        Modality --      Not performed at this site.        Ionized Calcium 4.40 (L) mg/dL     Blood Gas, Arterial [[id number]]  (Abnormal) Collected:  02/28/18 1585    Specimen:  Arterial Blood Updated:  02/28/18 1852     Site --      Not performed at this site.        Christopher's Test --      Not performed at this site.        pH, Arterial 7.371 pH units      pCO2, Arterial 33.2 (L) mm Hg      pO2, Arterial 113.3 (H) mm Hg      HCO3, Arterial 18.8 (L) mmol/L      Base Excess, Arterial -5.7 (L) mmol/L      O2 Saturation, Arterial 98.4 %      Hemoglobin,  Blood Gas 10.8 (L) g/dL      Hematocrit, Blood Gas 32.0 (L) %      CO2 Content 19.8 (L)     Sodium, Arterial 139.6 mmol/L      Potassium, Arterial 4.32 mmol/L      Glucose, Arterial 106 mmol/L      Barometric Pressure for Blood Gas -- mmHg       Not performed at this site.        Modality --      Not performed at this site.        Ionized Calcium 4.30 (L) mg/dL     CBC & Differential [[id number]] Collected:  03/01/18 0319    Specimen:  Blood Updated:  03/01/18 0400    Narrative:       The following orders were created for panel order CBC & Differential.  Procedure                               Abnormality         Status                     ---------                               -----------         ------                     CBC Auto Differential[[id number]]        Abnormal            Final result                 Please view results for these tests on the individual orders.    CBC Auto Differential [[id number]]  (Abnormal) Collected:  03/01/18 0319    Specimen:  Blood Updated:  03/01/18 0400     WBC 5.73 10*3/mm3      RBC 3.03 (L) 10*6/mm3      Hemoglobin 9.0 (L) g/dL      Hematocrit 28.1 (L) %      MCV 92.7 fL      MCH 29.7 pg      MCHC 32.0 g/dL      RDW 19.8 (H) %      RDW-SD 67.4 (H) fl      MPV 9.1 fL      Platelets 218 10*3/mm3      Neutrophil % 74.2 %      Lymphocyte % 17.6 %      Monocyte % 7.7 %      Eosinophil % 0.0 %      Basophil % 0.3 %      Immature Grans % 0.2 %      Neutrophils, Absolute 4.25 10*3/mm3      Lymphocytes, Absolute 1.01 10*3/mm3      Monocytes, Absolute 0.44 10*3/mm3      Eosinophils, Absolute 0.00 10*3/mm3      Basophils, Absolute 0.02 10*3/mm3      Immature Grans, Absolute 0.01 10*3/mm3     Comprehensive Metabolic Panel [[id number]]  (Abnormal) Collected:  03/01/18 0319    Specimen:  Blood Updated:  03/01/18 0410     Glucose 95 mg/dL      BUN 14 mg/dL      Creatinine 0.98 mg/dL      Sodium 144 mmol/L      Potassium 3.9 mmol/L      Chloride 113 (H) mmol/L      CO2 19.0 (L) mmol/L       Calcium 7.5 (L) mg/dL      Total Protein 6.4 g/dL      Albumin 3.10 (L) g/dL      ALT (SGPT) 52 U/L      AST (SGOT) 102 (H) U/L      Alkaline Phosphatase 78 U/L      Total Bilirubin 1.2 mg/dL      eGFR Non African Amer 56 mL/min/1.73      Globulin 3.3 gm/dL      A/G Ratio 0.9 (L) g/dL      BUN/Creatinine Ratio 14.3     Anion Gap 12.0 mmol/L            Results Review:     Labs and imaging for today were reviewed.    Assessment/Plan     Johanne Mayer is a 67 y.o. female who is s/p loop gastrojejunostomy, Ru En Y hepaticojejunostomy, Gtube, Jtube, Right ureteral stent.      Overall looks well.  DC Arterial Line.  Anticipate going to floor today.  Add pepcid for ulcer prophylaxis.  Keep alfred today.          This document has been electronically signed by Yevgeniy Ware MD on March 1, 2018 7:18 AM        Yevgeniy Ware MD  03/01/18  7:18 AM

## 2018-03-01 NOTE — NURSING NOTE
Pt. Transported at this time to [facility], room 360 via wheelchair by messenger transport. PCA and IV pump sent with pt. Infusing as ordered. Daughter present and accompanied pt. With personal belongings. Pt. Alert, oriented, ambulatory, and in no apparent distress upon transfer.

## 2018-03-02 NOTE — THERAPY EVALUATION
Acute Care - Physical Therapy Initial Evaluation  [facility]     Patient Name: Johanne Mayer  : 1950  MRN: [id number]  Today's Date: 3/2/2018   Onset of Illness/Injury or Date of Surgery Date: 18  Date of Referral to PT: 18  Referring Physician: TAHIRA Ware MD.      Admit Date: 2018     Visit Dx:    ICD-10-CM ICD-9-CM   1. Duodenal obstruction K31.5 537.3   2. Impaired physical mobility Z74.09 781.99     Patient Active Problem List   Diagnosis   • Malignant neoplasm of left breast   • Pancytopenia due to antineoplastic chemotherapy   • Metastasis to bone   • Dyspnea on exertion   • PAF (paroxysmal atrial fibrillation)   • Osteoarthritis   • Lymphadenopathy   • Lump of breast, left   • Localized enlarged lymph nodes   • History of echocardiogram   • Heartburn   • GERD (gastroesophageal reflux disease)   • Essential hypertension   • Carcinoma, female breast, infiltrating lobular   • Atrial fibrillation   • Current use of long term anticoagulation   • Sepsis   • E coli bacteremia   • Acute cystitis   • Hypokalemia   • Dilated intrahepatic bile duct   • Acute kidney injury   • Renal calyceal dilation determined by ultrasound   • Duodenal obstruction     Past Medical History:   Diagnosis Date   • Atrial fibrillation    • Carcinoma, female breast, infiltrating lobular     LEFT side with axillary lymph node metastases      • Essential hypertension    • GERD (gastroesophageal reflux disease)    • Heartburn    • History of echocardiogram 10/13/2011   • Localized enlarged lymph nodes    • Lump of breast, left    • Lymphadenopathy      LEFT axilla-this is confirmed by my personal review of the ultrasound      • Osteoarthritis      Past Surgical History:   Procedure Laterality Date   • APPENDECTOMY     • BREAST SURGERY  2014    Ultrasound directed mammotome biopsy of the left breast with clip placement, lesion at the 5:30 position 5 cm. from the nipple.Left axillary lymph node  biopsy, open.   • CHOLECYSTECTOMY     • COLON RESECTION N/A 2/28/2018    Procedure: Laparotomy with gastrojejunostomy, gastrostomy placement, Ru en Y hepaticojejunostomy, jejunostomy.    LIVER BIOPSY (DR. SIMS FIRST);  Surgeon: Yevgeniy Sims MD;  Location: [facility] OR;  Service:    • COLONOSCOPY  12/12/2011    Mild diverticulosis in sigmoid colon. Stool collected for study. Hemorrhoids found.   • COLONOSCOPY N/A 2/7/2017    Procedure: COLONOSCOPY;  Surgeon: Yevgeniy Sims MD;  Location: [facility] ENDOSCOPY;  Service:    • CYSTOSCOPY, RETROGRADE PYELOGRAM AND STENT INSERTION Right 2/28/2018    Procedure: CYSTOSCOPY RETROGRADE PYELOGRAM AND STENT INSERTION         (C-ARM)      ;  Surgeon: Yevgeniy Griffith MD;  Location: [facility] OR;  Service:    • ERCP N/A 2/19/2018    Procedure: ENDOSCOPIC RETROGRADE CHOLANGIOPANCREATOGRAPHY;  Surgeon: Cecil Lemus DO;  Location: [facility] ENDOSCOPY;  Service:    • INJECTION OF MEDICATION  08/12/2013    Kenalog (19)      • TONSILLECTOMY     • TUBAL ABDOMINAL LIGATION     • UPPER GASTROINTESTINAL ENDOSCOPY  06/10/2014    Normal hypopharynx, esophagus, symmetrical & patent pylorus. Hiatus hernia in GE junction. Polyps in antrum & fundus. Multiple biopsies taken. Prominant CBD in medial duodenal wall. The ampulla has a normal appearance.          PT ASSESSMENT (last 72 hours)      PT Evaluation       03/02/18 1331 03/02/18 1103    Rehab Evaluation    Document Type evaluation  -CZ     Subjective Information agree to therapy  -CZ     Evaluation Not Performed  patient unavailable for evaluation  -CZ    Patient Effort, Rehab Treatment good  -CZ     Symptoms Noted During/After Treatment fatigue  -CZ     General Information    Patient Profile Review yes  -CZ     Onset of Illness/Injury or Date of Surgery Date 02/28/18  -CZ     Referring Physician TAHIRA Sims MD.  -CZ     General Observations Seated in recliner, IV, RA, no apparent distress, telemetry, alfred, J-tube.   -CZ     Pertinent History Of Current Problem Admitted for Laparotomy with gastrojejunostomy, liver biopsy, G-tube, J-tube, R ureter stent.   -CZ     Precautions/Limitations fall precautions  -CZ     Prior Level of Function independent:;all household mobility;community mobility  -CZ     Equipment Currently Used at Home rollator;cane, straight  -CZ     Plans/Goals Discussed With patient and family;agreed upon  -CZ     Benefits Reviewed patient and family:;improve function;increase independence;increase strength;increase balance  -CZ     Living Environment    Lives With child(ilya), adult  -CZ     Living Arrangements apartment  -CZ     Home Accessibility no concerns  -CZ     Clinical Impression    Date of Referral to PT 03/02/18  -CZ     PT Diagnosis impaired physical mobility  -CZ     Prognosis good  -CZ     Criteria for Skilled Therapeutic Interventions Met yes;treatment indicated  -CZ     Pathology/Pathophysiology Noted (Describe Specifically for Each System) musculoskeletal;genitourinary  -CZ     Impairments Found (describe specific impairments) aerobic capacity/endurance;gait, locomotion, and balance;integumentary integrity  -CZ     Rehab Potential good, to achieve stated therapy goals  -CZ     Predicted Duration of Therapy Intervention (days/wks) 2-4 days  -CZ     Vital Signs    Pre Systolic BP Rehab 112  -CZ     Pre Treatment Diastolic BP 72  -CZ     Post Systolic BP Rehab 127  -CZ     Post Treatment Diastolic BP 80  -CZ     Pretreatment Heart Rate (beats/min) 111  -CZ     Posttreatment Heart Rate (beats/min) 96  -CZ     Pre SpO2 (%) 95  -CZ     O2 Delivery Pre Treatment room air  -CZ     Post SpO2 (%) 68  -CZ     O2 Delivery Post Treatment room air  -CZ     Pre Patient Position Sitting  -CZ     Post Patient Position Sitting  -CZ     Pain Assessment    Pain Assessment 0-10  -CZ     Pain Score 4  -CZ     Post Pain Score 4  -CZ     Pain Intervention(s) Medication (See MAR);Distraction;Ambulation/increased activity   -CZ     Vision Assessment/Intervention    Visual Impairment WFL with corrective lenses   reading glasses.  -CZ     Cognitive Assessment/Intervention    Current Cognitive/Communication Assessment functional  -CZ     Orientation Status oriented x 4  -CZ     Follows Commands/Answers Questions 100% of the time  -CZ     Personal Safety WNL/WFL  -CZ     Personal Safety Interventions gait belt;nonskid shoes/slippers when out of bed  -CZ     ROM (Range of Motion)    General ROM lower extremity range of motion deficits identified  -CZ     General ROM Detail AROM hip flexion limited by obesity and weakness: 3-/5 bilaterally  -CZ     MMT (Manual Muscle Testing)    General MMT Assessment lower extremity strength deficits identified  -CZ     General MMT Assessment Detail BLEs: 3+/5 grossly   -CZ     Bed Mobility, Assessment/Treatment    Bed Mobility, Assistive Device bed rails;head of bed elevated  -CZ     Bed Mob, Supine to Sit, Walsh minimum assist (75% patient effort)  -CZ     Bed Mob, Sit to Supine, Walsh minimum assist (75% patient effort)  -CZ     Bed Mobility, Comment Cues for technique.   -CZ     Transfer Assessment/Treatment    Transfers, Sit-Stand Walsh contact guard assist  -CZ     Transfers, Stand-Sit Walsh contact guard assist  -CZ     Transfers, Sit-Stand-Sit, Assist Device rolling walker  -CZ     Transfer, Comment Cues for hand placement.   -CZ     Gait Assessment/Treatment    Gait, [place] Level contact guard assist  -CZ     Gait, Assistive Device rolling walker  -CZ     Gait, Distance (Feet) 125  -CZ     Gait, Comment Patient's daughter provided CGA with cues for technique and use of gait belt.   -CZ     Positioning and Restraints    Pre-Treatment Position sitting in chair/recliner  -CZ     Post Treatment Position chair  -CZ     In Chair sitting;encouraged to call for assist;with family/caregiver  -       03/02/18 0888 03/02/18 0754    General Information    Equipment Currently  Used at Home rollator;cane, straight  -EW     Living Environment    Lives With child(ilya), adult  -EW     Living Arrangements apartment  -EW     Home Accessibility no concerns  -EW     Transportation Available family or friend will provide;car  -EW     Muscle Tone Assessment    Muscle Tone Assessment  Bilateral Upper Extremities;Bilateral Lower Extremities  -EF    Bilateral Upper Extremities Muscle Tone Assessment  mildly decreased tone  -EF    Bilateral Lower Extremities Muscle Tone Assessment  mildly decreased tone  -EF      03/02/18 0500 03/01/18 1200    Muscle Tone Assessment    Muscle Tone Assessment Bilateral Upper Extremities;Bilateral Lower Extremities  -AA Bilateral Upper Extremities;Bilateral Lower Extremities  -SR    Bilateral Upper Extremities Muscle Tone Assessment mildly decreased tone  -AA mildly decreased tone  -SR    Bilateral Lower Extremities Muscle Tone Assessment mildly decreased tone  -AA mildly decreased tone  -SR      03/01/18 0800 03/01/18 0737    General Information    Equipment Currently Used at Home  cane, quad;walker, rolling  -LC    Living Environment    Transportation Available  family or friend will provide  -LC    Muscle Tone Assessment    Muscle Tone Assessment Bilateral Upper Extremities;Bilateral Lower Extremities  -SR     Bilateral Upper Extremities Muscle Tone Assessment mildly decreased tone  -SR     Bilateral Lower Extremities Muscle Tone Assessment mildly decreased tone  -SR       03/01/18 0400 03/01/18 0000    Muscle Tone Assessment    Muscle Tone Assessment Bilateral Upper Extremities;Bilateral Lower Extremities  -LC Bilateral Upper Extremities;Bilateral Lower Extremities  -LC    Bilateral Upper Extremities Muscle Tone Assessment mildly decreased tone  -LC mildly decreased tone  -LC    Bilateral Lower Extremities Muscle Tone Assessment mildly decreased tone  -LC mildly decreased tone  -LC      02/28/18 2000 02/28/18 1900    Muscle Tone Assessment    Muscle Tone Assessment  Bilateral Upper Extremities;Bilateral Lower Extremities  -LC Bilateral Upper Extremities;Bilateral Lower Extremities  -LC    Bilateral Upper Extremities Muscle Tone Assessment mildly decreased tone  -LC mildly decreased tone  -LC    Bilateral Lower Extremities Muscle Tone Assessment mildly decreased tone  -LC mildly decreased tone  -LC      02/28/18 1800 02/28/18 0912    General Information    Equipment Currently Used at Home  cane, quad;walker, rolling  -KM    Living Environment    Lives With child(ilya), adult  -LR child(ilya), adult  -KM    Living Arrangements house  -LR     Home Accessibility no concerns  -LR     Stair Railings at Home none  -LR     Type of Financial/Environmental Concern none  -LR     Transportation Available family or friend will provide  -LR family or friend will provide  -KM      02/28/18 0909       Living Environment    Transportation Available family or friend will provide  -KM       User Key  (r) = Recorded By, (t) = Taken By, (c) = Cosigned By    Initials Name Provider Type    EF Lianna Campbell, RN Registered Nurse    LR Alie Feng, RN Registered Nurse    SR Sumi Narvaez, RN Registered Nurse    EW Lita Luna, [facility]      Roshni De La Garza, RN Registered Nurse    LC Alie Danielson, RN Registered Nurse    CZ Neel Marin, PT Physical Therapist    DOROTHY Benitez Registered Nurse          Physical Therapy Education     Title: PT OT SLP Therapies (Active)     Topic: Physical Therapy (Active)     Point: Mobility training (Active)    Learning Progress Summary    Learner Readiness Method Response Comment Documented by Status   Patient Acceptance E NR Educated patient and her daughter on proper guarding technique with gait and transfers; proper use of gait belt. CZ 03/02/18 1503 Active   Family Acceptance E NR Educated patient and her daughter on proper guarding technique with gait and transfers; proper use of gait belt. CZ 03/02/18 1503 Active                Point: Precautions (Active)    Learning Progress Summary    Learner Readiness Method Response Comment Documented by Status   Patient Acceptance E NR Tinetti assessed: 20/28 or moderate fall risk. Recommend continued use FWW and assistance with mobility.  03/02/18 1500 Active                      User Key     Initials Effective Dates Name Provider Type Discipline     02/17/17 -  Neel Marin, PT Physical Therapist PT                PT Recommendation and Plan  Anticipated Discharge Disposition: home with home health  Planned Therapy Interventions: balance training, bed mobility training, gait training, home exercise program, stair training, strengthening, transfer training  PT Frequency: other (see comments) (5-14x/week)  Plan of Care Review  Plan Of Care Reviewed With: patient  Outcome Summary/Follow up Plan: Initial PT evaluation complete. Patient is alert and cooperative, not limited by pain.  Patient requires min Ax1 with bed mobility, CGA  with transfers and gait.  Patient ambulates 125'x1 with FWW, patient's daughter provides support. Tinetti assessed: 20/28 or moderate fall risk. Patient  would benefit from HHPT upon discharge.  Continue skilled PT.           IP PT Goals       03/02/18 1331          Bed Mobility PT STG    Bed Mobility PT STG, Date Established 03/02/18  -CZ      Bed Mobility PT STG, Time to Achieve 2 - 3 days  -CZ      Bed Mobility PT STG, Activity Type all bed mobility  -CZ      Bed Mobility PT STG, [place] Level conditional independence  -CZ      Bed Mobility PT STG, Additional Goal HOB flat, no bed rails.   -CZ      Bed Mobility PT STG, Date Goal Reviewed 03/02/18  -CZ      Bed Mobility PT STG, Outcome goal ongoing  -CZ      Transfer Training PT STG    Transfer Training PT STG, Date Established 03/02/18  -CZ      Transfer Training PT STG, Time to Achieve 2 - 3 days  -CZ      Transfer Training PT STG, Activity Type bed to chair /chair to bed;sit to stand/stand to sit  -CZ       Transfer Training PT STG, Assist Device walker, rolling  -CZ      Transfer Training PT STG, Date Goal Reviewed 03/02/18  -CZ      Transfer Training PT STG, Outcome goal ongoing  -CZ      Gait Training PT LTG    Gait Training Goal PT LTG, Date Established 03/02/18  -CZ      Gait Training Goal PT LTG, Time to Achieve by discharge  -CZ      Gait Training Goal PT LTG, [place] Level conditional independence  -CZ      Gait Training Goal PT LTG, Assist Device walker, rolling  -CZ      Gait Training Goal PT LTG, Distance to Achieve 200'x1.  -CZ      Gait Training Goal PT LTG, Date Goal Reviewed 03/02/18  -CZ      Gait Training Goal PT LTG, Outcome goal ongoing  -CZ      Physical Therapy PT LTG    Physical Therapy PT LTG, Date Established 03/02/18  -CZ      Physical Therapy PT LTG, Activity Type Tinetti fall risk assessement.   -CZ      Physical Therapy PT LTG, Addisional Goal Score 24/28 or low fall risk.   -CZ      Physical Therapy PT LTG, Date Goal Reviewed 03/02/18  -CZ      Physical Therapy PT LTG, Outcome goal ongoing  -CZ        User Key  (r) = Recorded By, (t) = Taken By, (c) = Cosigned By    Initials Name Provider Type    VIJAY Marin, PT Physical Therapist                Outcome Measures       03/02/18 1331          How much help from another person do you currently need...    Turning from your back to your side while in flat bed without using bedrails? 3  -CZ      Moving from lying on back to sitting on the side of a flat bed without bedrails? 3  -CZ      Moving to and from a bed to a chair (including a wheelchair)? 3  -CZ      Standing up from a chair using your arms (e.g., wheelchair, bedside chair)? 3  -CZ      Climbing 3-5 steps with a railing? 3  -CZ      To walk in hospital room? 3  -CZ      AM-PAC 6 Clicks Score 18  -CZ      Tinetti Assessment    Tinetti Assessment yes  -CZ      Sitting Balance 1  -CZ      Arises 1  -CZ      Attempts to Rise 2  -CZ      Immediate Standing Balance (first 5 sec) 1   "-CZ      Standing Balance 1  -CZ      Sternal Nudge (feet close together) 1  -CZ      Eyes Closed (feet close together) 1  -CZ      Turning 360 Degrees- Steps 1  -CZ      Turning 360 Degrees- Steadiness 1  -CZ      Sitting Down 1  -CZ      Tinetti Balance Score 11  -CZ      Gait Initiation (immediate after told \"go\") 1  -CZ      Step Length- Right Swing 1  -CZ      Step Length- Left Swing 1  -CZ      Foot Clearance- Right Foot 1  -CZ      Foot Clearance- Left Foot 1  -CZ      Step Symmetry 1  -CZ      Step Continuity 1  -CZ      Path (excursion) 1  -CZ      Trunk 0  -CZ      Base of Support 1  -CZ      Gait Score 9  -CZ      Tinetti Total Score 20  -CZ      Tinetti Assistive Device rolling walker  -CZ        User Key  (r) = Recorded By, (t) = Taken By, (c) = Cosigned By    Initials Name Provider Type    CZ Neel Marin, PT Physical Therapist           Time Calculation:         PT Charges       03/02/18 1517 03/02/18 1103       Time Calculation    Start Time 1331  -CZ      Stop Time 1410  -CZ      Time Calculation (min) 39 min  -CZ      PT Received On 03/02/18  -CZ      PT - Next Appointment  03/03/18  -CZ     PT Goal Re-Cert Due Date 03/15/18  -CZ      Time Calculation- PT    Total Timed Code Minutes- PT 24 minute(s)  -CZ        User Key  (r) = Recorded By, (t) = Taken By, (c) = Cosigned By    Initials Name Provider Type    CZ Neel Marin, PT Physical Therapist          Therapy Charges for Today     Code Description Service Date Service Provider Modifiers Qty    54469056153 HC PT MOBILITY CURRENT 3/2/2018 Neel Marin, PT GP, CJ 1    84815368793 HC PT MOBILITY PROJECTED 3/2/2018 Neel Marin, PT GP, CI 1    28728726202 HC PT EVAL MOD COMPLEXITY 1 3/2/2018 Neel Marin, PT GP 1    57061057460 HC GAIT TRAINING EA 15 MIN 3/2/2018 Neel Marin, PT GP 1    32405880593 HC PT THERAPEUTIC ACT EA 15 MIN 3/2/2018 Neel Marin, PT GP 1          PT G-Codes  PT Professional Judgement Used?: Yes  Outcome " Measure Options: Tinetti  Score: 20  Functional Limitation: Mobility: Walking and moving around  Mobility: Walking and Moving Around Current Status (): At least 20 percent but less than 40 percent impaired, limited or restricted  Mobility: Walking and Moving Around Goal Status (): At least 1 percent but less than 20 percent impaired, limited or restricted      Neel Marin, PT  3/2/2018

## 2018-03-02 NOTE — NURSING NOTE
"teley called about Pt being in A-fib rhythm. Checked on Pt HR was 82, she felt fine and stated \"when it get out of control in know it and Im fine.\"  "

## 2018-03-02 NOTE — PLAN OF CARE
Problem: Patient Care Overview (Adult)  Goal: Plan of Care Review  Outcome: Ongoing (interventions implemented as appropriate)   03/02/18 0534   Coping/Psychosocial Response Interventions   Plan Of Care Reviewed With patient;daughter   Patient Care Overview   Progress no change   Outcome Evaluation   Outcome Summary/Follow up Plan Pt is resting in bed at this time, no complaints of pain or discomfort at this time. will continue to monitor.       Problem: Fall Risk (Adult)  Goal: Identify Related Risk Factors and Signs and Symptoms  Outcome: Ongoing (interventions implemented as appropriate)   03/02/18 0534   Fall Risk   Fall Risk: Related Risk Factors environment unfamiliar   Fall Risk: Signs and Symptoms presence of risk factors     Goal: Absence of Falls  Outcome: Ongoing (interventions implemented as appropriate)   03/02/18 0534   Fall Risk (Adult)   Absence of Falls achieves outcome       Problem: Pain, Acute (Adult)  Goal: Identify Related Risk Factors and Signs and Symptoms  Outcome: Ongoing (interventions implemented as appropriate)   03/02/18 0534   Pain, Acute   Related Risk Factors (Acute Pain) disease process;persistent pain   Signs and Symptoms (Acute Pain) fatigue/weakness;guarding/abnormal posturing/positioning     Goal: Acceptable Pain Control/Comfort Level  Outcome: Ongoing (interventions implemented as appropriate)   03/02/18 0534   Pain, Acute (Adult)   Acceptable Pain Control/Comfort Level making progress toward outcome       Problem: Infection, Risk/Actual (Adult)  Goal: Identify Related Risk Factors and Signs and Symptoms  Outcome: Ongoing (interventions implemented as appropriate)   03/02/18 0534   Infection, Risk/Actual   Infection, Risk/Actual: Related Risk Factors skin integrity impairment;surgery/procedure   Signs and Symptoms (Infection, Risk/Actual) edema;pain     Goal: Infection Prevention/Resolution  Outcome: Ongoing (interventions implemented as appropriate)   03/02/18 0534    Infection, Risk/Actual (Adult)   Infection Prevention/Resolution making progress toward outcome       Problem: Nutrition, Imbalanced: Inadequate Oral Intake (Adult)  Goal: Identify Related Risk Factors and Signs and Symptoms  Outcome: Ongoing (interventions implemented as appropriate)   03/02/18 0534   Nutrition, Imbalanced: Inadequate Oral Intake   Nutrition Imbalanced: Less than Body Requirements: Related Risk Factors appetite decreased;chronic illness/infection   Signs and Symptoms (Nutrition Imbalance, Inadequate Oral Intake: Signs and Symptoms) weight decreased (percent weight loss, percent usual body weight, body mass index less than 18.5) (Adults)     Goal: Improved Oral Intake  Outcome: Ongoing (interventions implemented as appropriate)   03/02/18 0534   Nutrition, Imbalanced: Inadequate Oral Intake (Adult)   Improved Oral Intake unable to achieve outcome     Goal: Prevent Further Weight Loss  Outcome: Ongoing (interventions implemented as appropriate)   03/02/18 0534   Nutrition, Imbalanced: Inadequate Oral Intake (Adult)   Prevent Further Weight Loss unable to achieve outcome       Problem: Pressure Ulcer Risk (Luis Scale) (Adult,Obstetrics,Pediatric)  Goal: Identify Related Risk Factors and Signs and Symptoms  Outcome: Ongoing (interventions implemented as appropriate)   03/02/18 0534   Pressure Ulcer Risk (Luis Scale)   Related Risk Factors (Pressure Ulcer Risk (Luis Scale)) age extremes;nutritional deficiencies;mobility impaired     Goal: Skin Integrity  Outcome: Ongoing (interventions implemented as appropriate)   03/02/18 0534   Pressure Ulcer Risk (Luis Scale) (Adult,Obstetrics,Pediatric)   Skin Integrity making progress toward outcome

## 2018-03-02 NOTE — MEDICAL STUDENT
"GENERAL SURGERY PROGRESS NOTE  Chief Complaint:  Surgery Follow up   LOS: 2 days       Subjective     Interval History:     Patient feels well this morning. She did have a couple episodes of afib overnight, though she felt \"fine.\" Laying in bed with head of bed at 30 degrees. No complaints currently. No flatulence and no bowel movements.     Objective     Vital Signs  Temp:  [97.8 °F (36.6 °C)-99.2 °F (37.3 °C)] 99.1 °F (37.3 °C)  Heart Rate:  [] 100  Resp:  [16-20] 18  BP: (128-190)/() 142/80  Arterial Line BP: (141-152)/(76-80) 145/80    Physical Exam:   Gtube, Jtube, and drain all in place. No bile in drain. Some bile in Gtube. Abdomen is soft. Some bowel sounds present. Feet and legs are nontender to palpation.  Labs:  Lab Results (last 24 hours)     ** No results found for the last 24 hours. **           Results Review:     Labs and imaging for today were reviewed.    Assessment/Plan     Johanne Mayer is a 67 y.o. female who is post-op day 2 from a loop gastrojejunostomy, Ru En Y hepaticojejunostomy, Gtube, Jtube, Right ureteral stent.      Remove Alfred catheter today.  Walk some today.  Continue spirometry.          This document has been electronically signed by Shahbaz Espinoza on March 2, 2018 5:33 AM        Shahbaz Espinoza  03/02/18  5:33 AM    STAFF:  As above.  Overall feels well, currently in NSR.   Abdomen soft with tubes in place, Drain serosanguinous  S/p palliative bypass  Start TF via Jtube  DC alfred  Overall looks well.  PT/OT.          This document has been electronically signed by Yevgeniy Ware MD on March 2, 2018 8:35 AM      "

## 2018-03-02 NOTE — CONSULTS
Adult Nutrition  Assessment    Patient Name:  Johanne Mayer  YOB: 1950  MRN: [id number]  Admit Date:  2/28/2018    Assessment Date:  3/2/2018    Comments:  RD consulted to start TF this date. Will order Osmolite 1.2 w/goal rate 60cc/hr which will provide 1730 calories and 80 grams protein. RD will monitor initiation/tolerance and make recs prn.           Reason for Assessment       03/02/18 1020    Reason for Assessment    Reason For Assessment/Visit TF/PN                    Labs/Tests/Procedures/Meds       03/02/18 1020    Labs/Tests/Procedures/Meds    Labs/Tests Review Reviewed    Medication Review Reviewed, pertinent            Physical Findings       03/02/18 1020    Physical Findings/Assessment    Additional Documentation Physical Appearance (Group)    Physical Appearance    Tubes gastrostomy tube;gastro-jejunostomy tube                  Electronically signed by:  Karen Marie RD  03/02/18 10:23 AM

## 2018-03-02 NOTE — PLAN OF CARE
Problem: Patient Care Overview (Adult)  Goal: Plan of Care Review  Outcome: Ongoing (interventions implemented as appropriate)   03/02/18 1331   Coping/Psychosocial Response Interventions   Plan Of Care Reviewed With patient   Outcome Evaluation   Outcome Summary/Follow up Plan Initial PT evaluation complete. Patient is alert and cooperative, not limited by pain. Patient requires min Ax1 with bed mobility, CGA with transfers and gait. Patient ambulates 125'x1 with FWW, patient's daughter provides support. Tinetti assessed: 20/28 or moderate fall risk. Patient would benefit from HHPT upon discharge. Continue skilled PT.        Problem: Inpatient Physical Therapy  Goal: Bed Mobility Goal STG- PT  Outcome: Ongoing (interventions implemented as appropriate)   03/02/18 1331   Bed Mobility PT STG   Bed Mobility PT STG, Date Established 03/02/18   Bed Mobility PT STG, Time to Achieve 2 - 3 days   Bed Mobility PT STG, Activity Type all bed mobility   Bed Mobility PT STG, [place] Level conditional independence   Bed Mobility PT STG, Additional Goal HOB flat, no bed rails.    Bed Mobility PT STG, Date Goal Reviewed 03/02/18   Bed Mobility PT STG, Outcome goal ongoing     Goal: Transfer Training Goal 1 STG- PT  Outcome: Ongoing (interventions implemented as appropriate)   03/02/18 1331   Transfer Training PT STG   Transfer Training PT STG, Date Established 03/02/18   Transfer Training PT STG, Time to Achieve 2 - 3 days   Transfer Training PT STG, Activity Type bed to chair /chair to bed;sit to stand/stand to sit   Transfer Training PT STG, Assist Device walker, rolling   Transfer Training PT STG, Date Goal Reviewed 03/02/18   Transfer Training PT STG, Outcome goal ongoing     Goal: Gait Training Goal LTG- PT  Outcome: Ongoing (interventions implemented as appropriate)   03/02/18 1331   Gait Training PT LTG   Gait Training Goal PT LTG, Date Established 03/02/18   Gait Training Goal PT LTG, Time to Achieve by discharge    Gait Training Goal PT LTG, [place] Level conditional independence   Gait Training Goal PT LTG, Assist Device walker, rolling   Gait Training Goal PT LTG, Distance to Achieve 200'x1.   Gait Training Goal PT LTG, Date Goal Reviewed 03/02/18   Gait Training Goal PT LTG, Outcome goal ongoing     Goal: Physical Therapy Goal LTG- PT  Outcome: Ongoing (interventions implemented as appropriate)   03/02/18 1331   Physical Therapy PT LTG   Physical Therapy PT LTG, Date Established 03/02/18   Physical Therapy PT LTG, Activity Type Tinetti fall risk assessement.    Physical Therapy PT LTG, Addisional Goal Score 24/28 or low fall risk.    Physical Therapy PT LTG, Date Goal Reviewed 03/02/18   Physical Therapy PT LTG, Outcome goal ongoing

## 2018-03-02 NOTE — PLAN OF CARE
Problem: Patient Care Overview (Adult)  Goal: Plan of Care Review  Outcome: Ongoing (interventions implemented as appropriate)   03/02/18 1114   Patient Care Overview   Progress no change   Outcome Evaluation   Outcome Summary/Follow up Plan RD consulted for TF initiation--Osmolite 1.2 w/goal rate 60cc/hr. RD will monitor tolerance and make recs prn.

## 2018-03-02 NOTE — PROGRESS NOTES
"   LOS: 2 days   Patient Care Team:  Jenaro Wood MD as PCP - General  Joey Ramos MD as PCP - Claims Attributed  Joey Ramos MD as Consulting Physician (Hematology and Oncology)    Subjective     Subjective:  Symptoms:  Stable.  No shortness of breath, chest pain, headache or chest pressure.  (No hematuria, TMax 99.1. [place] to be removed today. Ms. Mayer states she has made significantly more urine since surgery compared to when she was at home. ).    Pain:  Pain is well controlled.        History taken from: patient chart    Objective     Vital Signs  Temp:  [97.8 °F (36.6 °C)-99.2 °F (37.3 °C)] 99.1 °F (37.3 °C)  Heart Rate:  [] 99  Resp:  [16-20] 18  BP: (128-190)/() 142/80    Objective:  General Appearance:  In no acute distress.    Vital signs: (most recent): Blood pressure 142/80, pulse 99, temperature 99.1 °F (37.3 °C), temperature source Oral, resp. rate 18, height 157.5 cm (62\"), weight 75.8 kg (167 lb), SpO2 96 %, not currently breastfeeding.  Vital signs are normal.  No fever.    Output: Producing urine.    HEENT: Normal HEENT exam.    Lungs:  Normal respiratory rate and normal effort.  Breath sounds clear to auscultation.    Heart: Tachycardia.  Irregular rhythm.  S1 normal and S2 normal.  No murmur.   Chest: Symmetric chest wall expansion.   Abdomen: Abdomen is soft and non-distended.  Bowel sounds are normal.   There is no abdominal tenderness.     Extremities: Normal range of motion.  There is no dependent edema.    Pulses: Distal pulses are intact.    Neurological: Patient is alert and oriented to person, place and time.  GCS score is 15.    Pupils:  Pupils are equal, round, and reactive to light.    Skin:  Warm and dry.  No ecchymosis or cyanosis.             Results Review:    Lab Results (last 24 hours)     ** No results found for the last 24 hours. **         Imaging Results (last 24 hours)     ** No results found for the last 24 hours. **           I reviewed the " patient's new clinical results.  I reviewed the patient's new imaging results and agree with the interpretation.  I reviewed the patient's other test results and agree with the interpretation      Assessment/Plan     Principal Problem:    Duodenal obstruction      Assessment & Plan  POD #2 laparotomy with gastrojejunostomy, gastrostomy placement, Ru en Y hepaticojejunostomy, jejunostomy, liver biopsy.  POD #2  Cystoscopy right retrograde and J-stent placement, right hydronephrosis.   -urine output 750 mL last 24 hours, 3009 mL previous 24 hours   -Estimated Creatinine Clearance: 51.7 mL/min (by C-G formula based on Cr of 0.98).      Plan: J-stent remains, monitor urine output, discussed J-stent pain and symptoms of UTI.     FADI sEtes  03/02/18  8:37 AM

## 2018-03-03 NOTE — PLAN OF CARE
Problem: Patient Care Overview (Adult)  Goal: Plan of Care Review  Outcome: Ongoing (interventions implemented as appropriate)   03/03/18 0430   Coping/Psychosocial Response Interventions   Plan Of Care Reviewed With patient;daughter   Patient Care Overview   Progress no change   Outcome Evaluation   Outcome Summary/Follow up Plan Pt tolrating tube feeding well at this time, pain control is good at this time.       Problem: Fall Risk (Adult)  Goal: Identify Related Risk Factors and Signs and Symptoms  Outcome: Ongoing (interventions implemented as appropriate)   03/03/18 0430   Fall Risk   Fall Risk: Related Risk Factors environment unfamiliar   Fall Risk: Signs and Symptoms presence of risk factors     Goal: Absence of Falls  Outcome: Ongoing (interventions implemented as appropriate)   03/03/18 0430   Fall Risk (Adult)   Absence of Falls making progress toward outcome       Problem: Pain, Acute (Adult)  Goal: Identify Related Risk Factors and Signs and Symptoms  Outcome: Ongoing (interventions implemented as appropriate)   03/03/18 0430   Pain, Acute   Related Risk Factors (Acute Pain) disease process;persistent pain   Signs and Symptoms (Acute Pain) fatigue/weakness;guarding/abnormal posturing/positioning     Goal: Acceptable Pain Control/Comfort Level  Outcome: Ongoing (interventions implemented as appropriate)   03/03/18 0430   Pain, Acute (Adult)   Acceptable Pain Control/Comfort Level making progress toward outcome       Problem: Infection, Risk/Actual (Adult)  Goal: Identify Related Risk Factors and Signs and Symptoms  Outcome: Ongoing (interventions implemented as appropriate)   03/03/18 0430   Infection, Risk/Actual   Infection, Risk/Actual: Related Risk Factors skin integrity impairment;surgery/procedure   Signs and Symptoms (Infection, Risk/Actual) pain;edema     Goal: Infection Prevention/Resolution  Outcome: Ongoing (interventions implemented as appropriate)   03/03/18 0430   Infection, Risk/Actual  (Adult)   Infection Prevention/Resolution making progress toward outcome       Problem: Nutrition, Imbalanced: Inadequate Oral Intake (Adult)  Goal: Identify Related Risk Factors and Signs and Symptoms  Outcome: Ongoing (interventions implemented as appropriate)   03/03/18 0430   Nutrition, Imbalanced: Inadequate Oral Intake   Nutrition Imbalanced: Less than Body Requirements: Related Risk Factors appetite decreased;chronic illness/infection   Signs and Symptoms (Nutrition Imbalance, Inadequate Oral Intake: Signs and Symptoms) weight decreased (percent weight loss, percent usual body weight, body mass index less than 18.5) (Adults)     Goal: Improved Oral Intake  Outcome: Ongoing (interventions implemented as appropriate)   03/03/18 0430   Nutrition, Imbalanced: Inadequate Oral Intake (Adult)   Improved Oral Intake unable to achieve outcome     Goal: Prevent Further Weight Loss  Outcome: Ongoing (interventions implemented as appropriate)   03/03/18 0430   Nutrition, Imbalanced: Inadequate Oral Intake (Adult)   Prevent Further Weight Loss making progress toward outcome       Problem: Pressure Ulcer Risk (Luis Scale) (Adult,Obstetrics,Pediatric)  Goal: Identify Related Risk Factors and Signs and Symptoms  Outcome: Ongoing (interventions implemented as appropriate)   03/03/18 0430   Pressure Ulcer Risk (Luis Scale)   Related Risk Factors (Pressure Ulcer Risk (Luis Scale)) age extremes;nutritional deficiencies;mobility impaired     Goal: Skin Integrity  Outcome: Ongoing (interventions implemented as appropriate)   03/03/18 0430   Pressure Ulcer Risk (Luis Scale) (Adult,Obstetrics,Pediatric)   Skin Integrity making progress toward outcome

## 2018-03-03 NOTE — PROGRESS NOTES
"  Subjective:  Pod 3 gastro j and bile bypass.  Sitting up in chair.  No complaints.  On j tube feedings.  G tube to gravity     /76 (BP Location: Right arm, Patient Position: Lying)  Pulse 71  Temp 97.1 °F (36.2 °C) (Oral)   Resp 18  Ht 157.5 cm (62\")  Wt 75.8 kg (167 lb)  LMP  (LMP Unknown)  SpO2 97%  Breastfeeding? No  BMI 30.54 kg/m2    Lab Results (last 24 hours)     Procedure Component Value Units Date/Time    CBC (No Diff) [[id number]]  (Abnormal) Collected:  03/03/18 0606    Specimen:  Blood Updated:  03/03/18 0715     WBC 6.60 10*3/mm3      RBC 2.75 (L) 10*6/mm3      Hemoglobin 8.4 (L) g/dL      Hematocrit 27.1 (L) %      MCV 98.5 (H) fL      MCH 30.5 pg      MCHC 31.0 (L) g/dL      RDW 20.4 (H) %      RDW-SD 73.4 (H) fl      MPV 9.1 fL      Platelets 248 10*3/mm3     Basic Metabolic Panel [[id number]]  (Abnormal) Collected:  03/03/18 0940    Specimen:  Blood Updated:  03/03/18 1008     Glucose 112 (H) mg/dL      BUN 16 mg/dL      Creatinine 0.84 mg/dL      Sodium 146 (H) mmol/L      Potassium 3.7 mmol/L      Chloride 112 (H) mmol/L      CO2 21.0 (L) mmol/L      Calcium 7.4 (L) mg/dL      eGFR Non African Amer 67 mL/min/1.73      BUN/Creatinine Ratio 19.0     Anion Gap 13.0 mmol/L           Current Medications:  Current Facility-Administered Medications   Medication Dose Route Frequency Provider Last Rate Last Dose   • famotidine (PEPCID) injection 20 mg  20 mg Intravenous Q12H Yevgeniy Ware MD   20 mg at 03/03/18 0922   • heparin (porcine) 5000 UNIT/ML injection 5,000 Units  5,000 Units Subcutaneous Q8H Yevgeniy Ware MD   5,000 Units at 03/03/18 0540   • HYDROmorphone (DILAUDID) injection 0.5 mg  0.5 mg Intravenous Q2H PRN Yevgeniy Ware MD        And   • naloxone (NARCAN) injection 0.1 mg  0.1 mg Intravenous Q5 Min PRN Yevgeniy Ware MD       • HYDROmorphone (DILAUDID) PCA 0.2 mg/mL 30 mL syringe   Intravenous Continuous Yevgeniy Ware MD       • " iopamidol (ISOVUE-300) 61 % injection    PRN Yevgeniy Ware MD   6 mL at 02/28/18 1314   • metoprolol tartrate (LOPRESSOR) injection 5 mg  5 mg Intravenous Once PRN Yevgeniy Ware MD       • naloxone (NARCAN) injection 0.1 mg  0.1 mg Intravenous Q5 Min PRN Yevgeniy Ware MD       • ondansetron (ZOFRAN) tablet 4 mg  4 mg Oral Q6H PRN Yevgeniy Ware MD        Or   • ondansetron ODT (ZOFRAN-ODT) disintegrating tablet 4 mg  4 mg Oral Q6H PRN Yevgenyi Ware MD        Or   • ondansetron (ZOFRAN) injection 4 mg  4 mg Intravenous Q6H PRN Yevgeniy Ware MD       • sodium chloride 0.9 % infusion  75 mL/hr Intravenous Continuous Yevgeniy Ware MD 75 mL/hr at 03/02/18 2140 75 mL/hr at 03/02/18 2140       Prior to admission medications:  Prescriptions Prior to Admission   Medication Sig Dispense Refill Last Dose   • oxyCODONE-acetaminophen (PERCOCET)  MG per tablet Take 1 tablet by mouth Every 6 (Six) Hours As Needed for Moderate Pain  or Severe Pain . 120 tablet 0 2/28/2018 at 0700   • acetaminophen (TYLENOL) 500 MG tablet Take 500 mg by mouth Every 6 (Six) Hours As Needed for Mild Pain .   More than a month at Unknown time   • aspirin 81 MG EC tablet Take 1 tablet by mouth Daily. (Patient taking differently: Take 81 mg by mouth Daily. Last dose 2/15/18) 30 tablet 0 2/21/2018   • atorvastatin (LIPITOR) 10 MG tablet Take 1 tablet by mouth Daily. 30 tablet 5 2/25/2018   • Calcium Carb-Cholecalciferol (CALCIUM 600 + D PO) Take 1 capsule by mouth 2 (Two) Times a Day.   2/25/2018   • cholecalciferol (VITAMIN D3) 1000 units tablet Take 1,000 Units by mouth Daily.   2/25/2018   • digoxin (LANOXIN) 125 MCG tablet Take 125 mcg by mouth Daily.   2/25/2018   • diltiazem XR (DILACOR XR) 180 MG 24 hr capsule Take 1 capsule by mouth Daily. 30 capsule 3 2/25/2018   • Interferon Beta-1b (EXTAVIA SC) Inject 1 mL under the skin Every 30 (Thirty) Days.   More than a month at  Unknown time   • isosorbide mononitrate (IMDUR) 30 MG 24 hr tablet Take 1 tablet by mouth Daily. 30 tablet 0 2/25/2018   • letrozole (FEMARA) 2.5 MG tablet Take 2.5 mg by mouth Daily.   2/25/2018   • letrozole (FEMARA) 2.5 MG tablet TAKE 1 TABLET BY MOUTH DAILY. 30 tablet 3 2/25/2018   • losartan (COZAAR) 100 MG tablet Take 1 tablet by mouth Daily. 30 tablet 0 2/25/2018   • Magnesium 250 MG tablet Take 1 tablet by mouth Daily.   2/25/2018   • ondansetron (ZOFRAN) 4 MG tablet Take 1 tablet by mouth 2 (Two) Times a Day As Needed for Nausea or Vomiting. 40 tablet 5 2/25/2018   • pantoprazole (PROTONIX) 40 MG EC tablet Take 1 tablet by mouth Daily. 30 tablet 5 2/25/2018   • polyethylene glycol (MIRALAX) powder Take 17 g by mouth Daily. 527 g 1 2/25/2018   • rivaroxaban (XARELTO) 20 MG tablet Take 1 tablet by mouth Daily. (Patient taking differently: Take 20 mg by mouth Daily. Last dose 2/13/18) 30 tablet 11 2/21/2018   • sucralfate (CARAFATE) 1 g tablet Take 1 g by mouth 3 (Three) Times a Day.   2/25/2018   • Zinc 50 MG capsule Take 1 tablet by mouth Daily.   2/25/2018       Physical exam: alert and appropriate  Abd soft  Wound clean and intact  g and j tube sites clean      Assessment : pod 3 improving      Plan: Change iv fluids and give more free water  Continue present care.

## 2018-03-03 NOTE — PLAN OF CARE
Problem: Patient Care Overview (Adult)  Goal: Plan of Care Review  Outcome: Ongoing (interventions implemented as appropriate)   03/03/18 1052 03/03/18 1118   Coping/Psychosocial Response Interventions   Plan Of Care Reviewed With patient;daughter --    Patient Care Overview   Progress no change --    Outcome Evaluation   Outcome Summary/Follow up Plan --  Pt zuleyma tx well with good participation. Pt r/f sup-sit with Min Ax1, sit-stand-sit with SBAx1. Amb 124', and 152' with FWRW with SBAx1. Pt will benefit from 24/7 care and HHPT at this time upon discharge.       Problem: Inpatient Physical Therapy  Goal: Bed Mobility Goal STG- PT  Outcome: Ongoing (interventions implemented as appropriate)   03/02/18 1331   Bed Mobility PT STG   Bed Mobility PT STG, Date Established 03/02/18   Bed Mobility PT STG, Time to Achieve 2 - 3 days   Bed Mobility PT STG, Activity Type all bed mobility   Bed Mobility PT STG, [place] Level conditional independence   Bed Mobility PT STG, Additional Goal HOB flat, no bed rails.    Bed Mobility PT STG, Date Goal Reviewed 03/02/18   Bed Mobility PT STG, Outcome goal ongoing     Goal: Transfer Training Goal 1 STG- PT  Outcome: Ongoing (interventions implemented as appropriate)   03/02/18 1331   Transfer Training PT STG   Transfer Training PT STG, Date Established 03/02/18   Transfer Training PT STG, Time to Achieve 2 - 3 days   Transfer Training PT STG, Activity Type bed to chair /chair to bed;sit to stand/stand to sit   Transfer Training PT STG, Assist Device walker, rolling   Transfer Training PT STG, Date Goal Reviewed 03/02/18   Transfer Training PT STG, Outcome goal ongoing     Goal: Gait Training Goal LTG- PT  Outcome: Ongoing (interventions implemented as appropriate)   03/02/18 1331   Gait Training PT LTG   Gait Training Goal PT LTG, Date Established 03/02/18   Gait Training Goal PT LTG, Time to Achieve by discharge   Gait Training Goal PT LTG, [place] Level conditional  independence   Gait Training Goal PT LTG, Assist Device walker, rolling   Gait Training Goal PT LTG, Distance to Achieve 200'x1.   Gait Training Goal PT LTG, Date Goal Reviewed 03/02/18   Gait Training Goal PT LTG, Outcome goal ongoing     Goal: Physical Therapy Goal LTG- PT  Outcome: Ongoing (interventions implemented as appropriate)   03/02/18 1331   Physical Therapy PT LTG   Physical Therapy PT LTG, Date Established 03/02/18   Physical Therapy PT LTG, Activity Type [place] fall risk assessement.    Physical Therapy PT LTG, Addisional Goal Score 24/28 or low fall risk.    Physical Therapy PT LTG, Date Goal Reviewed 03/02/18   Physical Therapy PT LTG, Outcome goal ongoing

## 2018-03-03 NOTE — THERAPY TREATMENT NOTE
Acute Care - Physical Therapy Treatment Note  [facility]     Patient Name: Johanne Mayer  : 1950  MRN: [id number]  Today's Date: 3/3/2018  Onset of Illness/Injury or Date of Surgery Date: 18  Date of Referral to PT: 18  Referring Physician: TAHIRA Ware MD.    Admit Date: 2018    Visit Dx:    ICD-10-CM ICD-9-CM   1. Duodenal obstruction K31.5 537.3   2. Impaired physical mobility Z74.09 781.99     Patient Active Problem List   Diagnosis   • Malignant neoplasm of left breast   • Pancytopenia due to antineoplastic chemotherapy   • Metastasis to bone   • Dyspnea on exertion   • PAF (paroxysmal atrial fibrillation)   • Osteoarthritis   • Lymphadenopathy   • Lump of breast, left   • Localized enlarged lymph nodes   • History of echocardiogram   • Heartburn   • GERD (gastroesophageal reflux disease)   • Essential hypertension   • Carcinoma, female breast, infiltrating lobular   • Atrial fibrillation   • Current use of long term anticoagulation   • Sepsis   • E coli bacteremia   • Acute cystitis   • Hypokalemia   • Dilated intrahepatic bile duct   • Acute kidney injury   • Renal calyceal dilation determined by ultrasound   • Duodenal obstruction               Adult Rehabilitation Note       18 0905          Rehab Assessment/Intervention    Discipline physical therapy assistant  -EM      Document Type therapy note (daily note)  -EM      Subjective Information agree to therapy  -EM      Patient Effort, Rehab Treatment good  -EM      Precautions/Limitations fall precautions  -EM      Recorded by [EM] Arie De La Garza PTA      Vital Signs    Pre Systolic BP Rehab 163  -EM      Pre Treatment Diastolic BP 79  -EM      Post Systolic BP Rehab 134  -EM      Post Treatment Diastolic BP 70  -EM      Pretreatment Heart Rate (beats/min) 79  -EM      Posttreatment Heart Rate (beats/min) 97  -EM      Pre SpO2 (%) 95  -EM      O2 Delivery Pre Treatment room air  -EM      Post SpO2 (%) 97  -EM       O2 Delivery Post Treatment room air  -EM      Pre Patient Position Sitting  -EM      Intra Patient Position Standing  -EM      Post Patient Position Sitting  -EM      Recorded by [EM] Arie De La Garza PTA      Pain Assessment    Pain Assessment 0-10  -EM      Pain Score 3  -EM      Post Pain Score 0  -EM      Pain Type Acute pain  -EM      Pain Location Abdomen  -EM      Recorded by [EM] Arie De La Garza PTA      Vision Assessment/Intervention    Visual Impairment WFL with corrective lenses  -EM      Recorded by [EM] Arie De La Garza PTA      Cognitive Assessment/Intervention    Current Cognitive/Communication Assessment functional  -EM      Orientation Status oriented x 4  -EM      Follows Commands/Answers Questions 100% of the time  -EM      Personal Safety WNL/WFL  -EM      Personal Safety Interventions gait belt;nonskid shoes/slippers when out of bed  -EM      Recorded by [EM] Arie De La Garza PTA      Bed Mobility, Assessment/Treatment    Bed Mob, Supine to Sit, [place] minimum assist (75% patient effort)  -EM      Recorded by [EM] Arie De La Garza PTA      Transfer Assessment/Treatment    Transfers, Sit-Stand [place] stand by assist  -EM      Transfers, Stand-Sit [place] stand by assist  -EM      Transfers, Sit-Stand-Sit, Assist Device rolling walker  -EM      Recorded by [EM] Arie De La Garza PTA      Gait Assessment/Treatment    Gait, [place] Level stand by assist  -EM      Gait, Assistive Device rolling walker  -EM      Gait, Distance (Feet) 124   +152'  -EM      Recorded by [EM] Arie De La Garza PTA      Positioning and Restraints    Pre-Treatment Position in bed  -EM      Post Treatment Position chair  -EM      In Chair reclined;call light within reach;encouraged to call for assist;with family/caregiver  -EM      Recorded by [EM] Arie De La Garza PTA        User Key  (r) = Recorded By, (t) = Taken By, (c) = Cosigned By    Initials Name Effective Dates    EM Arie De La Garza PTA 08/11/15 -                  IP PT Goals       03/02/18 1331          Bed Mobility PT STG    Bed Mobility PT STG, Date Established 03/02/18  -CZ      Bed Mobility PT STG, Time to Achieve 2 - 3 days  -CZ      Bed Mobility PT STG, Activity Type all bed mobility  -CZ      Bed Mobility PT STG, [place] Level conditional independence  -CZ      Bed Mobility PT STG, Additional Goal HOB flat, no bed rails.   -CZ      Bed Mobility PT STG, Date Goal Reviewed 03/02/18  -CZ      Bed Mobility PT STG, Outcome goal ongoing  -CZ      Transfer Training PT STG    Transfer Training PT STG, Date Established 03/02/18  -CZ      Transfer Training PT STG, Time to Achieve 2 - 3 days  -CZ      Transfer Training PT STG, Activity Type bed to chair /chair to bed;sit to stand/stand to sit  -CZ      Transfer Training PT STG, Assist Device walker, rolling  -CZ      Transfer Training PT STG, Date Goal Reviewed 03/02/18  -CZ      Transfer Training PT STG, Outcome goal ongoing  -CZ      Gait Training PT LTG    Gait Training Goal PT LTG, Date Established 03/02/18  -CZ      Gait Training Goal PT LTG, Time to Achieve by discharge  -CZ      Gait Training Goal PT LTG, [place] Level conditional independence  -CZ      Gait Training Goal PT LTG, Assist Device walker, rolling  -CZ      Gait Training Goal PT LTG, Distance to Achieve 200'x1.  -CZ      Gait Training Goal PT LTG, Date Goal Reviewed 03/02/18  -CZ      Gait Training Goal PT LTG, Outcome goal ongoing  -CZ      Physical Therapy PT LTG    Physical Therapy PT LTG, Date Established 03/02/18  -CZ      Physical Therapy PT LTG, Activity Type Tinetti fall risk assessement.   -CZ      Physical Therapy PT LTG, Addisional Goal Score 24/28 or low fall risk.   -CZ      Physical Therapy PT LTG, Date Goal Reviewed 03/02/18  -CZ      Physical Therapy PT LTG, Outcome goal ongoing  -CZ        User Key  (r) = Recorded By, (t) = Taken By, (c) = Cosigned By    Initials Name Provider Type    VIJAY Marin, PT Physical Therapist           Physical Therapy Education     Title: PT OT SLP Therapies (Active)     Topic: Physical Therapy (Active)     Point: Mobility training (Active)    Learning Progress Summary    Learner Readiness Method Response Comment Documented by Status   Patient Acceptance E NR Educated patient and her daughter on proper guarding technique with gait and transfers; proper use of gait belt.  03/02/18 1503 Active   Family Acceptance E NR Educated patient and her daughter on proper guarding technique with gait and transfers; proper use of gait belt. CZ 03/02/18 1503 Active               Point: Precautions (Active)    Learning Progress Summary    Learner Readiness Method Response Comment Documented by Status   Patient Acceptance E NR Tinetti assessed: 20/28 or moderate fall risk. Recommend continued use FWW and assistance with mobility.  03/02/18 1500 Active                      User Key     Initials Effective Dates Name Provider Type Discipline     02/17/17 -  Neel Marin, PT Physical Therapist PT                    PT Recommendation and Plan  Anticipated Discharge Disposition: home with home health  Planned Therapy Interventions: balance training, bed mobility training, gait training, home exercise program, stair training, strengthening, transfer training  PT Frequency: other (see comments) (5-14x/week)  Plan of Care Review  Outcome Summary/Follow up Plan: Pt zuleyma tx well with good participation. Pt r/f sup-sit with Min Ax1, sit-stand-sit with SBAx1. Amb 124', and 152' with FWRW with SBAx1. Pt will benefit from 24/7 care and HHPT at this time upon discharge.          Outcome Measures       03/03/18 1100 03/02/18 1331       How much help from another person do you currently need...    Turning from your back to your side while in flat bed without using bedrails? 3  -EM 3  -CZ     Moving from lying on back to sitting on the side of a flat bed without bedrails? 3  -EM 3  -CZ     Moving to and from a bed to a chair  "(including a wheelchair)? 3  -EM 3  -CZ     Standing up from a chair using your arms (e.g., wheelchair, bedside chair)? 3  -EM 3  -CZ     Climbing 3-5 steps with a railing? 3  -EM 3  -CZ     To walk in hospital room? 3  -EM 3  -CZ     AM-PAC 6 Clicks Score 18  -EM 18  -CZ     Tinetti Assessment    Tinetti Assessment  yes  -CZ     Sitting Balance  1  -CZ     Arises  1  -CZ     Attempts to Rise  2  -CZ     Immediate Standing Balance (first 5 sec)  1  -CZ     Standing Balance  1  -CZ     Sternal Nudge (feet close together)  1  -CZ     Eyes Closed (feet close together)  1  -CZ     Turning 360 Degrees- Steps  1  -CZ     Turning 360 Degrees- Steadiness  1  -CZ     Sitting Down  1  -CZ     Tinetti Balance Score  11  -CZ     Gait Initiation (immediate after told \"go\")  1  -CZ     Step Length- Right Swing  1  -CZ     Step Length- Left Swing  1  -CZ     Foot Clearance- Right Foot  1  -CZ     Foot Clearance- Left Foot  1  -CZ     Step Symmetry  1  -CZ     Step Continuity  1  -CZ     Path (excursion)  1  -CZ     Trunk  0  -CZ     Base of Support  1  -CZ     Gait Score  9  -CZ     Tinetti Total Score  20  -CZ     Tinetti Assistive Device  rolling walker  -CZ     Functional Assessment    Outcome Measure Options AM-PAC 6 Clicks Basic Mobility (PT)  -EM        User Key  (r) = Recorded By, (t) = Taken By, (c) = Cosigned By    Initials Name Provider Type    OLY De La Garza PTA Physical Therapy Assistant     Neel Marin, PT Physical Therapist           Time Calculation:         PT Charges       03/03/18 1121          Time Calculation    Start Time 0905  -EM      Stop Time 0930  -EM      Time Calculation (min) 25 min  -EM      Time Calculation- PT    Total Timed Code Minutes- PT 25 minute(s)  -EM        User Key  (r) = Recorded By, (t) = Taken By, (c) = Cosigned By    Initials Name Provider Type    OLY De La Garza PTA Physical Therapy Assistant          Therapy Charges for Today     Code Description Service Date Service " Provider Modifiers Qty    92833240740 HC PT THERAPEUTIC ACT EA 15 MIN 3/3/2018 Arie De La Garza PTA GP 1    78876252911 HC GAIT TRAINING EA 15 MIN 3/3/2018 Arie De La Garza PTA GP 1          PT G-Codes  PT Professional Judgement Used?: Yes  Outcome Measure Options: AM-PAC 6 Clicks Basic Mobility (PT)  Score: 20  Functional Limitation: Mobility: Walking and moving around  Mobility: Walking and Moving Around Current Status (): At least 20 percent but less than 40 percent impaired, limited or restricted  Mobility: Walking and Moving Around Goal Status (): At least 1 percent but less than 20 percent impaired, limited or restricted    Arie De La Garza PTA  3/3/2018

## 2018-03-04 NOTE — PROGRESS NOTES
"  Subjective:  No complaints.  Resting comfortably.  Tolerating tube feedings.       /73 (BP Location: Right arm, Patient Position: Lying)  Pulse 75  Temp 98.5 °F (36.9 °C) (Oral)   Resp 16  Ht 157.5 cm (62\")  Wt 75.8 kg (167 lb)  LMP  (LMP Unknown)  SpO2 97%  Breastfeeding? No  BMI 30.54 kg/m2    Lab Results (last 24 hours)     ** No results found for the last 24 hours. **          Current Medications:  Current Facility-Administered Medications   Medication Dose Route Frequency Provider Last Rate Last Dose   • dextrose 5 % and sodium chloride 0.45 % with KCl 20 mEq/L infusion  50 mL/hr Intravenous Continuous Ming Loya MD 50 mL/hr at 03/03/18 1203 50 mL/hr at 03/03/18 1203   • famotidine (PEPCID) injection 20 mg  20 mg Intravenous Q12H Yevgeniy Ware MD   20 mg at 03/04/18 1101   • heparin (porcine) 5000 UNIT/ML injection 5,000 Units  5,000 Units Subcutaneous Q8H Yevgeniy Ware MD   5,000 Units at 03/04/18 0530   • HYDROmorphone (DILAUDID) injection 0.5 mg  0.5 mg Intravenous Q2H PRN Yevgeniy Ware MD        And   • naloxone (NARCAN) injection 0.1 mg  0.1 mg Intravenous Q5 Min PRN Yevgeniy Ware MD       • HYDROmorphone (DILAUDID) PCA 0.2 mg/mL 30 mL syringe   Intravenous Continuous Yevgeniy Ware MD       • iopamidol (ISOVUE-300) 61 % injection    PRN Yevgeniy Ware MD   6 mL at 02/28/18 1314   • metoprolol tartrate (LOPRESSOR) injection 5 mg  5 mg Intravenous Once PRN Yevgeniy Ware MD       • naloxone (NARCAN) injection 0.1 mg  0.1 mg Intravenous Q5 Min PRN Yevgeniy Ware MD       • ondansetron (ZOFRAN) tablet 4 mg  4 mg Oral Q6H PRN Yevgeniy Ware MD        Or   • ondansetron ODT (ZOFRAN-ODT) disintegrating tablet 4 mg  4 mg Oral Q6H PRN Yevgeniy Ware MD        Or   • ondansetron (ZOFRAN) injection 4 mg  4 mg Intravenous Q6H PRN Yevgeniy Ware MD           Prior to admission " medications:  Prescriptions Prior to Admission   Medication Sig Dispense Refill Last Dose   • oxyCODONE-acetaminophen (PERCOCET)  MG per tablet Take 1 tablet by mouth Every 6 (Six) Hours As Needed for Moderate Pain  or Severe Pain . 120 tablet 0 2/28/2018 at 0700   • acetaminophen (TYLENOL) 500 MG tablet Take 500 mg by mouth Every 6 (Six) Hours As Needed for Mild Pain .   More than a month at Unknown time   • aspirin 81 MG EC tablet Take 1 tablet by mouth Daily. (Patient taking differently: Take 81 mg by mouth Daily. Last dose 2/15/18) 30 tablet 0 2/21/2018   • atorvastatin (LIPITOR) 10 MG tablet Take 1 tablet by mouth Daily. 30 tablet 5 2/25/2018   • Calcium Carb-Cholecalciferol (CALCIUM 600 + D PO) Take 1 capsule by mouth 2 (Two) Times a Day.   2/25/2018   • cholecalciferol (VITAMIN D3) 1000 units tablet Take 1,000 Units by mouth Daily.   2/25/2018   • digoxin (LANOXIN) 125 MCG tablet Take 125 mcg by mouth Daily.   2/25/2018   • diltiazem XR (DILACOR XR) 180 MG 24 hr capsule Take 1 capsule by mouth Daily. 30 capsule 3 2/25/2018   • Interferon Beta-1b (EXTAVIA SC) Inject 1 mL under the skin Every 30 (Thirty) Days.   More than a month at Unknown time   • isosorbide mononitrate (IMDUR) 30 MG 24 hr tablet Take 1 tablet by mouth Daily. 30 tablet 0 2/25/2018   • letrozole (FEMARA) 2.5 MG tablet Take 2.5 mg by mouth Daily.   2/25/2018   • letrozole (FEMARA) 2.5 MG tablet TAKE 1 TABLET BY MOUTH DAILY. 30 tablet 3 2/25/2018   • losartan (COZAAR) 100 MG tablet Take 1 tablet by mouth Daily. 30 tablet 0 2/25/2018   • Magnesium 250 MG tablet Take 1 tablet by mouth Daily.   2/25/2018   • ondansetron (ZOFRAN) 4 MG tablet Take 1 tablet by mouth 2 (Two) Times a Day As Needed for Nausea or Vomiting. 40 tablet 5 2/25/2018   • pantoprazole (PROTONIX) 40 MG EC tablet Take 1 tablet by mouth Daily. 30 tablet 5 2/25/2018   • polyethylene glycol (MIRALAX) powder Take 17 g by mouth Daily. 527 g 1 2/25/2018   • rivaroxaban (XARELTO)  20 MG tablet Take 1 tablet by mouth Daily. (Patient taking differently: Take 20 mg by mouth Daily. Last dose 2/13/18) 30 tablet 11 2/21/2018   • sucralfate (CARAFATE) 1 g tablet Take 1 g by mouth 3 (Three) Times a Day.   2/25/2018   • Zinc 50 MG capsule Take 1 tablet by mouth Daily.   2/25/2018       Physical exam: Wounds clean and dry  G and j tube sites clean    Assessment : Pod 4 gastric and biliary bypass    Plan: Continue present care

## 2018-03-04 NOTE — PLAN OF CARE
Problem: Fall Risk (Adult)  Goal: Identify Related Risk Factors and Signs and Symptoms  Outcome: Ongoing (interventions implemented as appropriate)    Goal: Absence of Falls  Outcome: Ongoing (interventions implemented as appropriate)      Problem: Pain, Acute (Adult)  Goal: Identify Related Risk Factors and Signs and Symptoms  Outcome: Ongoing (interventions implemented as appropriate)    Goal: Acceptable Pain Control/Comfort Level  Outcome: Ongoing (interventions implemented as appropriate)      Problem: Infection, Risk/Actual (Adult)  Goal: Identify Related Risk Factors and Signs and Symptoms  Outcome: Ongoing (interventions implemented as appropriate)    Goal: Infection Prevention/Resolution  Outcome: Ongoing (interventions implemented as appropriate)      Problem: Nutrition, Imbalanced: Inadequate Oral Intake (Adult)  Goal: Identify Related Risk Factors and Signs and Symptoms  Outcome: Ongoing (interventions implemented as appropriate)    Goal: Improved Oral Intake  Outcome: Ongoing (interventions implemented as appropriate)    Goal: Prevent Further Weight Loss  Outcome: Ongoing (interventions implemented as appropriate)      Problem: Pressure Ulcer Risk (Luis Scale) (Adult,Obstetrics,Pediatric)  Goal: Identify Related Risk Factors and Signs and Symptoms  Outcome: Ongoing (interventions implemented as appropriate)    Goal: Skin Integrity  Outcome: Ongoing (interventions implemented as appropriate)

## 2018-03-05 NOTE — MEDICAL STUDENT
GENERAL SURGERY PROGRESS NOTE  Chief Complaint:  Surgery Follow up   LOS: 5 days       Subjective     Interval History:     No complaints at this time. No acute events overnight. Ambulating. No gas, no BMs. Tube feeds at max of 60.    Objective     Vital Signs  Temp:  [96.1 °F (35.6 °C)-100 °F (37.8 °C)] 97.7 °F (36.5 °C)  Heart Rate:  [67-79] 67  Resp:  [16-18] 16  BP: (128-165)/(69-77) 140/73    Physical Exam:   Abdomen is soft. Positive bowel sounds. Incision is clean and dry. Serosanguinous fluid in FERMIN drain.  Labs:  Lab Results (last 24 hours)     ** No results found for the last 24 hours. **           Results Review:     Labs and imaging for today were reviewed.    Assessment/Plan     Johanne Mayer is a 67 y.o. female who POD 5 loop gastrojejunostomy, Ru En Y hepaticojejunostomy, Gtube, Jtube, Right ureteral stent.      Continue present care.          This document has been electronically signed by Shahbaz Espinoza on March 5, 2018 5:33 AM        Shahbaz Espinoza  03/05/18  5:33 AM    STAFF:  As above. Tolerating TF at goal. FERMIN serous. Had BM this AM after med student rounded.   Abdomen healing appropriately  Will clamp Gtube today and initiate liquid diet by mouth.  DC PCA  DC IVF  Continue OOB.  Overall doing well.          This document has been electronically signed by Yevgeniy Ware MD on March 5, 2018 7:46 AM

## 2018-03-05 NOTE — PLAN OF CARE
Problem: Patient Care Overview (Adult)  Goal: Plan of Care Review  Outcome: Ongoing (interventions implemented as appropriate)   03/05/18 7054   Coping/Psychosocial Response Interventions   Plan Of Care Reviewed With patient;daughter   Patient Care Overview   Progress improving   Outcome Evaluation   Outcome Summary/Follow up Plan Pt reports mild nausea w/TF at goal rate 60cc/hr and clear liquids po, but says overall she's feeling better.

## 2018-03-05 NOTE — PLAN OF CARE
Problem: Patient Care Overview (Adult)  Goal: Plan of Care Review  Outcome: Ongoing (interventions implemented as appropriate)   03/05/18 2453   Coping/Psychosocial Response Interventions   Plan Of Care Reviewed With patient   Patient Care Overview   Progress improving   Outcome Evaluation   Outcome Summary/Follow up Plan g tube clamped. slight nausea resolved when given zofran. vss. pca d/cd.       Problem: Fall Risk (Adult)  Goal: Identify Related Risk Factors and Signs and Symptoms  Outcome: Outcome(s) achieved Date Met: 03/05/18    Goal: Absence of Falls  Outcome: Ongoing (interventions implemented as appropriate)      Problem: Pain, Acute (Adult)  Goal: Identify Related Risk Factors and Signs and Symptoms  Outcome: Outcome(s) achieved Date Met: 03/05/18    Goal: Acceptable Pain Control/Comfort Level  Outcome: Ongoing (interventions implemented as appropriate)      Problem: Infection, Risk/Actual (Adult)  Goal: Identify Related Risk Factors and Signs and Symptoms  Outcome: Ongoing (interventions implemented as appropriate)    Goal: Infection Prevention/Resolution  Outcome: Ongoing (interventions implemented as appropriate)      Problem: Nutrition, Imbalanced: Inadequate Oral Intake (Adult)  Goal: Identify Related Risk Factors and Signs and Symptoms  Outcome: Outcome(s) achieved Date Met: 03/05/18    Goal: Improved Oral Intake  Outcome: Ongoing (interventions implemented as appropriate)    Goal: Prevent Further Weight Loss  Outcome: Ongoing (interventions implemented as appropriate)      Problem: Pressure Ulcer Risk (Luis Scale) (Adult,Obstetrics,Pediatric)  Goal: Identify Related Risk Factors and Signs and Symptoms  Outcome: Outcome(s) achieved Date Met: 03/05/18    Goal: Skin Integrity  Outcome: Ongoing (interventions implemented as appropriate)

## 2018-03-05 NOTE — PLAN OF CARE
Problem: Patient Care Overview (Adult)  Goal: Plan of Care Review  Outcome: Ongoing (interventions implemented as appropriate)   03/05/18 1036   Coping/Psychosocial Response Interventions   Plan Of Care Reviewed With patient   Patient Care Overview   Progress progress toward functional goals as expected   Outcome Evaluation   Outcome Summary/Follow up Plan pt responded well to therapy w/ increased gait to 280 SBA w/ RW. pt participated in LE ther ex until becoming nauseated. pt edu on HEP and home safety. no new goals met at this time. pt is progressing and would continue to benefit from PT services. Recommend  PT upon DC.        Problem: Inpatient Physical Therapy  Goal: Bed Mobility Goal STG- PT  Outcome: Ongoing (interventions implemented as appropriate)   03/02/18 1331 03/05/18 1036   Bed Mobility PT STG   Bed Mobility PT STG, Date Established 03/02/18 --    Bed Mobility PT STG, Time to Achieve 2 - 3 days --    Bed Mobility PT STG, Activity Type all bed mobility --    Bed Mobility PT STG, [place] Level conditional independence --    Bed Mobility PT STG, Additional Goal HOB flat, no bed rails.  --    Bed Mobility PT STG, Date Goal Reviewed --  03/05/18   Bed Mobility PT STG, Outcome --  goal ongoing     Goal: Transfer Training Goal 1 STG- PT  Outcome: Ongoing (interventions implemented as appropriate)   03/02/18 1331 03/05/18 1036   Transfer Training PT STG   Transfer Training PT STG, Date Established 03/02/18 --    Transfer Training PT STG, Time to Achieve 2 - 3 days --    Transfer Training PT STG, Activity Type bed to chair /chair to bed;sit to stand/stand to sit --    Transfer Training PT STG, Assist Device walker, rolling --    Transfer Training PT STG, Date Goal Reviewed --  03/05/18   Transfer Training PT STG, Outcome --  goal ongoing     Goal: Gait Training Goal LTG- PT  Outcome: Ongoing (interventions implemented as appropriate)   03/02/18 1331 03/05/18 1036   Gait Training PT LTG   Gait Training  Goal PT LTG, Date Established 03/02/18 --    Gait Training Goal PT LTG, Time to Achieve by discharge --    Gait Training Goal PT LTG, [place] Level conditional independence --    Gait Training Goal PT LTG, Assist Device walker, rolling --    Gait Training Goal PT LTG, Distance to Achieve 200'x1. --    Gait Training Goal PT LTG, Date Goal Reviewed --  03/05/18   Gait Training Goal PT LTG, Outcome --  goal ongoing     Goal: Physical Therapy Goal LTG- PT  Outcome: Ongoing (interventions implemented as appropriate)   03/02/18 1331 03/05/18 1036   Physical Therapy PT LTG   Physical Therapy PT LTG, Date Established 03/02/18 --    Physical Therapy PT LTG, Activity Type Tinetti fall risk assessement.  --    Physical Therapy PT LTG, Addisional Goal Score 24/28 or low fall risk.  --    Physical Therapy PT LTG, Date Goal Reviewed --  03/05/18   Physical Therapy PT LTG, Outcome --  goal ongoing

## 2018-03-05 NOTE — THERAPY TREATMENT NOTE
Acute Care - Physical Therapy Treatment Note  [facility]     Patient Name: Johanne Mayer  : 1950  MRN: [id number]  Today's Date: 3/5/2018  Onset of Illness/Injury or Date of Surgery Date: 18  Date of Referral to PT: 18  Referring Physician: TAHIRA Ware MD.    Admit Date: 2018    Visit Dx:    ICD-10-CM ICD-9-CM   1. Duodenal obstruction K31.5 537.3   2. Impaired physical mobility Z74.09 781.99     Patient Active Problem List   Diagnosis   • Malignant neoplasm of left breast   • Pancytopenia due to antineoplastic chemotherapy   • Metastasis to bone   • Dyspnea on exertion   • PAF (paroxysmal atrial fibrillation)   • Osteoarthritis   • Lymphadenopathy   • Lump of breast, left   • Localized enlarged lymph nodes   • History of echocardiogram   • Heartburn   • GERD (gastroesophageal reflux disease)   • Essential hypertension   • Carcinoma, female breast, infiltrating lobular   • Atrial fibrillation   • Current use of long term anticoagulation   • Sepsis   • E coli bacteremia   • Acute cystitis   • Hypokalemia   • Dilated intrahepatic bile duct   • Acute kidney injury   • Renal calyceal dilation determined by ultrasound   • Duodenal obstruction               Adult Rehabilitation Note       18 1036 18 0905       Rehab Assessment/Intervention    Discipline physical therapy assistant  -LAURIE physical therapy assistant  -EM     Document Type therapy note (daily note)  -LAURIE therapy note (daily note)  -EM     Subjective Information agree to therapy  -LAURIE agree to therapy  -EM     Patient Effort, Rehab Treatment good  -LAURIE good  -EM     Patient Effort, Rehab Treatment Comment pt c/o nausea at end of tx.   -LAURIE      Precautions/Limitations fall precautions  -LAURIE fall precautions  -EM     Recorded by [LAURIE] Francis Vora PTA [EM] Arie De La Garza PTA     Vital Signs    Pre Systolic BP Rehab 139  -LAURIE 163  -EM     Pre Treatment Diastolic BP 73  -LAURIE 79  -EM     Post Systolic BP Rehab 123   -LAURIE 134  -EM     Post Treatment Diastolic BP 79  -LAURIE 70  -EM     Pretreatment Heart Rate (beats/min) 69  -LAURIE 79  -EM     Intratreatment Heart Rate (beats/min) 74  -LAURIE      Posttreatment Heart Rate (beats/min) 74  -LAURIE 97  -EM     Pre SpO2 (%) 99  -LAURIE 95  -EM     O2 Delivery Pre Treatment room air  -LAURIE room air  -EM     Intra SpO2 (%) 99  -LAURIE      O2 Delivery Intra Treatment room air  -LAURIE      Post SpO2 (%) 98  -LAURIE 97  -EM     O2 Delivery Post Treatment room air  -LAURIE room air  -EM     Pre Patient Position Sitting  -LAURIE Sitting  -EM     Intra Patient Position  Standing  -EM     Post Patient Position Sitting  -LAURIE Sitting  -EM     Recorded by [LAURIE] Francis Vora PTA [EM] Arie De La Garza PTA     Pain Assessment    Pain Assessment 0-10  -LAURIE 0-10  -EM     Pain Score 4  -LAURIE 3  -EM     Post Pain Score 4  -LAURIE 0  -EM     Pain Type  Acute pain  -EM     Pain Location Abdomen  -LAURIE Abdomen  -EM     Pain Intervention(s) --   pain meds prior to tx.   -LAURIE      Recorded by [LAURIE] Francis Vora PTA [EM] Arie De La Garza PTA     Vision Assessment/Intervention    Visual Impairment WFL with corrective lenses  -LAURIE WFL with corrective lenses  -EM     Recorded by [LAURIE] Francis Vora PTA [EM] Arie De La Garza PTA     Cognitive Assessment/Intervention    Current Cognitive/Communication Assessment functional  -LAURIE functional  -EM     Orientation Status oriented x 4  -LAURIE oriented x 4  -EM     Follows Commands/Answers Questions 100% of the time  -LAURIE 100% of the time  -EM     Personal Safety WNL/WFL  -LAURIE WNL/WFL  -EM     Personal Safety Interventions gait belt;muscle strengthening facilitated;nonskid shoes/slippers when out of bed;supervised activity  -LAURIE gait belt;nonskid shoes/slippers when out of bed  -EM     Recorded by [LAURIE] Francis Vora PTA [EM] Arie De La Garza PTA     Bed Mobility, Assessment/Treatment    Bed Mob, Supine to Sit, [place]  minimum assist (75% patient effort)  -EM     Recorded by  [EM] Arie De La Garza PTA     Transfer  Assessment/Treatment    Transfers, Sit-Stand [place] stand by assist  -LAURIE stand by assist  -EM     Transfers, Stand-Sit [place] stand by assist  -LAURIE stand by assist  -EM     Transfers, Sit-Stand-Sit, Assist Device rolling walker  -LAURIE rolling walker  -EM     Recorded by [LAURIE] Francis Vora PTA [EM] Arie De La Garza PTA     Gait Assessment/Treatment    Gait, [place] Level stand by assist  -LAURIE stand by assist  -EM     Gait, Assistive Device rolling walker  -LAURIE rolling walker  -EM     Gait, Distance (Feet) 280  -LAURIE 124   +152'  -EM     Gait, Gait Pattern Analysis swing-through gait  -LAURIE      Gait, Gait Deviations urszula decreased;step length decreased  -LAURIE      Gait, Comment pt insists of holding pillow against abdomin and pushing walker with the other hand.   -LAURIE      Recorded by [LAURIE] Francis Vora PTA [EM] Arie De La Garza PTA     Therapy Exercises    Bilateral Lower Extremities AROM:;20 reps;sitting;ankle pumps/circles;LAQ;hip flexion;hip abduction/adduction;glut sets   2 sets of AP. pt defers further tx. pt edu on HEP and HS  -LAURIE      Recorded by [LAURIE] Francis Vora PTA      Positioning and Restraints    Pre-Treatment Position sitting in chair/recliner  -LAURIE in bed  -EM     Post Treatment Position chair  -LAURIE chair  -EM     In Chair sitting;call light within reach;encouraged to call for assist;with family/caregiver   all needs met.   -LAURIE reclined;call light within reach;encouraged to call for assist;with family/caregiver  -EM     Recorded by [LAURIE] Francis Vora PTA [EM] Arie De La Garza PTA       User Key  (r) = Recorded By, (t) = Taken By, (c) = Cosigned By    Initials Name Effective Dates    EM Arie De La Garza PTA 08/11/15 -     LAURIE Francis Vora PTA 10/17/16 -                 IP PT Goals       03/05/18 1036 03/02/18 1331       Bed Mobility PT STG    Bed Mobility PT STG, Date Established  03/02/18  -CZ     Bed Mobility PT STG, Time to Achieve  2 - 3 days  -CZ     Bed Mobility PT STG, Activity  Type  all bed mobility  -CZ     Bed Mobility PT STG, [place] Level  conditional independence  -CZ     Bed Mobility PT STG, Additional Goal  HOB flat, no bed rails.   -CZ     Bed Mobility PT STG, Date Goal Reviewed 03/05/18  -LAURIE 03/02/18  -CZ     Bed Mobility PT STG, Outcome goal ongoing  -LAURIE goal ongoing  -CZ     Transfer Training PT STG    Transfer Training PT STG, Date Established  03/02/18  -CZ     Transfer Training PT STG, Time to Achieve  2 - 3 days  -CZ     Transfer Training PT STG, Activity Type  bed to chair /chair to bed;sit to stand/stand to sit  -CZ     Transfer Training PT STG, Assist Device  walker, rolling  -CZ     Transfer Training PT STG, Date Goal Reviewed 03/05/18  -LAURIE 03/02/18  -CZ     Transfer Training PT STG, Outcome goal ongoing  - goal ongoing  -CZ     Gait Training PT LTG    Gait Training Goal PT LTG, Date Established  03/02/18  -CZ     Gait Training Goal PT LTG, Time to Achieve  by discharge  -CZ     Gait Training Goal PT LTG, [place] Level  conditional independence  -CZ     Gait Training Goal PT LTG, Assist Device  walker, rolling  -CZ     Gait Training Goal PT LTG, Distance to Achieve  200'x1.  -CZ     Gait Training Goal PT LTG, Date Goal Reviewed 03/05/18  -LAURIE 03/02/18  -CZ     Gait Training Goal PT LTG, Outcome goal ongoing  - goal ongoing  -CZ     Physical Therapy PT LTG    Physical Therapy PT LTG, Date Established  03/02/18  -CZ     Physical Therapy PT LTG, Activity Type  Tinetti fall risk assessement.   -CZ     Physical Therapy PT LTG, Addisional Goal  Score 24/28 or low fall risk.   -CZ     Physical Therapy PT LTG, Date Goal Reviewed 03/05/18  -LAURIE 03/02/18  -CZ     Physical Therapy PT LTG, Outcome goal ongoing  - goal ongoing  -CZ       User Key  (r) = Recorded By, (t) = Taken By, (c) = Cosigned By    Initials Name Provider Type    LAURIE Vora, PTA Physical Therapy Assistant    VIJAY Marin, PT Physical Therapist          Physical Therapy Education      Title: PT OT SLP Therapies (Active)     Topic: Physical Therapy (Active)     Point: Mobility training (Active)    Learning Progress Summary    Learner Readiness Method Response Comment Documented by Status   Patient Acceptance E NR   03/05/18 1138 Active    Acceptance E NR Educated patient and her daughter on proper guarding technique with gait and transfers; proper use of gait belt.  03/02/18 1503 Active   Family Acceptance E NR Educated patient and her daughter on proper guarding technique with gait and transfers; proper use of gait belt.  03/02/18 1503 Active               Point: Home exercise program (Active)    Learning Progress Summary    Learner Readiness Method Response Comment Documented by Status   Patient Acceptance E NR   03/05/18 1138 Active               Point: Precautions (Active)    Learning Progress Summary    Learner Readiness Method Response Comment Documented by Status   Patient Acceptance E NR Tinetti assessed: 20/28 or moderate fall risk. Recommend continued use FWW and assistance with mobility.  03/02/18 1500 Active                      User Key     Initials Effective Dates Name Provider Type Discipline     10/17/16 -  Francis Vora PTA Physical Therapy Assistant PT     02/17/17 -  Neel Marin, PT Physical Therapist PT                    PT Recommendation and Plan  Anticipated Discharge Disposition: home with home health  Planned Therapy Interventions: balance training, bed mobility training, gait training, home exercise program, stair training, strengthening, transfer training  PT Frequency: other (see comments) (5-14x/week)  Plan of Care Review  Plan Of Care Reviewed With: patient  Progress: progress toward functional goals as expected  Outcome Summary/Follow up Plan: pt responded well to therapy w/ increased gait to 280 SBA w/ RW. pt participated in LE ther ex until becoming nauseated. pt edu on HEP and home safety. no new goals met at this time. pt is progressing and  "would continue to benefit from PT services. Recommend  PT upon DC.           Outcome Measures       03/05/18 1036 03/03/18 1100 03/02/18 1331    How much help from another person do you currently need...    Turning from your back to your side while in flat bed without using bedrails? 3  -LAURIE 3  -EM 3  -CZ    Moving from lying on back to sitting on the side of a flat bed without bedrails? 3  -LAURIE 3  -EM 3  -CZ    Moving to and from a bed to a chair (including a wheelchair)? 3  -LAURIE 3  -EM 3  -CZ    Standing up from a chair using your arms (e.g., wheelchair, bedside chair)? 3  -LAURIE 3  -EM 3  -CZ    Climbing 3-5 steps with a railing? 3  -LAURIE 3  -EM 3  -CZ    To walk in hospital room? 3  -LAURIE 3  -EM 3  -CZ    AM-PAC 6 Clicks Score 18  -LAURIE 18  -EM 18  -CZ    Tinetti Assessment    Tinetti Assessment   yes  -CZ    Sitting Balance   1  -CZ    Arises   1  -CZ    Attempts to Rise   2  -CZ    Immediate Standing Balance (first 5 sec)   1  -CZ    Standing Balance   1  -CZ    Sternal Nudge (feet close together)   1  -CZ    Eyes Closed (feet close together)   1  -CZ    Turning 360 Degrees- Steps   1  -CZ    Turning 360 Degrees- Steadiness   1  -CZ    Sitting Down   1  -CZ    Tinetti Balance Score   11  -CZ    Gait Initiation (immediate after told \"go\")   1  -CZ    Step Length- Right Swing   1  -CZ    Step Length- Left Swing   1  -CZ    Foot Clearance- Right Foot   1  -CZ    Foot Clearance- Left Foot   1  -CZ    Step Symmetry   1  -CZ    Step Continuity   1  -CZ    Path (excursion)   1  -CZ    Trunk   0  -CZ    Base of Support   1  -CZ    Gait Score   9  -CZ    Tinetti Total Score   20  -CZ    Tinetti Assistive Device   rolling walker  -CZ    Functional Assessment    Outcome Measure Options AM-PAC 6 Clicks Basic Mobility (PT)  -LAURIE AM-PAC 6 Clicks Basic Mobility (PT)  -       User Key  (r) = Recorded By, (t) = Taken By, (c) = Cosigned By    Initials Name Provider Type    EM Arie De La Garza, PTA Physical Therapy Assistant    LAURIE DALEY " KATHY Vora Physical Therapy Assistant    VIJAY Marin, PT Physical Therapist           Time Calculation:         PT Charges       03/05/18 1142          Time Calculation    Start Time 1036  -LAURIE      Stop Time 1115  -LAURIE      Time Calculation (min) 39 min  -LAURIE      Time Calculation- PT    Total Timed Code Minutes- PT 39 minute(s)  -LAURIE        User Key  (r) = Recorded By, (t) = Taken By, (c) = Cosigned By    Initials Name Provider Type     Francis Vora PTA Physical Therapy Assistant          Therapy Charges for Today     Code Description Service Date Service Provider Modifiers Qty    53898575629 HC GAIT TRAINING EA 15 MIN 3/5/2018 Francis Vora PTA GP 1    13111243886 HC PT THER PROC EA 15 MIN 3/5/2018 Francis Vora PTA GP 1    39183337137 HC PT SELF CARE/MGMT/TRAIN EA 15 MIN 3/5/2018 Francis Vora PTA GP 1          PT G-Codes  PT Professional Judgement Used?: Yes  Outcome Measure Options: AM-PAC 6 Clicks Basic Mobility (PT)  Score: 20  Functional Limitation: Mobility: Walking and moving around  Mobility: Walking and Moving Around Current Status (): At least 20 percent but less than 40 percent impaired, limited or restricted  Mobility: Walking and Moving Around Goal Status (): At least 1 percent but less than 20 percent impaired, limited or restricted    Fracnis Vora PTA  3/5/2018

## 2018-03-05 NOTE — PLAN OF CARE
Problem: Fall Risk (Adult)  Goal: Identify Related Risk Factors and Signs and Symptoms  Outcome: Ongoing (interventions implemented as appropriate)    Goal: Absence of Falls  Outcome: Ongoing (interventions implemented as appropriate)      Problem: Pain, Acute (Adult)  Goal: Identify Related Risk Factors and Signs and Symptoms  Outcome: Ongoing (interventions implemented as appropriate)    Goal: Acceptable Pain Control/Comfort Level  Outcome: Ongoing (interventions implemented as appropriate)      Problem: Infection, Risk/Actual (Adult)  Goal: Infection Prevention/Resolution  Outcome: Ongoing (interventions implemented as appropriate)

## 2018-03-05 NOTE — CONSULTS
"Adult Nutrition  Assessment    Patient Name:  Johanne Mayer  YOB: 1950  MRN: [id number]  Admit Date:  2/28/2018    Assessment Date:  3/5/2018    Comments:  Pt's TF of Osmolite 1.2 is at goal rate of 60cc/hr. Pt has reported some mild nausea, but says overall she feels better. Pt is now able to take clear liquids po. Labs noted.  RD will monitor as able to advance diet and decrease TF, will make recs accordingly.          Reason for Assessment       03/05/18 1545    Reason for Assessment    Reason For Assessment/Visit follow up protocol;TF/PN                Nutrition/Diet History       03/05/18 1545    Nutrition/Diet History    Typical Food/Fluid Intake Pt says she is tolerating the TF well, but she did have some nausea this am which she attributes to drinking clear liquids then trying to get up and \"doing to much at once\". TF running at 60, which is goal rate.              Labs/Tests/Procedures/Meds       03/05/18 1546    Labs/Tests/Procedures/Meds    Labs/Tests Review Reviewed    Medication Review Reviewed, pertinent                Nutrition Prescription Ordered       03/05/18 1546    Nutrition Prescription PO    Current PO Diet Clear Liquid    Nutrition Prescription EN    Enteral Route Jejunostomy    Product Osmolite 1.2 beto    TF Delivery Method Continuous    Continuous TF Goal Rate (mL/hr) 60 mL/hr    Continuous TF Current Rate (mL/hr) 60 mL/hr            Evaluation of Received Nutrient/Fluid Intake       03/05/18 1547    Evaluation of Received Nutrient/Fluid Intake    Nutrition Delivered Protein Evaluation;Calorie Evaluation    Calorie Intake Evaluation    Enteral Calories (kcal) 1728    Total Calories (kcal) 1728    % Kcal Needs 110%    Protein Intake Evaluation    Enteral Protein (gm) 80    Total Protein (gm) 80    % Protein Needs 100%    EN Evaluation    EN Average Volume Delivered (mL/day) 1440 mL/day    % Goal Volume  100 %            Electronically signed by:  Karen Marie, " RD  03/05/18 3:54 PM

## 2018-03-06 NOTE — PROGRESS NOTES
GENERAL SURGERY PROGRESS NOTE  Chief Complaint:  Surgery Follow up   LOS: 6 days       Subjective     Interval History:     Some nausea today. Still on liquids. Tolerating TF at goal.    Objective     Vital Signs  Temp:  [97.3 °F (36.3 °C)-99.2 °F (37.3 °C)] 99.2 °F (37.3 °C)  Heart Rate:  [65-76] 67  Resp:  [16-18] 16  BP: (130-140)/(66-81) 130/66    Physical Exam:   Abdomen soft, FERMIN serous  Labs:  Lab Results (last 24 hours)     ** No results found for the last 24 hours. **           Results Review:     Labs and imaging for today were reviewed.    Assessment/Plan     Johanne Mayer is a 67 y.o. female who is s/p gastrojejunostomy.      Add Reglan for nausea and to promote emptying.  Tolerating TF.  Overall otherwise well.          This document has been electronically signed by Yevgeniy Ware MD on March 6, 2018 1:02 PM        Yevgeniy Ware MD  03/06/18  1:02 PM

## 2018-03-06 NOTE — PLAN OF CARE
Problem: Patient Care Overview (Adult)  Goal: Plan of Care Review  Outcome: Ongoing (interventions implemented as appropriate)   03/06/18 0201   Coping/Psychosocial Response Interventions   Plan Of Care Reviewed With patient   Patient Care Overview   Progress progress towards functional goals is fair   Outcome Evaluation   Outcome Summary/Follow up Plan G tube clamped again, no reports of nausea, started a new bottle of tube feeding, overall patient doing and resting well.     Goal: Adult Individualization and Mutuality  Outcome: Ongoing (interventions implemented as appropriate)    Goal: Discharge Needs Assessment  Outcome: Ongoing (interventions implemented as appropriate)      Problem: Fall Risk (Adult)  Goal: Absence of Falls  Outcome: Ongoing (interventions implemented as appropriate)      Problem: Pain, Acute (Adult)  Goal: Acceptable Pain Control/Comfort Level  Outcome: Ongoing (interventions implemented as appropriate)      Problem: Infection, Risk/Actual (Adult)  Goal: Identify Related Risk Factors and Signs and Symptoms  Outcome: Ongoing (interventions implemented as appropriate)    Goal: Infection Prevention/Resolution  Outcome: Ongoing (interventions implemented as appropriate)      Problem: Nutrition, Imbalanced: Inadequate Oral Intake (Adult)  Goal: Improved Oral Intake  Outcome: Ongoing (interventions implemented as appropriate)    Goal: Prevent Further Weight Loss  Outcome: Ongoing (interventions implemented as appropriate)      Problem: Pressure Ulcer Risk (Luis Scale) (Adult,Obstetrics,Pediatric)  Goal: Skin Integrity  Outcome: Ongoing (interventions implemented as appropriate)

## 2018-03-06 NOTE — SIGNIFICANT NOTE
03/06/18 1420   Rehab Treatment   Discipline physical therapy assistant   Treatment Not Performed patient/family declined treatment  (pt and her daughter decline therapy. states she hasnot been feeling well and cannot participate in therapy. )

## 2018-03-06 NOTE — PLAN OF CARE
Problem: Patient Care Overview (Adult)  Goal: Plan of Care Review  Outcome: Ongoing (interventions implemented as appropriate)   03/06/18 1432   Coping/Psychosocial Response Interventions   Plan Of Care Reviewed With patient   Patient Care Overview   Progress improving   Outcome Evaluation   Outcome Summary/Follow up Plan g tube unclamped prn for nausea. moderate output. osmolite 1.2 on tf. vss. pain controlled.       Problem: Fall Risk (Adult)  Goal: Absence of Falls  Outcome: Ongoing (interventions implemented as appropriate)      Problem: Pain, Acute (Adult)  Goal: Acceptable Pain Control/Comfort Level  Outcome: Ongoing (interventions implemented as appropriate)      Problem: Infection, Risk/Actual (Adult)  Goal: Identify Related Risk Factors and Signs and Symptoms  Outcome: Outcome(s) achieved Date Met: 03/06/18    Goal: Infection Prevention/Resolution  Outcome: Ongoing (interventions implemented as appropriate)      Problem: Nutrition, Imbalanced: Inadequate Oral Intake (Adult)  Goal: Improved Oral Intake  Outcome: Ongoing (interventions implemented as appropriate)    Goal: Prevent Further Weight Loss  Outcome: Ongoing (interventions implemented as appropriate)      Problem: Pressure Ulcer Risk (Luis Scale) (Adult,Obstetrics,Pediatric)  Goal: Skin Integrity  Outcome: Ongoing (interventions implemented as appropriate)

## 2018-03-07 NOTE — PLAN OF CARE
Problem: Patient Care Overview (Adult)  Goal: Plan of Care Review  Outcome: Ongoing (interventions implemented as appropriate)   03/07/18 1350   Coping/Psychosocial Response Interventions   Plan Of Care Reviewed With patient   Patient Care Overview   Progress no change   Outcome Evaluation   Outcome Summary/Follow up Plan pt. denies any nausea     Goal: Adult Individualization and Mutuality  Outcome: Ongoing (interventions implemented as appropriate)    Goal: Discharge Needs Assessment  Outcome: Ongoing (interventions implemented as appropriate)      Problem: Fall Risk (Adult)  Goal: Absence of Falls  Outcome: Ongoing (interventions implemented as appropriate)      Problem: Pain, Acute (Adult)  Goal: Acceptable Pain Control/Comfort Level  Outcome: Ongoing (interventions implemented as appropriate)      Problem: Infection, Risk/Actual (Adult)  Goal: Infection Prevention/Resolution  Outcome: Ongoing (interventions implemented as appropriate)      Problem: Nutrition, Imbalanced: Inadequate Oral Intake (Adult)  Goal: Improved Oral Intake  Outcome: Ongoing (interventions implemented as appropriate)    Goal: Prevent Further Weight Loss  Outcome: Ongoing (interventions implemented as appropriate)      Problem: Pressure Ulcer Risk (Luis Scale) (Adult,Obstetrics,Pediatric)  Goal: Skin Integrity  Outcome: Ongoing (interventions implemented as appropriate)

## 2018-03-07 NOTE — PLAN OF CARE
Problem: Patient Care Overview (Adult)  Goal: Plan of Care Review  Outcome: Ongoing (interventions implemented as appropriate)   03/06/18 1432 03/07/18 0445   Coping/Psychosocial Response Interventions   Plan Of Care Reviewed With --  patient;daughter   Patient Care Overview   Progress --  improving   Outcome Evaluation   Outcome Summary/Follow up Plan g tube unclamped prn for nausea. moderate output. osmolite 1.2 on tf. vss. pain controlled. --      Goal: Adult Individualization and Mutuality  Outcome: Ongoing (interventions implemented as appropriate)    Goal: Discharge Needs Assessment  Outcome: Ongoing (interventions implemented as appropriate)   03/02/18 0849   Discharge Needs Assessment   Concerns To Be Addressed denies needs/concerns at this time       Problem: Fall Risk (Adult)  Goal: Absence of Falls  Outcome: Ongoing (interventions implemented as appropriate)  No falls this shift.    03/07/18 0445   Fall Risk (Adult)   Absence of Falls achieves outcome       Problem: Pain, Acute (Adult)  Goal: Acceptable Pain Control/Comfort Level  Outcome: Ongoing (interventions implemented as appropriate)   03/07/18 0445   Pain, Acute (Adult)   Acceptable Pain Control/Comfort Level making progress toward outcome       Problem: Infection, Risk/Actual (Adult)  Goal: Infection Prevention/Resolution  Outcome: Ongoing (interventions implemented as appropriate)   03/07/18 0445   Infection, Risk/Actual (Adult)   Infection Prevention/Resolution making progress toward outcome      03/07/18 0445   Infection, Risk/Actual (Adult)   Infection Prevention/Resolution making progress toward outcome       Problem: Pressure Ulcer Risk (Luis Scale) (Adult,Obstetrics,Pediatric)  Goal: Skin Integrity  Outcome: Ongoing (interventions implemented as appropriate)   03/07/18 0445   Pressure Ulcer Risk (Luis Scale) (Adult,Obstetrics,Pediatric)   Skin Integrity making progress toward outcome

## 2018-03-07 NOTE — PLAN OF CARE
Problem: Patient Care Overview (Adult)  Goal: Plan of Care Review  Outcome: Ongoing (interventions implemented as appropriate)   03/07/18 0845   Coping/Psychosocial Response Interventions   Plan Of Care Reviewed With patient   Patient Care Overview   Progress progress toward functional goals as expected   Outcome Evaluation   Outcome Summary/Follow up Plan pt reaponded well to therapy. pt w/ improved bed mob to SBA w/ HOB almost flat and no bedrails. pt w/ gait training, 158 ft SBA w/ RW. pt did not required pillow against her abdomin during gait. pt SBA w/ all t/fs. pt deferred ther ex this tx. no new goals met at this time. pt is progressing and would continue to benefit from PT services.      Goal: Discharge Needs Assessment  Outcome: Ongoing (interventions implemented as appropriate)   03/01/18 0737 03/01/18 1549 03/02/18 0849   Discharge Needs Assessment   Concerns To Be Addressed --  --  denies needs/concerns at this time   Readmission Within The Last 30 Days planned readmission --  --    Equipment Needed After Discharge --  --  feeding device   Discharge Facility/Level Of Care Needs --  --  home with home health   Current Discharge Risk --  --  chronically ill   Discharge Disposition --  --  still a patient   Discharge Planning Comments --  new patient to 3e --    Living Environment   Transportation Available --  --  family or friend will provide;car   Current Health   Outpatient/Agency/Support Group Needs home hospice --  --    Anticipated Changes Related to Illness none --  --    Self-Care   Equipment Currently Used at Home --  --  --     03/02/18 1331   Discharge Needs Assessment   Concerns To Be Addressed --    Readmission Within The Last 30 Days --    Equipment Needed After Discharge --    Discharge Facility/Level Of Care Needs --    Current Discharge Risk --    Discharge Disposition --    Discharge Planning Comments --    Living Environment   Transportation Available --    Current Health    Outpatient/Agency/Support Group Needs --    Anticipated Changes Related to Illness --    Self-Care   Equipment Currently Used at Home rollator;cane, straight       Problem: Inpatient Physical Therapy  Goal: Bed Mobility Goal STG- PT  Outcome: Ongoing (interventions implemented as appropriate)   03/02/18 1331 03/07/18 0845   Bed Mobility PT STG   Bed Mobility PT STG, Date Established 03/02/18 --    Bed Mobility PT STG, Time to Achieve 2 - 3 days --    Bed Mobility PT STG, Activity Type all bed mobility --    Bed Mobility PT STG, [place] Level conditional independence --    Bed Mobility PT STG, Additional Goal HOB flat, no bed rails.  --    Bed Mobility PT STG, Date Goal Reviewed --  03/07/18   Bed Mobility PT STG, Outcome --  goal ongoing     Goal: Transfer Training Goal 1 STG- PT  Outcome: Ongoing (interventions implemented as appropriate)   03/02/18 1331 03/07/18 0845   Transfer Training PT STG   Transfer Training PT STG, Date Established 03/02/18 --    Transfer Training PT STG, Time to Achieve 2 - 3 days --    Transfer Training PT STG, Activity Type bed to chair /chair to bed;sit to stand/stand to sit --    Transfer Training PT STG, Assist Device walker, rolling --    Transfer Training PT STG, Date Goal Reviewed --  03/07/18   Transfer Training PT STG, Outcome --  goal ongoing     Goal: Gait Training Goal LTG- PT  Outcome: Ongoing (interventions implemented as appropriate)   03/02/18 1331 03/07/18 0845   Gait Training PT LTG   Gait Training Goal PT LTG, Date Established 03/02/18 --    Gait Training Goal PT LTG, Time to Achieve by discharge --    Gait Training Goal PT LTG, [place] Level conditional independence --    Gait Training Goal PT LTG, Assist Device walker, rolling --    Gait Training Goal PT LTG, Distance to Achieve 200'x1. --    Gait Training Goal PT LTG, Date Goal Reviewed --  03/07/18   Gait Training Goal PT LTG, Outcome --  goal ongoing     Goal: Physical Therapy Goal LTG- PT  Outcome:  Ongoing (interventions implemented as appropriate)   03/02/18 1331 03/07/18 0845   Physical Therapy PT LTG   Physical Therapy PT LTG, Date Established 03/02/18 --    Physical Therapy PT LTG, Activity Type Tinetti fall risk assessement.  --    Physical Therapy PT LTG, Addisional Goal Score 24/28 or low fall risk.  --    Physical Therapy PT LTG, Date Goal Reviewed --  03/07/18   Physical Therapy PT LTG, Outcome --  goal ongoing

## 2018-03-07 NOTE — MEDICAL STUDENT
GENERAL SURGERY PROGRESS NOTE  Chief Complaint:  Surgery Follow up   LOS: 7 days       Subjective     Interval History:     Still has some nausea, relieved by unclamping Gtube. IV blew yesterday, so she only received one dose of Reglan.    Objective     Vital Signs  Temp:  [96.3 °F (35.7 °C)-99.2 °F (37.3 °C)] 96.5 °F (35.8 °C)  Heart Rate:  [] 71  Resp:  [16-18] 18  BP: (123-134)/(66-78) 123/76    Physical Exam:   Abd soft. +bowel sounds. Incision clean and dry. Serosanguinous in FERMIN drain.  Labs:  Lab Results (last 24 hours)     Procedure Component Value Units Date/Time    Tissue Pathology Exam [[id number]] Collected:  02/28/18 1209    Specimen:  Tissue from Liver Updated:  03/06/18 1448     Case Report --     Surgical Pathology Report                         Case: [id number]                                  Authorizing Provider:  Yevgeniy Ware MD Collected:           02/28/2018 12:09 PM          [facility]             Received:            02/28/2018 01:10 PM                                 [facility]                                                              Pathologist:           Jatin Calvo MD                                                            Specimen:    Liver, liver lesion                                                                         Final Diagnosis --     LIVER, WEDGE BIOPSY:  METASTATIC MAMMARY CARCINOMA, INVASIVE LOBULAR TYPE.       Synoptic Checklist --     BREAST: Biomarker Reporting Template  (Breast Bmk - All Specimens)      TEST(S) PERFORMED     Test(s) Performed:    Estrogen Receptor (ER) Status       ER Results:    Positive         Percentage of Cells with Nuclear Positivity:    81-90%         Average Intensity of Staining:    Moderate       Test Type (required for [place]based laboratories):    Laboratory-developed test       Primary Antibody:    6F11     Test(s) Performed:    Progesterone Receptor (PgR) Status       PgR Results:     Positive         Percentage of Cells with Nuclear Positivity:    %         Average Intensity of Staining:    Strong       Test Type (required for [place]based laboratories):    Laboratory-developed test       Primary Antibody:    16     Test(s) Performed:    HER2 by Immunohistochemistry (IHC)       HER2 IHC Results:    Equivocal (Score 2+)       Percentage of Cells with Uniform Intense Complete Membrane Stainin       Test Type (required for [place]based laboratories):    Laboratory-developed test       Primary Antibody:    SP3    METHODS     Cold Ischemia and Fixation Times:    Meet requirements specified in latest version of the ASCO / CAP Guidelines     Fixative:    Formalin    Comment(s)   Comment(s):    Specimen will be sent for HER2 by FISH; this will be reported separately       Gross Description --     The specimen consists of a wedge biopsy of liver measuring 1.1 x 0.6 x 0.4 cm.  A white nodule measures 0.5 cm in diameter.  Totally submitted without sectioning.       Special Stains --     Immunostains were positive in the original tumor for GCDFP-15 and Mammoglobin.  These were repeated to confirm mammary origin of the liver metastasis.    Immunostain for GCDFP-1 strongly marks virtually all tumor cells.  Immunostain for Mammoglobin strongly marks a minority of tumor cells.    Prognostic markers (ER, AK, HER2) ordered to guide future treatment of metastatic breast carcinoma.  See synoptic report for details.    HER2 by FISH ordered for equivocal HER2 result be IHC.       Embedded Images --           Results Review:     Labs and imaging for today were reviewed.    Assessment/Plan     Johanne Mayer is a 67 y.o. female who is post-op day 7 loop gastrojejunostomy, Ru En Y hepaticojejunostomy, Gtube, Jtube, Right ureteral stent.      Continue present care.      This document has been electronically signed by Shahbaz Espinoza on 2018 5:43 AM        Shahbaz Espinoza  18  5:43  AM    STAFF:   Patient asleep and comfortable during my visit.  Anticipate home soon, likely on tube feeds given her persistent nausea.          This document has been electronically signed by Yevgeniy Ware MD on March 7, 2018 2:11 PM

## 2018-03-07 NOTE — THERAPY TREATMENT NOTE
Acute Care - Physical Therapy Treatment Note  [facility]     Patient Name: Johanne Mayer  : 1950  MRN: [id number]  Today's Date: 3/7/2018  Onset of Illness/Injury or Date of Surgery Date: 18  Date of Referral to PT: 18  Referring Physician: TAHIRA Ware MD.    Admit Date: 2018    Visit Dx:    ICD-10-CM ICD-9-CM   1. Duodenal obstruction K31.5 537.3   2. Impaired physical mobility Z74.09 781.99     Patient Active Problem List   Diagnosis   • Malignant neoplasm of left breast   • Pancytopenia due to antineoplastic chemotherapy   • Metastasis to bone   • Dyspnea on exertion   • PAF (paroxysmal atrial fibrillation)   • Osteoarthritis   • Lymphadenopathy   • Lump of breast, left   • Localized enlarged lymph nodes   • History of echocardiogram   • Heartburn   • GERD (gastroesophageal reflux disease)   • Essential hypertension   • Carcinoma, female breast, infiltrating lobular   • Atrial fibrillation   • Current use of long term anticoagulation   • Sepsis   • E coli bacteremia   • Acute cystitis   • Hypokalemia   • Dilated intrahepatic bile duct   • Acute kidney injury   • Renal calyceal dilation determined by ultrasound   • Duodenal obstruction               Adult Rehabilitation Note       18 0845 18 1036       Rehab Assessment/Intervention    Discipline physical therapy assistant  -LAURIE physical therapy assistant  -LAURIE     Document Type therapy note (daily note)  -LAURIE therapy note (daily note)  -LAURIE     Subjective Information agree to therapy  -LAURIE agree to therapy  -LAURIE     Patient Effort, Rehab Treatment adequate  -LAURIE good  -LAURIE     Patient Effort, Rehab Treatment Comment  pt c/o nausea at end of tx.   -LAURIE     Precautions/Limitations fall precautions  -LAURIE fall precautions  -LAURIE     Recorded by [LAURIE] Francis Vora PTA [LAURIE] Francis Vora PTA     Vital Signs    Pre Systolic BP Rehab 131  -LAURIE 139  -LAURIE     Pre Treatment Diastolic BP 85  -LAURIE 73  -LAURIE     Post Systolic BP Rehab  119  -LAURIE 123  -LAURIE     Post Treatment Diastolic BP 88  -LAURIE 79  -LAURIE     Pretreatment Heart Rate (beats/min) 78  -LAURIE 69  -LAURIE     Intratreatment Heart Rate (beats/min)  74  -LAURIE     Posttreatment Heart Rate (beats/min) 85  -LAURIE 74  -LAURIE     Pre SpO2 (%) 98  -LAURIE 99  -LAURIE     O2 Delivery Pre Treatment room air  -LAURIE room air  -LAURIE     Intra SpO2 (%)  99  -LAURIE     O2 Delivery Intra Treatment  room air  -LAURIE     Post SpO2 (%) 99  -LAURIE 98  -LAURIE     O2 Delivery Post Treatment room air  -LAURIE room air  -LAURIE     Pre Patient Position Supine  -LAURIE Sitting  -LAURIE     Post Patient Position Sitting  -LAURIE Sitting  -LAURIE     Recorded by [LAURIE] Francis Vora PTA [LAURIE] Francis Vora PTA     Pain Assessment    Pain Assessment 0-10  -LAURIE 0-10  -LAURIE     Pain Score 4  -LAURIE 4  -LAURIE     Post Pain Score 4  -LAURIE 4  -LAURIE     Pain Type Acute pain  -LAURIE      Pain Location Abdomen  -LAURIE Abdomen  -LAURIE     Pain Intervention(s) Ambulation/increased activity;Repositioned  -LAURIE --   pain meds prior to tx.   -LAURIE     Recorded by [LAURIE] Francis Vora PTA [LAURIE] Francis Vora PTA     Vision Assessment/Intervention    Visual Impairment WFL with corrective lenses  -LAURIE WFL with corrective lenses  -LAURIE     Recorded by [LAURIE] Francis Vora PTA [LAURIE] Francis Vora PTA     Cognitive Assessment/Intervention    Current Cognitive/Communication Assessment functional  -LAURIE functional  -LAURIE     Orientation Status oriented x 4  -LAURIE oriented x 4  -LAURIE     Follows Commands/Answers Questions 100% of the time  -LAURIE 100% of the time  -LAURIE     Personal Safety  WNL/WFL  -LAURIE     Personal Safety Interventions gait belt;muscle strengthening facilitated;nonskid shoes/slippers when out of bed;supervised activity  -LAURIE gait belt;muscle strengthening facilitated;nonskid shoes/slippers when out of bed;supervised activity  -LAURIE     Recorded by [LAURIE] Francis Vora PTA [LAURIE] Francis Vora PTA     Bed Mobility, Assessment/Treatment    Bed Mobility, Assistive Device --   HOB almost falt, no bedrail.   -LAURIE      Bed  Mob, Supine to Sit, [place] supervision required  -LAURIE      Bed Mob, Sit to Supine, [place] supervision required  -LAURIE      Bed Mobility, Comment multiple trials.   -LAURIE      Recorded by [LAURIE] Francis Vora PTA      Transfer Assessment/Treatment    Transfers, Bed-Chair [place] stand by assist  -LAURIE      Transfers, Bed-Chair-Bed, Assist Device rolling walker  -LAURIE      Transfers, Sit-Stand [place] stand by assist  -LAURIE stand by assist  -LAURIE     Transfers, Stand-Sit [place] stand by assist  -LAURIE stand by assist  -LAURIE     Transfers, Sit-Stand-Sit, Assist Device rolling walker  -LAURIE rolling walker  -LAURIE     Toilet Transfer, [place] supervision required  -LAURIE      Toilet Transfer, Assistive Device rolling walker  -LAURIE      Recorded by [LAURIE] Francis Vora PTA [LAURIE] Francis Vora PTA     Gait Assessment/Treatment    Gait, [place] Level stand by assist  -LAURIE stand by assist  -LAURIE     Gait, Assistive Device rolling walker  -LAURIE rolling walker  -LAURIE     Gait, Distance (Feet) 258  -LAURIE 280  -LAURIE     Gait, Gait Pattern Analysis  swing-through gait  -LAURIE     Gait, Gait Deviations urszula decreased  -LAURIE urszula decreased;step length decreased  -LAURIE     Gait, Comment 1st time not holding pillow against abdomin during gait.   -LAURIE pt insists of holding pillow against abdomin and pushing walker with the other hand.   -LAURIE     Recorded by [LAURIE] Francis Vora PTA [LAURIE] Francis Vora PTA     Therapy Exercises    Bilateral Lower Extremities --   pt deferred ther ex at this time. pt states she will later  -LAURIE AROM:;20 reps;sitting;ankle pumps/circles;LAQ;hip flexion;hip abduction/adduction;glut sets   2 sets of AP. pt defers further tx. pt edu on HEP and HS  -LAURIE     Recorded by [LAURIE] Francis Vora PTA [LAURIE] Francis Vora PTA     Positioning and Restraints    Pre-Treatment Position in bed  -LAURIE sitting in chair/recliner  -LAURIE     Post Treatment Position chair  -LAURIE chair  -LAURIE     In Chair reclined;call light within  reach;encouraged to call for assist;with family/caregiver   all needs met  -LAURIE sitting;call light within reach;encouraged to call for assist;with family/caregiver   all needs met.   -LAURIE     Recorded by [LAURIE] Francis Vora PTA [LAURIE] Francis Vora PTA       User Key  (r) = Recorded By, (t) = Taken By, (c) = Cosigned By    Initials Name Effective Dates    LAURIE Francis Vora PTA 10/17/16 - 03/06/18     Francis Vora PTA 03/07/18 -                 IP PT Goals       03/07/18 0845 03/05/18 1036 03/02/18 1331    Bed Mobility PT STG    Bed Mobility PT STG, Date Established   03/02/18  -CZ    Bed Mobility PT STG, Time to Achieve   2 - 3 days  -CZ    Bed Mobility PT STG, Activity Type   all bed mobility  -CZ    Bed Mobility PT STG, [place] Level   conditional independence  -CZ    Bed Mobility PT STG, Additional Goal   HOB flat, no bed rails.   -CZ    Bed Mobility PT STG, Date Goal Reviewed 03/07/18  -LAURIE 03/05/18  -LAURIE 03/02/18  -CZ    Bed Mobility PT STG, Outcome goal ongoing  -LAURIE goal ongoing  -LAURIE goal ongoing  -CZ    Transfer Training PT STG    Transfer Training PT STG, Date Established   03/02/18  -CZ    Transfer Training PT STG, Time to Achieve   2 - 3 days  -CZ    Transfer Training PT STG, Activity Type   bed to chair /chair to bed;sit to stand/stand to sit  -CZ    Transfer Training PT STG, Assist Device   walker, rolling  -CZ    Transfer Training PT STG, Date Goal Reviewed 03/07/18  -LAURIE 03/05/18  -LAURIE 03/02/18  -CZ    Transfer Training PT STG, Outcome goal ongoing  -LAURIE goal ongoing  -LAURIE goal ongoing  -CZ    Gait Training PT LTG    Gait Training Goal PT LTG, Date Established   03/02/18  -CZ    Gait Training Goal PT LTG, Time to Achieve   by discharge  -CZ    Gait Training Goal PT LTG, [place] Level   conditional independence  -CZ    Gait Training Goal PT LTG, Assist Device   walker, rolling  -CZ    Gait Training Goal PT LTG, Distance to Achieve   200'x1.  -CZ    Gait Training Goal PT LTG, Date Goal  Reviewed 03/07/18  -LAURIE 03/05/18  -LAURIE 03/02/18  -CZ    Gait Training Goal PT LTG, Outcome goal ongoing  -LAURIE goal ongoing  -LAURIE goal ongoing  -CZ    Physical Therapy PT LTG    Physical Therapy PT LTG, Date Established   03/02/18  -CZ    Physical Therapy PT LTG, Activity Type   Tinetti fall risk assessement.   -CZ    Physical Therapy PT LTG, Addisional Goal   Score 24/28 or low fall risk.   -CZ    Physical Therapy PT LTG, Date Goal Reviewed 03/07/18  -LAURIE 03/05/18  -LAURIE 03/02/18  -CZ    Physical Therapy PT LTG, Outcome goal ongoing  -LAURIE goal ongoing  -LAURIE goal ongoing  -CZ      User Key  (r) = Recorded By, (t) = Taken By, (c) = Cosigned By    Initials Name Provider Type    LAURIE Vora, PTA Physical Therapy Assistant     Francis Vora, PTA Physical Therapy Assistant    VIJAY Marin, PT Physical Therapist          Physical Therapy Education     Title: PT OT SLP Therapies (Active)     Topic: Physical Therapy (Active)     Point: Mobility training (Active)    Learning Progress Summary    Learner Readiness Method Response Comment Documented by Status   Patient Acceptance E NR  LAURIE 03/07/18 1312 Active    Acceptance E NR  LAURIE 03/05/18 1138 Active    Acceptance E NR Educated patient and her daughter on proper guarding technique with gait and transfers; proper use of gait belt.  03/02/18 1503 Active   Family Acceptance E NR Educated patient and her daughter on proper guarding technique with gait and transfers; proper use of gait belt.  03/02/18 1503 Active               Point: Home exercise program (Active)    Learning Progress Summary    Learner Readiness Method Response Comment Documented by Status   Patient Acceptance E NR  LAURIE 03/05/18 1138 Active               Point: Precautions (Active)    Learning Progress Summary    Learner Readiness Method Response Comment Documented by Status   Patient Acceptance E NR Tinetti assessed: 20/28 or moderate fall risk. Recommend continued use FWW and assistance with mobility.   03/02/18 1500 Active                      User Key     Initials Effective Dates Name Provider Type Discipline    LAURIE 10/17/16 - 03/06/18 Francis Vora, KATHY Physical Therapy Assistant PT    LAURIE 03/07/18 -  Francis Vora PTA Physical Therapy Assistant PT     02/17/17 - 03/06/18 Neel Marin, PT Physical Therapist PT                    PT Recommendation and Plan  Anticipated Discharge Disposition: home with home health  Planned Therapy Interventions: balance training, bed mobility training, gait training, home exercise program, stair training, strengthening, transfer training  PT Frequency: other (see comments) (5-14x/week)  Plan of Care Review  Plan Of Care Reviewed With: patient  Progress: progress toward functional goals as expected  Outcome Summary/Follow up Plan: pt reaponded well to therapy. pt w/ improved bed mob to SBA w/ HOB almost flat and no bedrails. pt w/ gait training, 158 ft SBA w/ RW. pt did not required pillow against her abdomin during gait. pt SBA w/ all t/fs.  pt deferred ther ex this tx. no new goals met at this time. pt is progressing and would continue to benefit from PT services.           Outcome Measures       03/07/18 0845 03/05/18 1036       How much help from another person do you currently need...    Turning from your back to your side while in flat bed without using bedrails? 3  -LAURIE 3  -LAURIE     Moving from lying on back to sitting on the side of a flat bed without bedrails? 3  -LAURIE 3  -LAURIE     Moving to and from a bed to a chair (including a wheelchair)? 3  -LAURIE 3  -LAURIE     Standing up from a chair using your arms (e.g., wheelchair, bedside chair)? 3  -LAURIE 3  -LAURIE     Climbing 3-5 steps with a railing? 3  -LAURIE 3  -LAURIE     To walk in hospital room? 3  -LAURIE 3  -LAURIE     AM-PAC 6 Clicks Score 18  -LAURIE 18  -LAURIE     Functional Assessment    Outcome Measure Options AM-PAC 6 Clicks Basic Mobility (PT)  -LAURIE AM-PAC 6 Clicks Basic Mobility (PT)  -LAURIE       User Key  (r) = Recorded By, (t) = Taken By,  (c) = Cosigned By    Initials Name Provider Type    LAURIE Vora PTA Physical Therapy Assistant     Francis Vora PTA Physical Therapy Assistant           Time Calculation:         PT Charges       03/07/18 1316          Time Calculation    Start Time 0845  -LAURIE      Stop Time 0915  -LAURIE      Time Calculation (min) 30 min  -LAURIE      Time Calculation- PT    Total Timed Code Minutes- PT 30 minute(s)  -LAURIE        User Key  (r) = Recorded By, (t) = Taken By, (c) = Cosigned By    Initials Name Provider Type    LAURIE Vora PTA Physical Therapy Assistant          Therapy Charges for Today     Code Description Service Date Service Provider Modifiers Qty    65383732815 HC GAIT TRAINING EA 15 MIN 3/7/2018 Francis Vora PTA GP 1    29204449175 HC PT THERAPEUTIC ACT EA 15 MIN 3/7/2018 Francis Vora PTA GP 1          PT G-Codes  PT Professional Judgement Used?: Yes  Outcome Measure Options: AM-PAC 6 Clicks Basic Mobility (PT)  Score: 20  Functional Limitation: Mobility: Walking and moving around  Mobility: Walking and Moving Around Current Status (): At least 20 percent but less than 40 percent impaired, limited or restricted  Mobility: Walking and Moving Around Goal Status (): At least 1 percent but less than 20 percent impaired, limited or restricted    Francis Vora PTA  3/7/2018

## 2018-03-08 NOTE — PLAN OF CARE
Problem: Patient Care Overview (Adult)  Goal: Plan of Care Review  Outcome: Ongoing (interventions implemented as appropriate)   03/08/18 0331   Coping/Psychosocial Response Interventions   Plan Of Care Reviewed With patient   Patient Care Overview   Progress no change   Outcome Evaluation   Outcome Summary/Follow up Plan vss, ambulating with assistance, no c/o pain this shift     Goal: Adult Individualization and Mutuality  Outcome: Ongoing (interventions implemented as appropriate)    Goal: Discharge Needs Assessment  Outcome: Ongoing (interventions implemented as appropriate)      Problem: Fall Risk (Adult)  Goal: Absence of Falls  Outcome: Ongoing (interventions implemented as appropriate)      Problem: Pain, Acute (Adult)  Goal: Acceptable Pain Control/Comfort Level  Outcome: Ongoing (interventions implemented as appropriate)      Problem: Infection, Risk/Actual (Adult)  Goal: Infection Prevention/Resolution  Outcome: Ongoing (interventions implemented as appropriate)      Problem: Nutrition, Imbalanced: Inadequate Oral Intake (Adult)  Goal: Improved Oral Intake  Outcome: Ongoing (interventions implemented as appropriate)    Goal: Prevent Further Weight Loss  Outcome: Ongoing (interventions implemented as appropriate)      Problem: Pressure Ulcer Risk (Luis Scale) (Adult,Obstetrics,Pediatric)  Goal: Skin Integrity  Outcome: Ongoing (interventions implemented as appropriate)

## 2018-03-08 NOTE — PLAN OF CARE
Problem: Patient Care Overview (Adult)  Goal: Plan of Care Review  Outcome: Ongoing (interventions implemented as appropriate)   03/08/18 1402   Coping/Psychosocial Response Interventions   Plan Of Care Reviewed With patient   Patient Care Overview   Progress improving   Outcome Evaluation   Outcome Summary/Follow up Plan FERMIN removed per MD; pt. states no nausea     Goal: Adult Individualization and Mutuality  Outcome: Ongoing (interventions implemented as appropriate)    Goal: Discharge Needs Assessment  Outcome: Ongoing (interventions implemented as appropriate)      Problem: Fall Risk (Adult)  Goal: Absence of Falls  Outcome: Ongoing (interventions implemented as appropriate)      Problem: Pain, Acute (Adult)  Goal: Acceptable Pain Control/Comfort Level  Outcome: Ongoing (interventions implemented as appropriate)      Problem: Infection, Risk/Actual (Adult)  Goal: Infection Prevention/Resolution  Outcome: Ongoing (interventions implemented as appropriate)      Problem: Nutrition, Imbalanced: Inadequate Oral Intake (Adult)  Goal: Improved Oral Intake  Outcome: Ongoing (interventions implemented as appropriate)    Goal: Prevent Further Weight Loss  Outcome: Ongoing (interventions implemented as appropriate)      Problem: Pressure Ulcer Risk (Luis Scale) (Adult,Obstetrics,Pediatric)  Goal: Skin Integrity  Outcome: Ongoing (interventions implemented as appropriate)

## 2018-03-08 NOTE — PLAN OF CARE
Problem: Patient Care Overview (Adult)  Goal: Plan of Care Review  Outcome: Ongoing (interventions implemented as appropriate)   03/08/18 2582   Coping/Psychosocial Response Interventions   Plan Of Care Reviewed With caregiver;patient;daughter   Patient Care Overview   Progress improving   Outcome Evaluation   Outcome Summary/Follow up Plan Good tolerance of tube feeding at goal rate continues. Tolerating clear liquids except for intermittent nausea.        Problem: Nutrition, Imbalanced: Inadequate Oral Intake (Adult)  Goal: Improved Oral Intake  Outcome: Ongoing (interventions implemented as appropriate)    Goal: Prevent Further Weight Loss  Outcome: Ongoing (interventions implemented as appropriate)

## 2018-03-08 NOTE — SIGNIFICANT NOTE
03/08/18 1451   Rehab Treatment   Discipline physical therapy assistant   Treatment Not Performed patient/family declined treatment  (pt states she has already walked today and completed her HEP exercises. pt deferred tx. )

## 2018-03-08 NOTE — CONSULTS
Adult Nutrition  Assessment    Patient Name:  Johanne Mayer  YOB: 1950  MRN: [id number]  Admit Date:  2/28/2018    Assessment Date:  3/8/2018    Comments: Pt continues to receive tube feeding at goal rate with good tolerance.  Clear liquid diet also continues with good tolerance except for intermittent nausea.  Plans are for pt to be discharged on tube feeding and diet to be advanced as tolerated.  Rd will continue to monitor          Reason for Assessment       03/08/18 1443    Reason for Assessment    Reason For Assessment/Visit follow up protocol                Nutrition/Diet History       03/08/18 1443    Nutrition/Diet History    Typical Food/Fluid Intake Pt playing cards with her daughters.  SHe reports that she is doing good.  She did have some nausea but they unclamped her GTube and she did ok.  Dr Ware said at home she could advance her diet as she felt like it.  She will go home with her tube feeding.                Labs/Tests/Procedures/Meds       03/08/18 1445    Labs/Tests/Procedures/Meds    Labs/Tests Review Reviewed;Na+;Glucose    Medication Review Reviewed, pertinent            Physical Findings       03/08/18 1445    Physical Appearance    Overall Physical Appearance on oxygen therapy    Tubes gastrostomy tube;gastro-jejunostomy tube              Nutrition Prescription Ordered       03/08/18 1445    Nutrition Prescription PO    Current PO Diet Clear Liquid    Fluid Consistency Thin    Nutrition Prescription EN    Enteral Route Jejunostomy    Product Osmolite 1.2 beto    TF Delivery Method Continuous    Continuous TF Goal Rate (mL/hr) 60 mL/hr    Continuous TF Current Rate (mL/hr) 60 mL/hr    Continuous TF Goal Volume (mL) 1440 mL    Water flush (mL)  25 mL    Water Flush Frequency Per hour            Evaluation of Received Nutrient/Fluid Intake       03/08/18 1447    Calorie Intake Evaluation    Enteral Calories (kcal) 1728    Total Calories (kcal) 1728    % Kcal Needs  110%    Protein Intake Evaluation    Enteral Protein (gm) 80    Total Protein (gm) 80    % Protein Needs 100%            Electronically signed by:  Melia Cool RD  03/08/18 2:59 PM

## 2018-03-08 NOTE — SIGNIFICANT NOTE
03/08/18 0840   Rehab Treatment   Discipline physical therapy assistant   Treatment Not Performed patient/family declined treatment  (pt c/o pain from having drain removed. pt declined tx at this time. )

## 2018-03-08 NOTE — PROGRESS NOTES
GENERAL SURGERY PROGRESS NOTE  Chief Complaint:  Surgery Follow up   LOS: 8 days       Subjective     Interval History:     Had some nausea yesterday, relieved with unclamping of Gtube. Tolerating TF at goal.    Objective     Vital Signs  Temp:  [97.6 °F (36.4 °C)-99.5 °F (37.5 °C)] 99.5 °F (37.5 °C)  Heart Rate:  [] 99  Resp:  [18] 18  BP: (130-148)/(68-86) 146/86    Physical Exam:   Abdomen soft, incision CDI, tubes/drain in place. Drain serous.  Labs:  Lab Results (last 24 hours)     ** No results found for the last 24 hours. **           Results Review:     Labs and imaging for today were reviewed.    Assessment/Plan     Johanne Mayer is a 67 y.o. female who is s/p g and j tubes, gastrojejunostomy, hepaticojejunostomy for metastatic breast cancer.      Continue tube feeds.   Clears as able to tolerate.  Plan for home on tube feeds.  FERMIN out today.          This document has been electronically signed by Yevgeniy Ware MD on March 8, 2018 7:30 AM        Yevgeniy Ware MD  03/08/18  7:30 AM

## 2018-03-08 NOTE — DISCHARGE PLACEMENT REQUEST
"Johanne Young (67 y.o. Female)     Date of Birth Social Security Number Address Home Phone MRN    1950  [address] [address] [phone] [id number]    Gnosticist Marital Status          Religious        Admission Date Admission Type Admitting Provider Attending Provider Department, Room/Bed    2/28/18 Elective Yevgeniy Ware MD Armstrong, James Edward, MD [address], 360/1    Discharge Date Discharge Disposition Discharge Destination                      Attending Provider: Yevgeniy Ware MD     Allergies:  Tape, Eggs Or Egg-derived Products, Influenza Vaccines, Lortab [Hydrocodone-acetaminophen], Penicillins    Isolation:  None   Infection:  None   Code Status:  FULL    Ht:  157.5 cm (62\")   Wt:  75.8 kg (167 lb)    Admission Cmt:  None   Principal Problem:  Duodenal obstruction [K31.5] More...                 Active Insurance as of 2/28/2018     Primary Coverage     Payor Plan Insurance Group Employer/Plan Group    MEDICARE MEDICARE A & B      Payor Plan Address Payor Plan Phone Number Effective From Effective To    [address] [phone] 1/1/2006     [place] [address]       Subscriber Name Subscriber Birth Date Member ID       JOHANNE YOUNG 1950 [id number]           Secondary Coverage     Payor Plan Insurance Group Employer/Plan Group    [employer] [place]ICAID      Payor Plan Address Payor Plan Phone Number Effective From Effective To    [address] [address] [phone] 4/3/2017     [place] [address]       Subscriber Name Subscriber Birth Date Member ID       JOHANNE YOUNG 1950 [id number]                 Emergency Contacts      (Rel.) Home Phone Work Phone Mobile Phone    Palak Bateman (Daughter) [phone] -- [phone]    Ky Good (Daughter) -- -- [phone]              Intake & Output (last 3 days)       03/05 0701 - 03/06 0700 03/06 0701 - 03/07 0700 " 03/07 0701 - 03/08 0700 03/08 0701 - 03/09 0700    P.O. 360  960     Other 252 30 389     NG/GT 0402 428 1223     Total Intake(mL/kg) 1769 (23.4) 930 (12.3) 3512 (46.4)     Urine (mL/kg/hr) 400 (0.2) 200 (0.1) 900 (0.5)     Emesis/NG output 0 (0)       Other 3165 (1.7) 3700 (2) 2200 (1.2)     Stool 0 (0) 0 (0)      Total Output 3565 3900 3100      Net -1796 -2970 +412              Unmeasured Urine Occurrence 4 x 5 x 2 x 1 x    Unmeasured Stool Occurrence 1 x 4 x      Unmeasured Emesis Occurrence 1 x           Prior to Admission Medications     Prescriptions Last Dose Informant Patient Reported? Taking?    oxyCODONE-acetaminophen (PERCOCET)  MG per tablet 2/28/2018 Self No Yes    Take 1 tablet by mouth Every 6 (Six) Hours As Needed for Moderate Pain  or Severe Pain .    acetaminophen (TYLENOL) 500 MG tablet More than a month Self Yes No    Take 500 mg by mouth Every 6 (Six) Hours As Needed for Mild Pain .    aspirin 81 MG EC tablet 2/21/2018 Self No No    Take 1 tablet by mouth Daily.    Patient taking differently:  Take 81 mg by mouth Daily. Last dose 2/15/18    atorvastatin (LIPITOR) 10 MG tablet 2/25/2018 Self No No    Take 1 tablet by mouth Daily.    Calcium Carb-Cholecalciferol (CALCIUM 600 + D PO) 2/25/2018 Self Yes No    Take 1 capsule by mouth 2 (Two) Times a Day.    cholecalciferol (VITAMIN D3) 1000 units tablet 2/25/2018 Self Yes No    Take 1,000 Units by mouth Daily.    digoxin (LANOXIN) 125 MCG tablet 2/25/2018 Self Yes No    Take 125 mcg by mouth Daily.    diltiazem XR (DILACOR XR) 180 MG 24 hr capsule 2/25/2018 Self No No    Take 1 capsule by mouth Daily.    Interferon Beta-1b (EXTAVIA SC) More than a month Self Yes No    Inject 1 mL under the skin Every 30 (Thirty) Days.    isosorbide mononitrate (IMDUR) 30 MG 24 hr tablet 2/25/2018 Self No No    Take 1 tablet by mouth Daily.    letrozole (FEMARA) 2.5 MG tablet 2/25/2018 Self Yes No    Take 2.5 mg by mouth Daily.    letrozole (FEMARA) 2.5 MG  "tablet 2/25/2018  No No    TAKE 1 TABLET BY MOUTH DAILY.    losartan (COZAAR) 100 MG tablet 2/25/2018 Self No No    Take 1 tablet by mouth Daily.    Magnesium 250 MG tablet 2/25/2018 Self Yes No    Take 1 tablet by mouth Daily.    ondansetron (ZOFRAN) 4 MG tablet 2/25/2018 Self No No    Take 1 tablet by mouth 2 (Two) Times a Day As Needed for Nausea or Vomiting.    pantoprazole (PROTONIX) 40 MG EC tablet 2/25/2018 Self No No    Take 1 tablet by mouth Daily.    polyethylene glycol (MIRALAX) powder 2/25/2018 Self No No    Take 17 g by mouth Daily.    rivaroxaban (XARELTO) 20 MG tablet 2/21/2018 Self No No    Take 1 tablet by mouth Daily.    Patient taking differently:  Take 20 mg by mouth Daily. Last dose 2/13/18    sucralfate (CARAFATE) 1 g tablet 2/25/2018 Self Yes No    Take 1 g by mouth 3 (Three) Times a Day.    Zinc 50 MG capsule 2/25/2018 Self Yes No    Take 1 tablet by mouth Daily.          Hospital Medications (active)       Dose Frequency Start End    famotidine (PEPCID) injection 20 mg 20 mg Every 12 Hours Scheduled 3/1/2018     Sig - Route: Infuse 2 mL into a venous catheter Every 12 (Twelve) Hours. - Intravenous    heparin (porcine) 5000 UNIT/ML injection 5,000 Units 5,000 Units Every 8 Hours Scheduled 3/1/2018     Sig - Route: Inject 1 mL under the skin Every 8 (Eight) Hours. - Subcutaneous    HYDROmorphone (DILAUDID) injection 0.5 mg 0.5 mg Every 2 Hours PRN 2/28/2018 3/10/2018    Sig - Route: Infuse 0.5 mL into a venous catheter Every 2 (Two) Hours As Needed for Severe Pain . - Intravenous    Linked Group 1:  \"And\" Linked Group Details        iopamidol (ISOVUE-300) 61 % injection  As Needed 2/28/2018     Sig: As Needed.    metoclopramide (REGLAN) injection 10 mg 10 mg Every 6 Hours 3/6/2018     Sig - Route: Infuse 2 mL into a venous catheter Every 6 (Six) Hours. - Intravenous    metoprolol tartrate (LOPRESSOR) injection 5 mg 5 mg Once As Needed 3/2/2018     Sig - Route: Infuse 5 mL into a venous " "catheter 1 (One) Time As Needed (HR > 140). - Intravenous    morphine injection 2 mg 2 mg Every 4 Hours PRN 3/5/2018 3/15/2018    Sig - Route: Infuse 1 mL into a venous catheter Every 4 (Four) Hours As Needed for Severe Pain . - Intravenous    naloxone (NARCAN) injection 0.1 mg 0.1 mg Every 5 Minutes PRN 2/28/2018     Sig - Route: Infuse 0.25 mL into a venous catheter Every 5 (Five) Minutes As Needed for Respiratory Depression. - Intravenous    Linked Group 1:  \"And\" Linked Group Details        naloxone (NARCAN) injection 0.1 mg 0.1 mg Every 5 Minutes PRN 2/28/2018     Sig - Route: Infuse 0.25 mL into a venous catheter Every 5 (Five) Minutes As Needed for Opioid Reversal or Respiratory Depression (see administration instructions). - Intravenous    ondansetron (ZOFRAN) injection 4 mg 4 mg Every 6 Hours PRN 2/28/2018     Sig - Route: Infuse 2 mL into a venous catheter Every 6 (Six) Hours As Needed for Nausea or Vomiting. - Intravenous    Linked Group 2:  \"Or\" Linked Group Details        ondansetron (ZOFRAN) tablet 4 mg 4 mg Every 6 Hours PRN 2/28/2018     Sig - Route: Take 1 tablet by mouth Every 6 (Six) Hours As Needed for Nausea or Vomiting. - Oral    Linked Group 2:  \"Or\" Linked Group Details        ondansetron ODT (ZOFRAN-ODT) disintegrating tablet 4 mg 4 mg Every 6 Hours PRN 2/28/2018     Sig - Route: Take 1 tablet by mouth Every 6 (Six) Hours As Needed for Nausea or Vomiting. - Oral    Linked Group 2:  \"Or\" Linked Group Details        oxyCODONE-acetaminophen (PERCOCET)  MG per tablet 1 tablet 1 tablet Every 4 Hours PRN 3/5/2018 3/15/2018    Sig - Route: Take 1 tablet by mouth Every 4 (Four) Hours As Needed for Moderate Pain . - Oral             Operative/Procedure Notes (all)      eYvgeniy Ware MD at 2/28/2018  1:48 PM  Version 1 of 1         COLON RESECTION LEFT  Progress Note    Johanne aMyer  2/28/2018    Pre-op Diagnosis:   Duodenal obstruction [K31.5]       Post-Op Diagnosis Codes:   "   * Duodenal obstruction [K31.5]    Procedure/CPT® Codes:      Procedure(s):  Laparotomy with gastrojejunostomy, gastrostomy placement, Ru en Y hepaticojejunostomy, jejunostomy.    LIVER BIOPSY (DR. SIMS FIRST)    CYSTOSCOPY RETROGRADE PYELOGRAM AND STENT INSERTION         (C-ARM)          Surgeon(s):  MD Yevgeniy Chan MD    Anesthesia: General    Staff:   Circulator: Vandana Bojorquez RN; Tianna Comer RN; Shima Smyth RN  Scrub Person: Kaia Wood; Shandra Teresa  Assistant: Palak Troncoso CSA    Estimated Blood Loss: 250 mL    Urine Voided: 200 mL    Specimens:                  ID Type Source Tests Collected by Time Destination   A : liver lesion Tissue Liver TISSUE PATHOLOGY EXAM Yevgeniy Sims MD 2/28/2018 1209          Drains:   Drain/Device Site 02/28/18 1224 midline  (Active)       Enterostomy Tube 02/28/18 1210 gastrostomy tube without balloon LUQ (Active)       Enterostomy Tube 02/28/18 1211 gastrostomy tube without balloon  (Active)       Urethral Catheter 02/28/18 0955 100% silicone 16 10 10 (Active)           Findings: large RUQ tumor causing biliary obstruction and duodenal obstruction    Complications: none          This document has been electronically signed by Yevgeniy Sims MD on February 28, 2018 1:48 PM          Yevgeniy Sims MD     Date: 2/28/2018  Time: 1:48 PM         Electronically signed by Yevgeniy Sims MD at 2/28/2018  1:49 PM      Yevgeniy Sims MD at 2/28/2018  4:22 PM  Version 1 of 1         Operative Note    Johanne Mayer  2/28/2018    Pre-op Diagnosis:   Duodenal obstruction [K31.5]    Post-op Diagnosis:     Post-Op Diagnosis Codes:     * Duodenal obstruction [K31.5]    Procedure/CPT® Codes:      Procedure(s):  Laparotomy with gastrojejunostomy, gastrostomy placement, Ru en Y hepaticojejunostomy, jejunostomy.    LIVER BIOPSY           Surgeon(s):  Yevgeniy Sims  MD    Anesthesia: General    Staff:   Circulator: Vandana Bojorquez, LELIA; Tianna Comer, LELIA; Shima Smyth RN  Scrub Person: Kaia Wood; Shandra Teresa  Assistant: Palak Troncoso CSA    Estimated Blood Loss: 250 mL    Specimens:                  ID Type Source Tests Collected by Time Destination   A : liver lesion Tissue Liver TISSUE PATHOLOGY EXAM Yevgeniy Ware MD 2/28/2018 1209          Drains:   Drain/Device Site 02/28/18 1224 midline  (Active)   Insertion Site clean and dry;dressing intact 2/28/2018  3:40 PM   Drainage Characteristics/Odor sanguineous 2/28/2018  3:40 PM   Drainage Amount moderate 2/28/2018  3:40 PM   General output (mL) 75 2/28/2018  1:55 PM       Enterostomy Tube 02/28/18 1210 gastrostomy tube without balloon LUQ (Active)   Securement taped to abdomen 2/28/2018  3:40 PM   Suction Setting/Drainage Method dependent drainage 2/28/2018  3:40 PM       Enterostomy Tube 02/28/18 1211 gastrostomy tube without balloon  (Active)   Securement taped to abdomen 2/28/2018  3:40 PM   Dressing dry and intact 2/28/2018  3:40 PM       Urethral Catheter 02/28/18 0955 100% silicone 16 10 10 (Active)   Daily Indications Selected surgeries ( tract, abdomen) 2/28/2018  3:40 PM   Tolerance no signs/symptoms of discomfort 2/28/2018  1:40 PM       Urethral Catheter 02/28/18 1330 100% silicone 16 10 10 (Active)           Findings: Large mass and right upper quadrant consistent with malignancy, multiple liver lesions.  Duodenal obstruction, common bile duct obstruction    Complications: None    Indication: Enlarging mass and right upper quadrant causing duodenal, biliary, ureteral obstruction.  History of metastatic breast cancer    Operative Note:    The patient was seen and consented preoperatively.  Following this she was taking the operating room and placed in supine position on the OR table.  Gen. anesthetic was administered and the patient was orotracheally intubated without incident.  Additional  support lines were placed by anesthesia.  Galicia catheter was placed sterilely by nursing.  A preoperative briefing was performed.  The abdomen was then prepped and draped in normal sterile fashion and a timeout was performed.    Following timeout an upper midline incision was made with a knife and subcutaneous tissues were divided with Bovie electrocautery.  The midline fascia was then exposed.  The fascia was then incised using the cautery as well.  The peritoneum was then opened.  There were adhesions in the right upper quadrant of the omentum to the abdominal wall related to the patient's prior open cholecystectomy.  These adhesions were taken down sharply.  Once this was done the adhesions from the right edge of the liver to the underlying bowel were then taken down sharply as well.  There was a large mass in the right upper quadrant and a portion of this appeared to be adherent to and possibly invading the right lobe of the liver.  Additional adhesions were lysed in this area and the common duct was visualized and appeared to be dilated to at least 2 cm in diameter.    The omentum was adhered to the inferior abdominal wall as well and these adhesions were also taken down so that the omentum was completely free.  Once this was done transverse colon was identified.  It appeared to be adherent to the right upper quadrant mass but did not appear to be obstructed.  The ligament of Treitz was identified and the small intestine was examined from ligament of Treitz all the way down to the ileocecal valve and appeared to be healthy.  I then returned back to the ligament of Treitz and identified the clear area in the transverse mesocolon to the patient's left of the ligament of Treitz.  A window was then created in the transverse mesocolon.  The lesser omentum was divided so that the stomach's posterior aspect could be visualized.  The posterior aspect of the lesser curvature of the stomach was then brought down through  the transverse mesocolon.  Approximately 15 cm distal to the ligament of Treitz sutures were placed through the small intestine to secure it to the posterior and inferior aspect of the stomach for a planned gastrojejunostomy.  Approximately 30 cm distal to this a MARILEE stapler was used to divide the small intestine.  An Enseal device was then used to divide the omentum down near its base.  An additional window was created in the transverse mesocolon to the patient's right of the middle colic vascular pedicle area this Ru limb of bowel was then brought up through the transverse mesocolon defect and appeared to reach up to the common duct without any undue tension.  The staple line was then oversewn with interrupted Vicryl sutures.  A ductotomy was then created in the anterior aspect of the common bile duct.  An enterotomy was made in the Ru limb of bowel as well.  A side to side hand sewn anastomosis was then created between the anterior common bile duct and the side of the small intestine using interrupted 3-0 Vicryl sutures.  The Ru limb of small bowel was then secured in place at the transverse mesocolon using interrupted 3-0 Vicryl suture.  The bowel was then followed downward for approximately 30 cm.  The distal end of stapled towel was then identified as well.  Stay sutures of 3-0 Vicryl were then placed to position the 2 segments of bowel and a side-to-side fashion.  The staple line on the gastric limb of the intestine was then partially removed using scissors.  An enterotomy was made in the side of the adjacent bowel was coming from the hepaticojejunostomy.  A 55 mm MARILEE stapler was then placed in each lumen and used to create a stapled side-to-side anastomosis.  The open ends of the bowel were then closed using a 60 mm TA stapler.  The anastomosis was palpated and found to be widely patent.  The stay sutures were then tied down and additional sutures were placed in the crotch of the anastomosis to  reinforce this area.  The area where the 2 mesenteries overlapped and created a mesenteric defect was then closed using interrupted 3-0 Vicryl sutures as well.    Attention was then turned to creation of a gastrojejunostomy.  The previously oriented bowel was identified adjacent to the inferior aspect of the posterior stomach.  A gastrotomy was made using the Bovie electrocautery.  An enterotomy was made using the Bovie electrocautery.  The 55 mm MARILEE stapler was then placed intraluminally and used to create a side to side anastomosis with a single firing of the stapler.  The TA stapler was then used to close the open aspect of the small intestine and stomach.  Staple line was then oversewn using interrupted 3-0 Vicryl sutures.  Both ends of the staple line were also reinforced using interrupted 3-0 Vicryl sutures.  The anastomosis was palpated and found to be widely patent.  The inferior border of the stomach was then sutured to the mesenteric defect so that the stomach would remain below the transverse mesocolon.    Attention was then turned to placement of a gastrostomy tube.  On the mid body of the stomach a pursestring suture of 3-0 Vicryl was placed.  A gastrotomy was made.  A 22 Upper sorbian Malecot tube was inserted in stomach.  The pursestring suture was tied down.  A Witzel tunnel of approximately 5 cm length was then created using interrupted 3-0 Vicryl sutures.  The gastrostomy tube was then brought out through a separate stab incision in the left upper quadrant and secured to the skin using a nylon suture.    Attention was then turned to placement of the jejunostomy tube.  Approximate 15 cm distal to the jejunojejunostomy a pursestring stitch was placed.  An enterotomy was created and a 16 Upper sorbian Malecot tube was inserted into the lumen of the bowel.  The pursestring suture was tied down.  A Witzel tunnel approximate 5 cm in length was then created using interrupted 3-0 Vicryl sutures.  The jejunostomy tube  was then brought out through the abdominal wall via a separate stab incision in the left mid abdomen.  It was then secured to the skin using a nylon suture.  A 19 Faroese round drain was then placed into the right upper quadrant and brought out through a right upper quadrant incision and secured to the skin using nylon suture.  The end of the drain was placed adjacent to the hepaticojejunostomy.    One of the firm lesions on the anterior surface of the left lobe of the liver was then removed using a 15 blade scalpel and passed off field as a permanent specimen.  Hemostasis was obtained at this site with Bovie electrocautery.  The abdomen was then irrigated.  All sponge, instrument, and needle counts were confirmed be correct.  The midline fascia was closed using a running #1 PDS suture.  The skin was closed using staples.  Appropriate dressings were applied.    Dr. Griffith then scrubbed into the operating room to perform his cystoscopy and right ureteral stent placement which was dictated separately by him.  The patient was then awakened and returned to the recovery room in stable condition.        This document has been electronically signed by Yevgeniy Sims MD on February 28, 2018 4:36 PM        Yevgeniy Sims MD     Date: 2/28/2018  Time: 4:22 PM         Electronically signed by Yevgeniy Sims MD at 2/28/2018  4:36 PM      Yevgeniy Griffith MD at 2/28/2018 10:05 PM  Version 1 of 1         CYSTOSCOPY RETROGRADE PYELOGRAM AND STENT INSERTION  Procedure Note    Johanne Chavez Leyla  2/28/2018    Pre-op Diagnosis:   Duodenal obstruction [K31.5]    Post-op Diagnosis:     Post-Op Diagnosis Codes:     * Duodenal obstruction [K31.5]      Procedure(s):  Laparotomy with gastrojejunostomy, gastrostomy placement, Ru en Y hepaticojejunostomy, jejunostomy.    LIVER BIOPSY (DR. SIMS FIRST)  CYSTOSCOPY RETROGRADE PYELOGRAM AND STENT INSERTION         (C-ARM)          Surgeon(s):  Yevgeniy Feldman  MD Yevgeniy Ware MD    Anesthesia: General    Staff:   Circulator: Vandana Bojorquez RN; Tianna Comer, LELIA; Shima Smyth RN  Scrub Person: Kaia Wood; Shandralouise Teresa  Assistant: Palak Troncoso CSA    Estimated Blood Loss: 250 mL    Specimens:                  ID Type Source Tests Collected by Time Destination   A : liver lesion Tissue Liver TISSUE PATHOLOGY EXAM Yevgeniy Ware MD 2/28/2018 1209          Drains:   Drain/Device Site 02/28/18 1224 midline  (Active)   Insertion Site clean and dry 2/28/2018  8:00 PM   Drainage Characteristics/Odor sanguineous 2/28/2018  8:00 PM   Drainage Amount moderate 2/28/2018  8:00 PM   General output (mL) 50 2/28/2018  5:00 PM       Enterostomy Tube 02/28/18 1210 gastrostomy tube without balloon LUQ (Active)   Securement taped to abdomen 2/28/2018  8:00 PM   Suction Setting/Drainage Method dependent drainage 2/28/2018  4:55 PM       Enterostomy Tube 02/28/18 1211 gastrostomy tube without balloon  (Active)   Securement taped to abdomen 2/28/2018  8:00 PM   Dressing dry and intact 2/28/2018  8:00 PM       Urethral Catheter 02/28/18 0955 100% silicone 16 10 10 (Active)   Daily Indications Selected surgeries ( tract, abdomen) 2/28/2018  8:00 PM   Tolerance signs/symptoms of discomfort 2/28/2018  8:00 PM       Urethral Catheter 02/28/18 1330 100% silicone 16 10 10 (Active)   Daily Indications Monitoring of strict I &O 2/28/2018  8:00 PM   Catheter care done Yes 2/28/2018  8:00 PM   Urine Output (mL) 1400 2/28/2018  5:00 PM           Findings: Right hydronephrosis    Complications: None    Indications: Same    Description of Procedure: Patient brought the was operating room after Dr. Ware's surgery she's placed in the dorsolithotomy position.  The place a 22 cystoscope open the bladder itself and a sterile prep and drape of genitalia prior to this right ureter was catheterized with a retrograde catheter 6 cc contrast placed up the ureter proximal  "ureter was narrowed and also strictured from extrinsic pressure.  This was accomplished we put a 0.35 Glidewire past this in the renal pelvis and over the top guidewire through the [place] we placed a 6 x 28 double-J stent.  Bladder strain scope was removed and 16 Galicia catheter was anchored 10 cc placed in the balloon    Yevgeniy Griffith MD     Date: 2/28/2018  Time: 10:05 PM       Electronically signed by Yevgeniy Griffith MD at 2/28/2018 10:08 PM           Nutrition Notes (last 72 hours) (Notes from 3/5/2018 10:37 AM through 3/8/2018 10:37 AM)      Karen Marie RD at 3/5/2018  3:54 PM  Version 1 of 1         Adult Nutrition  Assessment    Patient Name:  Johanne Mayer  YOB: 1950  MRN: [id number]  Admit Date:  2/28/2018    Assessment Date:  3/5/2018    Comments:  Pt's TF of Osmolite 1.2 is at goal rate of 60cc/hr. Pt has reported some mild nausea, but says overall she feels better. Pt is now able to take clear liquids po. Labs noted.  RD will monitor as able to advance diet and decrease TF, will make recs accordingly.          Reason for Assessment       03/05/18 1182    Reason for Assessment    Reason For Assessment/Visit follow up protocol;TF/PN                Nutrition/Diet History       03/05/18 1545    Nutrition/Diet History    Typical Food/Fluid Intake Pt says she is tolerating the TF well, but she did have some nausea this am which she attributes to drinking clear liquids then trying to get up and \"doing to much at once\". TF running at 60, which is goal rate.              Labs/Tests/Procedures/Meds       03/05/18 1546    Labs/Tests/Procedures/Meds    Labs/Tests Review Reviewed    Medication Review Reviewed, pertinent                Nutrition Prescription Ordered       03/05/18 1546    Nutrition Prescription PO    Current PO Diet Clear Liquid    Nutrition Prescription EN    Enteral Route Jejunostomy    Product Osmolite 1.2 beto    TF Delivery Method Continuous    Continuous TF Goal Rate " (mL/hr) 60 mL/hr    Continuous TF Current Rate (mL/hr) 60 mL/hr            Evaluation of Received Nutrient/Fluid Intake       03/05/18 1547    Evaluation of Received Nutrient/Fluid Intake    Nutrition Delivered Protein Evaluation;Calorie Evaluation    Calorie Intake Evaluation    Enteral Calories (kcal) 1728    Total Calories (kcal) 1728    % Kcal Needs 110%    Protein Intake Evaluation    Enteral Protein (gm) 80    Total Protein (gm) 80    % Protein Needs 100%    EN Evaluation    EN Average Volume Delivered (mL/day) 1440 mL/day    % Goal Volume  100 %            Electronically signed by:  Karen Marie RD  03/05/18 3:54 PM     Electronically signed by Karen Marie RD at 3/5/2018  3:56 PM      Karen Marie RD at 3/5/2018  3:59 PM  Version 1 of 1         Problem: Patient Care Overview (Adult)  Goal: Plan of Care Review  Outcome: Ongoing (interventions implemented as appropriate)   03/05/18 1558   Coping/Psychosocial Response Interventions   Plan Of Care Reviewed With patient;daughter   Patient Care Overview   Progress improving   Outcome Evaluation   Outcome Summary/Follow up Plan Pt reports mild nausea w/TF at goal rate 60cc/hr and clear liquids po, but says overall she's feeling better.            Electronically signed by Karen Marie RD at 3/5/2018  3:59 PM

## 2018-03-09 NOTE — PROGRESS NOTES
GENERAL SURGERY PROGRESS NOTE  Chief Complaint:  Surgery Follow up   LOS: 9 days       Subjective     Interval History:     Jtube clogged overnight during change of tube feeds.  Unable to get unclogged by flushing this AM. Patient still with intermittent nausea and reflux.    Objective     Vital Signs  Temp:  [97.9 °F (36.6 °C)-99.9 °F (37.7 °C)] 98.4 °F (36.9 °C)  Heart Rate:  [] 88  Resp:  [18] 18  BP: (133-155)/(82-87) 147/83    Physical Exam:   Abdomen soft, incisions CDI  Labs:  Lab Results (last 24 hours)     ** No results found for the last 24 hours. **           Results Review:     Labs and imaging for today were reviewed.    Assessment/Plan     Johanne Mayer is a 67 y.o. female who is s.p loop gastrojejunostomy, andreina en y hepaticojejunostomy, gtube, jtube.      Will try full liquids today.   Will have to attempt unclogging jtube with a wire.  Overall looks well.          This document has been electronically signed by Yevgeniy Ware MD on March 9, 2018 7:47 AM        Yevgeniy Ware MD  03/09/18  7:47 AM

## 2018-03-09 NOTE — SIGNIFICANT NOTE
03/09/18 1414   Rehab Treatment   Discipline physical therapy assistant   Treatment Not Performed patient/family declined treatment  (pt states she has already ambulated w/ family, doing great with bed mobility, and completing her HEP throughout the day. pt declines tx and states she doesn't feel that she needs therapy. will notify eval PT. )

## 2018-03-09 NOTE — NURSING NOTE
Dr oLya paged regarding feeding tube clogged. He stated he would not be doing anything about it at this time of the morning. No new orders at this time.

## 2018-03-09 NOTE — PLAN OF CARE
Problem: Patient Care Overview (Adult)  Goal: Plan of Care Review  Outcome: Ongoing (interventions implemented as appropriate)   03/09/18 0309   Coping/Psychosocial Response Interventions   Plan Of Care Reviewed With patient   Patient Care Overview   Progress improving   Outcome Evaluation   Outcome Summary/Follow up Plan vss, no c/o pain or nausea, j-tube clogged, MD on call notified, no new orders at this time     Goal: Adult Individualization and Mutuality  Outcome: Ongoing (interventions implemented as appropriate)    Goal: Discharge Needs Assessment  Outcome: Ongoing (interventions implemented as appropriate)      Problem: Fall Risk (Adult)  Goal: Absence of Falls  Outcome: Ongoing (interventions implemented as appropriate)      Problem: Pain, Acute (Adult)  Goal: Acceptable Pain Control/Comfort Level  Outcome: Ongoing (interventions implemented as appropriate)      Problem: Infection, Risk/Actual (Adult)  Goal: Infection Prevention/Resolution  Outcome: Ongoing (interventions implemented as appropriate)      Problem: Nutrition, Imbalanced: Inadequate Oral Intake (Adult)  Goal: Identify Related Risk Factors and Signs and Symptoms  Outcome: Ongoing (interventions implemented as appropriate)    Goal: Improved Oral Intake  Outcome: Ongoing (interventions implemented as appropriate)    Goal: Prevent Further Weight Loss  Outcome: Ongoing (interventions implemented as appropriate)      Problem: Pressure Ulcer Risk (Luis Scale) (Adult,Obstetrics,Pediatric)  Goal: Skin Integrity  Outcome: Ongoing (interventions implemented as appropriate)

## 2018-03-10 NOTE — THERAPY TREATMENT NOTE
Acute Care - Physical Therapy Treatment Note  [facility]     Patient Name: Johanne Mayer  : 1950  MRN: [id number]  Today's Date: 3/10/2018     Date of Referral to PT: 18       Admit Date: 2018    Visit Dx:    ICD-10-CM ICD-9-CM   1. Duodenal obstruction K31.5 537.3   2. Impaired physical mobility Z74.09 781.99     Patient Active Problem List   Diagnosis   • Malignant neoplasm of left breast   • Pancytopenia due to antineoplastic chemotherapy   • Metastasis to bone   • Dyspnea on exertion   • PAF (paroxysmal atrial fibrillation)   • Osteoarthritis   • Lymphadenopathy   • Lump of breast, left   • Localized enlarged lymph nodes   • History of echocardiogram   • Heartburn   • GERD (gastroesophageal reflux disease)   • Essential hypertension   • Carcinoma, female breast, infiltrating lobular   • Atrial fibrillation   • Current use of long term anticoagulation   • Sepsis   • E coli bacteremia   • Acute cystitis   • Hypokalemia   • Dilated intrahepatic bile duct   • Acute kidney injury   • Renal calyceal dilation determined by ultrasound   • Duodenal obstruction       Therapy Treatment    Therapy Treatment / Health Promotion    Treatment Time/Intention  Discipline: physical therapy assistant  Document Type: therapy note (daily note)  Subjective Information: no complaints  Plan of Care Review  Plan of Care Reviewed With: patient    Vitals/Pain/Safety  Vital Signs  Pre Systolic BP Rehab: 126  Pre Treatment Diastolic BP: 84  Intra Systolic BP Rehab: 140  Intra Treatment Diastolic BP: 94 (Nsg made aware.)  Post Systolic BP Rehab: 138  Post Treatment Diastolic BP: 75  Pretreatment Heart Rate (beats/min): 110  Posttreatment Heart Rate (beats/min): 114  Pre SpO2 (%): 97  O2 Delivery Pre Treatment: room air  Post SpO2 (%): 98  Pre Patient Position: Sitting  Intra Patient Position: Standing  Post Patient Position: Sitting  Positioning and Restraints  Pre-Treatment Position: sitting in  chair/recliner  Post Treatment Position: chair  In Chair: reclined, call light within reach, encouraged to call for assist, with family/caregiver    Mobility,ADL,Motor, Modality  Bed-Chair Transfer  Bed-Chair [place] (Transfers): stand by assist  Assistive Device (Bed-Chair Transfers): walker, 4-wheeled  Sit-Stand Transfer  Sit-Stand [place] (Transfers): stand by assist  Stand-Sit Transfer  Stand-Sit [place] (Transfers): stand by assist  Toilet Transfer  [place] Level (Toilet Transfer): supervision  Assistive Device (Toilet Transfer): walker, 4-wheeled  Gait/Stairs Assessment/Training  [place] Level (Gait): supervision  Assistive Device (Gait): walker, 4-wheeled  Distance in Feet (Gait): 184  Deviations/Abnormal Patterns (Gait): urszula decreased                 ROM/MMT             Sensory, Edema, Orthotics          Cognition, Communication, Swallow  Speaking Valve  Pretreatment Heart Rate (beats/min): 110  Pre SpO2 (%): 97  Posttreatment Heart Rate (beats/min): 114  Post SpO2 (%): 98  General Eating/Swallowing Observations  Pre SpO2 (%): 97  Post SpO2 (%): 98    Outcome Summary               PT Rehab Goals     Row Name 03/10/18 1445 03/10/18 1300          Bed Mobility Goal 1 (PT)    Activity/Assistive Device (Bed Mobility Goal 1, PT) bed mobility activities, all  -TW bed mobility activities, all  -TW     [place] Level/Cues Needed (Bed Mobility Goal 1, PT) conditional independence  -TW conditional independence  -TW     Time Frame (Bed Mobility Goal 1, PT) 2 days;3 days  -TW 2 days;3 days  -TW     Barriers (Bed Mobility Goal 1, PT) HOB flat with no handrails  -TW HOB flat with no handrails  -TW     Progress/Outcomes (Bed Mobility Goal 1, PT) goal ongoing  -TW goal ongoing  -TW        Transfer Goal 1 (PT)    Activity/Assistive Device (Transfer Goal 1, PT) sit-to-stand/stand-to-sit;bed-to-chair/chair-to-bed;walker, rolling  -TW sit-to-stand/stand-to-sit;bed-to-chair/chair-to-bed;walker,  rolling  -TW     Time Frame (Transfer Goal 1, PT) 2 - 3 days  -TW 2 - 3 days  -TW     Progress/Outcome (Transfer Goal 1, PT) goal ongoing  -TW goal ongoing  -TW        Gait Training Goal 1 (PT)    Activity/Assistive Device (Gait Training Goal 1, PT) gait (walking locomotion);walker, rolling  -TW gait (walking locomotion);walker, rolling  -TW     [place] Level (Gait Training Goal 1, PT) conditional independence  -TW conditional independence  -TW     Distance (Gait Goal 1, PT) 200'  -'  -TW     Time Frame (Gait Training Goal 1, PT) by discharge  -TW by discharge  -TW     Progress/Outcome (Gait Training Goal 1, PT) goal ongoing  -TW goal ongoing  -TW        Gait Training Goal 2 (PT)    Activity/Assistive Device (Gait Training Goal 2, PT) other (see comments)   Score 24/28 on Tinetti fall risk assessment or low fall risk  -TW other (see comments)   Score 24/28 on [place]etti fall risk assessment or low fall risk  -TW     Progress/Outcome (Gait Training Goal 2, PT) goal ongoing  -TW goal ongoing  -TW       User Key  (r) = Recorded By, (t) = Taken By, (c) = Cosigned By    Initials Name Provider Type    TW Leonid Maradiaga PTA Physical Therapy Assistant          Physical Therapy Education     Title: PT OT SLP Therapies (Active)     Topic: Physical Therapy (Active)     Point: Mobility training (Active)    Learning Progress Summary     Learner Status Readiness Method Response Comment Documented by    Patient Active Acceptance E NR  LAURIE 03/07/18 1312     Active Acceptance E NR  LAURIE 03/05/18 1138     Active Acceptance E NR Educated patient and her daughter on proper guarding technique with gait and transfers; proper use of gait belt.  03/02/18 1503    Family Active Acceptance E NR Educated patient and her daughter on proper guarding technique with gait and transfers; proper use of gait belt.  03/02/18 1503          Point: Home exercise program (Active)    Learning Progress Summary     Learner Status Readiness  Method Response Comment Documented by    Patient Active Acceptance E NR  LAURIE 03/05/18 1138          Point: Precautions (Active)    Learning Progress Summary     Learner Status Readiness Method Response Comment Documented by    Patient Active Acceptance E NR Aung assessed: 20/28 or moderate fall risk. Recommend continued use FWW and assistance with mobility.  03/02/18 1500                      User Key     Initials Effective Dates Name Provider Type Discipline    LAURIE 10/17/16 - 03/06/18 Francis Vora, KATHY Physical Therapy Assistant PT    LAURIE 03/07/18 -  Francis Vora PTA Physical Therapy Assistant PT    CZ 02/17/17 - 03/06/18 Neel Marin, PT Physical Therapist PT                    PT Recommendation and Plan     Plan of Care Reviewed With: patient  Progress: improving  Outcome Summary: Pt cont to show improvment with gait and t/f's. Pt more steady this tx and reports no pain after tx.          Outcome Measures     Row Name 03/10/18 1445             How much help from another person do you currently need...    Turning from your back to your side while in flat bed without using bedrails? 3  -TW      Moving from lying on back to sitting on the side of a flat bed without bedrails? 3  -TW      Moving to and from a bed to a chair (including a wheelchair)? 3  -TW      Standing up from a chair using your arms (e.g., wheelchair, bedside chair)? 3  -TW      Climbing 3-5 steps with a railing? 3  -TW      To walk in hospital room? 3  -TW      AM-PAC 6 Clicks Score 18  -TW         Functional Assessment    Outcome Measure Options AM-PAC 6 Clicks Basic Mobility (PT)  -TW        User Key  (r) = Recorded By, (t) = Taken By, (c) = Cosigned By    Initials Name Provider Type    TW Leonid Maradiaga PTA Physical Therapy Assistant           Time Calculation:         PT Charges     Row Name 03/10/18 1617             Time Calculation    Start Time 1445  -TW      Stop Time 1510  -TW      Time Calculation (min) 25 min  -TW       PT Received On 03/10/18  -TW      PT Goal Re-Cert Due Date 03/15/18  -TW         Time Calculation- PT    Total Timed Code Minutes- PT 25 minute(s)  -TW        User Key  (r) = Recorded By, (t) = Taken By, (c) = Cosigned By    Initials Name Provider Type     Leonid Maradiaga PTA Physical Therapy Assistant          Therapy Charges for Today     Code Description Service Date Service Provider Modifiers Qty    16947923988 HC PT THER SUPP EA 15 MIN 3/10/2018 Leonid Maradiaga PTA GP 1    87490216764 HC GAIT TRAINING EA 15 MIN 3/10/2018 Leonid Maradiaga PTA GP 1    33424827856 HC PT THERAPEUTIC ACT EA 15 MIN 3/10/2018 Leonid Maradiaga PTA GP 1          PT G-Codes  PT Professional Judgement Used?: Yes  Outcome Measure Options: AM-PAC 6 Clicks Basic Mobility (PT)  Score: 20  Functional Limitation: Mobility: Walking and moving around  Mobility: Walking and Moving Around Current Status (): At least 20 percent but less than 40 percent impaired, limited or restricted  Mobility: Walking and Moving Around Goal Status (): At least 1 percent but less than 20 percent impaired, limited or restricted    Leonid Maradiaga PTA  3/10/2018

## 2018-03-10 NOTE — PROGRESS NOTES
Jtube unclogged by me using forceful flush of tap water. Enteric contents were aspirated.  Tube was reconnected to tube feeds, flushed and tube feeds resumed.          This document has been electronically signed by Yevgeniy Ware MD on March 9, 2018 6:49 PM

## 2018-03-10 NOTE — PROGRESS NOTES
GENERAL SURGERY PROGRESS NOTE  Chief Complaint:  Surgery Follow up   LOS: 10 days       Subjective     Interval History:     Some burning in stomach but tolerating fulls. No further issues with Jtube.    Objective     Vital Signs  Temp:  [97 °F (36.1 °C)-97.9 °F (36.6 °C)] 97.4 °F (36.3 °C)  Heart Rate:  [] 108  Resp:  [16-19] 16  BP: (121-131)/(74-77) 121/76    Physical Exam:   Abdomen soft, tubes in place  Labs:  Lab Results (last 24 hours)     ** No results found for the last 24 hours. **           Results Review:     Labs and imaging for today were reviewed.    Assessment/Plan     Johanne Mayer is a 67 y.o. female who is s/p gastroj, hepaticoj, g and jtubes      Continue fulls and tube feeds.  Anticipate she will go home on jejunostomy tube feeds as she will not be able to maintain adequate nutrition with PO diet.  Anticipated needs of tube feeds is greater than 90 days.    DC IVF today.  Overall looks well.          This document has been electronically signed by Yevgeniy Ware MD on March 10, 2018 8:57 AM        Yevgeniy Ware MD  03/10/18  8:57 AM

## 2018-03-10 NOTE — PLAN OF CARE
Problem: Patient Care Overview  Goal: Plan of Care Review  Outcome: Ongoing (interventions implemented as appropriate)   03/10/18 3510   Coping/Psychosocial   Plan of Care Reviewed With patient   Plan of Care Review   Progress improving   OTHER   Outcome Summary Pt cont to show improvment with gait and t/f's. Pt more steady this tx and reports no pain after tx.

## 2018-03-10 NOTE — PLAN OF CARE
Problem: Fall Risk (Adult)  Goal: Absence of Fall  Outcome: Ongoing (interventions implemented as appropriate)      Problem: Patient Care Overview  Goal: Plan of Care Review  Outcome: Ongoing (interventions implemented as appropriate)   03/10/18 0500   Coping/Psychosocial   Plan of Care Reviewed With patient   Plan of Care Review   Progress improving       Problem: Pain, Acute (Adult)  Goal: Identify Related Risk Factors and Signs and Symptoms  Outcome: Ongoing (interventions implemented as appropriate)    Goal: Acceptable Pain Control/Comfort Level  Outcome: Ongoing (interventions implemented as appropriate)      Problem: Skin and Soft Tissue Infection (Adult)  Goal: Signs and Symptoms of Listed Potential Problems Will be Absent, Minimized or Managed (Skin and Soft Tissue Infection)  Outcome: Ongoing (interventions implemented as appropriate)      Problem: Nutrition, Enteral (Adult)  Goal: Signs and Symptoms of Listed Potential Problems Will be Absent, Minimized or Managed (Nutrition, Enteral)  Outcome: Ongoing (interventions implemented as appropriate)

## 2018-03-11 NOTE — PLAN OF CARE
Problem: Patient Care Overview  Goal: Plan of Care Review  Outcome: Ongoing (interventions implemented as appropriate)   03/11/18 4685   Coping/Psychosocial   Plan of Care Reviewed With patient;daughter   Plan of Care Review   Progress improving   OTHER   Outcome Summary Pt showing good tolerance to gait and t/f's.

## 2018-03-11 NOTE — THERAPY TREATMENT NOTE
Acute Care - Physical Therapy Treatment Note  [facility]     Patient Name: Johanne Mayer  : 1950  MRN: [id number]  Today's Date: 3/11/2018     Date of Referral to PT: 18       Admit Date: 2018    Visit Dx:    ICD-10-CM ICD-9-CM   1. Duodenal obstruction K31.5 537.3   2. Impaired physical mobility Z74.09 781.99     Patient Active Problem List   Diagnosis   • Malignant neoplasm of left breast   • Pancytopenia due to antineoplastic chemotherapy   • Metastasis to bone   • Dyspnea on exertion   • PAF (paroxysmal atrial fibrillation)   • Osteoarthritis   • Lymphadenopathy   • Lump of breast, left   • Localized enlarged lymph nodes   • History of echocardiogram   • Heartburn   • GERD (gastroesophageal reflux disease)   • Essential hypertension   • Carcinoma, female breast, infiltrating lobular   • Atrial fibrillation   • Current use of long term anticoagulation   • Sepsis   • E coli bacteremia   • Acute cystitis   • Hypokalemia   • Dilated intrahepatic bile duct   • Acute kidney injury   • Renal calyceal dilation determined by ultrasound   • Duodenal obstruction       Therapy Treatment    Therapy Treatment / Health Promotion    Treatment Time/Intention  Discipline: physical therapy assistant  Document Type: therapy note (daily note)  Subjective Information: no complaints  Mode of Treatment: physical therapy  Patient Effort: excellent  Plan of Care Review  Plan of Care Reviewed With: patient, daughter    Vitals/Pain/Safety  Vital Signs  Pre Systolic BP Rehab: 131  Pre Treatment Diastolic BP: 63  Pretreatment Heart Rate (beats/min): 80  Pre SpO2 (%): 97  O2 Delivery Pre Treatment: room air  Post SpO2 (%): 98  Pre Patient Position: Sitting  Intra Patient Position: Standing  Post Patient Position: Sitting  Positioning and Restraints  Pre-Treatment Position: sitting in chair/recliner  Post Treatment Position: chair  In Chair: reclined, call light within reach, encouraged to call for assist, with  family/caregiver    Mobility,ADL,Motor, Modality  Bed-Chair Transfer  Bed-Chair Patrick (Transfers): not tested  Sit-Stand Transfer  Sit-Stand Patrick (Transfers): stand by assist  Stand-Sit Transfer  Stand-Sit Patrick (Transfers): stand by assist  Toilet Transfer  [place] Level (Toilet Transfer): not tested  Gait/Stairs Assessment/Training  [place] Level (Gait): supervision  Assistive Device (Gait): walker, front-wheeled  Distance in Feet (Gait): 354                 ROM/MMT             Sensory, Edema, Orthotics          Cognition, Communication, Swallow  Speaking Valve  Pretreatment Heart Rate (beats/min): 80  Pre SpO2 (%): 97  Post SpO2 (%): 98  General Eating/Swallowing Observations  Pre SpO2 (%): 97  Post SpO2 (%): 98    Outcome Summary  Outcome Summary/Treatment Plan (PT)  Daily Summary of Progress (PT): progress toward functional goals as expected  Plan for Continued Treatment (PT): Cont  Anticipated Discharge Disposition (PT): home with home health care            PT Rehab Goals     Row Name 03/11/18 1305 03/10/18 1445 03/10/18 1300       Bed Mobility Goal 1 (PT)    Activity/Assistive Device (Bed Mobility Goal 1, PT) bed mobility activities, all  -TW bed mobility activities, all  -TW bed mobility activities, all  -TW    Patrick Level/Cues Needed (Bed Mobility Goal 1, PT) conditional independence  -TW conditional independence  -TW conditional independence  -TW    Time Frame (Bed Mobility Goal 1, PT) 2 days;3 days  -TW 2 days;3 days  -TW 2 days;3 days  -TW    Barriers (Bed Mobility Goal 1, PT) HOB flat with no handrails  -TW HOB flat with no handrails  -TW HOB flat with no handrails  -TW    Progress/Outcomes (Bed Mobility Goal 1, PT) continuing progress toward goal  -TW goal ongoing  -TW goal ongoing  -TW       Transfer Goal 1 (PT)    Activity/Assistive Device (Transfer Goal 1, PT) sit-to-stand/stand-to-sit;bed-to-chair/chair-to-bed;walker, rolling  -TW  sit-to-stand/stand-to-sit;bed-to-chair/chair-to-bed;walker, rolling  -TW sit-to-stand/stand-to-sit;bed-to-chair/chair-to-bed;walker, rolling  -TW    Time Frame (Transfer Goal 1, PT) 2 - 3 days  -TW 2 - 3 days  -TW 2 - 3 days  -TW    Progress/Outcome (Transfer Goal 1, PT) continuing progress toward goal  -TW goal ongoing  -TW goal ongoing  -TW       Gait Training Goal 1 (PT)    Activity/Assistive Device (Gait Training Goal 1, PT) gait (walking locomotion);walker, rolling  -TW gait (walking locomotion);walker, rolling  -TW gait (walking locomotion);walker, rolling  -TW    [place] Level (Gait Training Goal 1, PT) conditional independence  -TW conditional independence  -TW conditional independence  -TW    Distance (Gait Goal 1, PT) 200'  -'  -'  -TW    Time Frame (Gait Training Goal 1, PT) by discharge  -TW by discharge  -TW by discharge  -TW    Progress/Outcome (Gait Training Goal 1, PT) continuing progress toward goal  -TW goal ongoing  -TW goal ongoing  -TW       Gait Training Goal 2 (PT)    Activity/Assistive Device (Gait Training Goal 2, PT) other (see comments)   Score 24/28 on Tinetti fall risk assessment or low fall risk  -TW other (see comments)   Score 24/28 on Tinetti fall risk assessment or low fall risk  -TW other (see comments)   Score 24/28 on Tinetti fall risk assessment or low fall risk  -TW    Progress/Outcome (Gait Training Goal 2, PT) continuing progress toward goal  -TW goal ongoing  -TW goal ongoing  -TW      User Key  (r) = Recorded By, (t) = Taken By, (c) = Cosigned By    Initials Name Provider Type    TW Leonid Maradiaga PTA Physical Therapy Assistant          Physical Therapy Education     Title: PT OT SLP Therapies (Active)     Topic: Physical Therapy (Active)     Point: Mobility training (Active)    Learning Progress Summary     Learner Status Readiness Method Response Comment Documented by    Patient Active Acceptance E NR  LAURIE 03/07/18 2511     Active Acceptance E NR  LAURIE  03/05/18 1138     Active Acceptance E NR Educated patient and her daughter on proper guarding technique with gait and transfers; proper use of gait belt.  03/02/18 1503    Family Active Acceptance E NR Educated patient and her daughter on proper guarding technique with gait and transfers; proper use of gait belt.  03/02/18 1503          Point: Home exercise program (Active)    Learning Progress Summary     Learner Status Readiness Method Response Comment Documented by    Patient Active Acceptance E NR  LAURIE 03/05/18 1138          Point: Precautions (Active)    Learning Progress Summary     Learner Status Readiness Method Response Comment Documented by    Patient Active Acceptance E NR Tinetti assessed: 20/28 or moderate fall risk. Recommend continued use FWW and assistance with mobility.  03/02/18 1500                      User Key     Initials Effective Dates Name Provider Type Discipline     10/17/16 - 03/06/18 Francis Vora, PTA Physical Therapy Assistant PT     03/07/18 -  Francis Vora, PTA Physical Therapy Assistant PT     02/17/17 - 03/06/18 Neel Marin, PT Physical Therapist PT                    PT Recommendation and Plan  Anticipated Discharge Disposition (PT): home with home health care  Daily Summary of Progress (PT): progress toward functional goals as expected  Plan for Continued Treatment (PT): Cont  Plan of Care Reviewed With: patient, daughter  Progress: improving  Outcome Summary: Pt showing good tolerance to gait and t/f's.           Outcome Measures     Row Name 03/11/18 1305 03/10/18 1445          How much help from another person do you currently need...    Turning from your back to your side while in flat bed without using bedrails? 3  -TW 3  -TW     Moving from lying on back to sitting on the side of a flat bed without bedrails? 3  -TW 3  -TW     Moving to and from a bed to a chair (including a wheelchair)? 3  -TW 3  -TW     Standing up from a chair using your arms (e.g.,  wheelchair, bedside chair)? 3  -TW 3  -TW     Climbing 3-5 steps with a railing? 3  -TW 3  -TW     To walk in hospital room? 3  -TW 3  -TW     AM-PAC 6 Clicks Score 18  -TW 18  -TW        Functional Assessment    Outcome Measure Options AM-PAC 6 Clicks Basic Mobility (PT)  -TW AM-PAC 6 Clicks Basic Mobility (PT)  -TW       User Key  (r) = Recorded By, (t) = Taken By, (c) = Cosigned By    Initials Name Provider Type     Leonid Maradiaga PTA Physical Therapy Assistant           Time Calculation:         PT Charges     Row Name 03/11/18 1438             Time Calculation    Start Time 1305  -TW      Stop Time 1331  -TW      Time Calculation (min) 26 min  -TW      PT Received On 03/11/18  -TW      PT Goal Re-Cert Due Date 03/15/18  -TW         Time Calculation- PT    Total Timed Code Minutes- PT 26 minute(s)  -TW        User Key  (r) = Recorded By, (t) = Taken By, (c) = Cosigned By    Initials Name Provider Type     Leonid Maradiaga PTA Physical Therapy Assistant          Therapy Charges for Today     Code Description Service Date Service Provider Modifiers Qty    98467607905 HC PT THER SUPP EA 15 MIN 3/10/2018 Leonid Maradiaga PTA GP 1    97406494983 HC GAIT TRAINING EA 15 MIN 3/10/2018 Leonid Maradiaga PTA GP 1    91077817611 HC PT THERAPEUTIC ACT EA 15 MIN 3/10/2018 Leonid Maradiaga PTA GP 1    52930440915 HC PT THER SUPP EA 15 MIN 3/11/2018 Leonid Maradiaga PTA GP 1    20985754370 HC PT THERAPEUTIC ACT EA 15 MIN 3/11/2018 Leonid Maradiaga PTA GP 1    33714747681 HC GAIT TRAINING EA 15 MIN 3/11/2018 Leonid Maradiaga PTA GP 1          PT G-Codes  PT Professional Judgement Used?: Yes  Outcome Measure Options: AM-PAC 6 Clicks Basic Mobility (PT)  Score: 20  Functional Limitation: Mobility: Walking and moving around  Mobility: Walking and Moving Around Current Status (): At least 20 percent but less than 40 percent impaired, limited or restricted  Mobility: Walking and Moving Around Goal  Status (): At least 1 percent but less than 20 percent impaired, limited or restricted    Leonid Maradiaga, PTA  3/11/2018

## 2018-03-11 NOTE — PROGRESS NOTES
"   LOS: 11 days   Patient Care Team:  Jenaro Wood MD as PCP - General  Joey Ramos MD as PCP - Claims Attributed  Joey Ramos MD as Consulting Physician (Hematology and Oncology)    Subjective     Subjective:  Symptoms:  Stable.  No shortness of breath, chest pain, headache or chest pressure.  (No hematuria, TMax 99.9. No flank pain, no hematuria.).    Pain:  Pain is well controlled.        History taken from: patient chart    Objective     Vital Signs  Temp:  [96.1 °F (35.6 °C)-100.5 °F (38.1 °C)] 99.9 °F (37.7 °C)  Heart Rate:  [] 84  Resp:  [16-18] 16  BP: (109-146)/(58-85) 127/67    Objective:  General Appearance:  In no acute distress.    Vital signs: (most recent): Blood pressure 127/67, pulse 84, temperature 99.9 °F (37.7 °C), temperature source Oral, resp. rate 16, height 157.5 cm (62\"), weight 75.8 kg (167 lb), SpO2 97 %, not currently breastfeeding.  Vital signs are normal.  No fever.    Output: Producing urine.    HEENT: Normal HEENT exam.    Lungs:  Normal effort and normal respiratory rate.  Breath sounds clear to auscultation.    Heart: Normal rate.  Regular rhythm.  S1 normal and S2 normal.  No murmur.   Chest: Symmetric chest wall expansion.   Abdomen: Abdomen is soft and non-distended.  Bowel sounds are normal.   There is no abdominal tenderness.     Extremities: Normal range of motion.  There is no dependent edema.    Pulses: Distal pulses are intact.    Neurological: Patient is alert and oriented to person, place and time.  GCS score is 15.    Pupils:  Pupils are equal, round, and reactive to light.    Skin:  Warm and dry.  No ecchymosis or cyanosis.             Results Review:    Lab Results (last 24 hours)     ** No results found for the last 24 hours. **         Imaging Results (last 24 hours)     ** No results found for the last 24 hours. **           I reviewed the patient's new clinical results.  I reviewed the patient's new imaging results and agree with the " interpretation.  I reviewed the patient's other test results and agree with the interpretation      Assessment/Plan     Principal Problem:    Duodenal obstruction      Assessment & Plan  Postop laparotomy with gastrojejunostomy, gastrostomy placement, Ru en Y hepaticojejunostomy, jejunostomy, liver biopsy.  Postop Cystoscopy right retrograde and J-stent placement, right hydronephrosis.   -urine output 2100 mL last 24 hours    Plan: J-stent remains, monitor urine output and for UTI.    FADI Estes  03/11/18  10:07 AM

## 2018-03-11 NOTE — PLAN OF CARE
Problem: Fall Risk (Adult)  Goal: Absence of Fall  Outcome: Ongoing (interventions implemented as appropriate)      Problem: Skin and Soft Tissue Infection (Adult)  Goal: Signs and Symptoms of Listed Potential Problems Will be Absent, Minimized or Managed (Skin and Soft Tissue Infection)  Outcome: Ongoing (interventions implemented as appropriate)      Problem: Nutrition, Enteral (Adult)  Goal: Signs and Symptoms of Listed Potential Problems Will be Absent, Minimized or Managed (Nutrition, Enteral)  Outcome: Ongoing (interventions implemented as appropriate)

## 2018-03-12 NOTE — CONSULTS
Adult Nutrition  Assessment    Patient Name:  Johanne Mayer  YOB: 1950  MRN: [id number]  Admit Date:  2/28/2018    Assessment Date:  3/12/2018    Comments:  Pt continues to tolerate tube feeding at goal rate.  She has been started on solid foods, first meal at lunch.  She did well with the full liquid diet.  RD will add Glucerna (per pt's request) to trays.  She is to go home with tube feeding.  If she tolerates her po diet and supplements her tube feeding rate can be decreased or even ran only at night.  Rd will continue to monitor           Adult Nutrition Assessment     Row Name 03/12/18 1620       Calories Evaluation    Enteral Calories (kcal) 1728       Protein Evaluation    Enteral Protein (gm) 81       Intake Assessment    Tolerance tolerating       PO Evaluation    Number of Days PO Intake Evaluated Insufficient Data    Row Name 03/12/18 1619       Labs/Procedures/Meds    Lab Results Reviewed reviewed       Physical Findings    Gastrointestinal feeding tube    Tubes gastrostomy tube;jejunostomy tube       Nutrition Prescription PO    Current PO Diet Soft Texture    Texture Ground    Fluid Consistency Thin       Nutrition Prescription EN    Enteral Route Jejunostomy    Product Osmolite 1.2 beto    TF Delivery Method Continuous    Continuous TF Goal Rate (mL/hr) 60 mL/hr    Continuous TF Current Rate (mL/hr) 60 mL/hr    Continuous TF Goal Volume (mL) 1440 mL    Water flush (mL)  25 mL    Water Flush Frequency Per hour    Row Name 03/12/18 1618       Reason for Assessment    Reason For Assessment follow-up protocol       Nutrition/Diet History    Typical Food/Fluid Intake Pt states that is the first solid food that she has received. She would like Glucerna instead of Ensure.  She got some last night and really liked it.  She denies any Gi distress          Electronically signed by:  Melia Cool RD  03/12/18 4:27 PM

## 2018-03-12 NOTE — PLAN OF CARE
Problem: Patient Care Overview  Goal: Plan of Care Review  Outcome: Ongoing (interventions implemented as appropriate)   03/12/18 9403   Coping/Psychosocial   Plan of Care Reviewed With patient;daughter   Plan of Care Review   Progress improving   OTHER   Outcome Summary Pt able to tolerate increase gait distance this tx with VSS throughout tx.

## 2018-03-12 NOTE — PLAN OF CARE
Problem: Patient Care Overview  Goal: Plan of Care Review  Outcome: Ongoing (interventions implemented as appropriate)   03/12/18 2744   Coping/Psychosocial   Plan of Care Reviewed With patient;daughter   Plan of Care Review   Progress improving   OTHER   Outcome Summary Pt continues to receive tube feeding and po has been advanced to solid food. Rd will add Glucerna and monitor her intake and tolerance       Problem: Nutrition, Enteral (Adult)  Goal: Signs and Symptoms of Listed Potential Problems Will be Absent, Minimized or Managed (Nutrition, Enteral)  Outcome: Outcome(s) achieved Date Met: 03/12/18

## 2018-03-12 NOTE — SIGNIFICANT NOTE
03/12/18 1020   Rehab Treatment   Discipline physical therapy assistant   Reason Treatment Not Performed patient/family declined treatment  (Pt and daughter state pt didnt sleep well last night and just wants to rest this am. They are okay with PTA checking back this pm.)

## 2018-03-12 NOTE — PLAN OF CARE
Problem: Fall Risk (Adult)  Goal: Absence of Fall  Outcome: Ongoing (interventions implemented as appropriate)      Problem: Patient Care Overview  Goal: Plan of Care Review   03/12/18 6856   Coping/Psychosocial   Plan of Care Reviewed With patient   Plan of Care Review   Progress improving   OTHER   Outcome Summary patient doing well with ambulation and pain control, tolerating soft food well       Problem: Pain, Acute (Adult)  Goal: Identify Related Risk Factors and Signs and Symptoms  Outcome: Ongoing (interventions implemented as appropriate)    Goal: Acceptable Pain Control/Comfort Level  Outcome: Ongoing (interventions implemented as appropriate)      Problem: Skin and Soft Tissue Infection (Adult)  Goal: Signs and Symptoms of Listed Potential Problems Will be Absent, Minimized or Managed (Skin and Soft Tissue Infection)  Outcome: Ongoing (interventions implemented as appropriate)      Problem: Nutrition, Enteral (Adult)  Goal: Signs and Symptoms of Listed Potential Problems Will be Absent, Minimized or Managed (Nutrition, Enteral)  Outcome: Ongoing (interventions implemented as appropriate)

## 2018-03-12 NOTE — THERAPY TREATMENT NOTE
Acute Care - Physical Therapy Treatment Note  [facility]     Patient Name: Johanne Mayer  : 1950  MRN: [id number]  Today's Date: 3/12/2018     Date of Referral to PT: 18       Admit Date: 2018    Visit Dx:    ICD-10-CM ICD-9-CM   1. Duodenal obstruction K31.5 537.3   2. Impaired physical mobility Z74.09 781.99     Patient Active Problem List   Diagnosis   • Malignant neoplasm of left breast   • Pancytopenia due to antineoplastic chemotherapy   • Metastasis to bone   • Dyspnea on exertion   • PAF (paroxysmal atrial fibrillation)   • Osteoarthritis   • Lymphadenopathy   • Lump of breast, left   • Localized enlarged lymph nodes   • History of echocardiogram   • Heartburn   • GERD (gastroesophageal reflux disease)   • Essential hypertension   • Carcinoma, female breast, infiltrating lobular   • Atrial fibrillation   • Current use of long term anticoagulation   • Sepsis   • E coli bacteremia   • Acute cystitis   • Hypokalemia   • Dilated intrahepatic bile duct   • Acute kidney injury   • Renal calyceal dilation determined by ultrasound   • Duodenal obstruction       Therapy Treatment    Therapy Treatment / Health Promotion    Treatment Time/Intention  Discipline: physical therapy assistant  Document Type: therapy note (daily note)  Subjective Information: no complaints  Mode of Treatment: physical therapy  Patient Effort: excellent  Reason Treatment Not Performed: patient/family declined treatment (Pt and daughter state pt didnt sleep well last night and just wants to rest this am. They are okay with PTA checking back this pm.)  Plan of Care Review  Plan of Care Reviewed With: patient, daughter    Vitals/Pain/Safety  Vital Signs  Pre Systolic BP Rehab: 121  Pre Treatment Diastolic BP: 71  Post Systolic BP Rehab: 143  Post Treatment Diastolic BP: 84  Pretreatment Heart Rate (beats/min): 77  Intratreatment Heart Rate (beats/min): 88  Posttreatment Heart Rate (beats/min): 84  Pre SpO2 (%):  94  O2 Delivery Pre Treatment: room air  Intra SpO2 (%): 97  Post SpO2 (%): 96  Pre Patient Position: Sitting  Intra Patient Position: Standing  Post Patient Position: Sitting  Positioning and Restraints  Pre-Treatment Position: sitting in chair/recliner  Post Treatment Position: chair  In Chair: reclined, call light within reach, encouraged to call for assist, with family/caregiver    Mobility,ADL,Motor, Modality  Bed Mobility Assessment/Treatment  Supine-Sit [place] (Bed Mobility): not tested  Sit-Supine [place] (Bed Mobility): not tested  Bed-Chair Transfer  Bed-Chair [place] (Transfers): not tested  Sit-Stand Transfer  Sit-Stand [place] (Transfers): stand by assist, supervision  Stand-Sit Transfer  Stand-Sit [place] (Transfers): stand by assist, supervision  Gait/Stairs Assessment/Training  [place] Level (Gait): supervision  Assistive Device (Gait): walker, front-wheeled  Distance in Feet (Gait): 675  Deviations/Abnormal Patterns (Gait): urszula decreased                 ROM/MMT             Sensory, Edema, Orthotics          Cognition, Communication, Swallow  Speaking Valve  Pretreatment Heart Rate (beats/min): 77  Pre SpO2 (%): 94  Posttreatment Heart Rate (beats/min): 84  Post SpO2 (%): 96  General Eating/Swallowing Observations  Pre SpO2 (%): 94  Post SpO2 (%): 96    Outcome Summary  Outcome Summary/Treatment Plan (PT)  Daily Summary of Progress (PT): progress toward functional goals is good  Plan for Continued Treatment (PT): Cont  Anticipated Discharge Disposition (PT): home with home health care            PT Rehab Goals     Row Name 03/12/18 1315 03/11/18 1305 03/10/18 1445       Bed Mobility Goal 1 (PT)    Activity/Assistive Device (Bed Mobility Goal 1, PT) bed mobility activities, all  -TW bed mobility activities, all  -TW bed mobility activities, all  -TW    [place] Level/Cues Needed (Bed Mobility Goal 1, PT) conditional independence  -TW conditional independence  -TW  conditional independence  -TW    Time Frame (Bed Mobility Goal 1, PT) 2 days;3 days  -TW 2 days;3 days  -TW 2 days;3 days  -TW    Barriers (Bed Mobility Goal 1, PT) HOB flat with no handrails  -TW HOB flat with no handrails  -TW HOB flat with no handrails  -TW    Progress/Outcomes (Bed Mobility Goal 1, PT) continuing progress toward goal  -TW continuing progress toward goal  -TW goal ongoing  -TW       Transfer Goal 1 (PT)    Activity/Assistive Device (Transfer Goal 1, PT) sit-to-stand/stand-to-sit;bed-to-chair/chair-to-bed;walker, rolling  -TW sit-to-stand/stand-to-sit;bed-to-chair/chair-to-bed;walker, rolling  -TW sit-to-stand/stand-to-sit;bed-to-chair/chair-to-bed;walker, rolling  -TW    Time Frame (Transfer Goal 1, PT) 2 - 3 days  -TW 2 - 3 days  -TW 2 - 3 days  -TW    Progress/Outcome (Transfer Goal 1, PT) continuing progress toward goal  -TW continuing progress toward goal  -TW goal ongoing  -TW       Gait Training Goal 1 (PT)    Activity/Assistive Device (Gait Training Goal 1, PT) gait (walking locomotion);walker, rolling  -TW gait (walking locomotion);walker, rolling  -TW gait (walking locomotion);walker, rolling  -TW    [place] Level (Gait Training Goal 1, PT) conditional independence  -TW conditional independence  -TW conditional independence  -TW    Distance (Gait Goal 1, PT) 200'  -'  -'  -TW    Time Frame (Gait Training Goal 1, PT) by discharge  -TW by discharge  -TW by discharge  -TW    Progress/Outcome (Gait Training Goal 1, PT) continuing progress toward goal  -TW continuing progress toward goal  -TW goal ongoing  -TW       Gait Training Goal 2 (PT)    Activity/Assistive Device (Gait Training Goal 2, PT) other (see comments)   Score 24/28 onTinetti fall risk assessment or low fall risk.  -TW other (see comments)   Score 24/28 on [place] fall risk assessment or low fall risk  -TW other (see comments)   Score 24/28 on [place] fall risk assessment or low fall risk  -TW     Progress/Outcome (Gait Training Goal 2, PT) continuing progress toward goal  -TW continuing progress toward goal  -TW goal ongoing  -TW    Row Name 03/10/18 1300             Bed Mobility Goal 1 (PT)    Activity/Assistive Device (Bed Mobility Goal 1, PT) bed mobility activities, all  -TW      [place] Level/Cues Needed (Bed Mobility Goal 1, PT) conditional independence  -TW      Time Frame (Bed Mobility Goal 1, PT) 2 days;3 days  -TW      Barriers (Bed Mobility Goal 1, PT) HOB flat with no handrails  -TW      Progress/Outcomes (Bed Mobility Goal 1, PT) goal ongoing  -TW         Transfer Goal 1 (PT)    Activity/Assistive Device (Transfer Goal 1, PT) sit-to-stand/stand-to-sit;bed-to-chair/chair-to-bed;walker, rolling  -TW      Time Frame (Transfer Goal 1, PT) 2 - 3 days  -TW      Progress/Outcome (Transfer Goal 1, PT) goal ongoing  -TW         Gait Training Goal 1 (PT)    Activity/Assistive Device (Gait Training Goal 1, PT) gait (walking locomotion);walker, rolling  -TW      [place] Level (Gait Training Goal 1, PT) conditional independence  -TW      Distance (Gait Goal 1, PT) 200'  -TW      Time Frame (Gait Training Goal 1, PT) by discharge  -TW      Progress/Outcome (Gait Training Goal 1, PT) goal ongoing  -TW         Gait Training Goal 2 (PT)    Activity/Assistive Device (Gait Training Goal 2, PT) other (see comments)   Score 24/28 on [place]etti fall risk assessment or low fall risk  -TW      Progress/Outcome (Gait Training Goal 2, PT) goal ongoing  -TW        User Key  (r) = Recorded By, (t) = Taken By, (c) = Cosigned By    Initials Name Provider Type    TW Leonid Maradiaga PTA Physical Therapy Assistant          Physical Therapy Education     Title: PT OT SLP Therapies (Active)     Topic: Physical Therapy (Active)     Point: Mobility training (Active)    Learning Progress Summary     Learner Status Readiness Method Response Comment Documented by    Patient Active Acceptance E NR  LAURIE 03/07/18 1664      Active Acceptance E NR  LAURIE 03/05/18 1138     Active Acceptance E NR Educated patient and her daughter on proper guarding technique with gait and transfers; proper use of gait belt.  03/02/18 1503    Family Active Acceptance E NR Educated patient and her daughter on proper guarding technique with gait and transfers; proper use of gait belt.  03/02/18 1503          Point: Home exercise program (Active)    Learning Progress Summary     Learner Status Readiness Method Response Comment Documented by    Patient Active Acceptance E NR  LAURIE 03/05/18 1138          Point: Precautions (Active)    Learning Progress Summary     Learner Status Readiness Method Response Comment Documented by    Patient Active Acceptance E NR [place] assessed: 20/28 or moderate fall risk. Recommend continued use FWW and assistance with mobility.  03/02/18 1500                      User Key     Initials Effective Dates Name Provider Type Discipline     10/17/16 - 03/06/18 Francis Vora, PTA Physical Therapy Assistant PT     03/07/18 -  Francis Vora PTA Physical Therapy Assistant PT     02/17/17 - 03/06/18 Neel Marin, PT Physical Therapist PT                    PT Recommendation and Plan  Anticipated Discharge Disposition (PT): home with home health care  Daily Summary of Progress (PT): progress toward functional goals is good  Plan for Continued Treatment (PT): Cont  Plan of Care Reviewed With: patient, daughter  Progress: improving  Outcome Summary: Pt able to tolerate increase gait distance this tx with VSS throughout tx.          Outcome Measures     Row Name 03/12/18 1315 03/11/18 1305 03/10/18 1445       How much help from another person do you currently need...    Turning from your back to your side while in flat bed without using bedrails? 3  -TW 3  -TW 3  -TW    Moving from lying on back to sitting on the side of a flat bed without bedrails? 3  -TW 3  -TW 3  -TW    Moving to and from a bed to a chair (including a  wheelchair)? 3  -TW 3  -TW 3  -TW    Standing up from a chair using your arms (e.g., wheelchair, bedside chair)? 3  -TW 3  -TW 3  -TW    Climbing 3-5 steps with a railing? 3  -TW 3  -TW 3  -TW    To walk in hospital room? 3  -TW 3  -TW 3  -TW    AM-PAC 6 Clicks Score 18  -TW 18  -TW 18  -TW       Functional Assessment    Outcome Measure Options AM-PAC 6 Clicks Basic Mobility (PT)  -TW AM-PAC 6 Clicks Basic Mobility (PT)  -TW AM-PAC 6 Clicks Basic Mobility (PT)  -TW      User Key  (r) = Recorded By, (t) = Taken By, (c) = Cosigned By    Initials Name Provider Type    TW Leonid Maradiaga PTA Physical Therapy Assistant           Time Calculation:         PT Charges     Row Name 03/12/18 1453             Time Calculation    Start Time 1315  -TW      Stop Time 1353  -TW      Time Calculation (min) 38 min  -TW      PT Received On 03/12/18  -TW      PT Goal Re-Cert Due Date 03/15/18  -TW         Time Calculation- PT    Total Timed Code Minutes- PT 38 minute(s)  -TW        User Key  (r) = Recorded By, (t) = Taken By, (c) = Cosigned By    Initials Name Provider Type     Leonid Maradiaga PTA Physical Therapy Assistant          Therapy Charges for Today     Code Description Service Date Service Provider Modifiers Qty    48030563276 HC PT THER SUPP EA 15 MIN 3/11/2018 Leonid Maradiaga PTA GP 1    46159170139 HC PT THERAPEUTIC ACT EA 15 MIN 3/11/2018 Leonid Maradiaga PTA GP 1    98819919455 HC GAIT TRAINING EA 15 MIN 3/11/2018 Leonid Maradiaga PTA GP 1    06429552101 HC PT THER SUPP EA 15 MIN 3/12/2018 Leonid Maradiaga PTA GP 1    78693067178 HC PT THER SUPP EA 15 MIN 3/12/2018 Leonid Maradiaga PTA GP 1    06073607713 HC GAIT TRAINING EA 15 MIN 3/12/2018 Leonid Maradiaga PTA GP 1    57544108973 HC PT THERAPEUTIC ACT EA 15 MIN 3/12/2018 Leonid Maradiaga PTA GP 2          PT G-Codes  PT Professional Judgement Used?: Yes  Outcome Measure Options: AM-PAC 6 Clicks Basic Mobility (PT)  Score: 20  Functional  Limitation: Mobility: Walking and moving around  Mobility: Walking and Moving Around Current Status (): At least 20 percent but less than 40 percent impaired, limited or restricted  Mobility: Walking and Moving Around Goal Status (): At least 1 percent but less than 20 percent impaired, limited or restricted    Leonid Maradiaga, PTA  3/12/2018

## 2018-03-12 NOTE — PLAN OF CARE
Problem: Fall Risk (Adult)  Goal: Absence of Fall  Outcome: Ongoing (interventions implemented as appropriate)      Problem: Pain, Acute (Adult)  Goal: Identify Related Risk Factors and Signs and Symptoms  Outcome: Ongoing (interventions implemented as appropriate)    Goal: Acceptable Pain Control/Comfort Level  Outcome: Ongoing (interventions implemented as appropriate)      Problem: Nutrition, Enteral (Adult)  Goal: Signs and Symptoms of Listed Potential Problems Will be Absent, Minimized or Managed (Nutrition, Enteral)  Outcome: Ongoing (interventions implemented as appropriate)

## 2018-03-12 NOTE — PROGRESS NOTES
GENERAL SURGERY PROGRESS NOTE  Chief Complaint:  Surgery Follow up   LOS: 12 days       Subjective     Interval History:     Tolerated fulls yesterday with no nausea. Overall feels well.     Objective     Vital Signs  Temp:  [96.4 °F (35.8 °C)-98.9 °F (37.2 °C)] 98.9 °F (37.2 °C)  Heart Rate:  [77-96] 77  Resp:  [16-18] 18  BP: (116-131)/(57-71) 129/71    Physical Exam:   Abdomen soft, incision CDI  Labs:  Lab Results (last 24 hours)     Procedure Component Value Units Date/Time    Basic Metabolic Panel [[id number]]  (Abnormal) Collected:  03/12/18 0527    Specimen:  Blood Updated:  03/12/18 0704     Glucose 106 (H) mg/dL      BUN 20 mg/dL      Creatinine 0.89 mg/dL      Sodium 131 (L) mmol/L      Potassium 5.0 mmol/L      Chloride 98 mmol/L      CO2 21.0 (L) mmol/L      Calcium 8.1 (L) mg/dL      eGFR Non African Amer 63 mL/min/1.73      BUN/Creatinine Ratio 22.5     Anion Gap 12.0 mmol/L     CBC (No Diff) [[id number]]  (Abnormal) Collected:  03/12/18 0527    Specimen:  Blood Updated:  03/12/18 0621     WBC 10.82 (H) 10*3/mm3      RBC 2.97 (L) 10*6/mm3      Hemoglobin 8.6 (L) g/dL      Hematocrit 26.7 (L) %      MCV 89.9 fL      MCH 29.0 pg      MCHC 32.2 g/dL      RDW 18.6 (H) %      RDW-SD 62.0 (H) fl      MPV 9.7 fL      Platelets 359 10*3/mm3            Results Review:     Labs and imaging for today were reviewed.    Assessment/Plan     Johanne Mayer is a 67 y.o. female who is s/p gastrojejunostomy, hepaticojejunostomy for obstruction from metastatic breast cancer      Overall doing well, will attempt soft food today.  Discharge planning.          This document has been electronically signed by Yevgeniy Ware MD on March 12, 2018 7:34 AM        Yevgeniy Ware MD  03/12/18  7:34 AM

## 2018-03-13 NOTE — PLAN OF CARE
Problem: Patient Care Overview  Goal: Plan of Care Review  Outcome: Ongoing (interventions implemented as appropriate)   03/13/18 7562   Coping/Psychosocial   Plan of Care Reviewed With patient   Plan of Care Review   Progress improving   OTHER   Outcome Summary vss, tolerating diet and feeding, complaining of small amount of pain     Goal: Individualization and Mutuality  Outcome: Ongoing (interventions implemented as appropriate)    Goal: Discharge Needs Assessment  Outcome: Ongoing (interventions implemented as appropriate)    Goal: Interprofessional Rounds/Family Conf  Outcome: Ongoing (interventions implemented as appropriate)      Problem: Pain, Acute (Adult)  Goal: Identify Related Risk Factors and Signs and Symptoms  Outcome: Ongoing (interventions implemented as appropriate)    Goal: Acceptable Pain Control/Comfort Level  Outcome: Ongoing (interventions implemented as appropriate)

## 2018-03-13 NOTE — PLAN OF CARE
Problem: Fall Risk (Adult)  Goal: Identify Related Risk Factors and Signs and Symptoms  Outcome: Ongoing (interventions implemented as appropriate)   03/12/18 1406   Fall Risk (Adult)   Related Risk Factors (Fall Risk) gait/mobility problems;environment unfamiliar   Signs and Symptoms (Fall Risk) presence of risk factors     Goal: Absence of Fall  Outcome: Ongoing (interventions implemented as appropriate)   03/12/18 1406   Fall Risk (Adult)   Absence of Fall making progress toward outcome       Problem: Patient Care Overview  Goal: Individualization and Mutuality  Outcome: Ongoing (interventions implemented as appropriate)    Goal: Discharge Needs Assessment  Outcome: Ongoing (interventions implemented as appropriate)      Problem: Pain, Acute (Adult)  Goal: Identify Related Risk Factors and Signs and Symptoms  Outcome: Ongoing (interventions implemented as appropriate)   03/13/18 0449   Pain, Acute (Adult)   Related Risk Factors (Acute Pain) procedure/treatment   Signs and Symptoms (Acute Pain) verbalization of pain descriptors     Goal: Acceptable Pain Control/Comfort Level   03/13/18 0449   Pain, Acute (Adult)   Acceptable Pain Control/Comfort Level making progress toward outcome

## 2018-03-13 NOTE — PROGRESS NOTES
GENERAL SURGERY PROGRESS NOTE  Chief Complaint:  Surgery Follow up   LOS: 13 days       Subjective     Interval History:     Has had episodes of Afib with RVR today. Tolerated regular diet.    Objective     Vital Signs  Temp:  [98.4 °F (36.9 °C)-99 °F (37.2 °C)] 98.9 °F (37.2 °C)  Heart Rate:  [] 109  Resp:  [18] 18  BP: (102-140)/(60-82) 117/74    Physical Exam:   Abdomen soft, incision CDI  Labs:  Lab Results (last 24 hours)     ** No results found for the last 24 hours. **           Results Review:     Labs and imaging for today were reviewed.    Assessment/Plan     Johanne Mayer is a 67 y.o. female who is s/p palliative bypasses for metastatic breast cancer.      Home meds for A fib restarted, currently rate controlled.  Anticipate home tomorrow on PO diet.          This document has been electronically signed by Yevgeniy Ware MD on March 13, 2018 5:57 PM        Yevgeniy Ware MD  03/13/18  5:57 PM

## 2018-03-13 NOTE — SIGNIFICANT NOTE
"   03/13/18 6319   Rehab Treatment   Discipline physical therapy assistant   Reason Treatment Not Performed patient/family declined treatment  (Pt stated, \"I have been walking on my own - It has been a rough day.\" PT requested PTA to check back tomorrow.)   Recommendation   PT - Next Appointment 03/14/18     "

## 2018-03-14 NOTE — CONSULTS
Nutrition Services    Patient Name:  Johanne Mayer  YOB: 1950  MRN: [id number]  Admit Date:  2/28/2018  Pt is getting to go home.  MD came in while RD was in the room.  He said that pt was going to go home without the tube feeding.  Small frequent  Meals and ask RD to discuss diet.  RD discussed limiting gas forming foods and raw vegetables and Fruits.  We reviewed tender meats and small frequent meals.  Grazing through out the day. RD recommended high protein snacks and we discussed some of these  RD also recommended supplements--perhaps drinking/sipping them between meals.  RD name and number provided for home use.       Electronically signed by:  Melia Cool RD  03/14/18 2:44 PM

## 2018-03-14 NOTE — PROGRESS NOTES
GENERAL SURGERY PROGRESS NOTE  Chief Complaint:  Surgery Follow up   LOS: 14 days       Subjective     Interval History:     Tolerating diet well.  Instructions given for PO diet from dietician. No complaints.    Objective     Vital Signs  Temp:  [96.7 °F (35.9 °C)-99.3 °F (37.4 °C)] 96.7 °F (35.9 °C)  Heart Rate:  [] 74  Resp:  [18-20] 18  BP: (107-144)/(61-78) 144/78    Physical Exam:   Abdomen soft  Labs:  Lab Results (last 24 hours)     ** No results found for the last 24 hours. **           Results Review:     Labs and imaging for today were reviewed.    Assessment/Plan     Johanne Mayer is a 67 y.o. female who is s/p bypasses for metastatic breast cancer.      Home today on PO diet. Follow up in 1 week. Staples out prior to DC.          This document has been electronically signed by Yevgeniy Ware MD on March 14, 2018 12:03 PM        Yevgeniy Ware MD  03/14/18  12:03 PM

## 2018-03-14 NOTE — SIGNIFICANT NOTE
"   03/14/18 1110   Rehab Treatment   Discipline physical therapy assistant   Reason Treatment Not Performed patient/family declined treatment  (Pt stated, \"I have already been on a short walk w/family and I am waiting on Dr. Ware to hopefully go home.\")   Recommendation   PT - Next Appointment 03/14/18     "

## 2018-03-14 NOTE — PLAN OF CARE
Problem: Patient Care Overview  Goal: Plan of Care Review   03/14/18 5451   Coping/Psychosocial   Plan of Care Reviewed With patient;daughter   Plan of Care Review   Progress improving   OTHER   Outcome Summary Pt going home without tube feeding. SHe is eating well and tolerating po. Rd education pt and family on diet perscription

## 2018-03-14 NOTE — THERAPY DISCHARGE NOTE
Acute Care - Physical Therapy Discharge Summary  [facility]       Patient Name: Johanne Mayer  : 1950  MRN: [id number]    Today's Date: 3/14/2018       Date of Referral to PT: 18         Admit Date: 2018      PT Recommendation and Plan    Visit Dx:    ICD-10-CM ICD-9-CM   1. Duodenal obstruction K31.5 537.3   2. Impaired physical mobility Z74.09 781.99             Outcome Measures     Row Name 18 1315             How much help from another person do you currently need...    Turning from your back to your side while in flat bed without using bedrails? 3  -TW      Moving from lying on back to sitting on the side of a flat bed without bedrails? 3  -TW      Moving to and from a bed to a chair (including a wheelchair)? 3  -TW      Standing up from a chair using your arms (e.g., wheelchair, bedside chair)? 3  -TW      Climbing 3-5 steps with a railing? 3  -TW      To walk in hospital room? 3  -TW      AM-PAC 6 Clicks Score 18  -TW         Functional Assessment    Outcome Measure Options AM-PAC 6 Clicks Basic Mobility (PT)  -TW        User Key  (r) = Recorded By, (t) = Taken By, (c) = Cosigned By    Initials Name Provider Type    TW Leonid Maradiaga PTA Physical Therapy Assistant                PT Charges     Row Name 18 1110             Time Calculation    PT - Next Appointment 18  -AM        User Key  (r) = Recorded By, (t) = Taken By, (c) = Cosigned By    Initials Name Provider Type    AM Osorio Ding PTA Physical Therapy Assistant                  PT Rehab Goals     Row Name 18 1110 18 1315 18 1305       Bed Mobility Goal 1 (PT)    Activity/Assistive Device (Bed Mobility Goal 1, PT) bed mobility activities, all  -AM bed mobility activities, all  -TW bed mobility activities, all  -TW    [place] Level/Cues Needed (Bed Mobility Goal 1, PT) conditional independence  -AM conditional independence  -TW conditional independence  -TW    Time Frame  (Bed Mobility Goal 1, PT) 2 days;3 days  -AM 2 days;3 days  -TW 2 days;3 days  -TW    Barriers (Bed Mobility Goal 1, PT) HOB flat w/no HR  -AM HOB flat with no handrails  -TW HOB flat with no handrails  -TW    Progress/Outcomes (Bed Mobility Goal 1, PT) goal not met;discharged from facility  -AM continuing progress toward goal  -TW continuing progress toward goal  -TW       Transfer Goal 1 (PT)    Activity/Assistive Device (Transfer Goal 1, PT) sit-to-stand/stand-to-sit;bed-to-chair/chair-to-bed;walker, rolling  -AM sit-to-stand/stand-to-sit;bed-to-chair/chair-to-bed;walker, rolling  -TW sit-to-stand/stand-to-sit;bed-to-chair/chair-to-bed;walker, rolling  -TW    Time Frame (Transfer Goal 1, PT) 2 - 3 days  -AM 2 - 3 days  -TW 2 - 3 days  -TW    Progress/Outcome (Transfer Goal 1, PT) goal not met;discharged from facility  -AM continuing progress toward goal  -TW continuing progress toward goal  -TW       Gait Training Goal 1 (PT)    Activity/Assistive Device (Gait Training Goal 1, PT) gait (walking locomotion);walker, rolling  -AM gait (walking locomotion);walker, rolling  -TW gait (walking locomotion);walker, rolling  -TW    [place] Level (Gait Training Goal 1, PT) conditional independence  -AM conditional independence  -TW conditional independence  -TW    Distance (Gait Goal 1, PT) 200 ft  -'  -'  -TW    Time Frame (Gait Training Goal 1, PT) by discharge  -AM by discharge  -TW by discharge  -TW    Progress/Outcome (Gait Training Goal 1, PT) goal not met;discharged from facility  -AM continuing progress toward goal  -TW continuing progress toward goal  -TW       Gait Training Goal 2 (PT)    Activity/Assistive Device (Gait Training Goal 2, PT) other (see comments)   24/28 [place] or low fall risk  -AM other (see comments)   Score 24/28 on[place]etti fall risk assessment or low fall risk.  -TW other (see comments)   Score 24/28 on [place] fall risk assessment or low fall risk  -TW    Progress/Outcome  (Gait Training Goal 2, PT) goal not met;discharged from facility  -AM continuing progress toward goal  -TW continuing progress toward goal  -TW    Row Name 03/10/18 1445 03/10/18 1300          Bed Mobility Goal 1 (PT)    Activity/Assistive Device (Bed Mobility Goal 1, PT) bed mobility activities, all  -TW bed mobility activities, all  -TW     [place] Level/Cues Needed (Bed Mobility Goal 1, PT) conditional independence  -TW conditional independence  -TW     Time Frame (Bed Mobility Goal 1, PT) 2 days;3 days  -TW 2 days;3 days  -TW     Barriers (Bed Mobility Goal 1, PT) HOB flat with no handrails  -TW HOB flat with no handrails  -TW     Progress/Outcomes (Bed Mobility Goal 1, PT) goal ongoing  -TW goal ongoing  -TW        Transfer Goal 1 (PT)    Activity/Assistive Device (Transfer Goal 1, PT) sit-to-stand/stand-to-sit;bed-to-chair/chair-to-bed;walker, rolling  -TW sit-to-stand/stand-to-sit;bed-to-chair/chair-to-bed;walker, rolling  -TW     Time Frame (Transfer Goal 1, PT) 2 - 3 days  -TW 2 - 3 days  -TW     Progress/Outcome (Transfer Goal 1, PT) goal ongoing  -TW goal ongoing  -TW        Gait Training Goal 1 (PT)    Activity/Assistive Device (Gait Training Goal 1, PT) gait (walking locomotion);walker, rolling  -TW gait (walking locomotion);walker, rolling  -TW     [place] Level (Gait Training Goal 1, PT) conditional independence  -TW conditional independence  -TW     Distance (Gait Goal 1, PT) 200'  -'  -TW     Time Frame (Gait Training Goal 1, PT) by discharge  -TW by discharge  -TW     Progress/Outcome (Gait Training Goal 1, PT) goal ongoing  -TW goal ongoing  -TW        Gait Training Goal 2 (PT)    Activity/Assistive Device (Gait Training Goal 2, PT) other (see comments)   Score 24/28 on [place] fall risk assessment or low fall risk  -TW other (see comments)   Score 24/28 on [place] fall risk assessment or low fall risk  -TW     Progress/Outcome (Gait Training Goal 2, PT) goal ongoing  -TW goal  ongoing  -TW       User Key  (r) = Recorded By, (t) = Taken By, (c) = Cosigned By    Initials Name Provider Type    AM Osorio Ding PTA Physical Therapy Assistant    TW Leonid Maradiaga PTA Physical Therapy Assistant              PT Discharge Summary  Anticipated Discharge Disposition (PT): home with home health care  Reason for Discharge: Discharge from facility, Per MD order  Outcomes Achieved: Unable to make functional progress toward goals at this time  Discharge Destination: Home with home health      Osorio Ding PTA   3/14/2018

## 2018-03-14 NOTE — PLAN OF CARE
Problem: Fall Risk (Adult)  Goal: Identify Related Risk Factors and Signs and Symptoms  Outcome: Ongoing (interventions implemented as appropriate)    Goal: Absence of Fall  Outcome: Ongoing (interventions implemented as appropriate)      Problem: Patient Care Overview  Goal: Plan of Care Review  Outcome: Ongoing (interventions implemented as appropriate)   03/14/18 0415   Coping/Psychosocial   Plan of Care Reviewed With patient   Plan of Care Review   Progress no change   OTHER   Outcome Summary Has ran low grade temp; WBC 10.82 on 3/12/18     Goal: Individualization and Mutuality  Outcome: Ongoing (interventions implemented as appropriate)    Goal: Discharge Needs Assessment  Outcome: Ongoing (interventions implemented as appropriate)      Problem: Pain, Acute (Adult)  Goal: Identify Related Risk Factors and Signs and Symptoms  Outcome: Ongoing (interventions implemented as appropriate)    Goal: Acceptable Pain Control/Comfort Level  Outcome: Ongoing (interventions implemented as appropriate)      Problem: Skin and Soft Tissue Infection (Adult)  Goal: Signs and Symptoms of Listed Potential Problems Will be Absent, Minimized or Managed (Skin and Soft Tissue Infection)  Outcome: Ongoing (interventions implemented as appropriate)

## 2018-03-19 NOTE — PROGRESS NOTES
Adult Outpatient Nutrition  Assessment    Patient Name:  Johanne Mayer  YOB: 1950  MRN: [id number]    Assessment Date:  3/19/2018    Comments: Follow-up call to check nutrition since dismissed from hospital. Daughter confirmed that pt went home without TEN. Consuming small frequent feedings + Ensure supplements. Daughter very pleased with pt's nutrition progress--eating really well. Pt coming to [facility] Wed. RD off--samples of Ensure Enlive and discount coupons ready for .        Electronically signed by:  Bianca Mendoza RD  03/19/18 3:34 PM

## 2018-03-21 NOTE — PROGRESS NOTES
DATE OF VISIT: 3/21/2018    REASON FOR VISIT:  Metastatic lobular carcinoma of left breast with a bone metastasis.    HISTORY OF PRESENT ILLNESS:    67-year-old female with a past medical history significant for left breast cancer with diffuse skeletal metastasis diagnosed in 2014, currently on letrozole and Palbociclib with Xgeva is here for follow-up visit today.  In May of 2017 in view of disease progression on CT scan patient was changed over to Palbociclib with letrozole.  She finished her eighth cycle of Palbociclib with letrozole on February 10, 2018.  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  Since then patient was seen by Dr. Suazo and ERCP was attempted without any success.  Subsequently patient was referred to Dr. Ware and had a surgical procedure done on February 28, 2018 with Laparotomy with gastrojejunostomy, gastrostomy placement, Ru en Y hepaticojejunostomy, jejunostomy and liver biopsy.  Patient also has a stent placement by Dr. Griffith on the same day.  Patient is here to discuss the result of biopsy and further recommendation.  Still complains of generalized weakness since surgery.  Complains of abdominal discomfort on left side where still has tubes.  Denies any bleeding.  Complains of anxiety and inability to sleep.      PAST MEDICAL HISTORY:    Past Medical History:   Diagnosis Date   • Atrial fibrillation    • Carcinoma, female breast, infiltrating lobular     LEFT side with axillary lymph node metastases      • Essential hypertension    • GERD (gastroesophageal reflux disease)    • Heartburn    • History of echocardiogram 10/13/2011   • Localized enlarged lymph nodes    • Lump of breast, left    • Lymphadenopathy      LEFT axilla-this is confirmed by my personal review of the  ultrasound      • Osteoarthritis        SOCIAL HISTORY:    Social History   Substance Use Topics   • Smoking status: Never Smoker   • Smokeless tobacco: Never Used   • Alcohol use No       Surgical History :  Past Surgical History:   Procedure Laterality Date   • APPENDECTOMY     • BREAST SURGERY  11/21/2014    Ultrasound directed mammotome biopsy of the left breast with clip placement, lesion at the 5:30 position 5 cm. from the nipple.Left axillary lymph node biopsy, open.   • CHOLECYSTECTOMY     • COLON RESECTION N/A 2/28/2018    Procedure: Laparotomy with gastrojejunostomy, gastrostomy placement, Ru en Y hepaticojejunostomy, jejunostomy.    LIVER BIOPSY (DR. WARE FIRST);  Surgeon: Yevgeniy Ware MD;  Location: [facility] OR;  Service:    • COLONOSCOPY  12/12/2011    Mild diverticulosis in sigmoid colon. Stool collected for study. Hemorrhoids found.   • COLONOSCOPY N/A 2/7/2017    Procedure: COLONOSCOPY;  Surgeon: Yevgeniy Ware MD;  Location: [facility] ENDOSCOPY;  Service:    • CYSTOSCOPY, RETROGRADE PYELOGRAM AND STENT INSERTION Right 2/28/2018    Procedure: CYSTOSCOPY RETROGRADE PYELOGRAM AND STENT INSERTION         (C-ARM)      ;  Surgeon: Yevgeniy Griffith MD;  Location: [facility] OR;  Service:    • ERCP N/A 2/19/2018    Procedure: ENDOSCOPIC RETROGRADE CHOLANGIOPANCREATOGRAPHY;  Surgeon: Cecil Lemus DO;  Location: [facility] ENDOSCOPY;  Service:    • INJECTION OF MEDICATION  08/12/2013    Kenalog (19)      • TONSILLECTOMY     • TUBAL ABDOMINAL LIGATION     • UPPER GASTROINTESTINAL ENDOSCOPY  06/10/2014    Normal hypopharynx, esophagus, symmetrical & patent pylorus. Hiatus hernia in GE junction. Polyps in antrum & fundus. Multiple biopsies taken. Prominant CBD in medial duodenal wall. The ampulla has a normal appearance.       ALLERGIES:    Allergies   Allergen Reactions   • Tape Itching   • Eggs Or Egg-Derived Products Diarrhea and Nausea And Vomiting   • Influenza Vaccines Other (See  "Comments)     ALLERGY TO EGGS   • Lortab [Hydrocodone-Acetaminophen] Hallucinations   • Penicillins      \"PASS OUT\"         FAMILY HISTORY:  Family History   Problem Relation Age of Onset   • Heart failure Mother      congested   • Lung cancer Father    • Heart attack Other    • Lung cancer Other    • Colon cancer Neg Hx    • Stomach cancer Neg Hx          REVIEW OF SYSTEMS:      CONSTITUTIONAL: Positive for fatigue.Complains of anxiety.  Complains of inability to sleep.  No fever, chills, or night sweats.     HEENT:  No epistaxis, mouth sores, or difficulty swallowing.    RESPIRATORY:  No new shortness of breath or cough at present.    CARDIOVASCULAR:  No chest pain or palpitations.    GASTROINTESTINAL:   Complains of abdominal soreness and discomfort.  Complains of intermittent nausea without vomiting.  Denies any abdominal pain.    GENITOURINARY:  No dysuria or hematuria.    MUSCULOSKELETAL:  Complains of bilateral hip pain and stiffness.  Complains of bilateral shoulder stiffness.    NEUROLOGICAL:  No tingling or numbness. No new headache or dizziness.     LYMPHATICS:  Denies any abnormal swollen and anywhere in the body.    SKIN:  Complains of skin nodule that has not progressed since starting chemotherapy.  Complains of easy bruising on bilateral upper extremity.        PHYSICAL EXAMINATION:      VITAL SIGNS:    /76   Pulse 73   Temp 99.5 °F (37.5 °C) (Temporal Artery )   Resp 20   Ht 157.5 cm (62.01\")   Wt 73.3 kg (161 lb 9.6 oz)   LMP  (LMP Unknown)   SpO2 99%   BMI 29.55 kg/m²      ECOG performance status: 3    GENERAL:  Not in any distress.     EYES:  Mild pallor. No icterus.  Extraocular movement intact.    NECK:  No adenopathy.  No JVD.    RESPIRATORY:  Fair air entry bilaterally. No rhonchi or wheezing.    CARDIOVASCULAR:  S1, S2. Regular rate and rhythm.  Systolic murmur present.    ABDOMEN:  Soft, nontender. Bowel sounds present.  No organomegaly appreciated.    EXTREMITIES:  No edema.  " No calf  Tenderness.  Bruising present in bilateral upper extremity.    NEUROLOGICAL:  Alert, awake, oriented x3. No motor or sensory deficits.  Cranial nerves II-12 grossly intact.        DIAGNOSTIC DATA:    Glucose   Date Value Ref Range Status   03/21/2018 105 (H) 60 - 100 mg/dL Final     Glucose, Arterial   Date Value Ref Range Status   02/28/2018 106 mmol/L Final     Sodium   Date Value Ref Range Status   03/21/2018 134 (L) 137 - 145 mmol/L Final     Potassium   Date Value Ref Range Status   03/21/2018 3.5 3.5 - 5.1 mmol/L Final     CO2   Date Value Ref Range Status   03/21/2018 23.0 22.0 - 31.0 mmol/L Final     Chloride   Date Value Ref Range Status   03/21/2018 98 95 - 110 mmol/L Final     Anion Gap   Date Value Ref Range Status   03/21/2018 13.0 5.0 - 15.0 mmol/L Final     Creatinine   Date Value Ref Range Status   03/21/2018 0.90 0.50 - 1.00 mg/dL Final     BUN   Date Value Ref Range Status   03/21/2018 16 7 - 21 mg/dL Final     BUN/Creatinine Ratio   Date Value Ref Range Status   03/21/2018 17.8 7.0 - 25.0 Final     Calcium   Date Value Ref Range Status   03/21/2018 8.5 8.4 - 10.2 mg/dL Final     eGFR Non  Amer   Date Value Ref Range Status   03/21/2018 62 45 - 104 mL/min/1.73 Final     Alkaline Phosphatase   Date Value Ref Range Status   03/21/2018 117 38 - 126 U/L Final     Total Protein   Date Value Ref Range Status   03/21/2018 7.5 6.3 - 8.6 g/dL Final     ALT (SGPT)   Date Value Ref Range Status   03/21/2018 94 (H) 9 - 52 U/L Final     AST (SGOT)   Date Value Ref Range Status   03/21/2018 171 (H) 14 - 36 U/L Final     Total Bilirubin   Date Value Ref Range Status   03/21/2018 0.4 0.2 - 1.3 mg/dL Final     Albumin   Date Value Ref Range Status   03/21/2018 3.20 (L) 3.40 - 4.80 g/dL Final     Globulin   Date Value Ref Range Status   03/21/2018 4.3 (H) 2.3 - 3.5 gm/dL Final     A/G Ratio   Date Value Ref Range Status   03/21/2018 0.7 (L) 1.1 - 1.8 g/dL Final     Lab Results   Component Value Date     WBC 7.57 03/21/2018    HGB 9.0 (L) 03/21/2018    HCT 27.3 (L) 03/21/2018    MCV 88.1 03/21/2018     03/21/2018     Lab Results   Component Value Date    NEUTROABS 5.55 03/21/2018    FERRITIN 226.00 02/09/2017    IHHVMUDI32 625 02/09/2017     Lab Results   Component Value Date     185.9 (H) 02/07/2018    LABCA2 337.4 (H) 02/07/2018     43 (H) 02/16/2018    CEA 10.10 (H) 02/16/2018     Component      Latest Ref Rng & Units 11/30/2017 12/4/2017 2/7/2018   CA 15-3      0.0 - 25.0 U/mL 179.4 (H) 187.1 (H) 185.9 (H)       Component      Latest Ref Rng & Units 9/21/2017 11/30/2017 12/4/2017 2/7/2018   CA 27.29      0.0 - 38.6 U/mL 277.3 (H) 327.9 (H) 320.6 (H) 337.4 (H)             RADIOLOGY DATA :    CT of chest, abdomen and pelvis with contrast done on September 8, 2017 showed:  IMPRESSION:  CONCLUSION:    1. Widespread osteosclerotic metastatic disease.  2. No changes visualized from the prior study.         Nuclear bone scan done on April 21, 2017 showed:  FINDINGS:      The uptake and distribution of radiotracer activity demonstrates:         Interval progression of disease demonstrating relative intensity  in previously noted sites of abnormal activity, additionally with  new sites in both the axial and appendicular skeleton.      The kidneys are visualized bilaterally.   .   IMPRESSION:  CONCLUSION:      1. Interval progression of disease.      PATHOLOGY DATA:  Pathology report from February 28, 2018 showed:  Final Diagnosis   LIVER, WEDGE BIOPSY:  METASTATIC MAMMARY CARCINOMA, INVASIVE LOBULAR TYPE.   Electronically signed by Jatin Calvo MD on 3/6/2018 at 1448   Synoptic Checklist   BREAST: Biomarker Reporting Template   Breast Bmk - All Specimens   TEST(S) PERFORMED   Test(s) Performed  Estrogen Receptor (ER) Status   ER Results  Positive   Percentage of Cells with Nuclear Positivity  81-90%   Average Intensity of Staining  Moderate   Test Type (required for [place]based laboratories)   Laboratory-developed test   Primary Antibody  6F11   Test(s) Performed  Progesterone Receptor (PgR) Status   PgR Results  Positive   Percentage of Cells with Nuclear Positivity  %   Average Intensity of Staining  Strong   Test Type (required for [place]based laboratories)  Laboratory-developed test   Primary Antibody  16   Test(s) Performed  HER2 by Immunohistochemistry (IHC)   HER2 IHC Results  Equivocal (Score 2+)   Percentage of Cells with Uniform Intense Complete Membrane Staining  5   Test Type (required for [place]based laboratories)  Laboratory-developed test   Primary Antibody  SP3   METHODS   Cold Ischemia and Fixation Times  Meet requirements specified in latest version of the ASCO / CAP Guidelines   Fixative  Formalin   Comment(s)   Comment(s)  Specimen will be sent for HER2 by FISH; this will be reported separately   .      Gross Description See result below    The specimen consists of a wedge biopsy of liver measuring 1.1 x 0.6 x 0.4 cm.  A white nodule measures 0.5 cm in diameter.  Totally submitted without sectioning.   Special Stains See result below    Immunostains were positive in the original tumor for GCDFP-15 and Mammoglobin.  These were repeated to confirm mammary origin of the liver metastasis.     Immunostain for GCDFP-1 strongly marks virtually all tumor cells.  Immunostain for Mammoglobin strongly marks a minority of tumor cells.     Prognostic markers (ER, WA, HER2) ordered to guide future treatment of metastatic breast carcinoma.  See synoptic report for details.     HER2 by FISH ordered for equivocal HER2 result be IHC.             ASSESSMENT AND PLAN:      1.  Metastatic lobular carcinoma of left breast with a diffuse blastic lesion involving the spine and pelvis, ER/WA positive, HER-2/shelly negative diagnosed in December 2014.  Initially patient was started on Arimidex with Xgeva, in November 2015 patient had a restaging scan done which showed worsening of the metastases and patient  was changed to Aromasin along with Xgeva.  In February 2016 in view of worsening metastasis patient was started on Faslodex with Xgeva.ReStaging  Work up done in October 2016 showed worsening of metastatic disease, so patient was put on tamoxifen along with Xgeva In view of worsening bone metastasis based on the bone scan done in April 2017 as well as restaging of CT of chest abdomen and pelvis done on May 4, 2017.  Patient has received total 8 cycles of Palbociclib a FROM May 2017 until February 10, 2018.  In view of CT scan showing possible antral mass with dilated intrahepatic biliary duct,Patient was referred to Dr. Lemus and endoscopy with ERCP was attempted on February 19, 2018.  Due to large mass it was not successful.  Subsequently patient was seen by Dr. Ware and had   2.  Pancytopenia: Most likely secondary to Palbociclib.  Patient was instructed to come to the emergency room she starts having any bleeding or any fever.  We will monitored with CBC for now.    3.  Hot flashes: Patient is enough Effexor with her for now    4.  Bone metastases: Continue with Xgeva for now.  Patient was again explained about possible side effects including jaw pain and osteonecrosis of jaw.  We'll hold Xgeva in view of elevated creatinine done last week.    5.  Acute kidney injury: Patient was found to have acute kidney injury with creatinine of 1.30 last week.  Patient complains of multiple episodes of nausea and vomiting.  We will give her 1 L of normal saline today.    6.  Dilated intrahepatic bile duct: CT scan done on February 7, 2018 shows dilated intrahepatic bile duct with possible antral mass.  This abnormality on CT scan were discussed with patient and her family.  We are ordering MRCP for tomorrow after that referral has been placed for Dr. Lemus.  Case was also discussed with Dr. Lemus.    7.  Dilated calyceal system of the right renal pelvis: There is no etiology on CT scan, we will refer her to urology  team for further evaluation.    8.  Constipation: Currently taking Benefiber, stool softener and laxative.  Continue with same for now.    9.  Prescriptions: Prescription for Percocet 10/325 with 120 tablet has been provided on January 8, 2018.  Prescription for  MiraLAX was sent to her pharmacy  on October 26, 2017.    10.  Health maintenance: Patient does not smoke.  She is not a candidate for further screening colonoscopy in view of metastatic breast cancer.  She remains full code.       Joey Ramos MD  3/21/2018  4:38 PM  DATE OF VISIT: 3/21/2018    REASON FOR VISIT:  Metastatic lobular carcinoma of left breast with a bone metastasis.    HISTORY OF PRESENT ILLNESS:    67-year-old female with a past medical history significant for left breast cancer with diffuse skeletal metastasis diagnosed in 2014, currently on letrozole and Palbociclib with Xgeva is here for follow-up visit today.  In May of 2017 in view of disease progression on CT scan patient was changed over to Palbociclib with letrozole.  She finished her eighth cycle of Palbociclib with letrozole on February 10, 2018.  Patient had a restaging CT of chest, abdomen and pelvis with contrast done on February 7, 2018, she is here to discuss the result of CT scan and further recommendation.  Complains of 10 pound weight loss in last 1 month.  Complains of worsening nausea and vomiting especially after eating.  Complains of constipation.  Complains of chronic stiffness and pain involving back and hip joint.  Denies any new enlarged lymph nodes anywhere in the body.        PAST MEDICAL HISTORY:    Past Medical History:   Diagnosis Date   • Atrial fibrillation    • Carcinoma, female breast, infiltrating lobular     LEFT side with axillary lymph node metastases      • Essential hypertension    • GERD (gastroesophageal reflux disease)    • Heartburn    • History of echocardiogram 10/13/2011   • Localized enlarged lymph nodes    • Lump of breast, left    •  Lymphadenopathy      LEFT axilla-this is confirmed by my personal review of the ultrasound      • Osteoarthritis        SOCIAL HISTORY:    Social History   Substance Use Topics   • Smoking status: Never Smoker   • Smokeless tobacco: Never Used   • Alcohol use No       Surgical History :  Past Surgical History:   Procedure Laterality Date   • APPENDECTOMY     • BREAST SURGERY  11/21/2014    Ultrasound directed mammotome biopsy of the left breast with clip placement, lesion at the 5:30 position 5 cm. from the nipple.Left axillary lymph node biopsy, open.   • CHOLECYSTECTOMY     • COLON RESECTION N/A 2/28/2018    Procedure: Laparotomy with gastrojejunostomy, gastrostomy placement, Ru en Y hepaticojejunostomy, jejunostomy.    LIVER BIOPSY (DR. BETHANY GALVEZ);  Surgeon: Yevgeniy Ware MD;  Location: [facility] OR;  Service:    • COLONOSCOPY  12/12/2011    Mild diverticulosis in sigmoid colon. Stool collected for study. Hemorrhoids found.   • COLONOSCOPY N/A 2/7/2017    Procedure: COLONOSCOPY;  Surgeon: Yevgeniy Ware MD;  Location: [facility] ENDOSCOPY;  Service:    • CYSTOSCOPY, RETROGRADE PYELOGRAM AND STENT INSERTION Right 2/28/2018    Procedure: CYSTOSCOPY RETROGRADE PYELOGRAM AND STENT INSERTION         (C-ARM)      ;  Surgeon: Yevgeniy Griffith MD;  Location: [facility] OR;  Service:    • ERCP N/A 2/19/2018    Procedure: ENDOSCOPIC RETROGRADE CHOLANGIOPANCREATOGRAPHY;  Surgeon: Cecil Lemus DO;  Location: [facility] ENDOSCOPY;  Service:    • INJECTION OF MEDICATION  08/12/2013    Kenalog (19)      • TONSILLECTOMY     • TUBAL ABDOMINAL LIGATION     • UPPER GASTROINTESTINAL ENDOSCOPY  06/10/2014    Normal hypopharynx, esophagus, symmetrical & patent pylorus. Hiatus hernia in GE junction. Polyps in antrum & fundus. Multiple biopsies taken. Prominant CBD in medial duodenal wall. The ampulla has a normal appearance.       ALLERGIES:    Allergies   Allergen Reactions   • Tape Itching   • Eggs Or Egg-Derived  "Products Diarrhea and Nausea And Vomiting   • Influenza Vaccines Other (See Comments)     ALLERGY TO EGGS   • Lortab [Hydrocodone-Acetaminophen] Hallucinations   • Penicillins      \"PASS OUT\"         FAMILY HISTORY:  Family History   Problem Relation Age of Onset   • Heart failure Mother      congested   • Lung cancer Father    • Heart attack Other    • Lung cancer Other    • Colon cancer Neg Hx    • Stomach cancer Neg Hx          REVIEW OF SYSTEMS:      CONSTITUTIONAL: Positive for fatigue.  Positive for hot flashes.Complains of 10 pound weight loss in last 1 month.  No fever, chills, or night sweats.     HEENT:  No epistaxis, mouth sores, or difficulty swallowing.    RESPIRATORY:  No new shortness of breath or cough at present.    CARDIOVASCULAR:  No chest pain or palpitations.    GASTROINTESTINAL:   Complains of nausea and vomiting that has been ongoing for last 3 weeks.  Complains of constipation.  Denies any abdominal pain.    GENITOURINARY:  No dysuria or hematuria.    MUSCULOSKELETAL:  Complains of bilateral hip pain and stiffness.  Complains of bilateral shoulder stiffness.    NEUROLOGICAL:  No tingling or numbness. No new headache or dizziness.     LYMPHATICS:  Denies any abnormal swollen and anywhere in the body.    SKIN:  Complains of skin nodule that has not progressed since starting chemotherapy.  Complains of easy bruising on bilateral upper extremity.        PHYSICAL EXAMINATION:      VITAL SIGNS:    /76   Pulse 73   Temp 99.5 °F (37.5 °C) (Temporal Artery )   Resp 20   Ht 157.5 cm (62.01\")   Wt 73.3 kg (161 lb 9.6 oz)   LMP  (LMP Unknown)   SpO2 99%   BMI 29.55 kg/m²     GENERAL:  Not in any distress.     EYES:  Mild pallor. No icterus.  Extraocular movement intact.    NECK:  No adenopathy.  No JVD.    RESPIRATORY:  Fair air entry bilaterally. No rhonchi or wheezing.    CARDIOVASCULAR:  S1, S2. Regular rate and rhythm.  Systolic murmur present.    ABDOMEN:  Soft, nontender. Bowel sounds " present.  No organomegaly appreciated.    EXTREMITIES:  No edema.  No calf  Tenderness.  Bruising present in bilateral upper extremity.    NEUROLOGICAL:  Alert, awake, oriented x3. No motor or sensory deficits.  Cranial nerves II-12 grossly intact.        DIAGNOSTIC DATA:    Glucose   Date Value Ref Range Status   03/21/2018 105 (H) 60 - 100 mg/dL Final     Glucose, Arterial   Date Value Ref Range Status   02/28/2018 106 mmol/L Final     Sodium   Date Value Ref Range Status   03/21/2018 134 (L) 137 - 145 mmol/L Final     Potassium   Date Value Ref Range Status   03/21/2018 3.5 3.5 - 5.1 mmol/L Final     CO2   Date Value Ref Range Status   03/21/2018 23.0 22.0 - 31.0 mmol/L Final     Chloride   Date Value Ref Range Status   03/21/2018 98 95 - 110 mmol/L Final     Anion Gap   Date Value Ref Range Status   03/21/2018 13.0 5.0 - 15.0 mmol/L Final     Creatinine   Date Value Ref Range Status   03/21/2018 0.90 0.50 - 1.00 mg/dL Final     BUN   Date Value Ref Range Status   03/21/2018 16 7 - 21 mg/dL Final     BUN/Creatinine Ratio   Date Value Ref Range Status   03/21/2018 17.8 7.0 - 25.0 Final     Calcium   Date Value Ref Range Status   03/21/2018 8.5 8.4 - 10.2 mg/dL Final     eGFR Non  Amer   Date Value Ref Range Status   03/21/2018 62 45 - 104 mL/min/1.73 Final     Alkaline Phosphatase   Date Value Ref Range Status   03/21/2018 117 38 - 126 U/L Final     Total Protein   Date Value Ref Range Status   03/21/2018 7.5 6.3 - 8.6 g/dL Final     ALT (SGPT)   Date Value Ref Range Status   03/21/2018 94 (H) 9 - 52 U/L Final     AST (SGOT)   Date Value Ref Range Status   03/21/2018 171 (H) 14 - 36 U/L Final     Total Bilirubin   Date Value Ref Range Status   03/21/2018 0.4 0.2 - 1.3 mg/dL Final     Albumin   Date Value Ref Range Status   03/21/2018 3.20 (L) 3.40 - 4.80 g/dL Final     Globulin   Date Value Ref Range Status   03/21/2018 4.3 (H) 2.3 - 3.5 gm/dL Final     A/G Ratio   Date Value Ref Range Status   03/21/2018  0.7 (L) 1.1 - 1.8 g/dL Final     Lab Results   Component Value Date    WBC 7.57 03/21/2018    HGB 9.0 (L) 03/21/2018    HCT 27.3 (L) 03/21/2018    MCV 88.1 03/21/2018     03/21/2018     Lab Results   Component Value Date    NEUTROABS 5.55 03/21/2018    FERRITIN 226.00 02/09/2017    [id number] 625 02/09/2017     Lab Results   Component Value Date     185.9 (H) 02/07/2018    LABCA2 337.4 (H) 02/07/2018     43 (H) 02/16/2018    CEA 10.10 (H) 02/16/2018     Component      Latest Ref Rng & Units 11/30/2017 12/4/2017 2/7/2018   CA 15-3      0.0 - 25.0 U/mL 179.4 (H) 187.1 (H) 185.9 (H)       Component      Latest Ref Rng & Units 9/21/2017 11/30/2017 12/4/2017 2/7/2018   CA 27.29      0.0 - 38.6 U/mL 277.3 (H) 327.9 (H) 320.6 (H) 337.4 (H)             RADIOLOGY DATA :    CT of chest, abdomen and pelvis with contrast done on September 8, 2017 showed:  IMPRESSION:  CONCLUSION:    1. Widespread osteosclerotic metastatic disease.  2. No changes visualized from the prior study.         Nuclear bone scan done on April 21, 2017 showed:  FINDINGS:      The uptake and distribution of radiotracer activity demonstrates:         Interval progression of disease demonstrating relative intensity  in previously noted sites of abnormal activity, additionally with  new sites in both the axial and appendicular skeleton.      The kidneys are visualized bilaterally.   .   IMPRESSION:  CONCLUSION:      1. Interval progression of disease.              ASSESSMENT AND PLAN:      1.  Metastatic lobular carcinoma of left breast with a diffuse blastic lesion involving the spine and pelvis, ER/RI positive, HER-2/shelly negative diagnosed in December 2014.  Initially patient was started on Arimidex with Xgeva, in November 2015 patient had a restaging scan done which showed worsening of the metastases and patient was changed to Aromasin along with Xgeva.  In February 2016 in view of worsening metastasis patient was started on Faslodex with  Xgeva.ReStaging  Work up done in October 2016 showed worsening of metastatic disease, so patient was put on tamoxifen along with Xgeva In view of worsening bone metastasis based on the bone scan done in April 2017 as well as restaging of CT of chest abdomen and pelvis done on May 4, 2017.  Patient has received total 8 cycles of Palbociclib a FROM May 2017 until February 10, 2018.  In view of CT scan showing possible antral mass with dilated intrahepatic biliary duct, patient was seen by Dr. Suazo and ERCP was attempted on February 19, 2018 without success.  Subsequently patient was referred to Dr. Ware and had a surgery on February 20, 2018 withLaparotomy with gastrojejunostomy, gastrostomy placement, Ru en Y hepaticojejunostomy, jejunostomy and liver biopsy.  Liver biopsy did show metastatic invasive lobular carcinoma which was discussed with patient and her family.  Currently patient is recovering from surgery and performance status is around 3.  Since patient has exhausted all hormonal therapy option, her treatment option remains intravenous chemotherapy consisting of Taxotere which was discussed with patient and her family.  We'll provide her information about Taxotere today.  We will reevaluate patient in about 3 weeks to see if she is a candidate of chemotherapy or not.    2.  Anemia: Most likely secondary to most recent surgical loss.  Hemoglobin is stable around 9.  We will monitored with CBC for now.    3.  Hot flashes: Patient is enough Effexor with her for now    4.  Bone metastases: In view of patient not ambulating around, we will hold Xgeva today.  We will resume Xgeva upon next clinic visit in 3 weeks which was discussed with patient.    5.  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 Griffith on February 28, 2018    6.  Prescriptions: Prescription for Percocet 10/325 with 120 tablet has been provided today on March 21, 2018.  In view of  insomnia, we will provide her prescription of Ambien 5 mg by mouth daily at bedtime when necessary with 20 tablets today on March 21, 2018    7.  Health maintenance: Patient does not smoke.  She is not a candidate for further screening colonoscopy in view of metastatic breast cancer.  She remains full code.       Joey Ramos MD  3/21/2018  4:41 PM

## 2018-03-21 NOTE — PATIENT INSTRUCTIONS
Docetaxel injection  What is this medicine?  DOCETAXEL (mendez se TAX el) is a chemotherapy drug. It targets fast dividing cells, like cancer cells, and causes these cells to die. This medicine is used to treat many types of cancers like breast cancer, certain stomach cancers, head and neck cancer, lung cancer, and prostate cancer.  This medicine may be used for other purposes; ask your health care provider or pharmacist if you have questions.  COMMON BRAND NAME(S): Jurgenpatlindsey Taxotermaira  What should I tell my health care provider before I take this medicine?  They need to know if you have any of these conditions:  -infection (especially a virus infection such as chickenpox, cold sores, or herpes)  -liver disease  -low blood counts, like low white cell, platelet, or red cell counts  -an unusual or allergic reaction to docetaxel, polysorbate 80, other chemotherapy agents, other medicines, foods, dyes, or preservatives  -pregnant or trying to get pregnant  -breast-feeding  How should I use this medicine?  This drug is given as an infusion into a vein. It is administered in a hospital or clinic by a specially trained health care professional.  Talk to your pediatrician regarding the use of this medicine in children. Special care may be needed.  Overdosage: If you think you have taken too much of this medicine contact a poison control center or emergency room at once.  NOTE: This medicine is only for you. Do not share this medicine with others.  What if I miss a dose?  It is important not to miss your dose. Call your doctor or health care professional if you are unable to keep an appointment.  What may interact with this medicine?  -cyclosporine  -erythromycin  -ketoconazole  -medicines to increase blood counts like filgrastim, pegfilgrastim, sargramostim  -vaccines  Talk to your doctor or health care professional before taking any of these medicines:  -acetaminophen  -aspirin  -ibuprofen  -ketoprofen  -naproxen  This list  may not describe all possible interactions. Give your health care provider a list of all the medicines, herbs, non-prescription drugs, or dietary supplements you use. Also tell them if you smoke, drink alcohol, or use illegal drugs. Some items may interact with your medicine.  What should I watch for while using this medicine?  Your condition will be monitored carefully while you are receiving this medicine. You will need important blood work done while you are taking this medicine.  This drug may make you feel generally unwell. This is not uncommon, as chemotherapy can affect healthy cells as well as cancer cells. Report any side effects. Continue your course of treatment even though you feel ill unless your doctor tells you to stop.  In some cases, you may be given additional medicines to help with side effects. Follow all directions for their use.  Call your doctor or health care professional for advice if you get a fever, chills or sore throat, or other symptoms of a cold or flu. Do not treat yourself. This drug decreases your body's ability to fight infections. Try to avoid being around people who are sick.  This medicine may increase your risk to bruise or bleed. Call your doctor or health care professional if you notice any unusual bleeding.  This medicine may contain alcohol in the product. You may get drowsy or dizzy. Do not drive, use machinery, or do anything that needs mental alertness until you know how this medicine affects you. Do not stand or sit up quickly, especially if you are an older patient. This reduces the risk of dizzy or fainting spells. Avoid alcoholic drinks.  Do not become pregnant while taking this medicine. Women should inform their doctor if they wish to become pregnant or think they might be pregnant. There is a potential for serious side effects to an unborn child. Talk to your health care professional or pharmacist for more information. Do not breast-feed an infant while taking  this medicine.  What side effects may I notice from receiving this medicine?  Side effects that you should report to your doctor or health care professional as soon as possible:  -allergic reactions like skin rash, itching or hives, swelling of the face, lips, or tongue  -low blood counts - This drug may decrease the number of white blood cells, red blood cells and platelets. You may be at increased risk for infections and bleeding.  -signs of infection - fever or chills, cough, sore throat, pain or difficulty passing urine  -signs of decreased platelets or bleeding - bruising, pinpoint red spots on the skin, black, tarry stools, nosebleeds  -signs of decreased red blood cells - unusually weak or tired, fainting spells, lightheadedness  -breathing problems  -fast or irregular heartbeat  -low blood pressure  -mouth sores  -nausea and vomiting  -pain, swelling, redness or irritation at the injection site  -pain, tingling, numbness in the hands or feet  -swelling of the ankle, feet, hands  -weight gain  Side effects that usually do not require medical attention (report to your doctor or health care professional if they continue or are bothersome):  -bone pain  -complete hair loss including hair on your head, underarms, pubic hair, eyebrows, and eyelashes  -diarrhea  -excessive tearing  -changes in the color of fingernails  -loosening of the fingernails  -nausea  -muscle pain  -red flush to skin  -sweating  -weak or tired  This list may not describe all possible side effects. Call your doctor for medical advice about side effects. You may report side effects to FDA at 2-627-FDA-2928.  Where should I keep my medicine?  This drug is given in a hospital or clinic and will not be stored at home.  NOTE: This sheet is a summary. It may not cover all possible information. If you have questions about this medicine, talk to your doctor, pharmacist, or health care provider.  © 2018 [employer] (2017-01-19 12:32:56)

## 2018-03-21 NOTE — PROGRESS NOTES
Oncology Social Worker met with patient in the exam room subsequent to her follow up appointment.  LCSW advised by RN of patient distress related to change of treatment plan and recommendation of chemotherapy.   Pt is accompanied in the room by her two adult daughters who present attentive and supportive. Pt. Presents tearful as she shared her thoughts and feelings.  Pt. Describes feelings of worry and sadness that were validated as normal given her experiences.  Pt. Denies feeling hopeless and helpless and further denies any thoughts of self inflicted harm.  Pt. Was openly supported from a strengths perspective as she processed her feelings and treatment considerations.  Pt. States she has good support system and gains strength from her spiritual hamzah and family support.   Pt. Responded receptive to SW feedback and verbalized feeling better prior to departure. Undersigned offered ongoing support to pt and her family as she considers her care options.

## 2018-03-29 PROBLEM — N39.0 COMPLICATED URINARY TRACT INFECTION: Status: ACTIVE | Noted: 2018-01-01

## 2018-04-03 PROBLEM — N13.5 STRICTURE OR KINKING OF URETER: Status: ACTIVE | Noted: 2018-01-01

## 2018-04-14 NOTE — PROGRESS NOTES
CHIEF COMPLAINT:    Chief Complaint   Patient presents with   • Post-op Follow-up     Post-op Laparotomy with gastrojejunostomy, gastrostomy placement. Rou en Y, jejunostomy , liver bx.       HISTORY OF PRESENT ILLNESS:    Johanne Mayer is a 67 y.o. female who underwent Prior gastrostomy, gastrojejunostomy, Ru-en-Y hepaticojejunostomy, and liver biopsy for metastatic lobular carcinoma.  She is here today for follow-up.  Overall she is feeling well.  She is tolerating a by mouth diet.  She's having regular bowel function.    EXAM:  Vitals:    03/22/18 1502   BP: 146/82         Abdomen soft, tubes in place    ASSESSMENT:    Postoperative follow-up, doing well    PLAN:    Overall she is doing well.  She would like to have her tubes removed.  I've asked her to come back next week for this.  She has follow-up with medical oncology and urology as well.        This document has been electronically signed by Yevgeniy Ware MD on April 14, 2018 3:40 PM

## 2018-04-15 NOTE — PROGRESS NOTES
CHIEF COMPLAINT:    Chief Complaint   Patient presents with   • Follow-up     S/P Laparotomy with gastrojejunostomy, gastrostomy placement, Ru en Y hepaticojejunostomy, and liver biopsy on 2/28/18       HISTORY OF PRESENT ILLNESS:    Johanne Mayer is a 67 y.o. female who underwent Gastrojejunostomy, hepaticojejunostomy, gastrostomy and jejunostomy tube placement for obstruction due to metastatic lobular breast cancer.  She returns today for follow-up.  She had intended to have her feeding tubes removed today.  However, she reports near syncopal episodes at home with substantial fatigue and overall feeling poorly.  This just began earlier today.    EXAM:  Vitals:    03/29/18 1437   BP: 118/74         Abdomen soft, incision well healed, tubes in place    ASSESSMENT:    Surgical follow-up    PLAN:    She looks substantially and acutely worse today.  I have advised she and her daughters to report to the emergency department for further evaluation.        This document has been electronically signed by Yevgeniy Ware MD on April 15, 2018 3:17 PM

## 2018-04-16 PROBLEM — C50.919 METASTATIC BREAST CANCER: Status: ACTIVE | Noted: 2018-01-01

## 2018-04-16 PROBLEM — I95.9 HYPOTENSION: Status: ACTIVE | Noted: 2018-01-01

## 2018-04-16 PROBLEM — A41.9 SEPSIS: Status: ACTIVE | Noted: 2018-01-01

## 2018-04-16 PROBLEM — N39.0 UTI (URINARY TRACT INFECTION): Status: ACTIVE | Noted: 2018-01-01

## 2018-04-16 NOTE — PROGRESS NOTES
DATE OF VISIT: 4/16/2018    REASON FOR VISIT:  Metastatic lobular carcinoma of left breast with a bone metastasis.    HISTORY OF PRESENT ILLNESS:    67-year-old female with a past medical history significant for left breast cancer with diffuse skeletal metastasis diagnosed in 2014, currently on letrozole and Palbociclib with Xgeva is here for follow-up visit today.  In May of 2017 in view of disease progression on CT scan patient was changed over to Palbociclib with letrozole.  She finished her eighth cycle of Palbociclib with letrozole on February 10, 2018.  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  Since then patient was seen by Dr. Lemus and ERCP was attempted without any success.  Subsequently patient was referred to Dr. Ware and had a surgical procedure done on February 28, 2018 with Laparotomy with gastrojejunostomy, gastrostomy placement, Ru en Y hepaticojejunostomy, jejunostomy and liver biopsy.  Patient also has a stent placement by Dr. Griffith on the same day.  He was last seen here at clinic on March 21, 2018 to discuss the result of pathology report.  Since then patient was again admitted to the hospital on March 29, 2018 with intractable nausea and vomiting and was found to have urinary tract infection.  Patient is here for three-week follow-up appointment today.  Patient complains of dizziness complains of nausea and vomiting since removal of feeding tube last week by Dr. Ware.  Complains of poor appetite and has lost 10 pounds in last 3 weeks.  Complains of back pain and hip pain.  Denies any fevers.      PAST MEDICAL HISTORY:    Past Medical History:   Diagnosis Date   • Atrial fibrillation    • Carcinoma, female breast, infiltrating lobular     LEFT side with axillary lymph node  metastases      • Essential hypertension    • GERD (gastroesophageal reflux disease)    • Heartburn    • History of echocardiogram 10/13/2011   • History of transfusion    • Localized enlarged lymph nodes    • Lump of breast, left    • Lymphadenopathy      LEFT axilla-this is confirmed by my personal review of the ultrasound      • Osteoarthritis        SOCIAL HISTORY:    Social History   Substance Use Topics   • Smoking status: Never Smoker   • Smokeless tobacco: Never Used   • Alcohol use No       Surgical History :  Past Surgical History:   Procedure Laterality Date   • APPENDECTOMY     • BREAST SURGERY  11/21/2014    Ultrasound directed mammotome biopsy of the left breast with clip placement, lesion at the 5:30 position 5 cm. from the nipple.Left axillary lymph node biopsy, open.   • CHOLECYSTECTOMY     • COLON RESECTION N/A 2/28/2018    Procedure: Laparotomy with gastrojejunostomy, gastrostomy placement, Ru en Y hepaticojejunostomy, jejunostomy.    LIVER BIOPSY (DR. WARE FIRST);  Surgeon: Yevgeniy Ware MD;  Location: [facility] OR;  Service:    • COLONOSCOPY  12/12/2011    Mild diverticulosis in sigmoid colon. Stool collected for study. Hemorrhoids found.   • COLONOSCOPY N/A 2/7/2017    Procedure: COLONOSCOPY;  Surgeon: Yevgeniy Ware MD;  Location: [facility] ENDOSCOPY;  Service:    • CYSTOSCOPY, RETROGRADE PYELOGRAM AND STENT INSERTION Right 2/28/2018    Procedure: CYSTOSCOPY RETROGRADE PYELOGRAM AND STENT INSERTION         (C-ARM)      ;  Surgeon: Yevgeniy Griffith MD;  Location: [facility] OR;  Service:    • ERCP N/A 2/19/2018    Procedure: ENDOSCOPIC RETROGRADE CHOLANGIOPANCREATOGRAPHY;  Surgeon: Cecil Lemus DO;  Location: [facility] ENDOSCOPY;  Service:    • HYSTERECTOMY     • INJECTION OF MEDICATION  08/12/2013    Kenalog (19)      • TONSILLECTOMY     • TUBAL ABDOMINAL LIGATION     • UPPER GASTROINTESTINAL ENDOSCOPY  06/10/2014    Normal hypopharynx, esophagus, symmetrical & patent  "pylorus. Hiatus hernia in GE junction. Polyps in antrum & fundus. Multiple biopsies taken. Prominant CBD in medial duodenal wall. The ampulla has a normal appearance.       ALLERGIES:    Allergies   Allergen Reactions   • Tape Itching   • Eggs Or Egg-Derived Products Diarrhea and Nausea And Vomiting   • Influenza Vaccines Other (See Comments)     ALLERGY TO EGGS   • Lortab [Hydrocodone-Acetaminophen] Hallucinations   • Other Dermatitis     Plastic tape   • Penicillins      \"PASS OUT\"         FAMILY HISTORY:  Family History   Problem Relation Age of Onset   • Heart failure Mother      congested   • Lung cancer Father    • Heart attack Other    • Lung cancer Other    • Colon cancer Neg Hx    • Stomach cancer Neg Hx          REVIEW OF SYSTEMS:      CONSTITUTIONAL: Positive for fatigue.Complains of generalized weakness.  No fever, chills, or night sweats.     HEENT:  No epistaxis, mouth sores, or difficulty swallowing.    RESPIRATORY:  No new shortness of breath or cough at present.    CARDIOVASCULAR:  No chest pain or palpitations.    GASTROINTESTINAL:   Complains of abdominal soreness and discomfort.  Complains of persistent nausea with vomiting since last week.  Denies any diarrhea.    GENITOURINARY:  No dysuria or hematuria.    MUSCULOSKELETAL:  Complains of bilateral hip pain and stiffness.  Complains of bilateral shoulder stiffness.    NEUROLOGICAL:  Complains of dizziness.No tingling or numbness. No new headache .     LYMPHATICS:  Denies any abnormal swollen and anywhere in the body.    SKIN:  No new skin lesions.        PHYSICAL EXAMINATION:      VITAL SIGNS:    BP (!) 72/64   Pulse 56   Temp 96.4 °F (35.8 °C) (Temporal Artery )   Resp 20   Ht 157.5 cm (62.01\")   Wt 68.5 kg (151 lb)   LMP  (LMP Unknown)   BMI 27.61 kg/m²      ECOG performance status: 3    GENERAL:  Not in any distress. Appears pale and uncomfortable.    EYES:  Mild pallor. No icterus.  Extraocular movement intact.    NECK:  No adenopathy. "  No JVD.    RESPIRATORY:  Fair air entry bilaterally. No rhonchi or wheezing.    CARDIOVASCULAR:  S1, S2. Regular rate and rhythm.  Systolic murmur present.    ABDOMEN:  Soft, nontender. Bowel sounds present.  No organomegaly appreciated.    EXTREMITIES:  No edema.  No calf  Tenderness.  Bruising present in bilateral upper extremity.    NEUROLOGICAL:  Alert, awake, oriented x3. No motor.  Cranial nerves II-12 grossly intact.    SKIN: No new skin lesions.    LYMPHATICS: No new enlarged lymph nodes in neck or supraclavicular area.          DIAGNOSTIC DATA:    Glucose   Date Value Ref Range Status   04/16/2018 143 (H) 60 - 100 mg/dL Final     Glucose, Arterial   Date Value Ref Range Status   02/28/2018 106 mmol/L Final     Sodium   Date Value Ref Range Status   04/16/2018 131 (L) 137 - 145 mmol/L Final     Potassium   Date Value Ref Range Status   04/16/2018 3.2 (L) 3.5 - 5.1 mmol/L Final     CO2   Date Value Ref Range Status   04/16/2018 22.0 22.0 - 31.0 mmol/L Final     Chloride   Date Value Ref Range Status   04/16/2018 93 (L) 95 - 110 mmol/L Final     Anion Gap   Date Value Ref Range Status   04/16/2018 16.0 (H) 5.0 - 15.0 mmol/L Final     Creatinine   Date Value Ref Range Status   04/16/2018 1.66 (H) 0.50 - 1.00 mg/dL Final     BUN   Date Value Ref Range Status   04/16/2018 17 7 - 21 mg/dL Final     BUN/Creatinine Ratio   Date Value Ref Range Status   04/16/2018 10.2 7.0 - 25.0 Final     Calcium   Date Value Ref Range Status   04/16/2018 9.2 8.4 - 10.2 mg/dL Final     eGFR Non  Amer   Date Value Ref Range Status   04/16/2018 31 (L) >60 mL/min/1.73 Final     Alkaline Phosphatase   Date Value Ref Range Status   04/16/2018 156 (H) 38 - 126 U/L Final     Total Protein   Date Value Ref Range Status   04/16/2018 8.3 6.3 - 8.6 g/dL Final     ALT (SGPT)   Date Value Ref Range Status   04/16/2018 40 9 - 52 U/L Final     AST (SGOT)   Date Value Ref Range Status   04/16/2018 260 (H) 14 - 36 U/L Final     Total  Bilirubin   Date Value Ref Range Status   04/16/2018 1.0 0.2 - 1.3 mg/dL Final     Albumin   Date Value Ref Range Status   04/16/2018 3.40 3.40 - 4.80 g/dL Final     Globulin   Date Value Ref Range Status   04/16/2018 4.9 (H) 2.3 - 3.5 gm/dL Final     A/G Ratio   Date Value Ref Range Status   04/16/2018 0.7 (L) 1.1 - 1.8 g/dL Final     Lab Results   Component Value Date    WBC 7.98 04/16/2018    HGB 9.7 (L) 04/16/2018    HCT 29.4 (L) 04/16/2018    MCV 85.7 04/16/2018     04/16/2018     Lab Results   Component Value Date    NEUTROABS 4.76 04/16/2018    FERRITIN 226.00 02/09/2017    AJTZTBAT33 625 02/09/2017     Lab Results   Component Value Date     185.9 (H) 02/07/2018    LABCA2 337.4 (H) 02/07/2018     43 (H) 02/16/2018    CEA 10.10 (H) 02/16/2018     Component      Latest Ref Rng & Units 11/30/2017 12/4/2017 2/7/2018   CA 15-3      0.0 - 25.0 U/mL 179.4 (H) 187.1 (H) 185.9 (H)       Component      Latest Ref Rng & Units 9/21/2017 11/30/2017 12/4/2017 2/7/2018   CA 27.29      0.0 - 38.6 U/mL 277.3 (H) 327.9 (H) 320.6 (H) 337.4 (H)             RADIOLOGY DATA :    CT of Abdomen and pelvis with contrast done on March 29, 2018 showed:  Addendum: The CT chest abdomen and pelvis from 2/7/2018 and MRI  abdomen 2/13/2018 has been made available for comparison.     Hepatobiliary: There is continued however significantly decreased  degree of intrahepatic biliary dilation. As seen on the prior  exam the findings are greater within the left greater than right  hepatic lobes. There is pneumobilia now present likely relating  to interval surgical intervention. While the bile duct at the  level of the hepatic hilum remains dilated at approximately 1.8  cm it is significantly improved compared to prior examination  where it measured greater than 3 cm. The degree of pancreatic  ductal dilation appears relatively similar to slightly improved  compared to the prior study. The multiple hypodense lesions  seen  scattered throughout the liver that are now present were not  identified on the prior CT and MRI. Consideration for this is  given to hepatic metastatic disease or in the proper clinical  setting development of hepatic abscesses.     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  Placement of the urinary stent has improved the degree of  obstructive changes with dilation of the intrarenal collection  system reduced. There is however continued significant amount of  hydronephrosis with continued right renal cortical edema and  subjectively a greater degree of stunned cortical enhancement by  comparison to the exam from February. These findings may relate  to the chronic obstructive uropathy and/or superimposed  pyelonephritis. No suspicious perinephric fluid collections or  discrete renal abscess have developed since the prior study.     STOMACH: Placement of the percutaneous gastrostomy demonstrates  improvement resolution of the gastric distention and retention of  ingested debris seen previously. No adjacent suspicious fluid  collections or free air associated with this procedure.     Osseous: Overall similar appearance of the extensive sclerotic  metastatic disease.        CT of chest, abdomen and pelvis with contrast done on September 8, 2017 showed:  IMPRESSION:  CONCLUSION:    1. Widespread osteosclerotic metastatic disease.  2. No changes visualized from the prior study.         Nuclear bone scan done on April 21, 2017 showed:  FINDINGS:      The uptake and distribution of radiotracer activity demonstrates:         Interval progression of disease demonstrating relative intensity  in previously noted sites of abnormal activity, additionally with  new sites in both the axial and appendicular  skeleton.      The kidneys are visualized bilaterally.   .   IMPRESSION:  CONCLUSION:      1. Interval progression of disease.        PATHOLOGY DATA:  Pathology report from February 28, 2018 showed:  Final Diagnosis   LIVER, WEDGE BIOPSY:  METASTATIC MAMMARY CARCINOMA, INVASIVE LOBULAR TYPE.   Electronically signed by Jatin Calvo MD on 3/6/2018 at 1448   Synoptic Checklist   BREAST: Biomarker Reporting Template   Breast Bmk - All Specimens   TEST(S) PERFORMED   Test(s) Performed  Estrogen Receptor (ER) Status   ER Results  Positive   Percentage of Cells with Nuclear Positivity  81-90%   Average Intensity of Staining  Moderate   Test Type (required for [place]based laboratories)  Laboratory-developed test   Primary Antibody  6F11   Test(s) Performed  Progesterone Receptor (PgR) Status   PgR Results  Positive   Percentage of Cells with Nuclear Positivity  %   Average Intensity of Staining  Strong   Test Type (required for [place]based laboratories)  Laboratory-developed test   Primary Antibody  16   Test(s) Performed  HER2 by Immunohistochemistry (IHC)   HER2 IHC Results  Equivocal (Score 2+)   Percentage of Cells with Uniform Intense Complete Membrane Staining  5   Test Type (required for [place]based laboratories)  Laboratory-developed test   Primary Antibody  SP3   METHODS   Cold Ischemia and Fixation Times  Meet requirements specified in latest version of the ASCO / CAP Guidelines   Fixative  Formalin   Comment(s)   Comment(s)  Specimen will be sent for HER2 by FISH; this will be reported separately   .      Gross Description See result below    The specimen consists of a wedge biopsy of liver measuring 1.1 x 0.6 x 0.4 cm.  A white nodule measures 0.5 cm in diameter.  Totally submitted without sectioning.   Special Stains See result below    Immunostains were positive in the original tumor for GCDFP-15 and Mammoglobin.  These were repeated to confirm mammary origin of the liver metastasis.     Immunostain for  GCDFP-1 strongly marks virtually all tumor cells.  Immunostain for Mammoglobin strongly marks a minority of tumor cells.     Prognostic markers (ER, WA, HER2) ordered to guide future treatment of metastatic breast carcinoma.  See synoptic report for details.     HER2 by FISH ordered for equivocal HER2 result be IHC.             ASSESSMENT AND PLAN:      1.  Metastatic lobular carcinoma of left breast with a diffuse blastic lesion involving the spine and pelvis, ER/WA positive, HER-2/shelly negative diagnosed in December 2014.  Initially patient was started on Arimidex with Xgeva, in November 2015 patient had a restaging scan done which showed worsening of the metastases and patient was changed to Aromasin along with Xgeva.  In February 2016 in view of worsening metastasis patient was started on Faslodex with Xgeva.ReStaging  Work up done in October 2016 showed worsening of metastatic disease, so patient was put on tamoxifen along with Xgeva In view of worsening bone metastasis based on the bone scan done in April 2017 as well as restaging of CT of chest abdomen and pelvis done on May 4, 2017.  Patient has received total 8 cycles of Palbociclib a FROM May 2017 until February 10, 2018.  In view of CT scan showing possible antral mass with dilated intrahepatic biliary duct,Patient was referred to Dr. Lemus and endoscopy with ERCP was attempted on February 19, 2018.  Due to large mass it was not successful.  Subsequently patient was seen by Dr. Ware and had Laparotomy with gastrojejunostomy, gastrostomy placement, Ru en Y hepaticojejunostomy, jejunostomy and liver biopsy.  Liver biopsy did show metastatic lobular carcinoma.  Patient had a CT scan on March 29, 2018 which also showed liver lesion consistent with metastasis.  Patient's performance status has deteriorated significantly in last 3 weeks.  Her performance status at best is 3 at present.  Patient has exhausted all oral option for treatment for metastatic  breast cancer.  She is not a candidate for strong intravenous chemotherapy like Taxotere.  Recommend hospice/palliative care which was discussed with patient and her family.  In view of her hypotension and dizziness.  We will refer patient to the emergency room today.  Case was discussed with the emergency room physician Dr. Lundberg.     2. Anemia :  Most likely anemia of chronic disease secondary to chemotherapy.    3.  Hypotension and dizziness: Most likely secondary to Poor po intake.  Her blood pressure was only 72/46.  Patient has been referred to the emergency room.    4.  Bone metastases: Continue with Xgeva for now.  Patient was again explained about possible side effects including jaw pain and osteonecrosis of jaw.  We'll hold Xgeva in view of elevated creatinine done last week.    5.  Acute kidney injury: Creatinine elevated at 1.66.    6.  Health maintenance: Patient does not smoke.  She is not a candidate for further screening colonoscopy in view of metastatic breast cancer.  She has advance directive on file.       Joey Ramos MD  4/16/2018  12:52 PM

## 2018-04-18 NOTE — ANESTHESIA PREPROCEDURE EVALUATION
Anesthesia Evaluation     NPO Solid Status: > 8 hours  NPO Liquid Status: > 8 hours           Airway   Mallampati: I  Possible difficult intubation  Dental    (+) poor dentition    Pulmonary     breath sounds clear to auscultation  (+) a smoker Former, COPD mild, asthma, shortness of breath,   Cardiovascular     ECG reviewed  Rhythm: irregular  Rate: abnormal    (+) hypertension well controlled, dysrhythmias Atrial Fib, murmur,       Neuro/Psych  (+) psychiatric history Anxiety,     GI/Hepatic/Renal/Endo    (+)  GERD well controlled,      Musculoskeletal     (+) arthralgias,   Abdominal     Abdomen: soft.   Substance History      OB/GYN          Other   (+) arthritis   history of cancer                    Anesthesia Plan    ASA 4     general     intravenous induction   Anesthetic plan and risks discussed with patient.

## 2018-04-18 NOTE — ANESTHESIA PROCEDURE NOTES
Airway  Urgency: elective    Airway not difficult    General Information and Staff    Patient location during procedure: OR  CRNA: JAMA MCKEON    Indications and Patient Condition  Indications for airway management: airway protection    Preoxygenated: yes  Mask difficulty assessment: 0 - not attempted    Final Airway Details  Final airway type: supraglottic airway      Successful airway: classic  Size 4    Number of attempts at approach: 1

## 2018-04-18 NOTE — ANESTHESIA POSTPROCEDURE EVALUATION
Patient: Johanne Mayer    Procedure Summary     Date:  04/18/18 Room / Location:  [facility] OR 02 / [facility] OR    Anesthesia Start:  1818 Anesthesia Stop:  1854    Procedure:  CYSTOSCOPY  RIGHT RETROGRADE PYELOGRAM AND STENT CHANGE (Right ) Diagnosis:       Stricture or kinking of ureter      (Stricture or kinking of ureter [N13.5])    Surgeon:  Yevgeniy Griffith MD Provider:  Chava Goetz MD    Anesthesia Type:  general ASA Status:  4          Anesthesia Type: general  Last vitals  BP   116/64 (04/18/18 1851)   Temp   97.8 °F (36.6 °C) (04/18/18 1851)   Pulse   90 (04/18/18 1851)   Resp   16 (04/18/18 1851)     SpO2   94 % (04/18/18 1851)     Post Anesthesia Care and Evaluation    Patient location during evaluation: PACU  Patient participation: complete - patient participated  Level of consciousness: awake and alert  Pain management: adequate  Airway patency: patent  Anesthetic complications: No anesthetic complications  PONV Status: none  Cardiovascular status: acceptable  Respiratory status: acceptable  Hydration status: acceptable

## 2018-04-20 NOTE — PROGRESS NOTES
Adult Outpatient Nutrition  Assessment    Patient Name:  Johanne Mayer  YOB: 1950  MRN: [id number]    Assessment Date:  4/20/2018    Comments:  Received message that family asking about assistance with supplementation (prior to being admitted to hospital). Spoke with daughter per phone today. Explained that samples ready for  at pt/family convenience.  Wt 162 lb.          OP RD Assessment     Row Name 04/20/18 0600       Anthropometrics    Weight --   pt refuses weight this AM          Electronically signed by:  Bianca Mendoza RD  04/20/18 12:21 PM

## 2018-04-29 NOTE — PROGRESS NOTES
CHIEF COMPLAINT:    Chief Complaint   Patient presents with   • Follow-up     Removal of feeding tube       HISTORY OF PRESENT ILLNESS:    Johanne Mayer is a 67 y.o. female who underwent His previously had a gastrojejunostomy and Ru-en-Y hepaticojejunostomy due to obstructing metastatic lobular carcinoma of the breast.  She is here today to have her tubes removed.  She's feeling significantly better after a brief hospitalization for UTI.  She states that she is eating and drinking well at home.    EXAM:  Vitals:    04/12/18 1420   BP: 112/80         Abdomen soft, incisions well-healed.  Jejunostomy and gastrostomy tubes removed today    ASSESSMENT:    Postoperative follow-up, doing well    PLAN:    Continue follow-up with the cancer center.  Follow-up with me in 2 weeks.        This document has been electronically signed by Yevgeniy Ware MD on April 29, 2018 12:16 PM

## 2018-04-30 NOTE — PROGRESS NOTES
DATE OF VISIT: 4/30/2018      REASON FOR VISIT:  Metastatic lobular carcinoma of left breast with a bone metastasis.      HISTORY OF PRESENT ILLNESS:    67-year-old female with a past medical history significant for left breast cancer with diffuse skeletal metastasis diagnosed in 2014, currently on letrozole and Palbociclib with Xgeva is here for follow-up visit today.  In May of 2017 in view of disease progression on CT scan patient was changed over to Palbociclib with letrozole.  She finished her eighth cycle of Palbociclib with letrozole on February 10, 2018.  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  Since then patient was seen by Dr. Lemus and ERCP was attempted without any success.  Subsequently patient was referred to Dr. Ware and had a surgical procedure done on February 28, 2018 with Laparotomy with gastrojejunostomy, gastrostomy placement, Ru en Y hepaticojejunostomy, jejunostomy and liver biopsy.  Patient also has a stent placement by Dr. Griffith on the same day.  Patient was last seen here on April 16, 2018, in view of hypotension dizziness she was referred to the emergency room and was admitted to the hospital for urinary tract infection with enterococcus faecalis.  She was discharged from the hospital on April 25, 2018 and currently taking Levaquin.  Patient is here to discuss further treatment recommendation.  States her strength is getting better.  Her nausea and vomiting is improving.      PAST MEDICAL HISTORY:    Past Medical History:   Diagnosis Date   • Atrial fibrillation    • Carcinoma, female breast, infiltrating lobular     LEFT side with axillary lymph node metastases      • Essential hypertension    • GERD (gastroesophageal reflux disease)    • Heartburn    • History of  echocardiogram 10/13/2011   • History of transfusion    • Localized enlarged lymph nodes    • Lump of breast, left    • Lymphadenopathy      LEFT axilla-this is confirmed by my personal review of the ultrasound      • Osteoarthritis        SOCIAL HISTORY:    Social History   Substance Use Topics   • Smoking status: Never Smoker   • Smokeless tobacco: Never Used   • Alcohol use No       Surgical History :  Past Surgical History:   Procedure Laterality Date   • APPENDECTOMY     • BREAST SURGERY  11/21/2014    Ultrasound directed mammotome biopsy of the left breast with clip placement, lesion at the 5:30 position 5 cm. from the nipple.Left axillary lymph node biopsy, open.   • CHOLECYSTECTOMY     • COLON RESECTION N/A 2/28/2018    Procedure: Laparotomy with gastrojejunostomy, gastrostomy placement, Ru en Y hepaticojejunostomy, jejunostomy.    LIVER BIOPSY (DR. BETHANY GALVEZ);  Surgeon: Yevgeniy Ware MD;  Location: [facility] OR;  Service:    • COLONOSCOPY  12/12/2011    Mild diverticulosis in sigmoid colon. Stool collected for study. Hemorrhoids found.   • COLONOSCOPY N/A 2/7/2017    Procedure: COLONOSCOPY;  Surgeon: Yevgeniy Ware MD;  Location: [facility] ENDOSCOPY;  Service:    • CYSTOSCOPY, RETROGRADE PYELOGRAM AND STENT INSERTION Right 2/28/2018    Procedure: CYSTOSCOPY RETROGRADE PYELOGRAM AND STENT INSERTION         (C-ARM)      ;  Surgeon: Yevgeniy Griffith MD;  Location: [facility] OR;  Service:    • CYSTOSCOPY, RETROGRADE PYELOGRAM AND STENT INSERTION Right 4/18/2018    Procedure: CYSTOSCOPY  RIGHT RETROGRADE PYELOGRAM AND STENT CHANGE;  Surgeon: Yevgeniy Griffith MD;  Location: [facility] OR;  Service: Urology   • ERCP N/A 2/19/2018    Procedure: ENDOSCOPIC RETROGRADE CHOLANGIOPANCREATOGRAPHY;  Surgeon: Cecil Lemus DO;  Location: [facility] ENDOSCOPY;  Service:    • HYSTERECTOMY     • INJECTION OF MEDICATION  08/12/2013    Kenalog (19)      • TONSILLECTOMY     • TUBAL ABDOMINAL LIGATION     • UPPER  "GASTROINTESTINAL ENDOSCOPY  06/10/2014    Normal hypopharynx, esophagus, symmetrical & patent pylorus. Hiatus hernia in GE junction. Polyps in antrum & fundus. Multiple biopsies taken. Prominant CBD in medial duodenal wall. The ampulla has a normal appearance.       ALLERGIES:    Allergies   Allergen Reactions   • Tape Itching   • Eggs Or Egg-Derived Products Diarrhea and Nausea And Vomiting   • Influenza Vaccines Other (See Comments)     ALLERGY TO EGGS   • Lortab [Hydrocodone-Acetaminophen] Hallucinations   • Other Dermatitis     Plastic tape   • Penicillins      \"PASS OUT\"         FAMILY HISTORY:  Family History   Problem Relation Age of Onset   • Heart failure Mother      congested   • Lung cancer Father    • Heart attack Other    • Lung cancer Other    • Colon cancer Neg Hx    • Stomach cancer Neg Hx          REVIEW OF SYSTEMS:      CONSTITUTIONAL: Positive for fatigue.Complains of generalized weakness.  No fever, chills, or night sweats.     HEENT:  No epistaxis, mouth sores, or difficulty swallowing.    RESPIRATORY:  No new shortness of breath or cough at present.    CARDIOVASCULAR:  No chest pain or palpitations.    GASTROINTESTINAL:   States her abdominal discomfort, nausea and vomiting is improving.  Denies any diarrhea.    GENITOURINARY:  No dysuria or hematuria.    MUSCULOSKELETAL:  Complains of bilateral hip pain and stiffness.  Complains of bilateral shoulder stiffness.    NEUROLOGICAL:  Complains of dizziness.No tingling or numbness. No new headache .     LYMPHATICS:  Denies any abnormal swollen and anywhere in the body.    SKIN:  No new skin lesions.        PHYSICAL EXAMINATION:      VITAL SIGNS:    /91   Pulse 88   Temp 99.2 °F (37.3 °C) (Temporal Artery )   Resp 18   Ht 157.5 cm (62.01\")   Wt 72.4 kg (159 lb 9.6 oz)   LMP  (LMP Unknown)   BMI 29.18 kg/m²      ECOG performance status: 3    GENERAL:  Not in any distress. Appears frail.    EYES:  Mild pallor. No icterus.  Extraocular " movement intact.    NECK:  No adenopathy.  No JVD.    RESPIRATORY:  Fair air entry bilaterally. No rhonchi or wheezing.    CARDIOVASCULAR:  S1, S2. Regular rate and rhythm.  Systolic murmur present.    ABDOMEN:  Soft, nontender. Bowel sounds present.  No organomegaly appreciated.    EXTREMITIES:  No edema.  No calf  Tenderness.  Bruising present in bilateral upper extremity.    NEUROLOGICAL:  Alert, awake, oriented x3. No motor.  Cranial nerves II-12 grossly intact.    SKIN: No new skin lesions.    LYMPHATICS: No new enlarged lymph nodes in neck or supraclavicular area.          DIAGNOSTIC DATA:    Glucose   Date Value Ref Range Status   04/25/2018 85 60 - 100 mg/dL Final     Glucose, Arterial   Date Value Ref Range Status   02/28/2018 106 mmol/L Final     Sodium   Date Value Ref Range Status   04/25/2018 139 137 - 145 mmol/L Final     Potassium   Date Value Ref Range Status   04/25/2018 3.5 3.5 - 5.1 mmol/L Final     CO2   Date Value Ref Range Status   04/25/2018 23.0 22.0 - 31.0 mmol/L Final     Chloride   Date Value Ref Range Status   04/25/2018 109 95 - 110 mmol/L Final     Anion Gap   Date Value Ref Range Status   04/25/2018 7.0 5.0 - 15.0 mmol/L Final     Creatinine   Date Value Ref Range Status   04/25/2018 1.14 (H) 0.50 - 1.00 mg/dL Final     BUN   Date Value Ref Range Status   04/25/2018 9 7 - 21 mg/dL Final     BUN/Creatinine Ratio   Date Value Ref Range Status   04/25/2018 7.9 7.0 - 25.0 Final     Calcium   Date Value Ref Range Status   04/25/2018 8.2 (L) 8.4 - 10.2 mg/dL Final     eGFR Non  Amer   Date Value Ref Range Status   04/25/2018 47 45 - 104 mL/min/1.73 Final     Alkaline Phosphatase   Date Value Ref Range Status   04/16/2018 156 (H) 38 - 126 U/L Final     Total Protein   Date Value Ref Range Status   04/16/2018 8.3 6.3 - 8.6 g/dL Final     ALT (SGPT)   Date Value Ref Range Status   04/16/2018 40 9 - 52 U/L Final     AST (SGOT)   Date Value Ref Range Status   04/16/2018 260 (H) 14 - 36 U/L  Final     Total Bilirubin   Date Value Ref Range Status   04/16/2018 1.0 0.2 - 1.3 mg/dL Final     Albumin   Date Value Ref Range Status   04/16/2018 3.40 3.40 - 4.80 g/dL Final     Globulin   Date Value Ref Range Status   04/16/2018 4.9 (H) 2.3 - 3.5 gm/dL Final     A/G Ratio   Date Value Ref Range Status   04/16/2018 0.7 (L) 1.1 - 1.8 g/dL Final     Lab Results   Component Value Date    WBC 10.52 (H) 04/25/2018    HGB 9.3 (L) 04/25/2018    HCT 29.0 (L) 04/25/2018    MCV 90.6 04/25/2018    PLT 36 (L) 04/25/2018     Lab Results   Component Value Date    NEUTROABS 4.92 04/25/2018    IRON 26 (L) 04/17/2018    TIBC 210 (L) 04/17/2018    LABIRON 12 (L) 04/17/2018    FERRITIN 226.00 02/09/2017    MHDLFGOM38 625 02/09/2017     Lab Results   Component Value Date     185.9 (H) 02/07/2018    LABCA2 337.4 (H) 02/07/2018     43 (H) 02/16/2018    CEA 10.10 (H) 02/16/2018             RADIOLOGY DATA :    CT of Abdomen and pelvis with contrast done on March 29, 2018 showed:  Addendum: The CT chest abdomen and pelvis from 2/7/2018 and MRI  abdomen 2/13/2018 has been made available for comparison.     Hepatobiliary: There is continued however significantly decreased  degree of intrahepatic biliary dilation. As seen on the prior  exam the findings are greater within the left greater than right  hepatic lobes. There is pneumobilia now present likely relating  to interval surgical intervention. While the bile duct at the  level of the hepatic hilum remains dilated at approximately 1.8  cm it is significantly improved compared to prior examination  where it measured greater than 3 cm. The degree of pancreatic  ductal dilation appears relatively similar to slightly improved  compared to the prior study. The multiple hypodense lesions seen  scattered throughout the liver that are now present were not  identified on the prior CT and MRI. Consideration for this is  given to hepatic metastatic disease or in the proper  clinical  setting development of hepatic abscesses.     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  Placement of the urinary stent has improved the degree of  obstructive changes with dilation of the intrarenal collection  system reduced. There is however continued significant amount of  hydronephrosis with continued right renal cortical edema and  subjectively a greater degree of stunned cortical enhancement by  comparison to the exam from February. These findings may relate  to the chronic obstructive uropathy and/or superimposed  pyelonephritis. No suspicious perinephric fluid collections or  discrete renal abscess have developed since the prior study.     STOMACH: Placement of the percutaneous gastrostomy demonstrates  improvement resolution of the gastric distention and retention of  ingested debris seen previously. No adjacent suspicious fluid  collections or free air associated with this procedure.     Osseous: Overall similar appearance of the extensive sclerotic  metastatic disease.        CT of chest, abdomen and pelvis with contrast done on September 8, 2017 showed:  IMPRESSION:  CONCLUSION:    1. Widespread osteosclerotic metastatic disease.  2. No changes visualized from the prior study.         Nuclear bone scan done on April 21, 2017 showed:  FINDINGS:      The uptake and distribution of radiotracer activity demonstrates:         Interval progression of disease demonstrating relative intensity  in previously noted sites of abnormal activity, additionally with  new sites in both the axial and appendicular skeleton.      The kidneys are visualized bilaterally.   .   IMPRESSION:  CONCLUSION:      1. Interval progression of disease.        PATHOLOGY DATA:  Pathology report from February 28,  2018 showed:  Final Diagnosis   LIVER, WEDGE BIOPSY:  METASTATIC MAMMARY CARCINOMA, INVASIVE LOBULAR TYPE.   Electronically signed by Jatin Calvo MD on 3/6/2018 at 1448   Synoptic Checklist   BREAST: Biomarker Reporting Template   Breast Bmk - All Specimens   TEST(S) PERFORMED   Test(s) Performed  Estrogen Receptor (ER) Status   ER Results  Positive   Percentage of Cells with Nuclear Positivity  81-90%   Average Intensity of Staining  Moderate   Test Type (required for [place]based laboratories)  Laboratory-developed test   Primary Antibody  6F11   Test(s) Performed  Progesterone Receptor (PgR) Status   PgR Results  Positive   Percentage of Cells with Nuclear Positivity  %   Average Intensity of Staining  Strong   Test Type (required for [place]based laboratories)  Laboratory-developed test   Primary Antibody  16   Test(s) Performed  HER2 by Immunohistochemistry (IHC)   HER2 IHC Results  Equivocal (Score 2+)   Percentage of Cells with Uniform Intense Complete Membrane Staining  5   Test Type (required for [place]based laboratories)  Laboratory-developed test   Primary Antibody  SP3   METHODS   Cold Ischemia and Fixation Times  Meet requirements specified in latest version of the ASCO / CAP Guidelines   Fixative  Formalin   Comment(s)   Comment(s)  Specimen will be sent for HER2 by FISH; this will be reported separately   .      Gross Description See result below    The specimen consists of a wedge biopsy of liver measuring 1.1 x 0.6 x 0.4 cm.  A white nodule measures 0.5 cm in diameter.  Totally submitted without sectioning.   Special Stains See result below    Immunostains were positive in the original tumor for GCDFP-15 and Mammoglobin.  These were repeated to confirm mammary origin of the liver metastasis.     Immunostain for GCDFP-1 strongly marks virtually all tumor cells.  Immunostain for Mammoglobin strongly marks a minority of tumor cells.     Prognostic markers (ER, OK, HER2) ordered to guide future  treatment of metastatic breast carcinoma.  See synoptic report for details.     HER2 by FISH ordered for equivocal HER2 result be IHC.             ASSESSMENT AND PLAN:      1.  Metastatic lobular carcinoma of left breast with a diffuse blastic lesion involving the spine and pelvis, ER/NV positive, HER-2/shelly negative diagnosed in December 2014.  Initially patient was started on Arimidex with Xgeva, in November 2015 patient had a restaging scan done which showed worsening of the metastases and patient was changed to Aromasin along with Xgeva.  In February 2016 in view of worsening metastasis patient was started on Faslodex with Xgeva.ReStaging  Work up done in October 2016 showed worsening of metastatic disease, so patient was put on tamoxifen along with Xgeva In view of worsening bone metastasis based on the bone scan done in April 2017 as well as restaging of CT of chest abdomen and pelvis done on May 4, 2017.  Patient has received total 8 cycles of Palbociclib a FROM May 2017 until February 10, 2018.  In view of CT scan showing possible antral mass with dilated intrahepatic biliary duct,Patient was referred to Dr. Lemus and endoscopy with ERCP was attempted on February 19, 2018.  Due to large mass it was not successful.  Subsequently patient was seen by Dr. Ware and had Laparotomy with gastrojejunostomy, gastrostomy placement, Ru en Y hepaticojejunostomy, jejunostomy and liver biopsy.  Liver biopsy did show metastatic lobular carcinoma.  Patient had a CT scan on March 29, 2018 which also showed liver lesion consistent with metastasis.  Patient's performance status has deteriorated significantly since surgery.  Her performance status at best is 3 at present.  Patient has exhausted all oral option for treatment for metastatic breast cancer.  She is not a candidate for strong intravenous chemotherapy like Taxotere.  Recommend hospice/palliative care which was discussed with patient and her family.  Angela has  not decided yet whether she wants to go on hospice or wants to try palliative chemotherapy.  We will give her 3 week appointment to come back, patient will decide in next one week and call us with her decision whether she wants to do chemotherapy or not.    2. Anemia :  Most likely anemia of chronic disease .  Recommend transfusing when necessary if hemoglobin is less than 7.    3. Thrombocytopenia: Secondary to recent enterococcus faecalis infection.  Patient is currently on Levaquin.  Denies any bleeding.  We'll recheck CBC upon next clinic visit in 3 weeks.    4.  Bone metastases: Continue with Xgeva for now.  Patient was again explained about possible side effects including jaw pain and osteonecrosis of jaw.  Recent calcium is low at 7.9.  We'll continue holding Xgeva for now    5.  Health maintenance: Patient does not smoke.  She is not a candidate for further screening colonoscopy in view of metastatic breast cancer.      6. Advance care planning: She has advance directive on file.    7.  Prescriptions: Prescription for zolpidem 5 mg by mouth daily at bedtime for insomnia has been provided today on April 30, 2018.     Joey Ramos MD  4/30/2018  3:59 PM

## 2018-05-07 PROBLEM — R65.20 SEVERE SEPSIS (HCC): Status: ACTIVE | Noted: 2018-01-01

## 2018-05-07 PROBLEM — A41.9 SEVERE SEPSIS (HCC): Status: ACTIVE | Noted: 2018-01-01

## 2018-05-07 PROBLEM — K85.90 ACUTE PANCREATITIS: Status: ACTIVE | Noted: 2018-01-01

## 2018-05-07 NOTE — PLAN OF CARE
Problem: Patient Care Overview  Goal: Plan of Care Review   05/07/18 6767   Coping/Psychosocial   Plan of Care Reviewed With patient;daughter;durable power of    Plan of Care Review   Progress improving   OTHER   Outcome Summary UOP adequate this shift. VSS. No pressors required this shift. Nausea has improved. Will continue to montior        Problem: Fall Risk (Adult)  Goal: Identify Related Risk Factors and Signs and Symptoms  Outcome: Ongoing (interventions implemented as appropriate)    Goal: Absence of Fall  Outcome: Ongoing (interventions implemented as appropriate)      Problem: Skin Injury Risk (Adult)  Goal: Identify Related Risk Factors and Signs and Symptoms  Outcome: Ongoing (interventions implemented as appropriate)    Goal: Skin Health and Integrity  Outcome: Ongoing (interventions implemented as appropriate)      Problem: Pain, Acute (Adult)  Goal: Identify Related Risk Factors and Signs and Symptoms  Outcome: Ongoing (interventions implemented as appropriate)    Goal: Acceptable Pain Control/Comfort Level  Outcome: Ongoing (interventions implemented as appropriate)      Problem: Sepsis/Septic Shock (Adult)  Goal: Signs and Symptoms of Listed Potential Problems Will be Absent, Minimized or Managed (Sepsis/Septic Shock)  Outcome: Ongoing (interventions implemented as appropriate)

## 2018-05-07 NOTE — H&P
[address] [address]  T - [id number]     H&P         SUBJECTIVE:   Patient Care Team:  Joey Ramos MD as PCP - General (Hematology and Oncology)  Joey Ramos MD as PCP - Claims Attributed  Petty Rosenberg RN as Care Coordinator ([facility])  Joey Ramos MD as Consulting Physician (Hematology and Oncology)    Chief Complaint:     Chief Complaint   Patient presents with   • Nausea   • Vomiting       Patient is 67 y.o. female presents with Past medical history of atrial fibrillation, metastatic breast cancer, essential hypertension, GERD, presented to the ED with a complaint of nausea and vomiting.  Upon initial evaluation patient was found to be ill appearing, and A. fib with RVR.  Patient was also found to have elevated white count, and elevated amylase as well.  Patient was given Cardizem in the ED and was then started on Cardizem drip after which she converted to normal sinus rhythm.  Her blood pressure has improved significantly.    HPI     ROS/HISTORY/ CURRENT MEDICATIONS/OBJECTIVE/VS/PE:   Review of Systems:   Review of Systems   Constitutional: Positive for activity change, appetite change, chills and fatigue. Negative for fever.   HENT: Negative for congestion and rhinorrhea.    Respiratory: Negative for chest tightness, shortness of breath and wheezing.    Cardiovascular: Positive for palpitations and leg swelling. Negative for chest pain.   Gastrointestinal: Positive for nausea and vomiting. Negative for abdominal pain, blood in stool and diarrhea.   Endocrine: Negative for cold intolerance and heat intolerance.   Genitourinary: Negative for dysuria, frequency and urgency.   Musculoskeletal: Positive for arthralgias, back pain and myalgias.   Neurological: Positive for dizziness and light-headedness. Negative for syncope.   Hematological: Negative for adenopathy.   Psychiatric/Behavioral: Negative for agitation, behavioral problems and  confusion.       History:     Past Medical History:   Diagnosis Date   • Atrial fibrillation    • Carcinoma, female breast, infiltrating lobular     LEFT side with axillary lymph node metastases      • Essential hypertension    • GERD (gastroesophageal reflux disease)    • Heartburn    • History of echocardiogram 10/13/2011   • History of transfusion    • Localized enlarged lymph nodes    • Lump of breast, left    • Lymphadenopathy      LEFT axilla-this is confirmed by my personal review of the ultrasound      • Osteoarthritis      Past Surgical History:   Procedure Laterality Date   • APPENDECTOMY     • BREAST SURGERY  11/21/2014    Ultrasound directed mammotome biopsy of the left breast with clip placement, lesion at the 5:30 position 5 cm. from the nipple.Left axillary lymph node biopsy, open.   • CHOLECYSTECTOMY     • COLON RESECTION N/A 2/28/2018    Procedure: Laparotomy with gastrojejunostomy, gastrostomy placement, Ru en Y hepaticojejunostomy, jejunostomy.    LIVER BIOPSY (DR. WARE FIRST);  Surgeon: Yevgeniy Ware MD;  Location: [facility] OR;  Service:    • COLONOSCOPY  12/12/2011    Mild diverticulosis in sigmoid colon. Stool collected for study. Hemorrhoids found.   • COLONOSCOPY N/A 2/7/2017    Procedure: COLONOSCOPY;  Surgeon: Yevgeniy Ware MD;  Location: [facility] ENDOSCOPY;  Service:    • CYSTOSCOPY, RETROGRADE PYELOGRAM AND STENT INSERTION Right 2/28/2018    Procedure: CYSTOSCOPY RETROGRADE PYELOGRAM AND STENT INSERTION         (C-ARM)      ;  Surgeon: Yevgeniy Griffith MD;  Location: [facility] OR;  Service:    • CYSTOSCOPY, RETROGRADE PYELOGRAM AND STENT INSERTION Right 4/18/2018    Procedure: CYSTOSCOPY  RIGHT RETROGRADE PYELOGRAM AND STENT CHANGE;  Surgeon: Yevgeniy Griffith MD;  Location: [facility] OR;  Service: Urology   • ERCP N/A 2/19/2018    Procedure: ENDOSCOPIC RETROGRADE CHOLANGIOPANCREATOGRAPHY;  Surgeon: Cecil Lemus DO;  Location: [facility] ENDOSCOPY;  Service:    •  HYSTERECTOMY     • INJECTION OF MEDICATION  08/12/2013    Kenalog (19)      • TONSILLECTOMY     • TUBAL ABDOMINAL LIGATION     • UPPER GASTROINTESTINAL ENDOSCOPY  06/10/2014    Normal hypopharynx, esophagus, symmetrical & patent pylorus. Hiatus hernia in GE junction. Polyps in antrum & fundus. Multiple biopsies taken. Prominant CBD in medial duodenal wall. The ampulla has a normal appearance.     Family History   Problem Relation Age of Onset   • Heart failure Mother      congested   • Lung cancer Father    • Heart attack Other    • Lung cancer Other    • Colon cancer Neg Hx    • Stomach cancer Neg Hx      Social History   Substance Use Topics   • Smoking status: Never Smoker   • Smokeless tobacco: Never Used   • Alcohol use No       (Not in a hospital admission)  Allergies:  Tape; Eggs or egg-derived products; Influenza vaccines; Lortab [hydrocodone-acetaminophen]; Other; and Penicillins    Current Medications:     Current Facility-Administered Medications   Medication Dose Route Frequency Provider Last Rate Last Dose   • diltiaZEM (CARDIZEM) 125 mg in 100 mL sodium chloride 0.9% (total volume 125 mL)  5-15 mg/hr Intravenous Continuous Gagan Jhaveri MD       • diltiazem (CARDIZEM) bolus from bag 1 mg/mL 10 mg  10 mg Intravenous Once Gagan Jhaveri MD   Stopped at 05/07/18 1259   • ondansetron ODT (ZOFRAN-ODT) disintegrating tablet 4 mg  4 mg Oral Once Gagan Jhaveri MD       • sodium chloride 0.9 % bolus 1,000 mL  1,000 mL Intravenous Once Gagan Jhaveri MD       • sodium chloride 0.9 % bolus 2,187 mL  30 mL/kg Intravenous Continuous Gagan Jhaveri MD 2,187 mL/hr at 05/07/18 1226 1,000 mL at 05/07/18 1226   • sodium chloride 0.9 % flush 10 mL  10 mL Intravenous PRN Gagan Jhaveri MD       • sodium chloride 0.9 % infusion  125 mL/hr Intravenous Continuous Gagan Jhaveri MD       • sodium chloride 0.9 % infusion  100 mL/hr Intravenous Once Gagan Jhaveri MD       • vancomycin 1 g/250 mL 0.9% NS (vial-mate)  15 mg/kg  Intravenous Once Gagan Jhaveri MD   1,000 mg at 05/07/18 1259     Current Outpatient Prescriptions   Medication Sig Dispense Refill   • acetaminophen (TYLENOL) 500 MG tablet Take 500 mg by mouth Every 6 (Six) Hours As Needed for Mild Pain .     • aspirin 81 MG EC tablet Take 1 tablet by mouth Daily. (Patient taking differently: Take 81 mg by mouth Daily. Last dose 2/15/18) 30 tablet 0   • atorvastatin (LIPITOR) 10 MG tablet TAKE 1 TABLET BY MOUTH DAILY. 30 tablet 5   • Calcium Carb-Cholecalciferol (CALCIUM 600 + D PO) Take 1 capsule by mouth 2 (Two) Times a Day.     • cholecalciferol (VITAMIN D3) 1000 units tablet Take 1,000 Units by mouth Daily.     • dexamethasone (DECADRON) 4 MG tablet Take 1 tablet by mouth Daily With Breakfast for 20 days. 20 tablet 1   • diltiazem XR (DILACOR XR) 180 MG 24 hr capsule Take 1 capsule by mouth Daily. 30 capsule 3   • fentaNYL (DURAGESIC) 12 MCG/HR Place 1 patch on the skin Every 72 (Seventy-Two) Hours. 3 patch 0   • Interferon Beta-1b (EXTAVIA SC) Inject 1 mL under the skin Every 30 (Thirty) Days.     • isosorbide mononitrate (IMDUR) 30 MG 24 hr tablet Take 1 tablet by mouth Daily. 30 tablet 0   • letrozole (FEMARA) 2.5 MG tablet TAKE 1 TABLET BY MOUTH DAILY. 30 tablet 3   • losartan (COZAAR) 100 MG tablet Take 1 tablet by mouth Daily. 30 tablet 0   • Magnesium 250 MG tablet Take 1 tablet by mouth Daily.     • ondansetron (ZOFRAN) 4 MG tablet Take 1 tablet by mouth 4 (Four) Times a Day As Needed for Nausea or Vomiting. 40 tablet 3   • oxyCODONE-acetaminophen (PERCOCET)  MG per tablet Take 1 tablet by mouth Every 6 (Six) Hours As Needed for Moderate Pain  or Severe Pain . 120 tablet 0   • pantoprazole (PROTONIX) 40 MG EC tablet Take 1 tablet by mouth Daily. 30 tablet 5   • polyethylene glycol (MIRALAX) powder Take 17 g by mouth Daily. 527 g 1   • promethazine (PHENERGAN) 25 MG tablet Take 1 tablet by mouth Every 6 (Six) Hours As Needed for Nausea or Vomiting. 40 tablet 3    • sucralfate (CARAFATE) 1 g tablet Take 1 g by mouth 3 (Three) Times a Day.     • Zinc 50 MG capsule Take 1 tablet by mouth Daily.     • zolpidem (AMBIEN) 5 MG tablet Take 1 tablet by mouth At Night As Needed for Sleep. 30 tablet 0       Physical Exam:     Vital Sign Min/Max for last 24 hours  Temp  Min: 99.6 °F (37.6 °C)  Max: 99.6 °F (37.6 °C)   BP  Min: 82/40  Max: 169/81   Pulse  Min: 105  Max: 146   Resp  Min: 32  Max: 32   SpO2  Min: 99 %  Max: 99 %   Flow (L/min)  Min: 2  Max: 2   Weight  Min: 72.9 kg (160 lb 11.2 oz)  Max: 72.9 kg (160 lb 11.2 oz)       Physical Exam:    Physical Exam   Constitutional: She is oriented to person, place, and time. She appears well-developed and well-nourished. She appears toxic. She has a sickly appearance. She appears ill.   HENT:   Head: Normocephalic and atraumatic.   Eyes: EOM are normal. Pupils are equal, round, and reactive to light.   Neck: Normal range of motion.   Cardiovascular: Normal rate, regular rhythm and normal heart sounds.    Pulmonary/Chest: Effort normal and breath sounds normal.   Abdominal: Soft. Bowel sounds are normal.   Musculoskeletal: Normal range of motion.   Neurological: She is alert and oriented to person, place, and time.   Skin: Skin is warm. Capillary refill takes more than 3 seconds.   Vitals reviewed.       Results Review:   Lab Results (last 24 hours)     Procedure Component Value Units Date/Time    Urinalysis With / Culture If Indicated - Urine, Catheter [[id number]]  (Abnormal) Collected:  05/07/18 1300    Specimen:  Urine from Urine, Catheter Updated:  05/07/18 1313     Color, UA Yellow     Appearance, UA Clear     pH, UA 6.5     Specific Gravity, UA 1.015     Glucose, UA Negative     Ketones, UA Negative     Bilirubin, UA Negative     Blood, UA Small (1+) (A)     Protein,  mg/dL (2+) (A)     Leuk Esterase, UA Negative     Nitrite, UA Negative     Urobilinogen, UA 1.0 E.U./dL    Urinalysis, Microscopic Only - Urine, Clean Catch  [[id number]] Collected:  05/07/18 1300    Specimen:  Urine from Urine, Catheter Updated:  05/07/18 1311    Blood Gas, Arterial [[id number]]  (Abnormal) Collected:  05/07/18 1240    Specimen:  Arterial Blood Updated:  05/07/18 1304     Site Arterial Line     Christopher's Test N/A     pH, Arterial 7.367 pH units      pCO2, Arterial 28.8 (L) mm Hg      pO2, Arterial 99.7 mm Hg      HCO3, Arterial 16.5 (L) mmol/L      Base Excess, Arterial -8.0 (L) mmol/L      O2 Saturation, Arterial 97.9 %      Barometric Pressure for Blood Gas 751 mmHg      Modality Nasal Cannula     Flow Rate 2.0 lpm      Ventilator Mode NA    Extra Tubes [[id number]] Collected:  05/07/18 1134    Specimen:  Blood from Blood, Venous Line Updated:  05/07/18 1245    Narrative:       The following orders were created for panel order Extra Tubes.  Procedure                               Abnormality         Status                     ---------                               -----------         ------                     Gold Top - SST[[id number]]                                   Final result                 Please view results for these tests on the individual orders.    Gold Top - SST [[id number]] Collected:  05/07/18 1134    Specimen:  Blood Updated:  05/07/18 1245     Extra Tube Hold for add-ons.     Comment: Auto resulted.       CBC & Differential [[id number]] Collected:  05/07/18 1122    Specimen:  Blood Updated:  05/07/18 1224    Narrative:       The following orders were created for panel order CBC & Differential.  Procedure                               Abnormality         Status                     ---------                               -----------         ------                     Manual Differential[[id number]]          Abnormal            Final result               Scan Slide[[id number]]                                                                  CBC Auto Differential[[id number]]        Abnormal            Final result                 Please view  results for these tests on the individual orders.    Manual Differential [[id number]]  (Abnormal) Collected:  05/07/18 1122    Specimen:  Blood Updated:  05/07/18 1224     Neutrophil % 72.0 %      Lymphocyte % 21.0 %      Monocyte % 3.0 %      Bands %  2.0 %      Metamyelocyte % 1.0 (H) %      Myelocyte % 1.0 (H) %      Neutrophils Absolute 17.86 (H) 10*3/mm3      Lymphocytes Absolute 5.07 (H) 10*3/mm3      Monocytes Absolute 0.72 10*3/mm3      nRBC 8.0 (H) /100 WBC      Anisocytosis Mod/2+     Polychromasia Mod/2+     WBC Morphology Normal     Platelet Estimate Decreased    Troponin [[id number]]  (Abnormal) Collected:  05/07/18 1122    Specimen:  Blood Updated:  05/07/18 1203     Troponin I 0.162 (C) ng/mL     Protime-INR [[id number]]  (Abnormal) Collected:  05/07/18 1122    Specimen:  Blood Updated:  05/07/18 1159     Protime 19.2 (H) Seconds      INR 1.68 (H)    Narrative:       Therapeutic range for most indications is 2.0-3.0 INR,  or 2.5-3.5 for mechanical heart valves.    aPTT [[id number]]  (Normal) Collected:  05/07/18 1122    Specimen:  Blood Updated:  05/07/18 1159     PTT 37.1 seconds     Narrative:       The recommended Heparin therapeutic range is 68-97 seconds.    CK-MB [[id number]]  (Normal) Collected:  05/07/18 1122    Specimen:  Blood Updated:  05/07/18 1159     CKMB 0.29 ng/mL     Comprehensive Metabolic Panel [[id number]]  (Abnormal) Collected:  05/07/18 1122    Specimen:  Blood Updated:  05/07/18 1159     Glucose 109 (H) mg/dL      BUN 28 (H) mg/dL      Creatinine 1.20 (H) mg/dL      Sodium 132 (L) mmol/L      Potassium 3.5 mmol/L      Chloride 100 mmol/L      CO2 14.0 (L) mmol/L      Calcium 8.6 mg/dL      Total Protein 7.0 g/dL      Albumin 2.90 (L) g/dL      ALT (SGPT) 30 U/L      AST (SGOT) 666 (H) U/L      Alkaline Phosphatase 199 (H) U/L      Total Bilirubin 1.8 (H) mg/dL      eGFR Non African Amer 45 mL/min/1.73      Globulin 4.1 (H) gm/dL      A/G Ratio 0.7 (L) g/dL      BUN/Creatinine Ratio  23.3     Anion Gap 18.0 (H) mmol/L     CBC Auto Differential [[id number]]  (Abnormal) Collected:  05/07/18 1122    Specimen:  Blood Updated:  05/07/18 1157     WBC 24.14 (H) 10*3/mm3      RBC 2.54 (L) 10*6/mm3      Hemoglobin 7.8 (L) g/dL      Hematocrit 25.1 (L) %      MCV 98.8 (H) fL      MCH 30.7 pg      MCHC 31.1 (L) g/dL      RDW 28.1 (H) %      RDW-SD 95.6 (H) fl      MPV -- fL      Comment: Unable to verify        Platelets 44 (L) 10*3/mm3     Lipase [[id number]]  (Abnormal) Collected:  05/07/18 1122    Specimen:  Blood Updated:  05/07/18 1156     Lipase 2,831 (H) U/L     Lactic Acid, Plasma [[id number]]  (Abnormal) Collected:  05/07/18 1122    Specimen:  Blood Updated:  05/07/18 1151     Lactate 7.9 (C) mmol/L     Lactic Acid, Reflex Timer (This will reflex a repeat order 3-3:15 hours after ordered.) [[id number]] Collected:  05/07/18 1122    Specimen:  Blood Updated:  05/07/18 1151    Amylase [[id number]]  (Abnormal) Collected:  05/07/18 1122    Specimen:  Blood Updated:  05/07/18 1147     Amylase 416 (H) U/L     CK [[id number]]  (Abnormal) Collected:  05/07/18 1122    Specimen:  Blood Updated:  05/07/18 1147     Creatine Kinase 1,089 (H) U/L     Magnesium [[id number]]  (Normal) Collected:  05/07/18 1122    Specimen:  Blood Updated:  05/07/18 1147     Magnesium 2.2 mg/dL     Blood Culture - Blood, [[id number]] Collected:  05/07/18 1115    Specimen:  Blood from Blood, Venous Line Updated:  05/07/18 1132              Imaging Results (last 24 hours)     Procedure Component Value Units Date/Time    CT Abdomen Pelvis Without Contrast [[id number]] Collected:  05/07/18 1151     Updated:  05/07/18 1226    Narrative:       CT abdomen, pelvis without contrast.       CLINICAL INDICATION: Abdominal pain, sepsis.        COMPARISON: CT April 16, 2018.           TECHNIQUE: Noncontrast helical scanning with axial and coronal  reformations. Soft tissue, lung, and bone windows reviewed.    This exam was performed according to our  departmental  dose-optimization program, which includes automated exposure  control, adjustment of the mA and/or kV according to patient size  and/or use of iterative reconstruction technique.    FINDINGS: Multiple liver masses, metastases. Metastases appear  more pronounced compared to prior imaging study, CT ultrasound.    Air within the biliary tree, pneumobilia probably from surgical  procedure. Normal spleen. Soft tissue mass in the retroperitoneum  near the caudate lobe of liver and pancreatic head similar to  prior exam. The body and tail the pancreas are normal in  appearance. Additional right-sided retroperitoneal masses are  also identified similar to prior exam. Feeding gastrostomy tube  seen on prior examination is no longer present.    Chronic hydronephrotic changes right kidney apparently obstructed  right-sided renal stent. Ureteral stent observed with proximal  loop in the right kidney and distal loop in urinary bladder.  Extensive mixed sclerotic and lytic bony metastases lumbar spine  and pelvis.          Impression:       CONCLUSION: Multiple hepatic masses, metastases, more pronounced  than on prior exams. Pneumobilia, air within the biliary tree  from prior surgical procedure. Intra-abdominal left  retroperitoneal masses similar to prior examination.    Right-sided ureteral stent with proximal loop in the right kidney  distal loop in the urinary bladder. Chronic appearing  hydronephrotic changes right kidney suggesting probably occluded  stent.    Extensive bony metastases.    Electronically signed by:  Dariusz Lacy MD  5/7/2018 12:24 PM CDT  Workstation: [id number]    XR Chest Post CVA Port [[id number]] Collected:  05/07/18 1118     Updated:  05/07/18 1143    Narrative:       Chest single view.        CLINICAL INDICATION: PICC line placement.    COMPARISON: Chest April 16, 2018.     FINDINGS: Cardiac silhouette is normal in size. Emphysematous   changes especially in the right apex. New discoid  infiltrative  changes right lung base probably atelectasis.      Impression:       CONCLUSION: Right arm PICC line catheter with catheter tip in  superior vena cava, in satisfactory position.    Electronically signed by:  Dariusz Lacy MD  5/7/2018 11:42 AM CDT  Workstation: [id number]    IR PICC wo fluoro guidance [[id number]] Resulted:  05/07/18 1132     Updated:  05/07/18 1132    Narrative:       This procedure was auto-finalized with no dictation required.    US Guided Vascular Access [[id number]] Resulted:  05/07/18 1113     Updated:  05/07/18 1113    Narrative:       This procedure was auto-finalized with no dictation required.           I reviewed the patient's new clinical results.  I reviewed the patient's new imaging results and agree with the interpretation.     ASSESSMENT/PLAN:   Assessment/Plan   Active Hospital Problems (** Indicates Principal Problem)    Diagnosis Date Noted   • Severe sepsis [A41.9, R65.20] 05/07/2018   • Acute pancreatitis [K85.90] 05/07/2018   • Metastatic breast cancer [C50.919] 04/16/2018   • Acute kidney injury [N17.9] 02/12/2018   • Atrial fibrillation [I48.91]    • Carcinoma, female breast, infiltrating lobular [C50.919]      LEFT side with axillary lymph node metastases        • GERD (gastroesophageal reflux disease) [K21.9]    • Essential hypertension [I10]    • Malignant neoplasm of left breast [C50.912] 01/12/2017      Resolved Hospital Problems    Diagnosis Date Noted Date Resolved   No resolved problems to display.     1.  Severe sepsis with unknown origin, we will start patient on broad-spectrum antibiotic, will obtain pan culture.  Continue to monitor white count and CRP.  2.  Acute pancreatitis: Patient was found to have lipase of 2800, we'll keep her nothing by mouth, continue with IV fluid and pain control.  3.  Lactic acidosis: This is likely secondary to do the acute infection.  We'll give IV fluid monitor lactic acid doses.  4.  Atrial fibrillation with RVR: Currently  she is in normal sinus rhythm currently on Cardizem drip.  Patient is a high risk with a bleed due to low platelets as well as an hemoglobin of 7.3.  5.  Essential hypertension: Currently she is nothing by mouth continue with when necessary hydralazine.  Monitor blood pressure.  6.  Rhabdomyolysis: Patient has a CK of about thousand, will continue monitor.  Continue with IV fluid resuscitation.  7.  Intractable nausea and vomiting: Differential will include while gastritis versus acute pancreatitis, we'll continue with supportive therapy at this point.  Continue with antibiotic.  8.  Thrombocytopenia: This is likely secondary to metastatic liver disease.  We'll continue to monitor platelet count if any signs of leading up present will need platelet transfusion at that point.  Will need to consult Dr. Ramos of that one as well.  9.  Anemia: Currently hemoglobin is 7.3, we'll monitor H&H, if troponins will need transfusion at that point.  10.  Metastatic breast cancer: Patient has been following with Dr. Ramos.  11.  Elevated LFT: Secondary to likely metastatic disease.  Continue to monitor.  12.  CODE STATUS is DNI, patient wants everything else to be done.  She does not want to be placed on ventilator at all.  13.  Right hydronephrosis: As it appears likely chronic, patient has had stent placement on this side as well.  We'll contact Dr. Griffith to evaluate the patient from the hydronephrosis standpoint.    DVT ppx: scd/teds     I discussed the patients findings and my recommendations with patient, family, nursing staff and consulting provider.              This document has been electronically signed by Chris Frey MD on May 7, 2018 1:20 PM

## 2018-05-07 NOTE — CONSULTS
Consults    Patient Care Team:  Joey Ramos MD as PCP - General (Hematology and Oncology)  Joey Ramos MD as PCP - Claims Attributed  Petty Rosenberg RN as Care Coordinator (Population Health)  Joey Ramos MD as Consulting Physician (Hematology and Oncology)    Chief complaint: hydronephrosis    Subjective     Ms. Mayer is a 67-year-old female consulted to Urology for right hydronephrosis. She is admitted for severe sepsis, likely UTI and pneumonia. History is limited, she is very weak during today's exam, but does report that her back and right flank have been hurting worse, increasing abdominal pain and intractable nausea and vomiting with anorexia and weight loss. [place] in place now, urine martinez. TMix 94.8, TMax 99.6, hypotensive and tachycardic on arrival. She has been administered Azactam and vancomycin, lactic acid 7.9 upon arrival, currently she is normotensive after fluid bolus.       Flank Pain   This is a recurrent problem. The current episode started more than 1 month ago. The problem occurs constantly. The problem is unchanged. The pain is mild. Associated symptoms include abdominal pain, weakness and weight loss. Pertinent negatives include no chest pain, dysuria, fever or headaches. Risk factors include history of cancer. She has tried analgesics for the symptoms. The treatment provided mild relief.       Review of Systems   Constitutional: Positive for activity change, appetite change, fatigue, unexpected weight change and weight loss. Negative for chills and fever.   HENT: Negative.    Eyes: Negative.    Respiratory: Positive for cough. Negative for apnea, choking, chest tightness, shortness of breath, wheezing and stridor.    Cardiovascular: Negative for chest pain, palpitations and leg swelling.   Gastrointestinal: Positive for abdominal pain.   Endocrine: Negative.    Genitourinary: Positive for decreased urine volume and flank pain. Negative for difficulty urinating, dysuria, hematuria and  urgency.   Musculoskeletal: Positive for back pain.   Skin: Positive for pallor. Negative for color change.   Allergic/Immunologic: Negative.    Neurological: Positive for weakness. Negative for dizziness and headaches.   Hematological: Negative.    Psychiatric/Behavioral: Negative for agitation, confusion, self-injury and suicidal ideas.        Past Medical History:   Diagnosis Date   • Atrial fibrillation    • Carcinoma, female breast, infiltrating lobular     LEFT side with axillary lymph node metastases      • Essential hypertension    • GERD (gastroesophageal reflux disease)    • Heartburn    • History of echocardiogram 10/13/2011   • History of transfusion    • Localized enlarged lymph nodes    • Lump of breast, left    • Lymphadenopathy      LEFT axilla-this is confirmed by my personal review of the ultrasound      • Osteoarthritis    ,   Past Surgical History:   Procedure Laterality Date   • APPENDECTOMY     • BREAST SURGERY  11/21/2014    Ultrasound directed mammotome biopsy of the left breast with clip placement, lesion at the 5:30 position 5 cm. from the nipple.Left axillary lymph node biopsy, open.   • CHOLECYSTECTOMY     • COLON RESECTION N/A 2/28/2018    Procedure: Laparotomy with gastrojejunostomy, gastrostomy placement, Ru en Y hepaticojejunostomy, jejunostomy.    LIVER BIOPSY (DR. WARE FIRST);  Surgeon: Yevgeniy Ware MD;  Location: [facility] OR;  Service:    • COLONOSCOPY  12/12/2011    Mild diverticulosis in sigmoid colon. Stool collected for study. Hemorrhoids found.   • COLONOSCOPY N/A 2/7/2017    Procedure: COLONOSCOPY;  Surgeon: Yevgeniy Ware MD;  Location: [facility] ENDOSCOPY;  Service:    • CYSTOSCOPY, RETROGRADE PYELOGRAM AND STENT INSERTION Right 2/28/2018    Procedure: CYSTOSCOPY RETROGRADE PYELOGRAM AND STENT INSERTION         (C-ARM)      ;  Surgeon: Yevgeniy Griffith MD;  Location: [facility] OR;  Service:    • CYSTOSCOPY, RETROGRADE PYELOGRAM AND STENT INSERTION Right  4/18/2018    Procedure: CYSTOSCOPY  RIGHT RETROGRADE PYELOGRAM AND STENT CHANGE;  Surgeon: Yevgeniy Griffith MD;  Location: [facility] OR;  Service: Urology   • ERCP N/A 2/19/2018    Procedure: ENDOSCOPIC RETROGRADE CHOLANGIOPANCREATOGRAPHY;  Surgeon: Cecil Lemus DO;  Location: [facility] ENDOSCOPY;  Service:    • HYSTERECTOMY     • INJECTION OF MEDICATION  08/12/2013    Kenalog (19)      • TONSILLECTOMY     • TUBAL ABDOMINAL LIGATION     • UPPER GASTROINTESTINAL ENDOSCOPY  06/10/2014    Normal hypopharynx, esophagus, symmetrical & patent pylorus. Hiatus hernia in GE junction. Polyps in antrum & fundus. Multiple biopsies taken. Prominant CBD in medial duodenal wall. The ampulla has a normal appearance.   ,   Family History   Problem Relation Age of Onset   • Heart failure Mother      congested   • Lung cancer Father    • Heart attack Other    • Lung cancer Other    • Colon cancer Neg Hx    • Stomach cancer Neg Hx    ,   Social History   Substance Use Topics   • Smoking status: Never Smoker   • Smokeless tobacco: Never Used   • Alcohol use No   ,   Prescriptions Prior to Admission   Medication Sig Dispense Refill Last Dose   • acetaminophen (TYLENOL) 500 MG tablet Take 500 mg by mouth Every 6 (Six) Hours As Needed for Mild Pain .   Taking   • aspirin 81 MG EC tablet Take 1 tablet by mouth Daily. (Patient taking differently: Take 81 mg by mouth Daily. Last dose 2/15/18) 30 tablet 0 Taking   • atorvastatin (LIPITOR) 10 MG tablet TAKE 1 TABLET BY MOUTH DAILY. 30 tablet 5 Taking   • Calcium Carb-Cholecalciferol (CALCIUM 600 + D PO) Take 1 capsule by mouth 2 (Two) Times a Day.   Taking   • cholecalciferol (VITAMIN D3) 1000 units tablet Take 1,000 Units by mouth Daily.   Taking   • dexamethasone (DECADRON) 4 MG tablet Take 1 tablet by mouth Daily With Breakfast for 20 days. 20 tablet 1 Taking   • diltiazem XR (DILACOR XR) 180 MG 24 hr capsule Take 1 capsule by mouth Daily. 30 capsule 3 Taking   • fentaNYL (DURAGESIC) 12  MCG/HR Place 1 patch on the skin Every 72 (Seventy-Two) Hours. 3 patch 0 Taking   • Interferon Beta-1b (EXTAVIA SC) Inject 1 mL under the skin Every 30 (Thirty) Days.   Taking   • isosorbide mononitrate (IMDUR) 30 MG 24 hr tablet Take 1 tablet by mouth Daily. 30 tablet 0 Taking   • letrozole (FEMARA) 2.5 MG tablet TAKE 1 TABLET BY MOUTH DAILY. 30 tablet 3 Taking   • losartan (COZAAR) 100 MG tablet Take 1 tablet by mouth Daily. 30 tablet 0 Taking   • Magnesium 250 MG tablet Take 1 tablet by mouth Daily.   Taking   • ondansetron (ZOFRAN) 4 MG tablet Take 1 tablet by mouth 4 (Four) Times a Day As Needed for Nausea or Vomiting. 40 tablet 3 Taking   • oxyCODONE-acetaminophen (PERCOCET)  MG per tablet Take 1 tablet by mouth Every 6 (Six) Hours As Needed for Moderate Pain  or Severe Pain . 120 tablet 0 Taking   • pantoprazole (PROTONIX) 40 MG EC tablet Take 1 tablet by mouth Daily. 30 tablet 5 Taking   • polyethylene glycol (MIRALAX) powder Take 17 g by mouth Daily. 527 g 1 Taking   • promethazine (PHENERGAN) 25 MG tablet Take 1 tablet by mouth Every 6 (Six) Hours As Needed for Nausea or Vomiting. 40 tablet 3 Taking   • sucralfate (CARAFATE) 1 g tablet Take 1 g by mouth 3 (Three) Times a Day.   Taking   • Zinc 50 MG capsule Take 1 tablet by mouth Daily.   Taking   • zolpidem (AMBIEN) 5 MG tablet Take 1 tablet by mouth At Night As Needed for Sleep. 30 tablet 0         Current Facility-Administered Medications:   •  aztreonam (AZACTAM) 1 g/100 mL 0.9% NS IVPB (mbp), 1 g, Intravenous, Q8H, Chris Frey MD  •  fentaNYL (DURAGESIC) 12 MCG/HR 1 patch, 1 patch, Transdermal, Q72H, Chris Frey MD, 1 patch at 05/07/18 1438  •  morphine injection 1 mg, 1 mg, Intravenous, Q4H PRN, 1 mg at 05/07/18 1438 **AND** naloxone (NARCAN) injection 0.4 mg, 0.4 mg, Intravenous, Q5 Min PRN, Chris Frey MD  •  ondansetron ODT (ZOFRAN-ODT) disintegrating tablet 4 mg, 4 mg, Oral, Once, Gagan Jhaveri MD  •  [DISCONTINUED] vancomycin  (VANCOCIN) 1,500 mg in sodium chloride 0.9 % 500 mL IVPB, 20 mg/kg, Intravenous, Once **AND** Pharmacy to dose vancomycin, , Does not apply, Continuous PRN, Chris Frey MD  •  promethazine (PHENERGAN) injection 12.5 mg, 12.5 mg, Intravenous, Q8H PRN, Chris Frey MD, 12.5 mg at 05/07/18 1438  •  sodium chloride 0.9 % bolus 1,000 mL, 1,000 mL, Intravenous, Once, Gagan Jhaveri MD  •  sodium chloride 0.9 % flush 1-10 mL, 1-10 mL, Intravenous, PRN, Chris Frey MD  •  Insert peripheral IV, , , Once **AND** sodium chloride 0.9 % flush 10 mL, 10 mL, Intravenous, PRN, Gagan Jhaveri MD  •  sodium chloride 0.9 % infusion, 100 mL/hr, Intravenous, Once, Gagan Jhaveri MD  •  sodium chloride 0.9 % infusion, 100 mL/hr, Intravenous, Continuous, Chris Frey MD, Last Rate: 100 mL/hr at 05/07/18 1437, 100 mL/hr at 05/07/18 1437  •  [START ON 5/8/2018] vancomycin (VANCOCIN) 1,250 mg in sodium chloride 0.9 % 250 mL IVPB, 1,250 mg, Intravenous, Q24H, Chris Frey MD    ALLERGIES:  Tape; Eggs or egg-derived products; Influenza vaccines; Lortab [hydrocodone-acetaminophen]; Other; and Penicillins    Objective      Vital Signs  Temp:  [97.8 °F (36.6 °C)-99.6 °F (37.6 °C)] 97.8 °F (36.6 °C)  Heart Rate:  [] 84  Resp:  [24-32] 24  BP: ()/(40-96) 153/68    Physical Exam   Constitutional: She is oriented to person, place, and time. She appears ill.   HENT:   Head: Normocephalic.   Eyes: Pupils are equal, round, and reactive to light. Right eye exhibits no discharge. Left eye exhibits no discharge.   Neck: No JVD present.   Cardiovascular: Normal rate and normal heart sounds.  Exam reveals no gallop and no friction rub.    Pulmonary/Chest: Effort normal and breath sounds normal. No respiratory distress. She has no wheezes. She exhibits no tenderness.   Abdominal: Soft. Bowel sounds are normal. She exhibits no distension. There is generalized tenderness. There is no CVA tenderness.   Genitourinary:   Genitourinary Comments:  Galicia martinez urine   Musculoskeletal: Normal range of motion. She exhibits no tenderness or deformity.   Neurological: She is alert and oriented to person, place, and time. GCS eye subscore is 4. GCS verbal subscore is 5. GCS motor subscore is 6.   Skin: Skin is warm and dry. Capillary refill takes less than 2 seconds. There is pallor.   Psychiatric: She has a normal mood and affect. Her speech is normal and behavior is normal. Judgment and thought content normal. She is attentive.   Vitals reviewed.      Results Review:   Lab Results (last 24 hours)     Procedure Component Value Units Date/Time    Blood Culture - Blood, [[id number]] Collected:  05/07/18 1609    Specimen:  Blood from Arm, Right Updated:  05/07/18 1614    Troponin [[id number]] Collected:  05/07/18 1609    Specimen:  Blood Updated:  05/07/18 1614    Lactic Acid, Reflex [[id number]] Collected:  05/07/18 1609    Specimen:  Blood Updated:  05/07/18 1614    Urine Culture - Urine, Urine, Clean Catch [[id number]] Collected:  05/07/18 1543    Specimen:  Urine from Urine, Clean Catch Updated:  05/07/18 1543    Lactic Acid, Reflex Timer (This will reflex a repeat order 3-3:15 hours after ordered.) [[id number]] Collected:  05/07/18 1122    Specimen:  Blood Updated:  05/07/18 1500     Extra Tube Hold for add-ons.     Comment: Auto resulted.       Lactate Dehydrogenase [[id number]]  (Abnormal) Collected:  05/07/18 1122    Specimen:  Blood Updated:  05/07/18 1442     LDH 4,065 (H) U/L     Urinalysis, Microscopic Only - Urine, Clean Catch [[id number]]  (Abnormal) Collected:  05/07/18 1300    Specimen:  Urine from Urine, Catheter Updated:  05/07/18 1335     RBC, UA 3-5 (A) /HPF      WBC, UA 0-2 /HPF      Bacteria, UA None Seen /HPF      Squamous Epithelial Cells, UA None Seen /HPF      Hyaline Casts, UA 0-2 /LPF      Methodology Automated Microscopy    Urinalysis With / Culture If Indicated - Urine, Catheter [[id number]]  (Abnormal) Collected:  05/07/18 1300    Specimen:   Urine from Urine, Catheter Updated:  05/07/18 1313     Color, UA Yellow     Appearance, UA Clear     pH, UA 6.5     Specific Gravity, UA 1.015     Glucose, UA Negative     Ketones, UA Negative     Bilirubin, UA Negative     Blood, UA Small (1+) (A)     Protein,  mg/dL (2+) (A)     Leuk Esterase, UA Negative     Nitrite, UA Negative     Urobilinogen, UA 1.0 E.U./dL    Blood Gas, Arterial [[id number]]  (Abnormal) Collected:  05/07/18 1240    Specimen:  Arterial Blood Updated:  05/07/18 1304     Site Arterial Line     Christopher's Test N/A     pH, Arterial 7.367 pH units      pCO2, Arterial 28.8 (L) mm Hg      pO2, Arterial 99.7 mm Hg      HCO3, Arterial 16.5 (L) mmol/L      Base Excess, Arterial -8.0 (L) mmol/L      O2 Saturation, Arterial 97.9 %      Barometric Pressure for Blood Gas 751 mmHg      Modality Nasal Cannula     Flow Rate 2.0 lpm      Ventilator Mode NA    Extra Tubes [[id number]] Collected:  05/07/18 1134    Specimen:  Blood from Blood, Venous Line Updated:  05/07/18 1245    Narrative:       The following orders were created for panel order Extra Tubes.  Procedure                               Abnormality         Status                     ---------                               -----------         ------                     [facility][[id number]]                                   Final result                 Please view results for these tests on the individual orders.    [facility] - [facility] [[id number]] Collected:  05/07/18 1134    Specimen:  Blood Updated:  05/07/18 1245     Extra Tube Hold for add-ons.     Comment: Auto resulted.       CBC & Differential [[id number]] Collected:  05/07/18 1122    Specimen:  Blood Updated:  05/07/18 1224    Narrative:       The following orders were created for panel order CBC & Differential.  Procedure                               Abnormality         Status                     ---------                               -----------         ------                     Manual  Differential[[id number]]          Abnormal            Final result               Scan Slide[[id number]]                                                                  CBC Auto Differential[[id number]]        Abnormal            Final result                 Please view results for these tests on the individual orders.    Manual Differential [[id number]]  (Abnormal) Collected:  05/07/18 1122    Specimen:  Blood Updated:  05/07/18 1224     Neutrophil % 72.0 %      Lymphocyte % 21.0 %      Monocyte % 3.0 %      Bands %  2.0 %      Metamyelocyte % 1.0 (H) %      Myelocyte % 1.0 (H) %      Neutrophils Absolute 17.86 (H) 10*3/mm3      Lymphocytes Absolute 5.07 (H) 10*3/mm3      Monocytes Absolute 0.72 10*3/mm3      nRBC 8.0 (H) /100 WBC      Anisocytosis Mod/2+     Polychromasia Mod/2+     WBC Morphology Normal     Platelet Estimate Decreased    Troponin [[id number]]  (Abnormal) Collected:  05/07/18 1122    Specimen:  Blood Updated:  05/07/18 1203     Troponin I 0.162 (C) ng/mL     Protime-INR [[id number]]  (Abnormal) Collected:  05/07/18 1122    Specimen:  Blood Updated:  05/07/18 1159     Protime 19.2 (H) Seconds      INR 1.68 (H)    Narrative:       Therapeutic range for most indications is 2.0-3.0 INR,  or 2.5-3.5 for mechanical heart valves.    aPTT [[id number]]  (Normal) Collected:  05/07/18 1122    Specimen:  Blood Updated:  05/07/18 1159     PTT 37.1 seconds     Narrative:       The recommended Heparin therapeutic range is 68-97 seconds.    CK-MB [[id number]]  (Normal) Collected:  05/07/18 1122    Specimen:  Blood Updated:  05/07/18 1159     CKMB 0.29 ng/mL     Comprehensive Metabolic Panel [[id number]]  (Abnormal) Collected:  05/07/18 1122    Specimen:  Blood Updated:  05/07/18 1159     Glucose 109 (H) mg/dL      BUN 28 (H) mg/dL      Creatinine 1.20 (H) mg/dL      Sodium 132 (L) mmol/L      Potassium 3.5 mmol/L      Chloride 100 mmol/L      CO2 14.0 (L) mmol/L      Calcium 8.6 mg/dL      Total Protein 7.0 g/dL       Albumin 2.90 (L) g/dL      ALT (SGPT) 30 U/L      AST (SGOT) 666 (H) U/L      Alkaline Phosphatase 199 (H) U/L      Total Bilirubin 1.8 (H) mg/dL      eGFR Non African Amer 45 mL/min/1.73      Globulin 4.1 (H) gm/dL      A/G Ratio 0.7 (L) g/dL      BUN/Creatinine Ratio 23.3     Anion Gap 18.0 (H) mmol/L     CBC Auto Differential [[id number]]  (Abnormal) Collected:  05/07/18 1122    Specimen:  Blood Updated:  05/07/18 1157     WBC 24.14 (H) 10*3/mm3      RBC 2.54 (L) 10*6/mm3      Hemoglobin 7.8 (L) g/dL      Hematocrit 25.1 (L) %      MCV 98.8 (H) fL      MCH 30.7 pg      MCHC 31.1 (L) g/dL      RDW 28.1 (H) %      RDW-SD 95.6 (H) fl      MPV -- fL      Comment: Unable to verify        Platelets 44 (L) 10*3/mm3     Lipase [[id number]]  (Abnormal) Collected:  05/07/18 1122    Specimen:  Blood Updated:  05/07/18 1156     Lipase 2,831 (H) U/L     Lactic Acid, Plasma [[id number]]  (Abnormal) Collected:  05/07/18 1122    Specimen:  Blood Updated:  05/07/18 1151     Lactate 7.9 (C) mmol/L     Amylase [[id number]]  (Abnormal) Collected:  05/07/18 1122    Specimen:  Blood Updated:  05/07/18 1147     Amylase 416 (H) U/L     CK [[id number]]  (Abnormal) Collected:  05/07/18 1122    Specimen:  Blood Updated:  05/07/18 1147     Creatine Kinase 1,089 (H) U/L     Magnesium [[id number]]  (Normal) Collected:  05/07/18 1122    Specimen:  Blood Updated:  05/07/18 1147     Magnesium 2.2 mg/dL     Blood Culture - Blood, [[id number]] Collected:  05/07/18 1115    Specimen:  Blood from Blood, Venous Line Updated:  05/07/18 1132         Imaging Results (last 24 hours)     Procedure Component Value Units Date/Time    CT Abdomen Pelvis Without Contrast [[id number]] Collected:  05/07/18 1151     Updated:  05/07/18 1226    Narrative:       CT abdomen, pelvis without contrast.       CLINICAL INDICATION: Abdominal pain, sepsis.        COMPARISON: CT April 16, 2018.           TECHNIQUE: Noncontrast helical scanning with axial and coronal  reformations.  Soft tissue, lung, and bone windows reviewed.    This exam was performed according to our departmental  dose-optimization program, which includes automated exposure  control, adjustment of the mA and/or kV according to patient size  and/or use of iterative reconstruction technique.    FINDINGS: Multiple liver masses, metastases. Metastases appear  more pronounced compared to prior imaging study, CT ultrasound.    Air within the biliary tree, pneumobilia probably from surgical  procedure. Normal spleen. Soft tissue mass in the retroperitoneum  near the caudate lobe of liver and pancreatic head similar to  prior exam. The body and tail the pancreas are normal in  appearance. Additional right-sided retroperitoneal masses are  also identified similar to prior exam. Feeding gastrostomy tube  seen on prior examination is no longer present.    Chronic hydronephrotic changes right kidney apparently obstructed  right-sided renal stent. Ureteral stent observed with proximal  loop in the right kidney and distal loop in urinary bladder.  Extensive mixed sclerotic and lytic bony metastases lumbar spine  and pelvis.          Impression:       CONCLUSION: Multiple hepatic masses, metastases, more pronounced  than on prior exams. Pneumobilia, air within the biliary tree  from prior surgical procedure. Intra-abdominal left  retroperitoneal masses similar to prior examination.    Right-sided ureteral stent with proximal loop in the right kidney  distal loop in the urinary bladder. Chronic appearing  hydronephrotic changes right kidney suggesting probably occluded  stent.    Extensive bony metastases.    Electronically signed by:  Dariusz Lacy MD  5/7/2018 12:24 PM CDT  Workstation: [id number]    XR Chest Post CVA Port [[id number]] Collected:  05/07/18 1118     Updated:  05/07/18 1143    Narrative:       Chest single view.        CLINICAL INDICATION: PICC line placement.    COMPARISON: Chest April 16, 2018.     FINDINGS: Cardiac silhouette  is normal in size. Emphysematous   changes especially in the right apex. New discoid infiltrative  changes right lung base probably atelectasis.      Impression:       CONCLUSION: Right arm PICC line catheter with catheter tip in  superior vena cava, in satisfactory position.    Electronically signed by:  Dariusz Lacy MD  5/7/2018 11:42 AM CDT  Workstation: [id number]    IR PICC wo fluoro guidance [[id number]] Resulted:  05/07/18 1132     Updated:  05/07/18 1132    Narrative:       This procedure was auto-finalized with no dictation required.    US Guided Vascular Access [[id number]] Resulted:  05/07/18 1113     Updated:  05/07/18 1113    Narrative:       This procedure was auto-finalized with no dictation required.           I reviewed the patient's new clinical results.  I reviewed the patient's new imaging results and agree with the interpretation.  I reviewed the patient's other test results and agree with the interpretation        Assessment/Plan     Active Problems:    Malignant neoplasm of left breast    GERD (gastroesophageal reflux disease)    Essential hypertension    Carcinoma, female breast, infiltrating lobular    Atrial fibrillation    Acute kidney injury    Metastatic breast cancer    Severe sepsis    Acute pancreatitis      Assessment & Plan  1. Hydronephrosis, RIGHT, chronic, related to obstruction from metastatic breast cancer  --Postop cystoscopy RIGHT retrograde with J-stent change on 4/18/18  --CT abdomen/pelvis shows thr chronic right hydronephrosis, metastases more pronounced    2. Sepsis likely from UTI  --Lactic acid 7.9  --Hypotension responding to fluids  --Azactam and vanc    3. ADRIANA  -Cr 1.2, baseline running around 1.0-1.1  -Estimated Creatinine Clearance: 38.3 mL/min (by C-G formula based on SCr of 1.2 mg/dL (H)).    Plan: continue fluids and antibiotics, will monitor closely, consider retrograde and J-stent change if she does not improve clinically.     I discussed the patient's findings and  my recommendations with patient, nursing staff, primary care team and Dr. Nacho Cook, FADI  05/07/18  4:23 PM

## 2018-05-07 NOTE — ED PROVIDER NOTES
"Subjective   66yo female pmh significant htn/hyperlipidemia/stage IV breast ca/atrial fibrillation status post gastrojejunostomy, hepaticojejunostomy, gastrostomy, jejunostomy 02.28.2018 presents ED c/o 3-4d hx generalized weakness/intractable nausea, vomiting.  Pt notably vague historian; patient reports generalized myalgias/bony pain secondary to axial skeleton metastases.        History provided by:  Patient  Illness   Severity:  Severe  Onset quality:  Gradual  Duration:  3 days      Review of Systems   Unable to perform ROS: Acuity of condition       Past Medical History:   Diagnosis Date   • Atrial fibrillation    • Carcinoma, female breast, infiltrating lobular     LEFT side with axillary lymph node metastases      • Essential hypertension    • GERD (gastroesophageal reflux disease)    • Heartburn    • History of echocardiogram 10/13/2011   • History of transfusion    • Localized enlarged lymph nodes    • Lump of breast, left    • Lymphadenopathy      LEFT axilla-this is confirmed by my personal review of the ultrasound      • Osteoarthritis        Allergies   Allergen Reactions   • Tape Itching   • Eggs Or Egg-Derived Products Diarrhea and Nausea And Vomiting   • Influenza Vaccines Other (See Comments)     ALLERGY TO EGGS   • Lortab [Hydrocodone-Acetaminophen] Hallucinations   • Other Dermatitis     Plastic tape   • Penicillins      \"PASS OUT\"       Past Surgical History:   Procedure Laterality Date   • APPENDECTOMY     • BREAST SURGERY  11/21/2014    Ultrasound directed mammotome biopsy of the left breast with clip placement, lesion at the 5:30 position 5 cm. from the nipple.Left axillary lymph node biopsy, open.   • CHOLECYSTECTOMY     • COLON RESECTION N/A 2/28/2018    Procedure: Laparotomy with gastrojejunostomy, gastrostomy placement, Ru en Y hepaticojejunostomy, jejunostomy.    LIVER BIOPSY (DR. WARE FIRST);  Surgeon: Yevgeniy Ware MD;  Location: [facility];  Service:    • COLONOSCOPY  " 12/12/2011    Mild diverticulosis in sigmoid colon. Stool collected for study. Hemorrhoids found.   • COLONOSCOPY N/A 2/7/2017    Procedure: COLONOSCOPY;  Surgeon: Yevgeniy Ware MD;  Location: [facility] ENDOSCOPY;  Service:    • CYSTOSCOPY, RETROGRADE PYELOGRAM AND STENT INSERTION Right 2/28/2018    Procedure: CYSTOSCOPY RETROGRADE PYELOGRAM AND STENT INSERTION         (C-ARM)      ;  Surgeon: Yevgeniy Griffith MD;  Location: [facility] OR;  Service:    • CYSTOSCOPY, RETROGRADE PYELOGRAM AND STENT INSERTION Right 4/18/2018    Procedure: CYSTOSCOPY  RIGHT RETROGRADE PYELOGRAM AND STENT CHANGE;  Surgeon: Yevgeniy Griffith MD;  Location: [facility] OR;  Service: Urology   • ERCP N/A 2/19/2018    Procedure: ENDOSCOPIC RETROGRADE CHOLANGIOPANCREATOGRAPHY;  Surgeon: Cecil Lemus DO;  Location: [facility] ENDOSCOPY;  Service:    • HYSTERECTOMY     • INJECTION OF MEDICATION  08/12/2013    Kenalog (19)      • TONSILLECTOMY     • TUBAL ABDOMINAL LIGATION     • UPPER GASTROINTESTINAL ENDOSCOPY  06/10/2014    Normal hypopharynx, esophagus, symmetrical & patent pylorus. Hiatus hernia in GE junction. Polyps in antrum & fundus. Multiple biopsies taken. Prominant CBD in medial duodenal wall. The ampulla has a normal appearance.       Family History   Problem Relation Age of Onset   • Heart failure Mother      congested   • Lung cancer Father    • Heart attack Other    • Lung cancer Other    • Colon cancer Neg Hx    • Stomach cancer Neg Hx        Social History     Social History   • Marital status:      Social History Main Topics   • Smoking status: Never Smoker   • Smokeless tobacco: Never Used   • Alcohol use No   • Drug use: No   • Sexual activity: Defer     Other Topics Concern   • Not on file           Objective   Physical Exam   Constitutional: She is oriented to person, place, and time. She appears well-developed and well-nourished.   HENT:   Head: Normocephalic.   Mouth/Throat: Mucous membranes are dry.   Eyes: Pupils  are equal, round, and reactive to light.   Neck: Neck supple. No JVD present. No tracheal deviation present.   Cardiovascular: S1 normal, S2 normal and normal heart sounds.  An irregularly irregular rhythm present. Tachycardia present.    Pulmonary/Chest: Breath sounds normal. Tachypnea noted.   Abdominal: Soft. Bowel sounds are normal. She exhibits no mass. There is no tenderness. There is no rebound and no guarding.   Musculoskeletal: She exhibits no edema or tenderness.   Lymphadenopathy:     She has no cervical adenopathy.   Neurological: She is alert and oriented to person, place, and time. GCS eye subscore is 4. GCS verbal subscore is 5. GCS motor subscore is 6.   Skin: Skin is warm and dry. There is pallor.   Nursing note and vitals reviewed.      ECG 12 Lead    Date/Time: 5/7/2018 12:08 PM  Performed by: KIARRA MCGILL  Authorized by: KIARRA MCGILL   Interpreted by physician  Rhythm: atrial fibrillation  Rate: tachycardic  BPM: 147  QRS axis: left  Other findings: prolonged QTc interval  Clinical impression: abnormal ECG                 ED Course  ED Course      Labs Reviewed   COMPREHENSIVE METABOLIC PANEL - Abnormal; Notable for the following:        Result Value    Glucose 109 (*)     BUN 28 (*)     Creatinine 1.20 (*)     Sodium 132 (*)     CO2 14.0 (*)     Albumin 2.90 (*)     AST (SGOT) 666 (*)     Alkaline Phosphatase 199 (*)     Total Bilirubin 1.8 (*)     Globulin 4.1 (*)     A/G Ratio 0.7 (*)     Anion Gap 18.0 (*)     All other components within normal limits   AMYLASE - Abnormal; Notable for the following:     Amylase 416 (*)     All other components within normal limits   LIPASE - Abnormal; Notable for the following:     Lipase 2,831 (*)     All other components within normal limits   LACTIC ACID, PLASMA - Abnormal; Notable for the following:     Lactate 7.9 (*)     All other components within normal limits   PROTIME-INR - Abnormal; Notable for the following:     Protime 19.2 (*)     INR 1.68  (*)     All other components within normal limits    Narrative:     Therapeutic range for most indications is 2.0-3.0 INR,  or 2.5-3.5 for mechanical heart valves.   TROPONIN (IN-HOUSE) - Abnormal; Notable for the following:     Troponin I 0.162 (*)     All other components within normal limits   CK - Abnormal; Notable for the following:     Creatine Kinase 1,089 (*)     All other components within normal limits   CBC WITH AUTO DIFFERENTIAL - Abnormal; Notable for the following:     WBC 24.14 (*)     RBC 2.54 (*)     Hemoglobin 7.8 (*)     Hematocrit 25.1 (*)     MCV 98.8 (*)     MCHC 31.1 (*)     RDW 28.1 (*)     RDW-SD 95.6 (*)     Platelets 44 (*)     All other components within normal limits   MANUAL DIFFERENTIAL - Abnormal; Notable for the following:     Metamyelocyte % 1.0 (*)     Myelocyte % 1.0 (*)     Neutrophils Absolute 17.86 (*)     Lymphocytes Absolute 5.07 (*)     nRBC 8.0 (*)     All other components within normal limits   APTT - Normal    Narrative:     The recommended Heparin therapeutic range is 68-97 seconds.   CK MB - Normal   MAGNESIUM - Normal   BLOOD CULTURE   BLOOD CULTURE   URINALYSIS W/ CULTURE IF INDICATED   TROPONIN (IN-HOUSE)   LACTIC ACID REFLEX TIMER   LACTATE DEHYDROGENASE   BLOOD GAS, ARTERIAL   TYPE AND SCREEN   PREVIOUS HISTORY   CBC AND DIFFERENTIAL    Narrative:     The following orders were created for panel order CBC & Differential.  Procedure                               Abnormality         Status                     ---------                               -----------         ------                     Manual Differential[[id number]]          Abnormal            Final result               Scan Slide[[id number]]                                                                  CBC Auto Differential[[id number]]        Abnormal            Final result                 Please view results for these tests on the individual orders.   EXTRA TUBES    Narrative:     The following orders  were created for panel order Extra Tubes.  Procedure                               Abnormality         Status                     ---------                               -----------         ------                     Gold Top - [facility][[id number]]                                   In process                   Please view results for these tests on the individual orders.   GOLD TOP - SST   Ct Abdomen Pelvis Without Contrast    Result Date: 5/7/2018  Narrative: CT abdomen, pelvis without contrast. CLINICAL INDICATION: Abdominal pain, sepsis.    COMPARISON: CT April 16, 2018.    TECHNIQUE: Noncontrast helical scanning with axial and coronal reformations. Soft tissue, lung, and bone windows reviewed. This exam was performed according to our departmental dose-optimization program, which includes automated exposure control, adjustment of the mA and/or kV according to patient size and/or use of iterative reconstruction technique. FINDINGS: Multiple liver masses, metastases. Metastases appear more pronounced compared to prior imaging study, CT ultrasound. Air within the biliary tree, pneumobilia probably from surgical procedure. Normal spleen. Soft tissue mass in the retroperitoneum near the caudate lobe of liver and pancreatic head similar to prior exam. The body and tail the pancreas are normal in appearance. Additional right-sided retroperitoneal masses are also identified similar to prior exam. Feeding gastrostomy tube seen on prior examination is no longer present. Chronic hydronephrotic changes right kidney apparently obstructed right-sided renal stent. Ureteral stent observed with proximal loop in the right kidney and distal loop in urinary bladder. Extensive mixed sclerotic and lytic bony metastases lumbar spine and pelvis.     Impression: CONCLUSION: Multiple hepatic masses, metastases, more pronounced than on prior exams. Pneumobilia, air within the biliary tree from prior surgical procedure. Intra-abdominal left  retroperitoneal masses similar to prior examination. Right-sided ureteral stent with proximal loop in the right kidney distal loop in the urinary bladder. Chronic appearing hydronephrotic changes right kidney suggesting probably occluded stent. Extensive bony metastases. Electronically signed by:  Dariusz Lacy MD  5/7/2018 12:24 PM CDT Workstation: [id number]    Ct Abdomen Pelvis Without Contrast    Result Date: 4/16/2018  Narrative: EXAMINATION:  Computed Tomography         REGION:    Abdomen / Pelvis                 INDICATION:   abdominal pain, hypotension, I95.9 Hypotension, unspecified N17.9 Acute kidney failure, unspecified C50.912 Malignant neoplasm of unspecified site of left female breast C79.9 Secondary malignant neoplasm of unspecified site  HISTORY: CINTIA. IMAGING:    CT A/P 3/29/18, 2/7/18        TECHNIQUE:     - reconstructions:    axial, coronal, sagittal     - contrast:      oral:  no ;   intravenous:  no    - Please note:       - Lack of IV contrast limits assessment of solid organ parenchyma, urinary system, or vascular structures.       - Lack of oral contrast limits assessment of GI tract structures and peritoneal disease.      This exam was performed according to the departmental dose-optimization program which includes automated exposure control, adjustment of the mA and/or kV according to patient size and/or use of iterative reconstruction technique.            COMMENTS: THORAX (INFERIOR): The lung bases are clear.   Diffuse emphysematous changes suspected present. The pleura is without fluid or a mass.   The heart size is normal size and there is no pericardial fluid.   ABDOMEN:  The liver again displays an overall normal size with the appearance of multiple intrahepatic hypoattenuating defects, subjectively suspected to have increased in size since the previous study. Additionally, there is evidence of pneumobilia, overall configuration grossly unchanged. Previously noted intrahepatic biliary  ductal dilatation and pancreatic ductal dilatation, improved. Previously described antral findings, are again noted, possibly progressed however difficult to assess without contrast. No evidence of free fluid or free intraperitoneal air.   The osseous structures demonstrate diffuse osteosclerosis as previously described, grossly unchanged. RETROPERITONEUM: The right kidney displays the presence of a pigtail catheter in proper position, with central collecting system dilatation, grossly unchanged. The previously described soft tissue density along the course of the right ureter, presumably attributable to the presence of malignant retroperitoneal fibrosis, is grossly unchanged. The adrenal glands are of normal size and contour.   No gross evidence of significant retroperitoneal adenopathy.  The vascular structures are grossly within normal limits for age.    PELVIS:  Limited assessment of the viscera is grossly unremarkable demonstrating normal caliber bowel loops.     No evidence of free fluid or free intraperitoneal air.    The osseous structures again demonstrate diffuse osteosclerosis as previously described. The vascular structures are grossly within normal limits for age.       .          Impression: CONCLUSION:   1. Limited examination due to the lack of intravenous and oral contrast.      2. Findings in the region of the antrum, difficult to assess due to the lack of intravenous contrast. 3. Pigtail catheter in the right kidney, in standard position. Again noted is a moderate degree of central collecting system dilatation, grossly unchanged. 4. 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                             Electronically signed by:  DARIO Richard MD  4/16/2018 1:35 PM CDT Workstation: [id number]    Us Chest    Result Date: 4/25/2018  Narrative: Ultrasound anterior chest wall HISTORY: Multiple subcutaneous  mass Ultrasound examination of the area concern anterior chest wall performed. COMPARISON: None. Correlation chest CT February 7, 2018. 2.99 cm x 1.45 cm x 0.5 cm benign-appearing lymph node corresponding to palpable abnormality. Adjacent 4.3 mm cyst in the subcutaneous fat.     Impression: CONCLUSION: 2.99 cm x 1.45 cm x 0.5 cm benign-appearing lymph node corresponding to palpable abnormality. Adjacent 4.3 mm cyst in the subcutaneous fat. [id number] Electronically signed by:  Antonio De La Garza MD  4/25/2018 11:37 AM CDT Workstation: [id number]    Us Guided Vascular Access    Result Date: 5/7/2018  Narrative: This procedure was auto-finalized with no dictation required.    Us Renal Bilateral    Result Date: 4/17/2018  Narrative: EXAMINATION:  ultrasound, renal CLINICAL INDICATION / HISTORY:  UTI/hydronephrosis, I95.9 Hypotension, unspecified N17.9 Acute kidney failure, unspecified C50.912 Malignant neoplasm of unspecified site of left female breast C79.9 Secondary malignant neoplasm of unspecified site COMPARISON:  none TECHNIQUE:  ultrasound, renal FULL RESULTS / FINDINGS: Kidney, right:    size:  normal, measuring 11.6 x 6.3 x 6.5 cm   echotexture:  Mild increased renal cortical echogenicity   Persistent moderate right kidney hydronephrosis with right indwelling ureteral stent. No renal calculi or mass. Kidney, left:    size:  normal, measuring 10.7 x 5.4 x 6.8 cm   echotexture:  Mild diffuse increased renal cortical echogenicity.   no nephrolithiasis, solid mass, or collecting system dilation, left kidney lower pole circular 2.6 cm anechoic structure with good through acoustic transmission consistent with simple renal cortical cyst. Urinary bladder:  grossly within normal limits   Miscellaneous: Multiple hypoechoic solid appearing nodules are seen involving the liver.     Impression: CONCLUSION:  1.  Mild increased renal cortical echogenicity suspicious for underlying medical renal disease. 2.  Stable moderate right  kidney hydronephrosis with indwelling ureteral stent in place suggesting possible stent obstruction or malfunction. 3.  Multiple liver hypoechoic solid appearing nodule suspicious for metastatic disease. 4.  Otherwise unremarkable renal ultrasound. Electronically signed by:  Dariusz Austin MD  4/17/2018 4:34 PM CDT Workstation: [id number]    Xr Chest Pa & Lateral    Result Date: 4/16/2018  Narrative: TWO VIEW CHEST HISTORY: Hypotension. Dizziness. Frontal and lateral films of the chest were obtained. COMPARISON: November 21, 2017 EKG leads. Linear atelectasis or scar lung bases. Old granulomatous disease is present. The heart is not enlarged. The pulmonary vasculature is not increased. No pleural effusion. No pneumothorax. No acute osseous abnormality. Degenerative changes are present in the thoracic spine. Osteoblastic metastatic disease.     Impression: CONCLUSION: Linear atelectasis or scar lung bases. Osteoblastic metastatic disease, previously demonstrated. [id number] Electronically signed by:  Antonio De La Garza MD  4/16/2018 12:30 PM CDT Workstation: [id number]    Xr Chest Post Cva Port    Result Date: 5/7/2018  Narrative: Chest single view. CLINICAL INDICATION: PICC line placement. COMPARISON: Chest April 16, 2018. FINDINGS: Cardiac silhouette is normal in size. Emphysematous changes especially in the right apex. New discoid infiltrative changes right lung base probably atelectasis.     Impression: CONCLUSION: Right arm PICC line catheter with catheter tip in superior vena cava, in satisfactory position. Electronically signed by:  Dariusz Lacy MD  5/7/2018 11:42 AM CDT Workstation: [id number]    Ir Picc Wo Fluoro Guidance    Result Date: 5/7/2018  Narrative: This procedure was auto-finalized with no dictation required.                MDM  Number of Diagnoses or Management Options  Acute pancreatitis, unspecified complication status, unspecified pancreatitis type: new and requires workup  Atrial fibrillation with rapid  ventricular response: established and worsening  Metastatic breast cancer: established and worsening  Secondary rhabdomyolysis: new and requires workup  Severe sepsis: new and requires workup     Amount and/or Complexity of Data Reviewed  Clinical lab tests: reviewed  Tests in the radiology section of CPT®: reviewed  Tests in the medicine section of CPT®: reviewed  Decide to obtain previous medical records or to obtain history from someone other than the patient: yes    Risk of Complications, Morbidity, and/or Mortality  Presenting problems: high  Diagnostic procedures: high  Management options: high  General comments: Critical Care time 45 minutes    Critical Care  Total time providing critical care: 30-74 minutes    Patient Progress  Patient progress: stable        Final diagnoses:   Severe sepsis   Acute pancreatitis, unspecified complication status, unspecified pancreatitis type   Secondary rhabdomyolysis   Atrial fibrillation with rapid ventricular response   Metastatic breast cancer            Gagan Jhaveri MD  05/07/18 [id number]

## 2018-05-07 NOTE — PROGRESS NOTES
TWO PATIENT IDENTIFIERS WERE USED. CONSENT WAS SIGNED PER FAMILY EDUCATION MATERIAL WAS GIVEN TO PATIENT AND / OR FAMILY. THE PATIENT WAS DRAPED WITH FULL BODY DRAPE AND PATIENT'S RIGHT ARM WAS PREPPED WITH CHLORAPREP.  ULTRASOUND WAS USED TO LOCALIZE THERIGHT BASILIC  VEIN. SUBCUTANEOUS TISSUE AT THE CATHETER SITE WAS INFILTRATED WITH 2% LIDOCAINE. UNDER ULTRASOUND GUIDANCE, THE VEIN WAS ACCESSED WITH A 21GAUGE  NEEDLE. AN 0.018 WIRE WAS THEN THREADED THROUGH THE NEEDLE INTO THE CENTRAL VENOUS SYSTEM. THE 21GAUGE  NEEDLE WAS REMOVED AND A 6 [place] PEEL AWAY SHEATH WAS PLACED OVER THE WIRE. THE PICC LINE CATHETER WAS CUT AT 36 CM. THE PICC LINE CATHETER WAS THEN PLACED OVER THE WIRE INTO THE VEIN, THE SHEATH WAS PEELED AWAY,WIRE WAS REMOVED. CATHETER WAS FLUSHED WITH NORMAL SALINE AND TIPS APPLIED. BIOPATCH PLACED. CATHETER SECURED WITH STATLOCK AND TEGADERM. PATIENT TOLERATED PROCEDURE WELL. THIS WAS DONE IN THE   ER      IMPRESSION: SUCCESSFUL PLACEMENT OF TRIPLE LUMEN SOLO PICC        Chantell Bowie  5/7/2018  11:31 AM

## 2018-05-08 PROBLEM — N13.30 HYDRONEPHROSIS: Chronic | Status: ACTIVE | Noted: 2018-01-01

## 2018-05-08 PROBLEM — D64.9 ANEMIA: Status: ACTIVE | Noted: 2018-01-01

## 2018-05-08 PROBLEM — C78.7 LIVER METASTASIS: Chronic | Status: ACTIVE | Noted: 2018-01-01

## 2018-05-08 PROBLEM — C50.919 METASTATIC BREAST CANCER: Chronic | Status: ACTIVE | Noted: 2018-01-01

## 2018-05-08 PROBLEM — D64.9 ANEMIA: Chronic | Status: ACTIVE | Noted: 2018-01-01

## 2018-05-08 NOTE — PLAN OF CARE
Problem: Patient Care Overview  Goal: Plan of Care Review  Outcome: Ongoing (interventions implemented as appropriate)   05/08/18 2245   Coping/Psychosocial   Plan of Care Reviewed With patient;daughter   Plan of Care Review   Progress declining   OTHER   Outcome Summary Pt wishes to continue DNI wishes. Spoke with pt about comfort measures. Pt and family made aware per MD of changes with lab work. X2 units of PRBC infused today. No adverse reactions noted. Pt did have nausea and 1 episode of vomiting today. Had episode of A.fib RVR, Metoprolol 10 mg IV x1 dose converted pt back to NSR.        Problem: Fall Risk (Adult)  Goal: Absence of Fall  Outcome: Ongoing (interventions implemented as appropriate)      Problem: Skin Injury Risk (Adult)  Goal: Identify Related Risk Factors and Signs and Symptoms  Outcome: Outcome(s) achieved Date Met: 05/08/18    Goal: Skin Health and Integrity  Outcome: Ongoing (interventions implemented as appropriate)      Problem: Pain, Acute (Adult)  Goal: Identify Related Risk Factors and Signs and Symptoms  Outcome: Ongoing (interventions implemented as appropriate)    Goal: Acceptable Pain Control/Comfort Level  Outcome: Ongoing (interventions implemented as appropriate)      Problem: Sepsis/Septic Shock (Adult)  Goal: Signs and Symptoms of Listed Potential Problems Will be Absent, Minimized or Managed (Sepsis/Septic Shock)  Outcome: Ongoing (interventions implemented as appropriate)

## 2018-05-08 NOTE — PAYOR COMM NOTE
"Johanne Young (67 y.o. Female)     Date of Birth Social Security Number Address Home Phone MRN    1950  [address] [address] [phone] [id number]    Yazidi Marital Status          Jain        Admission Date Admission Type Admitting Provider Attending Provider Department, Room/Bed    5/7/18 Emergency Chris Frey MD Ahmed, Farhan, MD [facility] CRITICAL CARE, 17/A    Discharge Date Discharge Disposition Discharge Destination                       Attending Provider:  Chris Frey MD    Allergies:  Tape, Eggs Or Egg-derived Products, Influenza Vaccines, Lortab [Hydrocodone-acetaminophen], Other, Penicillins    Isolation:  None   Infection:  None   Code Status:  Conditional    Ht:  157.5 cm (62\")   Wt:  62 kg (136 lb 11 oz)    Admission Cmt:  None   Principal Problem:  None                Active Insurance as of 5/7/2018     Primary Coverage     Payor Plan Insurance Group Employer/Plan Group    MEDICARE MEDICARE A & B      Payor Plan Address Payor Plan Phone Number Effective From Effective To    [address] [phone] 1/1/2006     [place] [address]       Subscriber Name Subscriber Birth Date Member ID       JOHANNE YOUNG 1950 [id number]           Secondary Coverage     Payor Plan Insurance Group Employer/Plan Group    [employer] [place]ICAID      Payor Plan Address Payor Plan Phone Number Effective From Effective To    [address] [address] [phone] 4/3/2017     [place] [address]       Subscriber Name Subscriber Birth Date Member ID       JOHANNE YOUNG 1950 [id number]                 Emergency Contacts      (Rel.) Home Phone Work Phone Mobile Phone    Palak Bateman (Daughter) [phone] -- [phone]    Ky Good (Daughter) [phone] -- [phone]            History & Physical     No notes of this type exist for this encounter.           Emergency Department Notes    " "  Pauline Naik RN at 5/7/2018 11:05 AM        PICC team here to start PICC line on patient.      Pauline Naik RN  05/07/18 1105      Electronically signed by Pauline Naik RN at 5/7/2018 11:05 AM     Gagan Jhaveri MD at 5/7/2018 12:01 PM      Procedure Orders:    1. ECG 12 Lead [[id number]] ordered by Gagan Jhaveri MD at 05/07/18 1028                Subjective   66yo female pmh significant htn/hyperlipidemia/stage IV breast ca/atrial fibrillation status post gastrojejunostomy, hepaticojejunostomy, gastrostomy, jejunostomy 02.28.2018 presents ED c/o 3-4d hx generalized weakness/intractable nausea, vomiting.  Pt notably vague historian; patient reports generalized myalgias/bony pain secondary to axial skeleton metastases.        History provided by:  Patient  Illness   Severity:  Severe  Onset quality:  Gradual  Duration:  3 days      Review of Systems   Unable to perform ROS: Acuity of condition       Past Medical History:   Diagnosis Date   • Atrial fibrillation    • Carcinoma, female breast, infiltrating lobular     LEFT side with axillary lymph node metastases      • Essential hypertension    • GERD (gastroesophageal reflux disease)    • Heartburn    • History of echocardiogram 10/13/2011   • History of transfusion    • Localized enlarged lymph nodes    • Lump of breast, left    • Lymphadenopathy      LEFT axilla-this is confirmed by my personal review of the ultrasound      • Osteoarthritis        Allergies   Allergen Reactions   • Tape Itching   • Eggs Or Egg-Derived Products Diarrhea and Nausea And Vomiting   • Influenza Vaccines Other (See Comments)     ALLERGY TO EGGS   • Lortab [Hydrocodone-Acetaminophen] Hallucinations   • Other Dermatitis     Plastic tape   • Penicillins      \"PASS OUT\"       Past Surgical History:   Procedure Laterality Date   • APPENDECTOMY     • BREAST SURGERY  11/21/2014    Ultrasound directed mammotome biopsy of the left breast with clip placement, lesion " at the 5:30 position 5 cm. from the nipple.Left axillary lymph node biopsy, open.   • CHOLECYSTECTOMY     • COLON RESECTION N/A 2/28/2018    Procedure: Laparotomy with gastrojejunostomy, gastrostomy placement, Ru en Y hepaticojejunostomy, jejunostomy.    LIVER BIOPSY (DR. BETHANY GALVEZ);  Surgeon: Yevgeniy Ware MD;  Location: [facility] OR;  Service:    • COLONOSCOPY  12/12/2011    Mild diverticulosis in sigmoid colon. Stool collected for study. Hemorrhoids found.   • COLONOSCOPY N/A 2/7/2017    Procedure: COLONOSCOPY;  Surgeon: Yevgeniy Ware MD;  Location: [facility] ENDOSCOPY;  Service:    • CYSTOSCOPY, RETROGRADE PYELOGRAM AND STENT INSERTION Right 2/28/2018    Procedure: CYSTOSCOPY RETROGRADE PYELOGRAM AND STENT INSERTION         (C-ARM)      ;  Surgeon: Yevgeniy Griffith MD;  Location: [facility] OR;  Service:    • CYSTOSCOPY, RETROGRADE PYELOGRAM AND STENT INSERTION Right 4/18/2018    Procedure: CYSTOSCOPY  RIGHT RETROGRADE PYELOGRAM AND STENT CHANGE;  Surgeon: Yevgeniy Griffith MD;  Location: [facility] OR;  Service: Urology   • ERCP N/A 2/19/2018    Procedure: ENDOSCOPIC RETROGRADE CHOLANGIOPANCREATOGRAPHY;  Surgeon: Cecil Lemus DO;  Location: [facility] ENDOSCOPY;  Service:    • HYSTERECTOMY     • INJECTION OF MEDICATION  08/12/2013    Kenalog (19)      • TONSILLECTOMY     • TUBAL ABDOMINAL LIGATION     • UPPER GASTROINTESTINAL ENDOSCOPY  06/10/2014    Normal hypopharynx, esophagus, symmetrical & patent pylorus. Hiatus hernia in GE junction. Polyps in antrum & fundus. Multiple biopsies taken. Prominant CBD in medial duodenal wall. The ampulla has a normal appearance.       Family History   Problem Relation Age of Onset   • Heart failure Mother      congested   • Lung cancer Father    • Heart attack Other    • Lung cancer Other    • Colon cancer Neg Hx    • Stomach cancer Neg Hx        Social History     Social History   • Marital status:      Social History Main Topics   • Smoking status:  Never Smoker   • Smokeless tobacco: Never Used   • Alcohol use No   • Drug use: No   • Sexual activity: Defer     Other Topics Concern   • Not on file           Objective   Physical Exam   Constitutional: She is oriented to person, place, and time. She appears well-developed and well-nourished.   HENT:   Head: Normocephalic.   Mouth/Throat: Mucous membranes are dry.   Eyes: Pupils are equal, round, and reactive to light.   Neck: Neck supple. No JVD present. No tracheal deviation present.   Cardiovascular: S1 normal, S2 normal and normal heart sounds.  An irregularly irregular rhythm present. Tachycardia present.    Pulmonary/Chest: Breath sounds normal. Tachypnea noted.   Abdominal: Soft. Bowel sounds are normal. She exhibits no mass. There is no tenderness. There is no rebound and no guarding.   Musculoskeletal: She exhibits no edema or tenderness.   Lymphadenopathy:     She has no cervical adenopathy.   Neurological: She is alert and oriented to person, place, and time. GCS eye subscore is 4. GCS verbal subscore is 5. GCS motor subscore is 6.   Skin: Skin is warm and dry. There is pallor.   Nursing note and vitals reviewed.      ECG 12 Lead    Date/Time: 5/7/2018 12:08 PM  Performed by: KIARRA MCGILL  Authorized by: KIARRA MCGILL   Interpreted by physician  Rhythm: atrial fibrillation  Rate: tachycardic  BPM: 147  QRS axis: left  Other findings: prolonged QTc interval  Clinical impression: abnormal ECG                ED Course  ED Course      Labs Reviewed   COMPREHENSIVE METABOLIC PANEL - Abnormal; Notable for the following:        Result Value    Glucose 109 (*)     BUN 28 (*)     Creatinine 1.20 (*)     Sodium 132 (*)     CO2 14.0 (*)     Albumin 2.90 (*)     AST (SGOT) 666 (*)     Alkaline Phosphatase 199 (*)     Total Bilirubin 1.8 (*)     Globulin 4.1 (*)     A/G Ratio 0.7 (*)     Anion Gap 18.0 (*)     All other components within normal limits   AMYLASE - Abnormal; Notable for the following:     Amylase  416 (*)     All other components within normal limits   LIPASE - Abnormal; Notable for the following:     Lipase 2,831 (*)     All other components within normal limits   LACTIC ACID, PLASMA - Abnormal; Notable for the following:     Lactate 7.9 (*)     All other components within normal limits   PROTIME-INR - Abnormal; Notable for the following:     Protime 19.2 (*)     INR 1.68 (*)     All other components within normal limits    Narrative:     Therapeutic range for most indications is 2.0-3.0 INR,  or 2.5-3.5 for mechanical heart valves.   TROPONIN (IN-HOUSE) - Abnormal; Notable for the following:     Troponin I 0.162 (*)     All other components within normal limits   CK - Abnormal; Notable for the following:     Creatine Kinase 1,089 (*)     All other components within normal limits   CBC WITH AUTO DIFFERENTIAL - Abnormal; Notable for the following:     WBC 24.14 (*)     RBC 2.54 (*)     Hemoglobin 7.8 (*)     Hematocrit 25.1 (*)     MCV 98.8 (*)     MCHC 31.1 (*)     RDW 28.1 (*)     RDW-SD 95.6 (*)     Platelets 44 (*)     All other components within normal limits   MANUAL DIFFERENTIAL - Abnormal; Notable for the following:     Metamyelocyte % 1.0 (*)     Myelocyte % 1.0 (*)     Neutrophils Absolute 17.86 (*)     Lymphocytes Absolute 5.07 (*)     nRBC 8.0 (*)     All other components within normal limits   APTT - Normal    Narrative:     The recommended Heparin therapeutic range is 68-97 seconds.   CK MB - Normal   MAGNESIUM - Normal   BLOOD CULTURE   BLOOD CULTURE   URINALYSIS W/ CULTURE IF INDICATED   TROPONIN (IN-HOUSE)   LACTIC ACID REFLEX TIMER   LACTATE DEHYDROGENASE   BLOOD GAS, ARTERIAL   TYPE AND SCREEN   PREVIOUS HISTORY   CBC AND DIFFERENTIAL    Narrative:     The following orders were created for panel order CBC & Differential.  Procedure                               Abnormality         Status                     ---------                               -----------         ------                      Manual Differential[[id number]]          Abnormal            Final result               Scan Slide[[id number]]                                                                  CBC Auto Differential[[id number]]        Abnormal            Final result                 Please view results for these tests on the individual orders.   EXTRA TUBES    Narrative:     The following orders were created for panel order Extra Tubes.  Procedure                               Abnormality         Status                     ---------                               -----------         ------                     Gold Top - SST[[id number]]                                   In process                   Please view results for these tests on the individual orders.   GOLD TOP - SST   Ct Abdomen Pelvis Without Contrast    Result Date: 5/7/2018  Narrative: CT abdomen, pelvis without contrast. CLINICAL INDICATION: Abdominal pain, sepsis.    COMPARISON: CT April 16, 2018.    TECHNIQUE: Noncontrast helical scanning with axial and coronal reformations. Soft tissue, lung, and bone windows reviewed. This exam was performed according to our departmental dose-optimization program, which includes automated exposure control, adjustment of the mA and/or kV according to patient size and/or use of iterative reconstruction technique. FINDINGS: Multiple liver masses, metastases. Metastases appear more pronounced compared to prior imaging study, CT ultrasound. Air within the biliary tree, pneumobilia probably from surgical procedure. Normal spleen. Soft tissue mass in the retroperitoneum near the caudate lobe of liver and pancreatic head similar to prior exam. The body and tail the pancreas are normal in appearance. Additional right-sided retroperitoneal masses are also identified similar to prior exam. Feeding gastrostomy tube seen on prior examination is no longer present. Chronic hydronephrotic changes right kidney apparently obstructed right-sided renal  stent. Ureteral stent observed with proximal loop in the right kidney and distal loop in urinary bladder. Extensive mixed sclerotic and lytic bony metastases lumbar spine and pelvis.     Impression: CONCLUSION: Multiple hepatic masses, metastases, more pronounced than on prior exams. Pneumobilia, air within the biliary tree from prior surgical procedure. Intra-abdominal left retroperitoneal masses similar to prior examination. Right-sided ureteral stent with proximal loop in the right kidney distal loop in the urinary bladder. Chronic appearing hydronephrotic changes right kidney suggesting probably occluded stent. Extensive bony metastases. Electronically signed by:  Dariusz Lacy MD  5/7/2018 12:24 PM CDT Workstation: [id number]    Ct Abdomen Pelvis Without Contrast    Result Date: 4/16/2018  Narrative: EXAMINATION:  Computed Tomography         REGION:    Abdomen / Pelvis                 INDICATION:   abdominal pain, hypotension, I95.9 Hypotension, unspecified N17.9 Acute kidney failure, unspecified C50.912 Malignant neoplasm of unspecified site of left female breast C79.9 Secondary malignant neoplasm of unspecified site  HISTORY: CINTIA. IMAGING:    CT A/P 3/29/18, 2/7/18        TECHNIQUE:     - reconstructions:    axial, coronal, sagittal     - contrast:      oral:  no ;   intravenous:  no    - Please note:       - Lack of IV contrast limits assessment of solid organ parenchyma, urinary system, or vascular structures.       - Lack of oral contrast limits assessment of GI tract structures and peritoneal disease.      This exam was performed according to the departmental dose-optimization program which includes automated exposure control, adjustment of the mA and/or kV according to patient size and/or use of iterative reconstruction technique.            COMMENTS: THORAX (INFERIOR): The lung bases are clear.   Diffuse emphysematous changes suspected present. The pleura is without fluid or a mass.   The heart size is  normal size and there is no pericardial fluid.   ABDOMEN:  The liver again displays an overall normal size with the appearance of multiple intrahepatic hypoattenuating defects, subjectively suspected to have increased in size since the previous study. Additionally, there is evidence of pneumobilia, overall configuration grossly unchanged. Previously noted intrahepatic biliary ductal dilatation and pancreatic ductal dilatation, improved. Previously described antral findings, are again noted, possibly progressed however difficult to assess without contrast. No evidence of free fluid or free intraperitoneal air.   The osseous structures demonstrate diffuse osteosclerosis as previously described, grossly unchanged. RETROPERITONEUM: The right kidney displays the presence of a pigtail catheter in proper position, with central collecting system dilatation, grossly unchanged. The previously described soft tissue density along the course of the right ureter, presumably attributable to the presence of malignant retroperitoneal fibrosis, is grossly unchanged. The adrenal glands are of normal size and contour.   No gross evidence of significant retroperitoneal adenopathy.  The vascular structures are grossly within normal limits for age.    PELVIS:  Limited assessment of the viscera is grossly unremarkable demonstrating normal caliber bowel loops.     No evidence of free fluid or free intraperitoneal air.    The osseous structures again demonstrate diffuse osteosclerosis as previously described. The vascular structures are grossly within normal limits for age.       .          Impression: CONCLUSION:   1. Limited examination due to the lack of intravenous and oral contrast.      2. Findings in the region of the antrum, difficult to assess due to the lack of intravenous contrast. 3. Pigtail catheter in the right kidney, in standard position. Again noted is a moderate degree of central collecting system dilatation, grossly  unchanged. 4. 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                             Electronically signed by:  DARIO Richard MD  4/16/2018 1:35 PM CDT Workstation: [id number]    Us Chest    Result Date: 4/25/2018  Narrative: Ultrasound anterior chest wall HISTORY: Multiple subcutaneous mass Ultrasound examination of the area concern anterior chest wall performed. COMPARISON: None. Correlation chest CT February 7, 2018. 2.99 cm x 1.45 cm x 0.5 cm benign-appearing lymph node corresponding to palpable abnormality. Adjacent 4.3 mm cyst in the subcutaneous fat.     Impression: CONCLUSION: 2.99 cm x 1.45 cm x 0.5 cm benign-appearing lymph node corresponding to palpable abnormality. Adjacent 4.3 mm cyst in the subcutaneous fat. [id number] Electronically signed by:  Antonio De La Garza MD  4/25/2018 11:37 AM CDT Workstation: [id number]    Us Guided Vascular Access    Result Date: 5/7/2018  Narrative: This procedure was auto-finalized with no dictation required.    Us Renal Bilateral    Result Date: 4/17/2018  Narrative: EXAMINATION:  ultrasound, renal CLINICAL INDICATION / HISTORY:  UTI/hydronephrosis, I95.9 Hypotension, unspecified N17.9 Acute kidney failure, unspecified C50.912 Malignant neoplasm of unspecified site of left female breast C79.9 Secondary malignant neoplasm of unspecified site COMPARISON:  none TECHNIQUE:  ultrasound, renal FULL RESULTS / FINDINGS: Kidney, right:    size:  normal, measuring 11.6 x 6.3 x 6.5 cm   echotexture:  Mild increased renal cortical echogenicity   Persistent moderate right kidney hydronephrosis with right indwelling ureteral stent. No renal calculi or mass. Kidney, left:    size:  normal, measuring 10.7 x 5.4 x 6.8 cm   echotexture:  Mild diffuse increased renal cortical echogenicity.   no nephrolithiasis, solid mass, or collecting system dilation, left kidney lower pole circular 2.6 cm  anechoic structure with good through acoustic transmission consistent with simple renal cortical cyst. Urinary bladder:  grossly within normal limits   Miscellaneous: Multiple hypoechoic solid appearing nodules are seen involving the liver.     Impression: CONCLUSION:  1.  Mild increased renal cortical echogenicity suspicious for underlying medical renal disease. 2.  Stable moderate right kidney hydronephrosis with indwelling ureteral stent in place suggesting possible stent obstruction or malfunction. 3.  Multiple liver hypoechoic solid appearing nodule suspicious for metastatic disease. 4.  Otherwise unremarkable renal ultrasound. Electronically signed by:  Dariusz Austin MD  4/17/2018 4:34 PM CDT Workstation: [id number]    Xr Chest Pa & Lateral    Result Date: 4/16/2018  Narrative: TWO VIEW CHEST HISTORY: Hypotension. Dizziness. Frontal and lateral films of the chest were obtained. COMPARISON: November 21, 2017 EKG leads. Linear atelectasis or scar lung bases. Old granulomatous disease is present. The heart is not enlarged. The pulmonary vasculature is not increased. No pleural effusion. No pneumothorax. No acute osseous abnormality. Degenerative changes are present in the thoracic spine. Osteoblastic metastatic disease.     Impression: CONCLUSION: Linear atelectasis or scar lung bases. Osteoblastic metastatic disease, previously demonstrated. [id number] Electronically signed by:  Antonio De La Garza MD  4/16/2018 12:30 PM CDT Workstation: [id number]    Xr Chest Post Cva Port    Result Date: 5/7/2018  Narrative: Chest single view. CLINICAL INDICATION: PICC line placement. COMPARISON: Chest April 16, 2018. FINDINGS: Cardiac silhouette is normal in size. Emphysematous changes especially in the right apex. New discoid infiltrative changes right lung base probably atelectasis.     Impression: CONCLUSION: Right arm PICC line catheter with catheter tip in superior vena cava, in satisfactory position. Electronically signed by:  Dariusz  Bambi YATES  5/7/2018 11:42 AM CDT Workstation: [id number]    Ir Picc Wo Fluoro Guidance    Result Date: 5/7/2018  Narrative: This procedure was auto-finalized with no dictation required.                MDM  Number of Diagnoses or Management Options  Acute pancreatitis, unspecified complication status, unspecified pancreatitis type: new and requires workup  Atrial fibrillation with rapid ventricular response: established and worsening  Metastatic breast cancer: established and worsening  Secondary rhabdomyolysis: new and requires workup  Severe sepsis: new and requires workup     Amount and/or Complexity of Data Reviewed  Clinical lab tests: reviewed  Tests in the radiology section of CPT®:  reviewed  Tests in the medicine section of CPT®:  reviewed  Decide to obtain previous medical records or to obtain history from someone other than the patient: yes    Risk of Complications, Morbidity, and/or Mortality  Presenting problems: high  Diagnostic procedures: high  Management options: high  General comments: Critical Care time 45 minutes    Critical Care  Total time providing critical care: 30-74 minutes    Patient Progress  Patient progress: stable        Final diagnoses:   Severe sepsis   Acute pancreatitis, unspecified complication status, unspecified pancreatitis type   Secondary rhabdomyolysis   Atrial fibrillation with rapid ventricular response   Metastatic breast cancer            Gagan Jhaveri MD  05/07/18 1231      Electronically signed by Gagan Jhaveri MD at 5/7/2018 12:31 PM     Pauline Naik RN at 5/7/2018 12:04 PM        Called [facility] to come and get patient's ABG.      Pauline Naik RN  05/07/18 2397      Electronically signed by Pauline Naik RN at 5/7/2018 12:04 PM       Yolanda Francois RN  [facility]  [phone]  Fax [phone]

## 2018-05-08 NOTE — PROGRESS NOTES
Discharge Planning Assessment  [facility]     Patient Name: Johanne Mayer  MRN: [id number]  Today's Date: 5/8/2018    Admit Date: 5/7/2018          Discharge Needs Assessment     Row Name 05/08/18 1029       Living Environment    Lives With child(ilya), adult    Current Living Arrangements home/apartment/condo    Primary Care Provided by child(ilya)    Provides Primary Care For no one, unable/limited ability to care for self    Family Caregiver if Needed child(ilya), adult    Able to Return to Prior Arrangements yes    Living Arrangement Comments Pt resides at home with daughter. pt appears to have good support system.       Resource/Environmental Concerns    Resource/Environmental Concerns none       Transition Planning    Patient/Family Anticipates Transition to home with family    Patient/Family Anticipated Services at Transition home health care   Active with [facility]    Transportation Anticipated family or friend will provide       Discharge Needs Assessment    Readmission Within the Last 30 Days other (see comments)   Returned to hospital with diagnosis of spesis.    Concerns to be Addressed adjustment to diagnosis/illness    Equipment Currently Used at Home rollator;shower chair;commode    Anticipated Changes Related to Illness none;inability to care for self    Discharge Facility/Level of Care Needs home with home health    Offered/Gave Vendor List yes    Patient's Choice of Community Agency(s) Active with [facility].    Current Discharge Risk terminally ill    Discharge Coordination/Progress Family plans on pt returning home and resuming home health care. pt may be more appropriate for hospice, awaiting MD recomendations.            Discharge Plan     Row Name 05/08/18 1037       Plan    Plan Comments Family informed LSW that pt's PCP Dr Wood is no longer practicing and she has not been established with anyone else. Pt will need apppointment with new PCP at d/c.     Kalani  Name 05/08/18 1034       Plan    Plan Comments LSW assessment complete. pt resides at home with daughter. Daughter assisst with all ADL's. pt appears to have good support system. [facility] was seeing pt home for nursing and PT.  pt plans to return home at d/c and resume home health follow up. LSW awaiting additional recomendations from MD. LSW/case mgt will follow up as consulted and complete arrangements as ordered.     Row Name 05/08/18 1018       Plan    Plan Comments continued stay review- breast cancer with mets, Afib, sepsis- WBC 30.3- IV vancomycin and azacatm, ADRIANA, H7H 6.1/21.3, platelet count 32, troponin 0.2, elevated CK., acute pancreatitits- continue NPO and NS @100cc/hr.....Yolanda Francois RN        Destination     No service coordination in this encounter.      Durable Medical Equipment     No service coordination in this encounter.      Dialysis/Infusion     No service coordination in this encounter.      Home Medical Care     No service coordination in this encounter.      Social Care     No service coordination in this encounter.        Expected Discharge Date and Time     Expected Discharge Date Expected Discharge Time    May 15, 2018               Demographic Summary     Row Name 05/08/18 1022       General Information    Admission Type inpatient    Referral Source high risk screening    Reason for Consult discharge planning    Preferred Language English     Used During This Interaction no       Contact Information    Contact Information Obtained for             Functional Status     Row Name 05/08/18 1022       Functional Status    Usual Activity Tolerance poor    Current Activity Tolerance poor    Functional Status Comments Daughter assisst with all ADL's.       Functional Status, IADL    Medications assistive person    Meal Preparation completely dependent    Housekeeping completely dependent    Laundry completely dependent    Shopping completely dependent        Mental Status Summary    Recent Changes in Mental Status/Cognitive Functioning no changes            Psychosocial    No documentation.           Abuse/Neglect    No documentation.           Legal    No documentation.           Substance Abuse    No documentation.           Patient Forms    No documentation.         POLLY Greene

## 2018-05-08 NOTE — PLAN OF CARE
Problem: Patient Care Overview  Goal: Plan of Care Review   05/08/18 6534   Coping/Psychosocial   Plan of Care Reviewed With patient;caregiver   Plan of Care Review   Progress no change   OTHER   Outcome Summary New assessment. Pt with a hx of a very significant wt loss, ~60#. Rd will monitor diet advancement and tolerance

## 2018-05-08 NOTE — PROGRESS NOTES
Progress Note  Carlin Perea MD  Hospitalist    Date of visit: 5/8/2018     LOS: 1 day   Patient Care Team:  Joey Ramos MD as PCP - General (Hematology and Oncology)    Chief Complaint: weakness / abdominal discomfort    Subjective     Interval History:     Patient Complaints: weakness / abdominal discomfort, declining overall general condition.    History taken from: chart / nursing    Medication Review:   Current Facility-Administered Medications   Medication Dose Route Frequency Provider Last Rate Last Dose   • aztreonam (AZACTAM) 1 g/100 mL 0.9% NS IVPB (mbp)  1 g Intravenous Q8H Chris Frey MD   1 g at 05/08/18 2117   • famotidine (PEPCID) IVPB 20 mg  20 mg Intravenous Daily Chris Frey MD   20 mg at 05/08/18 0832   • fentaNYL (DURAGESIC) 12 MCG/HR 1 patch  1 patch Transdermal Q72H Chris Frey MD   1 patch at 05/07/18 1438   • morphine injection 2 mg  2 mg Intravenous Q4H PRN Carlin Perea MD        And   • naloxone (NARCAN) injection 0.4 mg  0.4 mg Intravenous Q5 Min PRN Carlin Perea MD       • ondansetron (ZOFRAN) 8 mg in sodium chloride 0.9 % 50 mL IVPB  8 mg Intravenous Q6H PRN Carlin Perea MD   8 mg at 05/08/18 0953   • Pharmacy to dose vancomycin   Does not apply Continuous PRN Chris Frey MD       • promethazine (PHENERGAN) injection 12.5 mg  12.5 mg Intravenous Q6H PRN Carlin Perea MD   12.5 mg at 05/08/18 2117   • sodium chloride 0.9 % bolus 1,000 mL  1,000 mL Intravenous Once Gagan Jhaveri MD       • sodium chloride 0.9 % flush 1-10 mL  1-10 mL Intravenous PRN Chris Frey MD       • sodium chloride 0.9 % flush 10 mL  10 mL Intravenous PRN Gagan Jhaveri MD       • sodium chloride 0.9 % infusion  100 mL/hr Intravenous Continuous Chris Frey  mL/hr at 05/08/18 1229 100 mL/hr at 05/08/18 1229   • vancomycin (VANCOCIN) 1,250 mg in sodium chloride 0.9 % 250 mL IVPB  1,250 mg Intravenous Q24H Chris Frey MD   1,250 mg at 05/08/18 1226       Review of Systems:   Review of  Systems   Constitutional: Positive for fatigue. Negative for fever.   Respiratory: Positive for shortness of breath. Negative for cough, choking and wheezing.    Cardiovascular: Positive for chest pain. Negative for palpitations and leg swelling.   Gastrointestinal: Positive for abdominal distention, abdominal pain and nausea. Negative for constipation and diarrhea.   Genitourinary: Positive for dysuria, frequency and urgency.   Musculoskeletal: Positive for arthralgias, back pain and gait problem. Negative for joint swelling.   Neurological: Positive for weakness. Negative for tremors, syncope, numbness and headaches.   Psychiatric/Behavioral: Negative for agitation, behavioral problems and confusion.   All other systems reviewed and are negative.      Objective     Vital Signs  Temp:  [96.1 °F (35.6 °C)-98 °F (36.7 °C)] 97.4 °F (36.3 °C)  Heart Rate:  [] 83  Resp:  [20-28] 23  BP: ()/() 106/48    Physical Exam:  Physical Exam   Constitutional: She is oriented to person, place, and time. She appears cachectic. She appears ill. She appears distressed.   HENT:   Head: Normocephalic and atraumatic.   Eyes: EOM are normal. Pupils are equal, round, and reactive to light. Scleral icterus is present.   Neck: Normal range of motion. Neck supple.   Cardiovascular: An irregular rhythm present. Tachycardia present.    Pulmonary/Chest: No respiratory distress. She has no wheezes. She has no rales.   Abdominal: Soft. She exhibits distension. She exhibits no mass. There is tenderness. There is no guarding. No hernia.   Musculoskeletal: Normal range of motion. She exhibits no edema or tenderness.   Neurological: She is alert and oriented to person, place, and time. She has normal reflexes. No cranial nerve deficit.   Skin: Skin is dry. No rash noted. No erythema. There is pallor.   Psychiatric: She has a normal mood and affect. Her behavior is normal.   Vitals reviewed.       Results Review:    Lab Results (last  24 hours)     Procedure Component Value Units Date/Time    Lactic Acid, Plasma [[id number]]  (Abnormal) Collected:  05/08/18 1755    Specimen:  Blood Updated:  05/08/18 1825     Lactate 2.1 (C) mmol/L     Blood Culture - Blood, [[id number]]  (Normal) Collected:  05/07/18 1609    Specimen:  Blood from Arm, Right Updated:  05/08/18 1615     Blood Culture No growth at 24 hours    Lactic Acid, Plasma [[id number]]  (Abnormal) Collected:  05/08/18 1154    Specimen:  Blood Updated:  05/08/18 1217     Lactate 6.7 (C) mmol/L     Blood Culture - Blood, [[id number]]  (Normal) Collected:  05/07/18 1115    Specimen:  Blood from Blood, Venous Line Updated:  05/08/18 1145     Blood Culture No growth at 24 hours    Urine Culture - Urine, Urine, Clean Catch [[id number]]  (Normal) Collected:  05/07/18 1543    Specimen:  Urine from Urine, Clean Catch Updated:  05/08/18 0637     Urine Culture Culture in progress    Comprehensive Metabolic Panel [[id number]]  (Abnormal) Collected:  05/08/18 0203    Specimen:  Blood Updated:  05/08/18 0306     Glucose 82 mg/dL      BUN 31 (H) mg/dL      Creatinine 1.21 (H) mg/dL      Sodium 137 mmol/L      Potassium 5.3 (H) mmol/L      Chloride 106 mmol/L      CO2 18.0 (L) mmol/L      Calcium 7.6 (L) mg/dL      Total Protein 6.1 (L) g/dL      Albumin 2.50 (L) g/dL      ALT (SGPT) 72 (H) U/L      AST (SGOT) 1,829 (H) U/L      Alkaline Phosphatase 222 (H) U/L      Total Bilirubin 1.9 (H) mg/dL      eGFR Non African Amer 44 (L) mL/min/1.73      Globulin 3.6 (H) gm/dL      A/G Ratio 0.7 (L) g/dL      BUN/Creatinine Ratio 25.6 (H)     Anion Gap 13.0 mmol/L     Troponin [[id number]]  (Abnormal) Collected:  05/08/18 0203    Specimen:  Blood Updated:  05/08/18 0256     Troponin I 0.205 (C) ng/mL     Lactic Acid, Plasma [[id number]]  (Abnormal) Collected:  05/08/18 0203    Specimen:  Blood Updated:  05/08/18 0255     Lactate 4.5 (C) mmol/L     CBC Auto Differential [[id number]]  (Abnormal) Collected:  05/08/18 0203     Specimen:  Blood Updated:  05/08/18 0211     WBC 30.37 (H) 10*3/mm3      RBC 2.19 (L) 10*6/mm3      Hemoglobin 6.7 (L) g/dL      Hematocrit 21.3 (L) %      MCV 97.3 fL      MCH 30.6 pg      MCHC 31.5 g/dL      RDW 28.1 (H) %      RDW-SD 93.9 (H) fl      MPV -- fL      Comment: Instrument unable to calculate.         Platelets 32 (L) 10*3/mm3      Neutrophil % 41.1 %      Lymphocyte % 49.5 %      Monocyte % 7.4 %      Eosinophil % 0.1 %      Basophil % 0.3 %      Immature Grans % 1.6 (H) %      Neutrophils, Absolute 12.50 (H) 10*3/mm3      Lymphocytes, Absolute 15.02 (H) 10*3/mm3      Monocytes, Absolute 2.25 (H) 10*3/mm3      Eosinophils, Absolute 0.04 10*3/mm3      Basophils, Absolute 0.08 10*3/mm3      Immature Grans, Absolute 0.48 (H) 10*3/mm3           Imaging Results (last 24 hours)     ** No results found for the last 24 hours. **          Assessment/Plan     Principal Problem:    Sepsis  Active Problems:    UTI (urinary tract infection)    Metastatic breast cancer    Bone metastasis    PAF (paroxysmal atrial fibrillation)    Acute kidney injury    Anemia    Hydronephrosis    Liver metastasis    Enterococcus faecalis in the UC, continue with the IV antibiotics. Transfuse 2 u PRBC, follow up on the CBC.    Extensive metastatic breast cancer - bone and liver metastasis. She could benefit from Hospice care if the family would agree. Very poor overall condition and guarded prognosis.    Carlin Perea MD  05/08/18  10:42 PM

## 2018-05-08 NOTE — PROGRESS NOTES
"   LOS: 1 day   Patient Care Team:  Jeoy Ramos MD as PCP - General (Hematology and Oncology)  Joey Ramos MD as PCP - Claims Attributed  Petty Rosenberg RN as Care Coordinator (Population Health)  Joey Ramos MD as Consulting Physician (Hematology and Oncology)    Subjective     Subjective:  Symptoms:  No shortness of breath.  (Less flank pain, back pain persists, urine martinez in Gal[place]'s gravity bag. No fever. PRBCs transfusing now. No CP or SOA. Nausea with vomiting. ).    Diet:  She reports  nausea and vomiting.    Pain:  She complains of pain that is moderate.  Pain is requiring pain medication.        History taken from: patient chart family RN    Objective     Vital Signs  Temp:  [96.1 °F (35.6 °C)-98 °F (36.7 °C)] 97.7 °F (36.5 °C)  Heart Rate:  [] 79  Resp:  [20-28] 24  BP: ()/() 168/72    Objective:  General Appearance:  Ill-appearing.    Vital signs: (most recent): Blood pressure 168/72, pulse 79, temperature 97.7 °F (36.5 °C), resp. rate 24, height 157.5 cm (62\"), weight 62 kg (136 lb 11 oz), SpO2 99 %, not currently breastfeeding.  Vital signs are normal.  No fever.    Output: Producing urine.    HEENT: Normal HEENT exam.    Lungs:  Normal effort and normal respiratory rate.  She is not in respiratory distress.  No stridor.  There are decreased breath sounds.  No rales.    Heart: Normal rate.  Regular rhythm.  S1 normal and S2 normal.    Abdomen: Abdomen is soft and non-distended.  Bowel sounds are normal.   There is generalized tenderness.     Extremities: Normal range of motion.  There is no dependent edema.    Pulses: Distal pulses are intact.    Neurological: Patient is alert and oriented to person, place and time.  GCS score is 15.    Pupils:  Pupils are equal, round, and reactive to light.    Skin:  Warm, dry and pale.  No ecchymosis or cyanosis.             Results Review:    Lab Results (last 24 hours)     Procedure Component Value Units Date/Time    Blood Culture - Blood, " [[id number]]  (Normal) Collected:  05/07/18 1609    Specimen:  Blood from Arm, Right Updated:  05/08/18 1615     Blood Culture No growth at 24 hours    Lactic Acid, Plasma [[id number]]  (Abnormal) Collected:  05/08/18 1154    Specimen:  Blood Updated:  05/08/18 1217     Lactate 6.7 (C) mmol/L     Blood Culture - Blood, [[id number]]  (Normal) Collected:  05/07/18 1115    Specimen:  Blood from Blood, Venous Line Updated:  05/08/18 1145     Blood Culture No growth at 24 hours    Urine Culture - Urine, Urine, Clean Catch [[id number]]  (Normal) Collected:  05/07/18 1543    Specimen:  Urine from Urine, Clean Catch Updated:  05/08/18 0637     Urine Culture Culture in progress    Comprehensive Metabolic Panel [[id number]]  (Abnormal) Collected:  05/08/18 0203    Specimen:  Blood Updated:  05/08/18 0306     Glucose 82 mg/dL      BUN 31 (H) mg/dL      Creatinine 1.21 (H) mg/dL      Sodium 137 mmol/L      Potassium 5.3 (H) mmol/L      Chloride 106 mmol/L      CO2 18.0 (L) mmol/L      Calcium 7.6 (L) mg/dL      Total Protein 6.1 (L) g/dL      Albumin 2.50 (L) g/dL      ALT (SGPT) 72 (H) U/L      AST (SGOT) 1,829 (H) U/L      Alkaline Phosphatase 222 (H) U/L      Total Bilirubin 1.9 (H) mg/dL      eGFR Non African Amer 44 (L) mL/min/1.73      Globulin 3.6 (H) gm/dL      A/G Ratio 0.7 (L) g/dL      BUN/Creatinine Ratio 25.6 (H)     Anion Gap 13.0 mmol/L     Troponin [[id number]]  (Abnormal) Collected:  05/08/18 0203    Specimen:  Blood Updated:  05/08/18 0256     Troponin I 0.205 (C) ng/mL     Lactic Acid, Plasma [[id number]]  (Abnormal) Collected:  05/08/18 0203    Specimen:  Blood Updated:  05/08/18 0255     Lactate 4.5 (C) mmol/L     CBC Auto Differential [[id number]]  (Abnormal) Collected:  05/08/18 0203    Specimen:  Blood Updated:  05/08/18 0211     WBC 30.37 (H) 10*3/mm3      RBC 2.19 (L) 10*6/mm3      Hemoglobin 6.7 (L) g/dL      Hematocrit 21.3 (L) %      MCV 97.3 fL      MCH 30.6 pg      MCHC 31.5 g/dL      RDW 28.1 (H) %       RDW-SD 93.9 (H) fl      MPV -- fL      Comment: Instrument unable to calculate.         Platelets 32 (L) 10*3/mm3      Neutrophil % 41.1 %      Lymphocyte % 49.5 %      Monocyte % 7.4 %      Eosinophil % 0.1 %      Basophil % 0.3 %      Immature Grans % 1.6 (H) %      Neutrophils, Absolute 12.50 (H) 10*3/mm3      Lymphocytes, Absolute 15.02 (H) 10*3/mm3      Monocytes, Absolute 2.25 (H) 10*3/mm3      Eosinophils, Absolute 0.04 10*3/mm3      Basophils, Absolute 0.08 10*3/mm3      Immature Grans, Absolute 0.48 (H) 10*3/mm3     Extra Tubes [[id number]] Collected:  05/07/18 2008    Specimen:  Blood from Blood, Venous Line Updated:  05/07/18 2115    Narrative:       The following orders were created for panel order Extra Tubes.  Procedure                               Abnormality         Status                     ---------                               -----------         ------                     Light Blue Top[[id number]]                                   Final result               Lavender Top[[id number]]                                     Final result                 Please view results for these tests on the individual orders.    Light Blue Top [[id number]] Collected:  05/07/18 2008    Specimen:  Blood Updated:  05/07/18 2115     Extra Tube hold for add-on     Comment: Auto resulted       Lavender Top [[id number]] Collected:  05/07/18 2008    Specimen:  Blood Updated:  05/07/18 2115     Extra Tube hold for add-on     Comment: Auto resulted       Troponin [[id number]]  (Abnormal) Collected:  05/07/18 2009    Specimen:  Blood Updated:  05/07/18 2049     Troponin I 0.146 (C) ng/mL     Respiratory Panel, PCR - Swab, Nasopharynx [[id number]]  (Normal) Collected:  05/07/18 1742    Specimen:  Swab from Nasopharynx Updated:  05/07/18 2027     ADENOVIRUS, PCR Not Detected     Coronavirus 229E Not Detected     Coronavirus HKU1 Not Detected     Coronavirus NL63 Not Detected     Coronavirus OC43 Not Detected     Human  Metapneumovirus Not Detected     Human Rhinovirus/Enterovirus Not Detected     Influenza B PCR Not Detected     Parainfluenza Virus 1 Not Detected     Parainfluenza Virus 2 Not Detected     Parainfluenza Virus 3 Not Detected     Parainfluenza Virus 4 Not Detected     Bordetella pertussis pcr Not Detected     Influenza A H1 2009 PCR Not Detected     Chlamydophila pneumoniae PCR Not Detected     Mycoplasma pneumo by PCR Not Detected     Influenza A PCR Not Detected     Influenza A H3 Not Detected     Influenza A H1 Not Detected     RSV, PCR Not Detected    Lactic Acid, Plasma [[id number]]  (Normal) Collected:  05/07/18 2009    Specimen:  Blood Updated:  05/07/18 2027     Lactate 1.9 mmol/L     Troponin [[id number]]  (Abnormal) Collected:  05/07/18 1609    Specimen:  Blood Updated:  05/07/18 1740     Troponin I 0.137 (C) ng/mL          Imaging Results (last 24 hours)     ** No results found for the last 24 hours. **           I reviewed the patient's new clinical results.  I reviewed the patient's new imaging results and agree with the interpretation.  I reviewed the patient's other test results and agree with the interpretation      Assessment/Plan     Principal Problem:    Sepsis  Active Problems:    PAF (paroxysmal atrial fibrillation)    Carcinoma, female breast, infiltrating lobular    Acute kidney injury    UTI (urinary tract infection)    Metastatic breast cancer    Anemia    Hydronephrosis    Liver metastasis      Assessment & Plan  1. Hydronephrosis, RIGHT, chronic, related to obstruction from metastatic breast cancer  --Postop cystoscopy RIGHT retrograde with J-stent change on 4/18/18  --CT abdomen/pelvis shows chronic right hydronephrosis, metastases more pronounced     2. Sepsis, unknown source, possible UTI  --Urine culture in progress  --Lactic acid 7.9-->4.1-->1.9-->4.5-->6.7  --Azactam and vanc day #2  --Note: acute pancreatitis, lipase 2831, NPO     3. ADRIANA  -Cr 1.21, same as yesterday, baseline running  around 1.0-1.1, BUN rising  -Estimated Creatinine Clearance: 38 mL/min (by C-G formula based on SCr of 1.21 mg/dL (H)).   -urine output 440 mL since 0700, nursing reports it decreasing, PRBCs transfusing now.      Plan: Continue antibiotics, J-stent remains, no plans for OR at this time, will follow.     Poncho Cook, FADI  05/08/18  5:00 PM

## 2018-05-08 NOTE — CONSULTS
Adult Nutrition  Assessment    Patient Name:  Johanne Mayer  YOB: 1950  MRN: [id number]  Admit Date:  5/7/2018    Assessment Date:  5/8/2018    Comments: Pt known to me from a previous hospital admit where she had a Ru en Y with Hepaticojejunostomy and a G and JTube placement for bowel obstruction related to her Stage IV Breast Cancer with mets.  She is admitted with severe sepsis and acute pancreatitis at this time.  Minimal po intake reported over the last month with a very severe wt loss of 60# since her initial surgery. Labs are reviewed and noted.  Significantly elevated Lipase.  RD will continue to monitor tx plans.            Adult Nutrition Assessment     Row Name 05/08/18 1539       Nutrition Prescription PO    Current PO Diet Clear Liquid    Fluid Consistency Thin    Row Name 05/08/18 1538       Labs/Procedures/Meds    Lab Results Reviewed reviewed, pertinent    Lab Results Comments K+ 5.3; bun 31; Creat 1.21; Lipase 2831; Amylase 416; TBili 1.9; ALT 72       Physical Findings    Overall Physical Appearance listlessness;loss of muscle mass;loss of subcutaneous fat    Tubes gastro-jejunostomy tube       Calculation Measurements    Weight Used For Calculations 61.7 kg (136 lb)       Estimated/Assessed Needs    Additional Documentation Calorie Requirements (Group);KCAL/KG (Group);Protein Requirements (Group);Hunt-St. Jeor Equation (Group);Fluid Requirements (Group)       Calorie Requirements    Estimated Calorie Need Method Hunt-St Jeor    Estimated Calorie Requirement Comment 1533 (1.4 activity factor)       KCAL/KG    14 Kcal/Kg (kcal) 863.65    15 Kcal/Kg (kcal) 925.34    18 Kcal/Kg (kcal) 1110.4    20 Kcal/Kg (kcal) 1233.78    25 Kcal/Kg (kcal) 1542.23    30 Kcal/Kg (kcal) 1850.67    35 Kcal/Kg (kcal) 2159.12    40 Kcal/Kg (kcal) 2467.56    45 Kcal/Kg (kcal) 2776.01    50 Kcal/Kg (kcal) 3084.45       Hunt-St. Jeor Equation    RMR (Hunt-St. Jeor Equation) 1095.14        Protein Requirements    Est Protein Requirement Amount (gms/kg) 1.2 gm protein    Estimated Protein Requirements (gms/day) 74.03       Fluid Requirements    Estimated Fluid Requirements (mL/day) 1500    Estimated Fluid Requirement Method RDA Method    RDA Method (mL) 1500    Mullin-Oleksandr Method (over 20 kg) 2733.78    Row Name 05/08/18 1529       Nutrition/Diet History    Typical Food/Fluid Intake  Pt speaking very softly.  States that she has lost ~60# in the since her first surgery in January.  She hasn't been eating well over the last month at all.      Row Name 05/08/18 1528       Reason for Assessment    Reason For Assessment identified at risk by screening criteria    Diagnosis cancer diagnosis/related complications    Identified At Risk by Screening Criteria MST SCORE 2+          Electronically signed by:  Melia Cool RD  05/08/18 3:45 PM

## 2018-05-08 NOTE — PLAN OF CARE
Problem: Patient Care Overview  Goal: Plan of Care Review  Outcome: Ongoing (interventions implemented as appropriate)   05/08/18 0344   Coping/Psychosocial   Plan of Care Reviewed With patient   Plan of Care Review   Progress no change   OTHER   Outcome Summary pt had increased nausea during night. pt tolerated med well. pt pain level being controlled. uop continues to be adequate.      Goal: Individualization and Mutuality  Outcome: Ongoing (interventions implemented as appropriate)    Goal: Discharge Needs Assessment  Outcome: Ongoing (interventions implemented as appropriate)    Goal: Interprofessional Rounds/Family Conf  Outcome: Ongoing (interventions implemented as appropriate)      Problem: Fall Risk (Adult)  Goal: Identify Related Risk Factors and Signs and Symptoms  Outcome: Ongoing (interventions implemented as appropriate)    Goal: Absence of Fall  Outcome: Ongoing (interventions implemented as appropriate)      Problem: Skin Injury Risk (Adult)  Goal: Identify Related Risk Factors and Signs and Symptoms  Outcome: Ongoing (interventions implemented as appropriate)    Goal: Skin Health and Integrity  Outcome: Ongoing (interventions implemented as appropriate)      Problem: Pain, Acute (Adult)  Goal: Identify Related Risk Factors and Signs and Symptoms  Outcome: Ongoing (interventions implemented as appropriate)    Goal: Acceptable Pain Control/Comfort Level  Outcome: Ongoing (interventions implemented as appropriate)      Problem: Sepsis/Septic Shock (Adult)  Goal: Signs and Symptoms of Listed Potential Problems Will be Absent, Minimized or Managed (Sepsis/Septic Shock)  Outcome: Ongoing (interventions implemented as appropriate)

## 2018-05-09 NOTE — PLAN OF CARE
Problem: Patient Care Overview  Goal: Plan of Care Review  Outcome: Ongoing (interventions implemented as appropriate)   05/09/18 7676   Coping/Psychosocial   Plan of Care Reviewed With patient;daughter   Plan of Care Review   Progress improving   OTHER   Outcome Summary pt had only 1 episode of nausea overnight, V/S stable, cardoac rhythm NSR       Problem: Fall Risk (Adult)  Goal: Identify Related Risk Factors and Signs and Symptoms  Outcome: Outcome(s) achieved Date Met: 05/09/18    Goal: Absence of Fall  Outcome: Ongoing (interventions implemented as appropriate)      Problem: Skin Injury Risk (Adult)  Goal: Skin Health and Integrity  Outcome: Ongoing (interventions implemented as appropriate)      Problem: Pain, Acute (Adult)  Goal: Identify Related Risk Factors and Signs and Symptoms  Outcome: Outcome(s) achieved Date Met: 05/09/18    Goal: Acceptable Pain Control/Comfort Level  Outcome: Ongoing (interventions implemented as appropriate)      Problem: Sepsis/Septic Shock (Adult)  Goal: Signs and Symptoms of Listed Potential Problems Will be Absent, Minimized or Managed (Sepsis/Septic Shock)  Outcome: Ongoing (interventions implemented as appropriate)

## 2018-05-09 NOTE — PLAN OF CARE
Problem: Patient Care Overview  Goal: Plan of Care Review  Outcome: Ongoing (interventions implemented as appropriate)   05/09/18 8682   Coping/Psychosocial   Plan of Care Reviewed With patient   Plan of Care Review   Progress improving   OTHER   Outcome Summary intermitent nausea today; run of Afib RVR in the 140's, metoprolol was given and patient converted back; patient tolerating clear liquids better today; awaiting Dr. Ramos for consult; will continue to monitor       Problem: Fall Risk (Adult)  Goal: Absence of Fall  Outcome: Ongoing (interventions implemented as appropriate)      Problem: Skin Injury Risk (Adult)  Goal: Skin Health and Integrity  Outcome: Ongoing (interventions implemented as appropriate)      Problem: Pain, Acute (Adult)  Goal: Acceptable Pain Control/Comfort Level  Outcome: Ongoing (interventions implemented as appropriate)      Problem: Sepsis/Septic Shock (Adult)  Goal: Signs and Symptoms of Listed Potential Problems Will be Absent, Minimized or Managed (Sepsis/Septic Shock)  Outcome: Ongoing (interventions implemented as appropriate)

## 2018-05-09 NOTE — CONSULTS
Nutrition Services    Patient Name:  Johanne Mayer  YOB: 1950  MRN: [id number]  Admit Date:  5/7/2018    Pt and family report that pt is allergic to eggs but have eggs cooked in foods.  She does not like broth but would like to have Ensure Clear.  Rd will note and add Ensure Clear to trays      Electronically signed by:  Melia Cool RD  05/09/18 10:33 AM

## 2018-05-09 NOTE — PROGRESS NOTES
"   LOS: 2 days   Patient Care Team:  Joey Ramos MD as PCP - General (Hematology and Oncology)  Joey Ramos MD as PCP - Claims Attributed  Petty Rosenberg, RN as Care Coordinator ([facility])  Joey Ramos MD as Consulting Physician (Hematology and Oncology)    Subjective     Subjective:  Symptoms:  No shortness of breath.  (No vomiting today, nausea more controlled, AFib had metoprolol. ).    Diet:  She reports  nausea.  No vomiting.    Pain:  She complains of pain that is moderate.  Pain is requiring pain medication.        History taken from: patient chart family RN    Objective     Vital Signs  Temp:  [97.3 °F (36.3 °C)-98 °F (36.7 °C)] 98 °F (36.7 °C)  Heart Rate:  [] 93  Resp:  [17-24] 18  BP: (106-177)/() 119/81    Objective:  General Appearance:  Ill-appearing.    Vital signs: (most recent): Blood pressure 119/81, pulse 93, temperature 98 °F (36.7 °C), resp. rate 18, height 157.5 cm (62\"), weight 62.3 kg (137 lb 5.6 oz), SpO2 97 %, not currently breastfeeding.  Vital signs are normal.  No fever.    Output: Producing urine ([place] urine martinez).    HEENT: Normal HEENT exam.    Lungs:  Normal effort and normal respiratory rate.  She is not in respiratory distress.  No stridor.  There are decreased breath sounds.  No rales.    Heart: Normal rate.  Regular rhythm.  S1 normal and S2 normal.    Abdomen: Abdomen is soft and non-distended.  Bowel sounds are normal.   There is generalized tenderness.     Extremities: Normal range of motion.  There is no dependent edema.    Pulses: Distal pulses are intact.    Neurological: Patient is alert and oriented to person, place and time.  GCS score is 15.    Pupils:  Pupils are equal, round, and reactive to light.    Skin:  Warm, dry and pale.  No ecchymosis or cyanosis.             Results Review:    Lab Results (last 24 hours)     Procedure Component Value Units Date/Time    Blood Culture - Blood, [[id number]]  (Normal) Collected:  05/07/18 1609    " Specimen:  Blood from Arm, Right Updated:  05/09/18 1615     Blood Culture No growth at 2 days    Lactic Acid, Plasma [[id number]]  (Normal) Collected:  05/09/18 1302    Specimen:  Blood Updated:  05/09/18 1320     Lactate 1.5 mmol/L     Blood Culture - Blood, [[id number]]  (Normal) Collected:  05/07/18 1115    Specimen:  Blood from Blood, Venous Line Updated:  05/09/18 1145     Blood Culture No growth at 2 days    CBC & Differential [[id number]] Collected:  05/09/18 0541    Specimen:  Blood Updated:  05/09/18 0839    Narrative:       The following orders were created for panel order CBC & Differential.  Procedure                               Abnormality         Status                     ---------                               -----------         ------                     Manual Differential[[id number]]          Abnormal            Final result               CBC Auto Differential[[id number]]        Abnormal            Final result                 Please view results for these tests on the individual orders.    Manual Differential [[id number]]  (Abnormal) Collected:  05/09/18 0541    Specimen:  Blood Updated:  05/09/18 0839     Neutrophil % 80.0 %      Lymphocyte % 15.0 %      Monocyte % 4.0 %      Bands %  1.0 %      Neutrophils Absolute 18.93 (H) 10*3/mm3      Lymphocytes Absolute 3.51 10*3/mm3      Monocytes Absolute 0.93 (H) 10*3/mm3      nRBC 36.0 (H) /100 WBC      Anisocytosis Mod/2+     Hypochromia Slight/1+     Macrocytes Slight/1+     Polychromasia Slight/1+     WBC Morphology Normal     Platelet Estimate Decreased    CBC Auto Differential [[id number]]  (Abnormal) Collected:  05/09/18 0541    Specimen:  Blood Updated:  05/09/18 0641     WBC 23.37 (H) 10*3/mm3      RBC 2.74 (L) 10*6/mm3      Hemoglobin 8.5 (L) g/dL      Comment: PATIENT RECEIVED BLOOD        Hematocrit 27.0 (L) %      MCV 98.5 (H) fL      MCH 31.0 pg      MCHC 31.5 g/dL      RDW 25.6 (H) %      RDW-SD 81.7 (H) fl      Platelets 25 (L) 10*3/mm3       Comment: SPECIMEN REANALYZED TO CONFIRM PLATELET COUNT       Comprehensive Metabolic Panel [[id number]]  (Abnormal) Collected:  05/09/18 0541    Specimen:  Blood Updated:  05/09/18 0621     Glucose 84 mg/dL      BUN 33 (H) mg/dL      Creatinine 1.11 (H) mg/dL      Sodium 140 mmol/L      Potassium 4.3 mmol/L      Chloride 113 (H) mmol/L      CO2 18.0 (L) mmol/L      Calcium 7.9 (L) mg/dL      Total Protein 5.4 (L) g/dL      Albumin 2.10 (L) g/dL      ALT (SGPT) 110 (H) U/L      AST (SGOT) 1,347 (H) U/L      Alkaline Phosphatase 194 (H) U/L      Total Bilirubin 3.5 (H) mg/dL      eGFR Non African Amer 49 mL/min/1.73      Globulin 3.3 gm/dL      A/G Ratio 0.6 (L) g/dL      BUN/Creatinine Ratio 29.7 (H)     Anion Gap 9.0 mmol/L     Lactic Acid, Plasma [[id number]]  (Normal) Collected:  05/09/18 0541    Specimen:  Blood Updated:  05/09/18 0617     Lactate 1.4 mmol/L     Urine Culture - Urine, Urine, Clean Catch [[id number]]  (Normal) Collected:  05/07/18 1543    Specimen:  Urine from Urine, Clean Catch Updated:  05/09/18 0613     Urine Culture No growth at 24 hours    Lactic Acid, Plasma [[id number]]  (Normal) Collected:  05/09/18 0056    Specimen:  Blood Updated:  05/09/18 0120     Lactate 1.9 mmol/L     Lactic Acid, Plasma [[id number]]  (Abnormal) Collected:  05/08/18 1755    Specimen:  Blood Updated:  05/08/18 1825     Lactate 2.1 (C) mmol/L          Imaging Results (last 24 hours)     ** No results found for the last 24 hours. **           I reviewed the patient's new clinical results.  I reviewed the patient's new imaging results and agree with the interpretation.  I reviewed the patient's other test results and agree with the interpretation      Assessment/Plan     Principal Problem:    Sepsis  Active Problems:    Bone metastasis    PAF (paroxysmal atrial fibrillation)    Acute kidney injury    UTI (urinary tract infection)    Metastatic breast cancer    Anemia    Hydronephrosis    Liver metastasis      Assessment &  Plan     1. Hydronephrosis, RIGHT, chronic, related to obstruction from metastatic breast cancer  --Postop cystoscopy RIGHT retrograde with J-stent change on 4/18/18  --CT abdomen/pelvis shows chronic right hydronephrosis, metastases more pronounced     2. Sepsis, unknown source, possible UTI  --Urine culture and blood cultures negative thus far, previous urine culture enterococcus faecalis  --Lactic acid 7.9-->4.1-->1.9-->4.5-->6.7-->30.7-->23.37  --Azactam and vanc day #3  --Note: acute pancreatitis, lipase 2831, NPO     3. ADRIANA  -Cr 1.11,  Yesterday 1.21, baseline running around 1.0-1.1, BUN rising  -Estimated Creatinine Clearance: 41.5 mL/min (by C-G formula based on SCr of 1.11 mg/dL (H)).   -urine output 1,115 mL previous 24 hours     Plan: Continue antibiotics, J-stent remains, no plans for OR at this time, will follow.     FADI Estes  05/09/18  4:22 PM

## 2018-05-09 NOTE — PROGRESS NOTES
Progress Note  Carlin Perea MD  Hospitalist    Date of visit: 5/9/2018     LOS: 2 days   Patient Care Team:  Joey Ramos MD as PCP - General (Hematology and Oncology)    Chief Complaint: weakness / abdominal discomfort    Subjective     Interval History:     Patient Complaints: weakness / some better after transfusion of 2 u PRBCs.    History taken from: chart / nursing    Medication Review:   Current Facility-Administered Medications   Medication Dose Route Frequency Provider Last Rate Last Dose   • aztreonam (AZACTAM) 1 g/100 mL 0.9% NS IVPB (mbp)  1 g Intravenous Q8H Chris Frey MD   1 g at 05/09/18 0517   • famotidine (PEPCID) IVPB 20 mg  20 mg Intravenous Daily Chris Frey MD   20 mg at 05/09/18 0825   • fentaNYL (DURAGESIC) 12 MCG/HR 1 patch  1 patch Transdermal Q72H Chris Frey MD   1 patch at 05/07/18 1438   • morphine injection 2 mg  2 mg Intravenous Q4H PRN Carlin Perea MD        And   • naloxone (NARCAN) injection 0.4 mg  0.4 mg Intravenous Q5 Min PRN Carlin Perea MD       • ondansetron (ZOFRAN) 8 mg in sodium chloride 0.9 % 50 mL IVPB  8 mg Intravenous Q6H PRN Carlin Perea MD   8 mg at 05/08/18 0953   • Pharmacy to dose vancomycin   Does not apply Continuous PRN Chris Frey MD       • promethazine (PHENERGAN) injection 12.5 mg  12.5 mg Intravenous Q6H PRN Carlin Perea MD   12.5 mg at 05/08/18 2117   • sodium chloride 0.9 % bolus 1,000 mL  1,000 mL Intravenous Once Gagan Jhaveri MD       • sodium chloride 0.9 % flush 1-10 mL  1-10 mL Intravenous PRN Chris Frey MD       • sodium chloride 0.9 % flush 10 mL  10 mL Intravenous PRN Gagan Jhaveri MD       • sodium chloride 0.9 % infusion  100 mL/hr Intravenous Continuous Chris Frey  mL/hr at 05/09/18 0321 100 mL/hr at 05/09/18 0321   • vancomycin (VANCOCIN) 1,250 mg in sodium chloride 0.9 % 250 mL IVPB  1,250 mg Intravenous Q24H Chris Frey MD   1,250 mg at 05/08/18 1226       Review of Systems:   Review of Systems    Constitutional: Positive for fatigue. Negative for fever.   Respiratory: Positive for shortness of breath. Negative for cough, choking and wheezing.    Cardiovascular: Positive for chest pain. Negative for palpitations and leg swelling.   Gastrointestinal: Positive for abdominal distention, abdominal pain and nausea. Negative for constipation and diarrhea.   Genitourinary: Positive for dysuria, frequency and urgency.   Musculoskeletal: Positive for arthralgias, back pain and gait problem. Negative for joint swelling.   Neurological: Positive for weakness. Negative for tremors, syncope, numbness and headaches.   Psychiatric/Behavioral: Negative for agitation, behavioral problems and confusion.   All other systems reviewed and are negative.      Objective     Vital Signs  Temp:  [97.3 °F (36.3 °C)-98 °F (36.7 °C)] 97.9 °F (36.6 °C)  Heart Rate:  [] 90  Resp:  [18-28] 18  BP: (106-177)/(48-83) 115/81    Physical Exam:  Physical Exam   Constitutional: She is oriented to person, place, and time. She appears cachectic. She appears ill. She appears distressed.   HENT:   Head: Normocephalic and atraumatic.   Eyes: EOM are normal. Pupils are equal, round, and reactive to light. Scleral icterus is present.   Neck: Normal range of motion. Neck supple.   Cardiovascular: An irregular rhythm present. Tachycardia present.    Pulmonary/Chest: No respiratory distress. She has no wheezes. She has no rales.   Abdominal: Soft. She exhibits distension. She exhibits no mass. There is tenderness. There is no guarding. No hernia.   Musculoskeletal: Normal range of motion. She exhibits no edema or tenderness.   Neurological: She is alert and oriented to person, place, and time. She has normal reflexes. No cranial nerve deficit.   Skin: Skin is dry. No rash noted. No erythema. There is pallor.   Psychiatric: She has a normal mood and affect. Her behavior is normal.   Vitals reviewed.       Results Review:    Lab Results (last 24  hours)     Procedure Component Value Units Date/Time    Blood Culture - Blood, [[id number]]  (Normal) Collected:  05/07/18 1115    Specimen:  Blood from Blood, Venous Line Updated:  05/09/18 1145     Blood Culture No growth at 2 days    CBC & Differential [[id number]] Collected:  05/09/18 0541    Specimen:  Blood Updated:  05/09/18 0839    Narrative:       The following orders were created for panel order CBC & Differential.  Procedure                               Abnormality         Status                     ---------                               -----------         ------                     Manual Differential[[id number]]          Abnormal            Final result               CBC Auto Differential[[id number]]        Abnormal            Final result                 Please view results for these tests on the individual orders.    Manual Differential [[id number]]  (Abnormal) Collected:  05/09/18 0541    Specimen:  Blood Updated:  05/09/18 0839     Neutrophil % 80.0 %      Lymphocyte % 15.0 %      Monocyte % 4.0 %      Bands %  1.0 %      Neutrophils Absolute 18.93 (H) 10*3/mm3      Lymphocytes Absolute 3.51 10*3/mm3      Monocytes Absolute 0.93 (H) 10*3/mm3      nRBC 36.0 (H) /100 WBC      Anisocytosis Mod/2+     Hypochromia Slight/1+     Macrocytes Slight/1+     Polychromasia Slight/1+     WBC Morphology Normal     Platelet Estimate Decreased    CBC Auto Differential [[id number]]  (Abnormal) Collected:  05/09/18 0541    Specimen:  Blood Updated:  05/09/18 0641     WBC 23.37 (H) 10*3/mm3      RBC 2.74 (L) 10*6/mm3      Hemoglobin 8.5 (L) g/dL      Comment: PATIENT RECEIVED BLOOD        Hematocrit 27.0 (L) %      MCV 98.5 (H) fL      MCH 31.0 pg      MCHC 31.5 g/dL      RDW 25.6 (H) %      RDW-SD 81.7 (H) fl      Platelets 25 (L) 10*3/mm3      Comment: SPECIMEN REANALYZED TO CONFIRM PLATELET COUNT       Comprehensive Metabolic Panel [[id number]]  (Abnormal) Collected:  05/09/18 0541    Specimen:  Blood Updated:   05/09/18 0621     Glucose 84 mg/dL      BUN 33 (H) mg/dL      Creatinine 1.11 (H) mg/dL      Sodium 140 mmol/L      Potassium 4.3 mmol/L      Chloride 113 (H) mmol/L      CO2 18.0 (L) mmol/L      Calcium 7.9 (L) mg/dL      Total Protein 5.4 (L) g/dL      Albumin 2.10 (L) g/dL      ALT (SGPT) 110 (H) U/L      AST (SGOT) 1,347 (H) U/L      Alkaline Phosphatase 194 (H) U/L      Total Bilirubin 3.5 (H) mg/dL      eGFR Non African Amer 49 mL/min/1.73      Globulin 3.3 gm/dL      A/G Ratio 0.6 (L) g/dL      BUN/Creatinine Ratio 29.7 (H)     Anion Gap 9.0 mmol/L     Lactic Acid, Plasma [[id number]]  (Normal) Collected:  05/09/18 0541    Specimen:  Blood Updated:  05/09/18 0617     Lactate 1.4 mmol/L     Urine Culture - Urine, Urine, Clean Catch [[id number]]  (Normal) Collected:  05/07/18 1543    Specimen:  Urine from Urine, Clean Catch Updated:  05/09/18 0613     Urine Culture No growth at 24 hours    Lactic Acid, Plasma [[id number]]  (Normal) Collected:  05/09/18 0056    Specimen:  Blood Updated:  05/09/18 0120     Lactate 1.9 mmol/L     Lactic Acid, Plasma [[id number]]  (Abnormal) Collected:  05/08/18 1755    Specimen:  Blood Updated:  05/08/18 1825     Lactate 2.1 (C) mmol/L     Blood Culture - Blood, [[id number]]  (Normal) Collected:  05/07/18 1609    Specimen:  Blood from Arm, Right Updated:  05/08/18 1615     Blood Culture No growth at 24 hours    Lactic Acid, Plasma [[id number]]  (Abnormal) Collected:  05/08/18 1154    Specimen:  Blood Updated:  05/08/18 1217     Lactate 6.7 (C) mmol/L           Imaging Results (last 24 hours)     ** No results found for the last 24 hours. **          Assessment/Plan     Principal Problem:    Sepsis  Active Problems:    UTI (urinary tract infection)    Metastatic breast cancer    Bone metastasis    PAF (paroxysmal atrial fibrillation)    Acute kidney injury    Anemia    Hydronephrosis    Liver metastasis    Enterococcus faecalis in the UC, continue with the IV antibiotics. Hgb up to 8.5  after 2 u PRBCs.    Reverted to sinus rhythm after IV beta blocker.    Extensive metastatic breast cancer - bone and liver metastasis. She could benefit from Hospice care if the family would agree. Very poor overall condition and guarded prognosis.    Carlin Perea MD  05/09/18  12:09 PM

## 2018-05-10 NOTE — PLAN OF CARE
Problem: Patient Care Overview  Goal: Plan of Care Review  Outcome: Ongoing (interventions implemented as appropriate)   05/10/18 0606   Coping/Psychosocial   Plan of Care Reviewed With patient   Plan of Care Review   Progress improving   OTHER   Outcome Summary no nausea overnight, pt tolerating clear liquids, afib on monitor - Metoprolol given,       Problem: Fall Risk (Adult)  Goal: Absence of Fall  Outcome: Ongoing (interventions implemented as appropriate)      Problem: Skin Injury Risk (Adult)  Goal: Skin Health and Integrity  Outcome: Ongoing (interventions implemented as appropriate)      Problem: Pain, Acute (Adult)  Goal: Acceptable Pain Control/Comfort Level  Outcome: Ongoing (interventions implemented as appropriate)      Problem: Sepsis/Septic Shock (Adult)  Goal: Signs and Symptoms of Listed Potential Problems Will be Absent, Minimized or Managed (Sepsis/Septic Shock)  Outcome: Ongoing (interventions implemented as appropriate)

## 2018-05-10 NOTE — PLAN OF CARE
Problem: Patient Care Overview  Goal: Plan of Care Review   05/10/18 1234   Coping/Psychosocial   Plan of Care Reviewed With caregiver;daughter   Plan of Care Review   Progress improving   OTHER   Outcome Summary Pt taking some of clear liquids. Good acceptance of Ensure clear. Overall Prognosis Guarded.

## 2018-05-10 NOTE — PROGRESS NOTES
"Pharmacokinetics by Pharmacy - Vancomycin    Johanne Mayer is a 67 y.o. female receiving vancomycin 1250mg IV Q 24 hours day 4 for sepsis  Patient is also receiving aztreonam      Objective:  [Ht: 157.5 cm (62\"); Wt: 67.1 kg (147 lb 14.9 oz)]     Lab Results   Component Value Date    WBC 23.37 (H) 05/09/2018    WBC 30.37 (H) 05/08/2018    WBC 24.14 (H) 05/07/2018      Lab Results   Component Value Date    LACTATE 1.9 05/10/2018    LACTATE 1.6 05/10/2018    LACTATE 2.1 (C) 05/09/2018      Temp Readings from Last 1 Encounters:   05/10/18 98.2 °F (36.8 °C) (Axillary)     Estimated Creatinine Clearance: 43 mL/min (by C-G formula based on SCr of 1.11 mg/dL (H)).   Lab Results   Component Value Date    CREATININE 1.11 (H) 05/09/2018    CREATININE 1.21 (H) 05/08/2018    CREATININE 1.20 (H) 05/07/2018       Lab Results   Component Value Date    VANCOPEAK 26.26 (L) 05/09/2018    VANCOTROUGH 11.66 05/10/2018    VANCOTROUGH 11.77 04/19/2018    VANCORANDOM 8.64 04/17/2018       Culture Results:  Microbiology Results (last 10 days)       Procedure Component Value - Date/Time    Respiratory Panel, PCR - Swab, Nasopharynx [[id number]]  (Normal) Collected:  05/07/18 1742    Lab Status:  Final result Specimen:  Swab from Nasopharynx Updated:  05/07/18 2027     ADENOVIRUS, PCR Not Detected     Coronavirus 229E Not Detected     Coronavirus HKU1 Not Detected     Coronavirus NL63 Not Detected     Coronavirus OC43 Not Detected     Human Metapneumovirus Not Detected     Human Rhinovirus/Enterovirus Not Detected     Influenza B PCR Not Detected     Parainfluenza Virus 1 Not Detected     Parainfluenza Virus 2 Not Detected     Parainfluenza Virus 3 Not Detected     Parainfluenza Virus 4 Not Detected     Bordetella pertussis pcr Not Detected     Influenza A H1 2009 PCR Not Detected     Chlamydophila pneumoniae PCR Not Detected     Mycoplasma pneumo by PCR Not Detected     Influenza A PCR Not Detected     Influenza A H3 Not Detected     " Influenza A H1 Not Detected     RSV, PCR Not Detected    Blood Culture - Blood, [[id number]]  (Normal) Collected:  05/07/18 1609    Lab Status:  Preliminary result Specimen:  Blood from Arm, Right Updated:  05/09/18 1615     Blood Culture No growth at 2 days    Urine Culture - Urine, Urine, Clean Catch [[id number]]  (Normal) Collected:  05/07/18 1543    Lab Status:  Final result Specimen:  Urine from Urine, Clean Catch Updated:  05/09/18 0613     Urine Culture No growth at 24 hours    Blood Culture - Blood, [[id number]]  (Normal) Collected:  05/07/18 1115    Lab Status:  Preliminary result Specimen:  Blood from Blood, Venous Line Updated:  05/10/18 1145     Blood Culture No growth at 3 days                 Assessment:  Renal function remains stable.  WBC are still elevated but improved.  Levels at low end of goal.  Will leave dose the same for now as patient seems to be improving per provider notes.  Will check trough in 3 days to ensure that trough remains above 10.   Current regimen provides a vancomycin peak of 27.73, trough 10.89 and .    Plan:  1. Continue vancomycin 1250mg IV Q 24 hours  2. Will order trough level for 5/13.  Will obtain trough sooner if renal function changes.   3. Pharmacy will monitor renal function and adjust dose accordingly.      Felipe White III, [facility]   05/10/18 12:55 PM

## 2018-05-10 NOTE — PROGRESS NOTES
Malnutrition Severity Assessment    Patient Name:  Johanne Mayer  YOB: 1950  MRN: [id number]  Admit Date:  5/7/2018    Patient meets criteria for : Severe malnutrition    Comments:  Pt presents with severe malnutrition as evidenced by her very significant wt loss since January of 60#, this is 32% of her UBW (190#) and is now 72% of her UBW.  Minimal intake over the last month.  She has physical signs of malnutrition present with fat loss and muscle wasting.  She has been Dxed with extensive Metastatic Breast Cancer with bone and Liver mets.    Malnutrition Type: Chronic Illness Malnutrition     Malnutrition Type (last 8 hours)      Malnutrition Severity Assessment     Row Name 05/10/18 1243       Malnutrition Severity Assessment    Malnutrition Type Chronic Illness Malnutrition    Row Name 05/10/18 1243       Physical Signs of Malnutrition (Chronic)    Muscle Wasting Severe    Fat Loss Severe    Fluid Accumulation None    Secondary Physical Signs None    Row Name 05/10/18 1243       Weight Status (Chronic)    %UBW Severe (<75%)    Weight Loss Severe (>10% / 6 mo)    Row Name 05/10/18 1243       Energy Intake Status (Chronic)    Energy Intake Severe (< or equal to 50% / > or equal to 1 mo)    Row Name 05/10/18 1243       Criteria Met (Must meet criteria for severity in at least 2 of these categories: M Wasting, Fat Loss, Fluid, Secondary Signs, Wt. Status, Intake)    Patient meets criteria for  Severe malnutrition          Electronically signed by:  Melia Cool RD  05/10/18 12:47 PM

## 2018-05-10 NOTE — PROGRESS NOTES
Progress Note  Carlin Perea MD  Hospitalist    Date of visit: 5/10/2018     LOS: 3 days   Patient Care Team:  Joey Ramos MD as PCP - General (Hematology and Oncology)    Chief Complaint: weakness / abdominal discomfort    Subjective     Interval History:     Patient Complaints: weakness / some better.    History taken from: chart / nursing    Medication Review:   Current Facility-Administered Medications   Medication Dose Route Frequency Provider Last Rate Last Dose   • aztreonam (AZACTAM) 1 g/100 mL 0.9% NS IVPB (mbp)  1 g Intravenous Q8H Chris Frey MD   1 g at 05/10/18 1359   • famotidine (PEPCID) IVPB 20 mg  20 mg Intravenous Daily Chris Frey MD   20 mg at 05/10/18 0820   • fentaNYL (DURAGESIC) 12 MCG/HR 1 patch  1 patch Transdermal Q72H Chris Frey MD   1 patch at 05/10/18 1400   • hydrALAZINE (APRESOLINE) injection 10 mg  10 mg Intravenous Q6H Carlin Perea MD   10 mg at 05/10/18 1359   • morphine injection 2 mg  2 mg Intravenous Q4H PRN Carlin Perea MD        And   • naloxone (NARCAN) injection 0.4 mg  0.4 mg Intravenous Q5 Min PRN Carlin Perea MD       • ondansetron (ZOFRAN) 8 mg in sodium chloride 0.9 % 50 mL IVPB  8 mg Intravenous Q6H PRN Carlin Perea MD   8 mg at 05/09/18 1730   • Pharmacy to dose vancomycin   Does not apply Continuous PRN Chris Frey MD       • promethazine (PHENERGAN) injection 12.5 mg  12.5 mg Intravenous Q6H PRN Carlin Perea MD   12.5 mg at 05/10/18 0852   • sodium chloride 0.9 % bolus 1,000 mL  1,000 mL Intravenous Once Gagan Jhaveri MD       • sodium chloride 0.9 % flush 1-10 mL  1-10 mL Intravenous PRN Chris Frey MD       • sodium chloride 0.9 % flush 10 mL  10 mL Intravenous PRN Gagan Jhaveri MD       • sodium chloride 0.9 % infusion  100 mL/hr Intravenous Continuous Chris Frey  mL/hr at 05/10/18 0709 100 mL/hr at 05/10/18 0709   • vancomycin (VANCOCIN) 1,250 mg in sodium chloride 0.9 % 250 mL IVPB  1,250 mg Intravenous Q24H Chris Frey MD    1,250 mg at 05/10/18 1304       Review of Systems:   Review of Systems   Constitutional: Positive for fatigue. Negative for fever.   Respiratory: Positive for shortness of breath. Negative for cough, choking and wheezing.    Cardiovascular: Positive for chest pain. Negative for palpitations and leg swelling.   Gastrointestinal: Positive for abdominal distention, abdominal pain and nausea. Negative for constipation and diarrhea.   Genitourinary: Positive for dysuria, frequency and urgency.   Musculoskeletal: Positive for arthralgias, back pain and gait problem. Negative for joint swelling.   Neurological: Positive for weakness. Negative for tremors, syncope, numbness and headaches.   Psychiatric/Behavioral: Negative for agitation, behavioral problems and confusion.   All other systems reviewed and are negative.      Objective     Vital Signs  Temp:  [97.6 °F (36.4 °C)-98.7 °F (37.1 °C)] 98.2 °F (36.8 °C)  Heart Rate:  [] 86  Resp:  [15-26] 15  BP: (112-168)/() 139/66    Physical Exam:  Physical Exam   Constitutional: She is oriented to person, place, and time. She appears cachectic. She appears ill. She appears distressed.   HENT:   Head: Normocephalic and atraumatic.   Eyes: EOM are normal. Pupils are equal, round, and reactive to light. Scleral icterus is present.   Neck: Normal range of motion. Neck supple.   Cardiovascular: An irregular rhythm present. Tachycardia present.    Pulmonary/Chest: No respiratory distress. She has no wheezes. She has no rales.   Abdominal: Soft. She exhibits distension. She exhibits no mass. There is tenderness. There is no guarding. No hernia.   Musculoskeletal: Normal range of motion. She exhibits no edema or tenderness.   Neurological: She is alert and oriented to person, place, and time. She has normal reflexes. No cranial nerve deficit.   Skin: Skin is dry. No rash noted. No erythema. There is pallor.   Psychiatric: She has a normal mood and affect. Her behavior is  normal.   Vitals reviewed.       Results Review:    Lab Results (last 24 hours)     Procedure Component Value Units Date/Time    Blood Culture - Blood, [[id number]]  (Normal) Collected:  05/07/18 1609    Specimen:  Blood from Arm, Right Updated:  05/10/18 1615     Blood Culture No growth at 3 days    Vancomycin, Trough [[id number]]  (Normal) Collected:  05/10/18 1157    Specimen:  Blood Updated:  05/10/18 1224     Vancomycin Trough 11.66 mcg/mL     Lactic Acid, Plasma [[id number]]  (Normal) Collected:  05/10/18 1157    Specimen:  Blood Updated:  05/10/18 1216     Lactate 1.9 mmol/L     Blood Culture - Blood, [[id number]]  (Normal) Collected:  05/07/18 1115    Specimen:  Blood from Blood, Venous Line Updated:  05/10/18 1145     Blood Culture No growth at 3 days    Lactic Acid, Plasma [[id number]]  (Normal) Collected:  05/10/18 0432    Specimen:  Blood Updated:  05/10/18 0448     Lactate 1.6 mmol/L     Lactic Acid, Plasma [[id number]]  (Abnormal) Collected:  05/09/18 2343    Specimen:  Blood Updated:  05/10/18 0000     Lactate 2.1 (C) mmol/L           Imaging Results (last 24 hours)     ** No results found for the last 24 hours. **          Assessment/Plan     Principal Problem:    Sepsis  Active Problems:    UTI (urinary tract infection)    Metastatic breast cancer    Bone metastasis    PAF (paroxysmal atrial fibrillation)    Acute kidney injury    Anemia    Hydronephrosis    Liver metastasis    Enterococcus faecalis in the UC, continue with the IV antibiotics. Hgb up to 8.5 after 2 u PRBCs.    Reverted to sinus rhythm after IV beta blocker.    Extensive metastatic breast cancer - bone and liver metastasis. She could benefit from Hospice care if the family would agree. Very poor overall condition and guarded prognosis.    Can be transferred to a regular floor    Carlin Perea MD  05/10/18  5:57 PM

## 2018-05-10 NOTE — PROGRESS NOTES
Progress Note  Carlin Perea MD  Hospitalist    Date of visit: 5/10/2018     LOS: 3 days   Patient Care Team:  Joey Ramos MD as PCP - General (Hematology and Oncology)    Chief Complaint: weakness / abdominal discomfort    Subjective     Interval History:     Pt and daughter state that pt feels a little stronger today. Pain mostly is in back. No SOA. She is having nausea.    History taken from: pt/ chart / nursing    Medication Review:   Current Facility-Administered Medications   Medication Dose Route Frequency Provider Last Rate Last Dose   • aztreonam (AZACTAM) 1 g/100 mL 0.9% NS IVPB (mbp)  1 g Intravenous Q8H Chris Frey MD   1 g at 05/10/18 0542   • famotidine (PEPCID) IVPB 20 mg  20 mg Intravenous Daily Chris Frey MD   20 mg at 05/10/18 0820   • fentaNYL (DURAGESIC) 12 MCG/HR 1 patch  1 patch Transdermal Q72H Chris Frey MD   1 patch at 05/07/18 1438   • morphine injection 2 mg  2 mg Intravenous Q4H PRN Carlin Perea MD        And   • naloxone (NARCAN) injection 0.4 mg  0.4 mg Intravenous Q5 Min PRN Carlin Perea MD       • ondansetron (ZOFRAN) 8 mg in sodium chloride 0.9 % 50 mL IVPB  8 mg Intravenous Q6H PRN Carlin Perea MD   8 mg at 05/09/18 1730   • Pharmacy to dose vancomycin   Does not apply Continuous PRN Chris Frey MD       • promethazine (PHENERGAN) injection 12.5 mg  12.5 mg Intravenous Q6H PRN Carlin Perea MD   12.5 mg at 05/10/18 0852   • sodium chloride 0.9 % bolus 1,000 mL  1,000 mL Intravenous Once Gagan Jhaveri MD       • sodium chloride 0.9 % flush 1-10 mL  1-10 mL Intravenous PRN Chris Frey MD       • sodium chloride 0.9 % flush 10 mL  10 mL Intravenous PRN Gagan Jhaveri MD       • sodium chloride 0.9 % infusion  100 mL/hr Intravenous Continuous Chris Frey  mL/hr at 05/10/18 0709 100 mL/hr at 05/10/18 0709   • vancomycin (VANCOCIN) 1,250 mg in sodium chloride 0.9 % 250 mL IVPB  1,250 mg Intravenous Q24H Chris Frey MD   1,250 mg at 05/09/18 1320        Review of Systems:   Review of Systems   Constitutional: Positive for fatigue. Negative for fever.   Respiratory: Negative for cough, choking, shortness of breath and wheezing.    Cardiovascular: Negative for chest pain, palpitations and leg swelling.   Gastrointestinal: Positive for abdominal distention, abdominal pain and nausea. Negative for constipation and diarrhea.   Genitourinary: Positive for dysuria, frequency and urgency.   Musculoskeletal: Positive for arthralgias, back pain and gait problem. Negative for joint swelling.   Neurological: Positive for weakness. Negative for tremors, syncope, numbness and headaches.   Psychiatric/Behavioral: Negative for agitation, behavioral problems and confusion.   All other systems reviewed and are negative.      Objective     Vital Signs  Temp:  [97.6 °F (36.4 °C)-98.7 °F (37.1 °C)] 98.2 °F (36.8 °C)  Heart Rate:  [] 75  Resp:  [15-26] 22  BP: (112-168)/() 145/82    Physical Exam:  Physical Exam   Constitutional: She is oriented to person, place, and time. She appears cachectic. She appears ill. No distress.   HENT:   Head: Normocephalic and atraumatic.   Eyes: EOM are normal. Pupils are equal, round, and reactive to light. Scleral icterus is present.   Neck: Normal range of motion. Neck supple.   Cardiovascular: Normal rate and regular rhythm.    Pulmonary/Chest: No respiratory distress. She has no wheezes. She has no rales.   Abdominal: Soft. She exhibits distension. She exhibits no mass. There is tenderness. There is no guarding. No hernia.   Musculoskeletal: Normal range of motion. She exhibits no edema or tenderness.   Neurological: She is alert and oriented to person, place, and time. She has normal reflexes. No cranial nerve deficit.   Skin: Skin is dry. No rash noted. No erythema. There is pallor.   Psychiatric: She has a normal mood and affect. Her behavior is normal.   Vitals reviewed.       Results Review:    Lab Results (last 24 hours)      Procedure Component Value Units Date/Time    Lactic Acid, Plasma [[id number]]  (Normal) Collected:  05/10/18 0432    Specimen:  Blood Updated:  05/10/18 0448     Lactate 1.6 mmol/L     Lactic Acid, Plasma [[id number]]  (Abnormal) Collected:  05/09/18 2343    Specimen:  Blood Updated:  05/10/18 0000     Lactate 2.1 (C) mmol/L     Lactic Acid, Plasma [[id number]]  (Abnormal) Collected:  05/09/18 1624    Specimen:  Blood Updated:  05/09/18 1652     Lactate 2.3 (C) mmol/L     Vancomycin, Peak [[id number]]  (Abnormal) Collected:  05/09/18 1624    Specimen:  Blood Updated:  05/09/18 1651     Vancomycin Peak 26.26 (L) mcg/mL     Blood Culture - Blood, [[id number]]  (Normal) Collected:  05/07/18 1609    Specimen:  Blood from Arm, Right Updated:  05/09/18 1615     Blood Culture No growth at 2 days    Lactic Acid, Plasma [[id number]]  (Normal) Collected:  05/09/18 1302    Specimen:  Blood Updated:  05/09/18 1320     Lactate 1.5 mmol/L     Blood Culture - Blood, [[id number]]  (Normal) Collected:  05/07/18 1115    Specimen:  Blood from Blood, Venous Line Updated:  05/09/18 1145     Blood Culture No growth at 2 days          Imaging Results (last 24 hours)     ** No results found for the last 24 hours. **          Assessment/Plan     Principal Problem:    Sepsis  Active Problems:    Bone metastasis    PAF (paroxysmal atrial fibrillation)    Acute kidney injury    UTI (urinary tract infection)    Metastatic breast cancer    Anemia    Hydronephrosis    Liver metastasis    Enterococcus faecalis in the UC, continue with the IV antibiotics. Lactate improved. WBC improved today. Hgb up to 8.5 yesterday after 2 u PRBCs.    Reverted to sinus rhythm after IV beta blocker.    Extensive metastatic breast cancer - bone and liver metastasis. She could benefit from Hospice care if the family would agree. Very poor overall condition and guarded prognosis.    Can go to medicine floor today.    GI ppx: pepcid  DVT ppx: SCD/TEDs          This document  has been electronically signed by Cecil James MD on May 10, 2018 11:05 AM

## 2018-05-10 NOTE — CONSULTS
Adult Nutrition  Assessment    Patient Name:  Johanne Mayer  YOB: 1950  MRN: [id number]  Admit Date:  5/7/2018    Assessment Date:  5/10/2018    Comments:  Pt taking some of the clear liquid diet but continues to have intermittent nausea.  She is taking the Ensure clear well when she is not nauseated.  MD notes that her overall prognosis is poor due to extensive metastatic breast cancer with bone and liver mets.  Hospice encouraged.  RD will continue to monitor.            Adult Nutrition Assessment     Row Name 05/10/18 1230       Nutrition Prescription PO    Current PO Diet Clear Liquid    Fluid Consistency Thin    Supplement Ensure Clear    Supplement Frequency 3 times a day       PO Evaluation    Number of Days PO Intake Evaluated Insufficient Data    Row Name 05/10/18 1229       Labs/Procedures/Meds    Lab Results Reviewed reviewed, pertinent    Lab Results Comments Bun 33; Creat 1.11; ; Tbili 3.5       Physical Findings    Overall Physical Appearance loss of muscle mass;loss of subcutaneous fat;listlessness    Tubes gastro-jejunostomy tube    Row Name 05/10/18 1228       Reason for Assessment    Reason For Assessment follow-up protocol       Nutrition/Diet History    Typical Food/Fluid Intake Pt sleeping.  Her daughters are present and they report that she is not doing well. She is drinking the some of the Ensure clear but has nausea off and on.      Row Name 05/10/18 0542       Anthropometrics    Weight 67.1 kg (147 lb 14.9 oz)          Electronically signed by:  Melia Cool RD  05/10/18 12:36 PM

## 2018-05-10 NOTE — PROGRESS NOTES
[facility] Hospice Referral - Spoke with patient & primary caregiver who resides in the patient's home.  The hospice mission/philosophy  and scope of practice reviewed with time allowed for any questions.  Family present relate that if [facility] isn't available that other daughters are prepared to assume that role.  Dr Dariusz Varela has agreed to be her PCP if patient chooses Hospice comfort measures.  Daughters agree to call number provided if any further questions.

## 2018-05-11 NOTE — PROGRESS NOTES
[facility] Medicine Services  INPATIENT PROGRESS NOTE    Length of Stay: 4  Date of Admission: 5/7/2018  Primary Care Physician: Joey Ramos MD    Subjective     HPI:  Pain is better controlled today.  Per RN report no meaningful changes overnight.    Review of Systems   Denies headache, chest pain, diarrhea, rash; noted double vision times several days  All pertinent negatives and positives are as above. All other systems have been reviewed and are negative unless otherwise stated.     Objective    Temp:  [98.5 °F (36.9 °C)-99.2 °F (37.3 °C)] 98.5 °F (36.9 °C)  Heart Rate:  [] 90  Resp:  [15-20] 18  BP: (115-164)/() 130/84    Physical Exam  Physical Exam  Gen: Ill-appearing and uncomfortable but alert and appropriate  HEENT: oral and nasal mucosa without erythema or exudate; noted dryness  Neck: supple/NT; no LAD or bruits; No elevation of JVP  Lungs: Basilar Rales B; symmetric rise and fall of the chest  Cv: RRR, no m/r/g; no S3  Abd: S/NT/ND, Bs+  Extremeties: No c/c/e  Skin: no rash, ecchymosis, skin breakdown  Neuro: EOMI; PERRLA; no focal motor weakness  Psych: Sad affect      Results Review:  I have reviewed the labs, radiology results, and diagnostic studies.    Laboratory Data:     Results from last 7 days  Lab Units 05/09/18  0541 05/08/18  0203 05/07/18  1122   SODIUM mmol/L 140 137 132*   POTASSIUM mmol/L 4.3 5.3* 3.5   CHLORIDE mmol/L 113* 106 100   CO2 mmol/L 18.0* 18.0* 14.0*   BUN mg/dL 33* 31* 28*   CREATININE mg/dL 1.11* 1.21* 1.20*   GLUCOSE mg/dL 84 82 109*   CALCIUM mg/dL 7.9* 7.6* 8.6   BILIRUBIN mg/dL 3.5* 1.9* 1.8*   ALK PHOS U/L 194* 222* 199*   ALT (SGPT) U/L 110* 72* 30   AST (SGOT) U/L 1,347* 1,829* 666*   ANION GAP mmol/L 9.0 13.0 18.0*     Estimated Creatinine Clearance: 45.9 mL/min (by C-G formula based on SCr of 1.11 mg/dL (H)).    Results from last 7 days  Lab Units 05/07/18  1122   MAGNESIUM mg/dL 2.2           Results from  last 7 days  Lab Units 05/09/18  0541 05/08/18  0203 05/07/18  1122   WBC 10*3/mm3 23.37* 30.37* 24.14*   HEMOGLOBIN g/dL 8.5* 6.7* 7.8*   HEMATOCRIT % 27.0* 21.3* 25.1*   PLATELETS 10*3/mm3 25* 32* 44*       Results from last 7 days  Lab Units 05/07/18  1122   INR  1.68*       Culture Data:   No results found for: BLOODCX  No results found for: URINECX  No results found for: RESPCX  No results found for: WOUNDCX  No results found for: STOOLCX  No components found for: BODYFLD    Radiology Data:   Imaging Results (last 24 hours)     ** No results found for the last 24 hours. **          I have reviewed the patient current medications.     Assessment/Plan     Hospital Problem List     * (Principal)Sepsis    Bone metastasis    PAF (paroxysmal atrial fibrillation)    Acute kidney injury    UTI (urinary tract infection)    Metastatic breast cancer (Chronic)    Anemia (Chronic)    Hydronephrosis (Chronic)    Liver metastasis (Chronic)          Plan:  67-year-old white female with widely metastatic breast cancer, paroxysmal atrial fibrillation here with..    #1 septic shock due to Enterococcus faecalis urinary tract infection  #2 E faecalis urinary tract infection  #3 acute renal failure due to #1 from acute tubular necrosis  #4 paroxysmal of atrial fibrillation with RVR status post conversion  #5 cancer pain    Patient family met with hospice yesterday and are making changes to her care plan and goals of care.  Most likely she will be discharged to her sister's home with hospice.  They are making preparations now.  The interdisciplinary care team is working through details of her disposition now.  Continue current anabolic regimen.  Continue forthcoming analgesic regimen.              Discharge Planning: I expect patient to be discharged to home with hospice in 1 days.    Parag Claudio MD  05/11/18  1:25 PM

## 2018-05-11 NOTE — PLAN OF CARE
Problem: Patient Care Overview  Goal: Plan of Care Review  Outcome: Ongoing (interventions implemented as appropriate)   05/11/18 0318   Coping/Psychosocial   Plan of Care Reviewed With patient   Plan of Care Review   Progress improving   OTHER   Outcome Summary Patient tolerating clear liquids. Patient reports some nausea. PRN nausea meds given and effective. Patient currently resting with no complaints. Will continue to monitor.      Goal: Individualization and Mutuality  Outcome: Ongoing (interventions implemented as appropriate)    Goal: Discharge Needs Assessment  Outcome: Ongoing (interventions implemented as appropriate)    Goal: Interprofessional Rounds/Family Conf  Outcome: Ongoing (interventions implemented as appropriate)      Problem: Fall Risk (Adult)  Goal: Absence of Fall  Outcome: Outcome(s) achieved Date Met: 05/11/18      Problem: Skin Injury Risk (Adult)  Goal: Skin Health and Integrity  Outcome: Ongoing (interventions implemented as appropriate)      Problem: Pain, Acute (Adult)  Goal: Acceptable Pain Control/Comfort Level  Outcome: Ongoing (interventions implemented as appropriate)      Problem: Sepsis/Septic Shock (Adult)  Goal: Signs and Symptoms of Listed Potential Problems Will be Absent, Minimized or Managed (Sepsis/Septic Shock)  Outcome: Ongoing (interventions implemented as appropriate)

## 2018-05-11 NOTE — PROGRESS NOTES
"   LOS: 4 days   Patient Care Team:  Joey Ramos MD as PCP - General (Hematology and Oncology)  Joey Ramos MD as PCP - Claims Attributed  Petty Rosenberg RN as Care Coordinator (Population Health)  Joey Ramos MD as Consulting Physician (Hematology and Oncology)    Subjective     Subjective:  Symptoms:  No shortness of breath.  (Some nausea. ).    Diet:  She reports  nausea.  No vomiting.    Pain:  She complains of pain that is moderate.  Pain is requiring pain medication.        History taken from: patient chart family RN    Objective     Vital Signs  Temp:  [96.8 °F (36 °C)-99.2 °F (37.3 °C)] 96.8 °F (36 °C)  Heart Rate:  [77-94] 89  Resp:  [15-18] 18  BP: (115-156)/(62-87) 130/82    Objective:  General Appearance:  Ill-appearing.    Vital signs: (most recent): Blood pressure 130/82, pulse 89, temperature 96.8 °F (36 °C), temperature source Oral, resp. rate 18, height 157.5 cm (62\"), weight 76.9 kg (169 lb 9 oz), SpO2 98 %, not currently breastfeeding.  Vital signs are normal.  No fever.    Output: Producing urine.    HEENT: Normal HEENT exam.    Lungs:  Normal effort and normal respiratory rate.  She is not in respiratory distress.  No stridor.  There are decreased breath sounds.  No rales.    Heart: Normal rate.  Regular rhythm.  S1 normal and S2 normal.    Abdomen: Abdomen is soft and non-distended.  Bowel sounds are normal.   There is generalized tenderness.     Extremities: Normal range of motion.  There is no dependent edema.    Pulses: Distal pulses are intact.    Neurological: Patient is alert and oriented to person, place and time.  GCS score is 15.    Pupils:  Pupils are equal, round, and reactive to light.    Skin:  Warm, dry and pale.  No ecchymosis or cyanosis.             Results Review:    Lab Results (last 24 hours)     Procedure Component Value Units Date/Time    Blood Culture - Blood, [[id number]]  (Normal) Collected:  05/07/18 1115    Specimen:  Blood from Blood, Venous Line Updated:  " 05/11/18 1145     Blood Culture No growth at 4 days    Blood Culture - Blood, [[id number]]  (Normal) Collected:  05/07/18 1609    Specimen:  Blood from Arm, Right Updated:  05/10/18 1615     Blood Culture No growth at 3 days         Imaging Results (last 24 hours)     ** No results found for the last 24 hours. **           I reviewed the patient's new clinical results.  I reviewed the patient's new imaging results and agree with the interpretation.  I reviewed the patient's other test results and agree with the interpretation      Assessment/Plan     Principal Problem:    Sepsis  Active Problems:    Bone metastasis    PAF (paroxysmal atrial fibrillation)    Acute kidney injury    UTI (urinary tract infection)    Metastatic breast cancer    Anemia    Hydronephrosis    Liver metastasis      Assessment & Plan     1. Hydronephrosis, RIGHT, chronic, related to obstruction from metastatic breast cancer  --Postop cystoscopy RIGHT retrograde with J-stent change on 4/18/18  --CT abdomen/pelvis shows chronic right hydronephrosis, metastases more pronounced     2. Sepsis, unknown source, possible UTI  --Urine culture and blood cultures negative thus far, previous urine culture enterococcus faecalis  --Lactic acid 7.9-->4.1-->1.9-->4.5-->6.7-->30.7-->23.37  --Azactam and vanc day #4     3. ADRIANA  -Most recent Cr 1.11, Previous 1.21, baseline running around 1.0-1.1, BUN rising  -Estimated Creatinine Clearance: 45.9 mL/min (by C-G formula based on SCr of 1.11 mg/dL (H)).   -urine output 1,355 mL previous 24 hours.      Plan: Continue antibiotics, J-stent remains, no plans for OR at this time, will follow.     FADI Estes  05/11/18  4:06 PM

## 2018-05-12 NOTE — PROGRESS NOTES
[facility]  INPATIENT PROGRESS NOTE    Length of Stay: 5  Date of Admission: 5/7/2018  Primary Care Physician: Joey Ramos MD    Subjective   Chief Complaint: Nausea  HPI:  Interval development of worsening nausea and single episode of vomitus.  Pain better controlled now.  Poor sleep overnight and treated to nausea and pain.    Review of Systems   Denies rash, diarrhea, chest pain  All pertinent negatives and positives are as above. All other systems have been reviewed and are negative unless otherwise stated.     Objective    Temp:  [96.6 °F (35.9 °C)-97 °F (36.1 °C)] 96.6 °F (35.9 °C)  Heart Rate:  [] 99  Resp:  [18] 18  BP: (110-162)/(65-92) 142/85    Physical Exam  Physical Exam  Gen: Uncomfortable appearing; appropriate to questioning  HEENT: oral and nasal mucosa without erythema or exudate  Neck: supple/NT; no LAD or bruits; No elevation of JVP  Lungs: Rhonchi B; symmetric rise and fall of the chest  Cv: RRR, no m/r/g; no S3  Abd: S/NT/ND, Bs+  Extremeties: No c/c; noted edema times all 4 extremities 1+  Skin: no rash, ecchymosis, skin breakdown  Neuro: EOMI;no focal motor weakness  Psych: Sad affect      Results Review:  I have reviewed the labs, radiology results, and diagnostic studies.    Laboratory Data:     Results from last 7 days  Lab Units 05/09/18  0541 05/08/18  0203 05/07/18  1122   SODIUM mmol/L 140 137 132*   POTASSIUM mmol/L 4.3 5.3* 3.5   CHLORIDE mmol/L 113* 106 100   CO2 mmol/L 18.0* 18.0* 14.0*   BUN mg/dL 33* 31* 28*   CREATININE mg/dL 1.11* 1.21* 1.20*   GLUCOSE mg/dL 84 82 109*   CALCIUM mg/dL 7.9* 7.6* 8.6   BILIRUBIN mg/dL 3.5* 1.9* 1.8*   ALK PHOS U/L 194* 222* 199*   ALT (SGPT) U/L 110* 72* 30   AST (SGOT) U/L 1,347* 1,829* 666*   ANION GAP mmol/L 9.0 13.0 18.0*     Estimated Creatinine Clearance: 46.4 mL/min (by C-G formula based on SCr of 1.11 mg/dL (H)).    Results from last 7 days  Lab Units 05/07/18  1056    MAGNESIUM mg/dL 2.2           Results from last 7 days  Lab Units 05/09/18  0541 05/08/18  0203 05/07/18  1122   WBC 10*3/mm3 23.37* 30.37* 24.14*   HEMOGLOBIN g/dL 8.5* 6.7* 7.8*   HEMATOCRIT % 27.0* 21.3* 25.1*   PLATELETS 10*3/mm3 25* 32* 44*       Results from last 7 days  Lab Units 05/07/18  1122   INR  1.68*       Culture Data:   No results found for: BLOODCX  No results found for: URINECX  No results found for: RESPCX  No results found for: WOUNDCX  No results found for: STOOLCX  No components found for: BODYFLD    Radiology Data:   Imaging Results (last 24 hours)     Procedure Component Value Units Date/Time    CT Head With & Without Contrast [[id number]] Collected:  05/12/18 0901     Updated:  05/12/18 0932    Narrative:       Noncontrast CT examination of the brain.    INDICATION: Headache.       Technique: Axial 5 mm contiguous images with brain parenchymal  and bone windows    This exam was performed according to our departmental  dose-optimization program, which includes automated exposure  control, adjustment of the mA and/or kV according to patient size  and/or use of iterative reconstruction technique.    Prior relevant examination: None.    Brain parenchyma appears within normal limits. Ventricles are  within normal limits in size. No evidence of abnormal mass or  calcification is seen. No evidence of acute hemorrhage is noted.  Bony structures appear within normal limits and the mastoid air  cells and visualized paranasal sinuses are normally aerated.        Impression:       CONCLUSION:   1. Normal brain for age . No acute changes are present.    Electronically signed by:  Dariusz Lacy MD  5/12/2018 9:31 AM CDT  Workstation: [id number]          I have reviewed the patient current medications.     Assessment/Plan     Hospital Problem List     * (Principal)Sepsis    Bone metastasis    PAF (paroxysmal atrial fibrillation)    Acute kidney injury    UTI (urinary tract infection)    Metastatic breast  cancer (Chronic)    Anemia (Chronic)    Hydronephrosis (Chronic)    Liver metastasis (Chronic)          Plan:   67-year-old white female with widely metastatic breast cancer, paroxysmal atrial fibrillation here with..     #1 septic shock due to Enterococcus faecalis urinary tract infection  #2 E faecalis urinary tract infection  #3 acute renal failure due to #1 from acute tubular necrosis  #4 paroxysmal of atrial fibrillation with RVR status post conversion  #5 cancer pain     Patient family met with hospice 5/10 and the current plan is to discharge home with family on Monday.  The family has made arrangements to accommodate her in their home.  Continue managing pain and nausea.               Discharge Planning: I expect patient to be discharged to home with hospice in 2 days.    Parag Claudio MD  05/12/18  12:46 PM       Recurrence of atrial fibrillation with RVR last night.  Also CT of the brain performed in evaluation of diplopia and especially out of concern for metastatic disease.  Contrasted CT revealed no abnormality.

## 2018-05-13 NOTE — NURSING NOTE
Spoke with Dr. Sorto regarding pt low blood pressure, low body temperature and nausea. Dr. Sorto stated, pt is to go home with hospice care related to metstatic cancer. Try to make pt comfortable at this time.

## 2018-05-13 NOTE — PROGRESS NOTES
"   LOS: 6 days   Patient Care Team:  Joey Ramos MD as PCP - General (Hematology and Oncology)  Joey Ramos MD as PCP - Claims Attributed  Petty Rosenberg, RN as Care Coordinator (Population Health)  Joey Ramos MD as Consulting Physician (Hematology and Oncology)    Subjective     Subjective:  Symptoms:  No shortness of breath.  (Nausea and vomiting, family at bedside. ).    Diet:  She reports  nausea and vomiting.    Pain:  She complains of pain that is moderate.  Pain is requiring pain medication.        History taken from: patient chart family RN    Objective     Vital Signs  Temp:  [92.1 °F (33.4 °C)-96.7 °F (35.9 °C)] 92.1 °F (33.4 °C)  Heart Rate:  [] 72  Resp:  [18-22] 22  BP: ()/(45-81) 120/58    Objective:  General Appearance:  Ill-appearing.    Vital signs: (most recent): Blood pressure 120/58, pulse 72, temperature 92.1 °F (33.4 °C), temperature source Axillary, resp. rate 22, height 157.5 cm (62\"), weight 79.4 kg (175 lb), SpO2 100 %, not currently breastfeeding.  Vital signs are normal.  No fever.    Output: Producing urine.    HEENT: Normal HEENT exam.    Lungs:  Normal effort and normal respiratory rate.  She is not in respiratory distress.  No stridor.  There are decreased breath sounds.  No rales.    Heart: Normal rate.  Regular rhythm.  S1 normal and S2 normal.    Abdomen: Abdomen is soft and non-distended.  Bowel sounds are normal.   There is generalized tenderness.     Extremities: Normal range of motion.  There is no dependent edema.    Pulses: Distal pulses are intact.    Neurological: Patient is alert and oriented to person, place and time.  GCS score is 15.    Pupils:  Pupils are equal, round, and reactive to light.    Skin:  Warm, dry and pale.  No ecchymosis or cyanosis.             Results Review:    Lab Results (last 24 hours)     Procedure Component Value Units Date/Time    Blood Culture - Blood, [[id number]]  (Normal) Collected:  05/07/18 6615    Specimen:  Blood from " Arm, Right Updated:  05/12/18 1614     Blood Culture No growth at 5 days         Imaging Results (last 24 hours)     ** No results found for the last 24 hours. **           I reviewed the patient's new clinical results.  I reviewed the patient's new imaging results and agree with the interpretation.  I reviewed the patient's other test results and agree with the interpretation      Assessment/Plan     Principal Problem:    Sepsis  Active Problems:    Bone metastasis    PAF (paroxysmal atrial fibrillation)    Acute kidney injury    UTI (urinary tract infection)    Metastatic breast cancer    Anemia    Hydronephrosis    Liver metastasis      Assessment & Plan     1. Hydronephrosis, RIGHT, chronic, related to obstruction from metastatic breast cancer  --Postop cystoscopy RIGHT retrograde with J-stent change on 4/18/18  --CT abdomen/pelvis shows chronic right hydronephrosis, metastases more pronounced     2. Sepsis, unknown source, possible UTI  --Urine culture and blood cultures negative thus far, previous urine culture enterococcus faecalis  --Azactam and Levaquin day #7     3. ADRIANA  -urine output 900 mL previous 24 hours.      Plan: J-stent remains, pain control, anti-emetics.     FADI Estes  05/13/18  12:34 PM

## 2018-05-13 NOTE — NURSING NOTE
Warm blankets placed on pt related to low body temp. Pt removing blankets, states it makes her feel hot. Dr. Sorto paged.

## 2018-05-13 NOTE — PLAN OF CARE
Problem: Patient Care Overview  Goal: Plan of Care Review   05/13/18 0304   Coping/Psychosocial   Plan of Care Reviewed With patient;daughter   Plan of Care Review   Progress declining   OTHER   Outcome Summary Pt awake throughout the night; complains of nausea; medication, cool cloth applied; blood pressure obtained, hypotensive, MD aware, pt plan to go home on hospice care; no falls, pt turned, barrier cream applied, educated on importance of turning to prevent skin breakdown; monitoring pt      Goal: Individualization and Mutuality  Outcome: Ongoing (interventions implemented as appropriate)    Goal: Discharge Needs Assessment  Outcome: Ongoing (interventions implemented as appropriate)      Problem: Skin Injury Risk (Adult)  Goal: Identify Related Risk Factors and Signs and Symptoms  Outcome: Ongoing (interventions implemented as appropriate)    Goal: Skin Health and Integrity  Outcome: Ongoing (interventions implemented as appropriate)      Problem: Pain, Acute (Adult)  Goal: Acceptable Pain Control/Comfort Level  Outcome: Ongoing (interventions implemented as appropriate)      Problem: Sepsis/Septic Shock (Adult)  Goal: Signs and Symptoms of Listed Potential Problems Will be Absent, Minimized or Managed (Sepsis/Septic Shock)  Outcome: Ongoing (interventions implemented as appropriate)

## 2018-05-13 NOTE — NURSING NOTE
Pt declining. Called Pt's POA Palak Bateman to inform them of the pts decline.Palak states she wanted to make the pt comfort measures. Called Dr. Claudio to inform of the decline and POA's Orders. Dr. Claudio gave the order to make pt comfort measures. Tenisha and I called the Palak Bateman to get a two nurse verification of the POA's wishes.

## 2018-05-13 NOTE — PROGRESS NOTES
[facility]  INPATIENT PROGRESS NOTE    Length of Stay: 6  Date of Admission: 5/7/2018  Primary Care Physician: Joey Ramos MD    Subjective   Chief Complaint: nausea  HPI:   Per RN report, interval development of hypothermia, tachycardia and hypotension, as well as vomiting and AMS. Pt describes 2/10 pain, as well as N/V without abd pain.     Review of Systems   Denies Muhammad, chest pain, diarrhea, rash  All pertinent negatives and positives are as above. All other systems have been reviewed and are negative unless otherwise stated.     Objective    Temp:  [92.1 °F (33.4 °C)-96.7 °F (35.9 °C)] 92.1 °F (33.4 °C)  Heart Rate:  [] 72  Resp:  [18-22] 22  BP: ()/(45-81) 120/58    Physical Exam    Gen: Uncomfortable appearing; moderate distress due to nausea vomiting, dyspnea, pain; alert to person and place  HEENT: oral and nasal mucosa without erythema or exudate  Neck: supple/NT; no LAD or bruits; No elevation of JVP  Lungs: Rhonchi B; symmetric rise and fall of the chest  Cv: Tachycardic rate RR, no m/r/g; no S3  Abd: S/ND, Bs+; noted tenderness  Extremeties: No c/c; noted edema times all 4 extremities 1+  Skin: no rash, ecchymosis, skin breakdown; warm dry skin  Neuro: EOMI;no focal motor weakness  Psych: Sad affect         Results Review:  I have reviewed the labs, radiology results, and diagnostic studies.    Laboratory Data:     Results from last 7 days  Lab Units 05/09/18  0541 05/08/18  0203 05/07/18  1122   SODIUM mmol/L 140 137 132*   POTASSIUM mmol/L 4.3 5.3* 3.5   CHLORIDE mmol/L 113* 106 100   CO2 mmol/L 18.0* 18.0* 14.0*   BUN mg/dL 33* 31* 28*   CREATININE mg/dL 1.11* 1.21* 1.20*   GLUCOSE mg/dL 84 82 109*   CALCIUM mg/dL 7.9* 7.6* 8.6   BILIRUBIN mg/dL 3.5* 1.9* 1.8*   ALK PHOS U/L 194* 222* 199*   ALT (SGPT) U/L 110* 72* 30   AST (SGOT) U/L 1,347* 1,829* 666*   ANION GAP mmol/L 9.0 13.0 18.0*     Estimated Creatinine Clearance: 46.7  mL/min (by C-G formula based on SCr of 1.11 mg/dL (H)).    Results from last 7 days  Lab Units 05/07/18  1122   MAGNESIUM mg/dL 2.2           Results from last 7 days  Lab Units 05/09/18  0541 05/08/18  0203 05/07/18  1122   WBC 10*3/mm3 23.37* 30.37* 24.14*   HEMOGLOBIN g/dL 8.5* 6.7* 7.8*   HEMATOCRIT % 27.0* 21.3* 25.1*   PLATELETS 10*3/mm3 25* 32* 44*       Results from last 7 days  Lab Units 05/07/18  1122   INR  1.68*       Culture Data:   No results found for: BLOODCX  No results found for: URINECX  No results found for: RESPCX  No results found for: WOUNDCX  No results found for: STOOLCX  No components found for: BODYFLD    Radiology Data:   Imaging Results (last 24 hours)     ** No results found for the last 24 hours. **          I have reviewed the patient current medications.     Assessment/Plan     Hospital Problem List     * (Principal)Sepsis    Bone metastasis    PAF (paroxysmal atrial fibrillation)    Acute kidney injury    UTI (urinary tract infection)    Metastatic breast cancer (Chronic)    Anemia (Chronic)    Hydronephrosis (Chronic)    Liver metastasis (Chronic)          Plan:  67-year-old white female with widely metastatic breast cancer, paroxysmal atrial fibrillation here with..     #1 septic shock due to Enterococcus faecalis urinary tract infection  #2 E faecalis urinary tract infection  #3 acute renal failure due to #1 from acute tubular necrosis  #4 paroxysmal of atrial fibrillation with RVR status post conversion  #5 cancer pain     Patient family met with hospice 5/10 and the current plan is to discharge home with family on Monday.  The family has made arrangements to accommodate her in their home.  Continue managing pain and nausea.     East discussion today with patient and family regarding plan of care.  Patient prioritizing comfort over survival at this time.  She would like to continue with antibiotics and other supportive therapies, but stop short of intubation, chest compressions,  pressors.          Recurrence of sepsis evident, will add levofloxacin to aztreonam in this PCN allergic patient .        Discharge Planning: I expect patient to be discharged to home with hospice in 1 days.    Parag Claudio MD  05/13/18  10:29 AM

## 2018-05-24 ENCOUNTER — APPOINTMENT (OUTPATIENT)
Dept: ONCOLOGY | Facility: HOSPITAL | Age: 68
End: 2018-05-24

## 2018-10-23 ENCOUNTER — CLINICAL SUPPORT NO REQUIREMENTS (OUTPATIENT)
Dept: ONCOLOGY | Facility: CLINIC | Age: 68
End: 2018-10-23

## 2018-10-23 DIAGNOSIS — C78.7 LIVER METASTASIS: Primary | Chronic | ICD-10-CM
